# Patient Record
Sex: MALE | Race: WHITE | NOT HISPANIC OR LATINO | Employment: OTHER | URBAN - METROPOLITAN AREA
[De-identification: names, ages, dates, MRNs, and addresses within clinical notes are randomized per-mention and may not be internally consistent; named-entity substitution may affect disease eponyms.]

---

## 2017-04-25 ENCOUNTER — GENERIC CONVERSION - ENCOUNTER (OUTPATIENT)
Dept: OTHER | Facility: OTHER | Age: 67
End: 2017-04-25

## 2017-05-11 ENCOUNTER — GENERIC CONVERSION - ENCOUNTER (OUTPATIENT)
Dept: OTHER | Facility: OTHER | Age: 67
End: 2017-05-11

## 2017-05-22 ENCOUNTER — ALLSCRIPTS OFFICE VISIT (OUTPATIENT)
Dept: OTHER | Facility: OTHER | Age: 67
End: 2017-05-22

## 2017-11-28 ENCOUNTER — ALLSCRIPTS OFFICE VISIT (OUTPATIENT)
Dept: OTHER | Facility: OTHER | Age: 67
End: 2017-11-28

## 2017-11-28 ENCOUNTER — APPOINTMENT (OUTPATIENT)
Dept: LAB | Facility: HOSPITAL | Age: 67
End: 2017-11-28
Payer: MEDICARE

## 2017-11-28 DIAGNOSIS — R97.20 ELEVATED PROSTATE SPECIFIC ANTIGEN (PSA): ICD-10-CM

## 2017-11-28 DIAGNOSIS — R35.0 FREQUENCY OF MICTURITION: ICD-10-CM

## 2017-11-28 DIAGNOSIS — J20.9 ACUTE BRONCHITIS: ICD-10-CM

## 2017-11-28 LAB
BACTERIA UR QL AUTO: ABNORMAL /HPF
BILIRUB UR QL STRIP: NEGATIVE
BILIRUB UR QL STRIP: NEGATIVE
CLARITY UR: CLEAR
CLARITY UR: NORMAL
COLOR UR: YELLOW
COLOR UR: YELLOW
GLUCOSE (HISTORICAL): NEGATIVE
GLUCOSE UR STRIP-MCNC: NEGATIVE MG/DL
HGB UR QL STRIP.AUTO: ABNORMAL
HGB UR QL STRIP.AUTO: POSITIVE
HYALINE CASTS #/AREA URNS LPF: ABNORMAL /LPF
KETONES UR STRIP-MCNC: NEGATIVE MG/DL
KETONES UR STRIP-MCNC: NEGATIVE MG/DL
LEUKOCYTE ESTERASE UR QL STRIP: ABNORMAL
LEUKOCYTE ESTERASE UR QL STRIP: NEGATIVE
NITRITE UR QL STRIP: NEGATIVE
NITRITE UR QL STRIP: NEGATIVE
NON-SQ EPI CELLS URNS QL MICRO: ABNORMAL /HPF
PH UR STRIP.AUTO: 5 [PH]
PH UR STRIP.AUTO: 6 [PH] (ref 4.5–8)
PROT UR STRIP-MCNC: 30 MG/DL
PROT UR STRIP-MCNC: ABNORMAL MG/DL
RBC #/AREA URNS AUTO: ABNORMAL /HPF
SP GR UR STRIP.AUTO: 1.01
SP GR UR STRIP.AUTO: 1.02 (ref 1–1.03)
UROBILINOGEN UR QL STRIP.AUTO: 0.2
UROBILINOGEN UR QL STRIP.AUTO: 1 E.U./DL
WBC #/AREA URNS AUTO: ABNORMAL /HPF

## 2017-11-28 PROCEDURE — 81001 URINALYSIS AUTO W/SCOPE: CPT

## 2017-11-28 PROCEDURE — 87086 URINE CULTURE/COLONY COUNT: CPT

## 2017-11-29 NOTE — PROGRESS NOTES
Assessment    1  Pharyngitis (462) (J02 9)   2  Acute bronchitis (466 0) (J20 9)   3  Frequent urination (788 41) (R35 0)   4  Elevated PSA (790 93) (R97 20)    Plan  Acute bronchitis    · Promethazine-Codeine 6 25-10 MG/5ML Oral Syrup; TAKE 5 ML EVERY 4 TO 6HOURS AS NEEDED FOR COUGH   · * XR CHEST PA & LATERAL; Status:Active; Requested for:28Nov2017;   Elevated PSA    · (1) PSA, DIAGNOSTIC (FOLLOW-UP); Status:Active; Requested for:29Nov2017;   Frequent urination    · (1) CBC/PLT/DIFF; Status:Active; Requested for:29Nov2017;    · (1) COMPREHENSIVE METABOLIC PANEL; Status:Active; Requested for:29Nov2017;    · (1) URINALYSIS WITH MICROSCOPIC; Status:Active; Requested for:28Nov2017;    · (1) URINE CULTURE; Source:Urine, Clean Catch; Status:Active; Requestedfor:28Nov2017;    · Urine Dip Non-Automated- POC; Status:Complete;   Done: [id number] 04:32PM    Discussion/Summary    Strep screen negative here today - urine dip is fine other than some blood, we will  a full urinalysis along with culture regarding urinary frequency and slight burning, he has seen Urology in the past, has not seen them in a few years, after discussion he will redo PSA along with other blood work, do this fasting  We discussed with his symptoms this appears viral, he can use codeine cough medicine, this should help with cough at night along with some soothing for his throat, he does have ulcerations pharyngeal  He can use ibuprofen 600 mg every 6 hours with food as needed watching for stomach upset  Increase fluids, do deep breathing  If he would develop a fever / temperature 100Â° or above, increased cough or worsening he should call, I did give slip to do chest x-ray if he does worsen or if cough continues x1 week  will see Cardiology again in about a month  He should set up a Medicare physical with us at his convenience        Chief Complaint  sore throat about 4 days ago, productive cough, head cold      History of Present Illness  HPI: sore throat w deep productive cough x 4 days - some upper congestion  urinary freq last night- ' peed 11 times' - very minimal discomfort w urinationfeeling ok prior to this- 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 Cardiology back in April - History of neurocardiogenic syncope, none recent  Has had no blood work recently  Relates otherwise has been feeling as at baseline  Regarding medication he does use his aspirin, lovastatin  He has only been using metoprolol tartrate 25 once daily  saw Urology ? 3 yrs ago- past elevated PSA w neg biopsy by hx - no PSA since      Review of Systems   Constitutional: as noted in HPI,-- no fever,-- no recent weight gain-- and-- no recent weight loss  ENT: sore throat, but-- as noted in HPI,-- no earache-- and-- no nosebleeds  Cardiovascular: past hx palps- no worse, but-- the heart rate was not slow,-- no chest pain,-- the heart rate was not fast-- and-- no lower extremity edema  Respiratory: cough, but-- as noted in HPI,-- no shortness of breath,-- no orthopnea-- and-- no wheezing  Gastrointestinal: no change bowel, but-- no abdominal pain,-- no nausea,-- no vomiting-- and-- no blood in stools  Genitourinary: as noted in HPI  Neurological: as noted in HPI,-- no headache,-- no confusion,-- no dizziness-- and-- no fainting  Active Problems  1  Abdominal hernia (553 9) (K46 9)   2  Benign essential hypertension (401 1) (I10)   3  Coccydynia (724 79) (M53 3)   4  Elevated PSA (790 93) (R97 20)   5  Hypercholesterolemia (272 0) (E78 00)   6  Insomnia (780 52) (G47 00)    Past Medical History  1  History of SVT (supraventricular tachycardia) (427 89) (I47 1)  Active Problems And Past Medical History Reviewed: The active problems and past medical history were reviewed and updated today  Family History  Mother    1  Family history of Alzheimer disease   2  Family history of Cancer of esophagus  Father    3  Family history of Diabetes  Sister    4  Family history of Diabetes   5  Family history of Kidney failure    Social History   · Full-time employment   ·    ·    · Never a smoker   · No alcohol use   · No drug use  The social history was reviewed and updated today  Surgical History  1  History of Complete Colonoscopy   2  History of Inguinal Hernia Repair  Surgical History Reviewed: The surgical history was reviewed and updated today  Current Meds   1  Aspirin 81 MG TABS; Take 1 tablet daily Recorded   2  Lovastatin 40 MG Oral Tablet; TAKE 1 TABLET AT BEDTIME Recorded   3  Metoprolol Tartrate 25 MG Oral Tablet; Take 1 tablet twice daily Recorded   4  Simply Sleep TABS; TAKE 1 TABLET AT BEDTIME Recorded    The medication list was reviewed and updated today  Allergies  1  No Known Drug Allergies    Vitals   Recorded: 66YWB2214 03:46PM   Temperature 97 6 F   Heart Rate 74   Systolic 761   Diastolic 74   Height 6 ft    Weight 236 lb    BMI Calculated 32 01   BSA Calculated 2 29       Physical Exam   Constitutional Afebrile, seated no acute distress other than co   Eyes  Conjunctiva and lids: No swelling, erythema, or discharge  -- no pallor, no icterus  Ears, Nose, Mouth, and Throat  Otoscopic examination: Tympanic membrance translucent with normal light reflex  Canals patent without erythema  -- moderate inflammation with redness/ ulcerations uvula, upper right palate, pharyngeal, no exudate  Pulmonary  Auscultation of lungs: Clear to auscultation, equal breath sounds bilaterally, no wheezes, no rales, no rhonci  Cardiovascular regular rate and rhythm, 1/6 systolic ejection murmur left sternal border  -- no ankle edema  Lymphatic  Palpation of lymph nodes in neck: No lymphadenopathy     Psychiatric  Orientation to person, place and time: Normal    Mood and affect: Normal          Results/Data  Urine Dip Non-Automated- POC [id number] 04:32PM Verline Mace     Test Name Result Flag Reference   Color Yellow       Clarity Transparent     Leukocytes negative     Nitrite negative     Blood positive     Bilirubin negative     Urobilinogen 0 2     Protein 30     Ph 5 0     Specific Gravity 1 015     Ketone negative     Glucose negative           Signatures   Electronically signed by : Nawaf Logan DO; Nov 28 2017  5:27PM EST                       (Author)

## 2017-11-30 ENCOUNTER — APPOINTMENT (OUTPATIENT)
Dept: RADIOLOGY | Facility: MEDICAL CENTER | Age: 67
End: 2017-11-30
Payer: MEDICARE

## 2017-11-30 ENCOUNTER — GENERIC CONVERSION - ENCOUNTER (OUTPATIENT)
Dept: OTHER | Facility: OTHER | Age: 67
End: 2017-11-30

## 2017-11-30 ENCOUNTER — APPOINTMENT (OUTPATIENT)
Dept: LAB | Facility: MEDICAL CENTER | Age: 67
End: 2017-11-30
Payer: MEDICARE

## 2017-11-30 ENCOUNTER — TRANSCRIBE ORDERS (OUTPATIENT)
Dept: ADMINISTRATIVE | Facility: HOSPITAL | Age: 67
End: 2017-11-30

## 2017-11-30 DIAGNOSIS — J20.9 ACUTE BRONCHITIS: ICD-10-CM

## 2017-11-30 DIAGNOSIS — R35.0 FREQUENCY OF MICTURITION: ICD-10-CM

## 2017-11-30 DIAGNOSIS — R97.20 ELEVATED PROSTATE SPECIFIC ANTIGEN (PSA): ICD-10-CM

## 2017-11-30 LAB
ALBUMIN SERPL BCP-MCNC: 3.6 G/DL (ref 3.5–5)
ALP SERPL-CCNC: 87 U/L (ref 46–116)
ALT SERPL W P-5'-P-CCNC: 19 U/L (ref 12–78)
ANION GAP SERPL CALCULATED.3IONS-SCNC: 7 MMOL/L (ref 4–13)
AST SERPL W P-5'-P-CCNC: 15 U/L (ref 5–45)
BACTERIA UR CULT: NORMAL
BASOPHILS # BLD AUTO: 0.04 THOUSANDS/ΜL (ref 0–0.1)
BASOPHILS NFR BLD AUTO: 0 % (ref 0–1)
BILIRUB SERPL-MCNC: 0.96 MG/DL (ref 0.2–1)
BUN SERPL-MCNC: 17 MG/DL (ref 5–25)
CALCIUM SERPL-MCNC: 9.3 MG/DL (ref 8.3–10.1)
CHLORIDE SERPL-SCNC: 103 MMOL/L (ref 100–108)
CO2 SERPL-SCNC: 28 MMOL/L (ref 21–32)
CREAT SERPL-MCNC: 1.09 MG/DL (ref 0.6–1.3)
EOSINOPHIL # BLD AUTO: 0.45 THOUSAND/ΜL (ref 0–0.61)
EOSINOPHIL NFR BLD AUTO: 4 % (ref 0–6)
ERYTHROCYTE [DISTWIDTH] IN BLOOD BY AUTOMATED COUNT: 13 % (ref 11.6–15.1)
GFR SERPL CREATININE-BSD FRML MDRD: 70 ML/MIN/1.73SQ M
GLUCOSE P FAST SERPL-MCNC: 99 MG/DL (ref 65–99)
HCT VFR BLD AUTO: 44.7 % (ref 36.5–49.3)
HGB BLD-MCNC: 15.2 G/DL (ref 12–17)
LYMPHOCYTES # BLD AUTO: 1.56 THOUSANDS/ΜL (ref 0.6–4.47)
LYMPHOCYTES NFR BLD AUTO: 13 % (ref 14–44)
MCH RBC QN AUTO: 30.6 PG (ref 26.8–34.3)
MCHC RBC AUTO-ENTMCNC: 34 G/DL (ref 31.4–37.4)
MCV RBC AUTO: 90 FL (ref 82–98)
MONOCYTES # BLD AUTO: 1.4 THOUSAND/ΜL (ref 0.17–1.22)
MONOCYTES NFR BLD AUTO: 12 % (ref 4–12)
NEUTROPHILS # BLD AUTO: 8.19 THOUSANDS/ΜL (ref 1.85–7.62)
NEUTS SEG NFR BLD AUTO: 71 % (ref 43–75)
NRBC BLD AUTO-RTO: 0 /100 WBCS
PLATELET # BLD AUTO: 218 THOUSANDS/UL (ref 149–390)
PMV BLD AUTO: 11.2 FL (ref 8.9–12.7)
POTASSIUM SERPL-SCNC: 4.1 MMOL/L (ref 3.5–5.3)
PROT SERPL-MCNC: 7.6 G/DL (ref 6.4–8.2)
PSA SERPL-MCNC: 5.4 NG/ML (ref 0–4)
RBC # BLD AUTO: 4.96 MILLION/UL (ref 3.88–5.62)
SODIUM SERPL-SCNC: 138 MMOL/L (ref 136–145)
WBC # BLD AUTO: 11.66 THOUSAND/UL (ref 4.31–10.16)

## 2017-11-30 PROCEDURE — 71020 HB CHEST X-RAY 2VW FRONTAL&LATL: CPT

## 2017-11-30 PROCEDURE — 84153 ASSAY OF PSA TOTAL: CPT

## 2017-11-30 PROCEDURE — 36415 COLL VENOUS BLD VENIPUNCTURE: CPT

## 2017-11-30 PROCEDURE — 85025 COMPLETE CBC W/AUTO DIFF WBC: CPT

## 2017-11-30 PROCEDURE — 80053 COMPREHEN METABOLIC PANEL: CPT

## 2017-12-21 ENCOUNTER — GENERIC CONVERSION - ENCOUNTER (OUTPATIENT)
Dept: OTHER | Facility: OTHER | Age: 67
End: 2017-12-21

## 2018-01-09 NOTE — RESULT NOTES
Verified Results  * XR CHEST PA & LATERAL [id number] 09:12AM [facility] Order Number: [id number]   Performing Comments: do chest xray if any worse or if cough past 1 week     Test Name Result Flag Reference   XR CHEST PA & LATERAL (Report)     CHEST      INDICATION: Productive cough     COMPARISON: None     VIEWS: Frontal and lateral projections     IMAGES: 3     FINDINGS:        Cardiomediastinal silhouette appears unremarkable  There is minimal subsegmental atelectasis or scar, left base  Lungs otherwise clear  Pulmonary vascularity normal  No pleural effusion, no pneumothorax  Visualized osseous structures appear within normal limits for the patient's age  IMPRESSION:     No active pulmonary disease         Workstation performed: [id number]     Signed by:   Cesar Larson MD   11/30/17     (1) URINALYSIS WITH MICROSCOPIC [id number] 09:29PM [facility] Order Number: [id number]     Test Name Result Flag Reference   COLOR Yellow     CLARITY Clear     PH UA 6 0  4 5-8 0   LEUKOCYTE ESTERASE UA Trace A Negative   NITRITE UA Negative  Negative   PROTEIN UA 30 (1+) mg/dl A Negative   GLUCOSE UA Negative mg/dl  Negative   KETONES UA Negative mg/dl  Negative   UROBILINOGEN UA 1 0 E U /dl  0 2, 1 0 E U /dl   BILIRUBIN UA Negative  Negative   BLOOD UA Trace A Negative   SPECIFIC GRAVITY UA 1 019  1 003-1 030   WBC UA None Seen /hpf  None Seen, 0-5, 5-55, 5-65   RBC UA 4-10 /hpf A None Seen, 0-5   HYALINE CASTS None Seen /lpf  None Seen   BACTERIA None Seen /hpf  None Seen, Occasional   EPITHELIAL CELLS None Seen /hpf  None Seen, Occasional     (1) URINE CULTURE 28Nov2017 09:29PM [facility] Order Number: [id number]     Test Name Result Flag Reference   CLINICAL REPORT (Report)     Test:        Urine culture  Specimen Type:   Urine  Specimen Date:   11/28/2017 9:29 PM  Result Date:    11/30/2017 8:15 AM  Result Status:   Final result  Resulting Lab:   [facility] [id number] [facility]            Tel: [phone]      CULTURE                                       ------------------                                   No Growth <1000 cfu/mL

## 2018-01-10 NOTE — RESULT NOTES
Verified Results  (1) CBC/PLT/DIFF [id number] 09:26AM Kaley Escobedo Order Number: [id number]     Test Name Result Flag Reference   WBC COUNT 11 66 Thousand/uL H 4 31-10 16   RBC COUNT 4 96 Million/uL  3 88-5 62   HEMOGLOBIN 15 2 g/dL  12 0-17 0   HEMATOCRIT 44 7 %  36 5-49 3   MCV 90 fL  82-98   MCH 30 6 pg  26 8-34 3   MCHC 34 0 g/dL  31 4-37 4   RDW 13 0 %  11 6-15 1   MPV 11 2 fL  8 9-12 7   PLATELET COUNT 339 Thousands/uL  149-390   nRBC AUTOMATED 0 /100 WBCs     NEUTROPHILS RELATIVE PERCENT 71 %  43-75   LYMPHOCYTES RELATIVE PERCENT 13 % L 14-44   MONOCYTES RELATIVE PERCENT 12 %  4-12   EOSINOPHILS RELATIVE PERCENT 4 %  0-6   BASOPHILS RELATIVE PERCENT 0 %  0-1   NEUTROPHILS ABSOLUTE COUNT 8 19 Thousands/? ??L H 1 85-7 62   LYMPHOCYTES ABSOLUTE COUNT 1 56 Thousands/? ??L  0 60-4 47   MONOCYTES ABSOLUTE COUNT 1 40 Thousand/? ??L H 0 17-1 22   EOSINOPHILS ABSOLUTE COUNT 0 45 Thousand/? ??L  0 00-0 61   BASOPHILS ABSOLUTE COUNT 0 04 Thousands/? ??L  0 00-0 10     (1) COMPREHENSIVE METABOLIC PANEL [id number] 76:11HE Kaley Escobedo Order Number: [id number]     Test Name Result Flag Reference   SODIUM 138 mmol/L  136-145   POTASSIUM 4 1 mmol/L  3 5-5 3   CHLORIDE 103 mmol/L  100-108   CARBON DIOXIDE 28 mmol/L  21-32   ANION GAP (CALC) 7 mmol/L  4-13   BLOOD UREA NITROGEN 17 mg/dL  5-25   CREATININE 1 09 mg/dL  0 60-1 30   Standardized to IDMS reference method   CALCIUM 9 3 mg/dL  8 3-10 1   BILI, TOTAL 0 96 mg/dL  0 20-1 00   ALK PHOSPHATAS 87 U/L     ALT (SGPT) 19 U/L  12-78   Specimen collection should occur prior to Sulfasalazine and/or Sulfapyridine administration due to the potential for falsely depressed results  AST(SGOT) 15 U/L  5-45   Specimen collection should occur prior to Sulfasalazine administration due to the potential for falsely depressed results     ALBUMIN 3 6 g/dL  3 5-5 0   TOTAL PROTEIN 7 6 g/dL  6 4-8 2   eGFR 70 ml/min/1 73sq m     National Kidney Disease Education Program recommendations are as follows:  GFR calculation is accurate only with a steady state creatinine  Chronic Kidney disease less than 60 ml/min/1 73 sq  meters  Kidney failure less than 15 ml/min/1 73 sq  meters  GLUCOSE FASTING 99 mg/dL  65-99   Specimen collection should occur prior to Sulfasalazine administration due to the potential for falsely depressed results  Specimen collection should occur prior to Sulfapyridine administration due to the potential for falsely elevated results  (1) PSA, DIAGNOSTIC (FOLLOW-UP) [id number] 09:26AM Neva Giraldo Order Number: [id number]     Test Name Result Flag Reference   PSA 5 4 ng/mL H 0 0-4 0   American Urological Association Guidelines define biochemical recurrence of prostate cancer as a detectable or rising PSA value post-radical prostatectomy that is greater than or equal to 0 2 ng/mL with a second confirmatory level of greater than or equal to 0 2 ng/mL

## 2018-01-13 VITALS
HEART RATE: 38 BPM | WEIGHT: 236.05 LBS | BODY MASS INDEX: 31.97 KG/M2 | HEIGHT: 72 IN | RESPIRATION RATE: 20 BRPM | DIASTOLIC BLOOD PRESSURE: 40 MMHG | SYSTOLIC BLOOD PRESSURE: 140 MMHG

## 2018-01-13 VITALS
TEMPERATURE: 97.6 F | HEART RATE: 74 BPM | HEIGHT: 72 IN | DIASTOLIC BLOOD PRESSURE: 74 MMHG | BODY MASS INDEX: 31.97 KG/M2 | WEIGHT: 236 LBS | SYSTOLIC BLOOD PRESSURE: 136 MMHG

## 2018-01-24 VITALS
OXYGEN SATURATION: 97 % | TEMPERATURE: 97.5 F | BODY MASS INDEX: 31.88 KG/M2 | DIASTOLIC BLOOD PRESSURE: 70 MMHG | HEIGHT: 72 IN | SYSTOLIC BLOOD PRESSURE: 128 MMHG | WEIGHT: 235.38 LBS | HEART RATE: 56 BPM

## 2018-05-22 RX ORDER — PROMETHAZINE HYDROCHLORIDE AND CODEINE PHOSPHATE 6.25; 1 MG/5ML; MG/5ML
5 SYRUP ORAL
COMMUNITY
Start: 2017-11-28 | End: 2018-05-23

## 2018-05-22 RX ORDER — PREDNISONE 10 MG/1
TABLET ORAL
COMMUNITY
Start: 2017-12-21 | End: 2018-05-23

## 2018-05-22 RX ORDER — AMOXICILLIN 875 MG/1
1 TABLET, COATED ORAL EVERY 12 HOURS
COMMUNITY
Start: 2017-11-30 | End: 2018-05-23

## 2018-05-22 RX ORDER — LOVASTATIN 40 MG/1
1 TABLET ORAL
COMMUNITY

## 2018-05-23 ENCOUNTER — OFFICE VISIT (OUTPATIENT)
Dept: FAMILY MEDICINE CLINIC | Facility: CLINIC | Age: 68
End: 2018-05-23
Payer: MEDICARE

## 2018-05-23 VITALS
RESPIRATION RATE: 18 BRPM | HEART RATE: 80 BPM | SYSTOLIC BLOOD PRESSURE: 130 MMHG | BODY MASS INDEX: 31.81 KG/M2 | TEMPERATURE: 98.5 F | HEIGHT: 73 IN | WEIGHT: 240 LBS | DIASTOLIC BLOOD PRESSURE: 78 MMHG

## 2018-05-23 DIAGNOSIS — M65.30 TRIGGER FINGER OF RIGHT HAND, UNSPECIFIED FINGER: Primary | ICD-10-CM

## 2018-05-23 PROCEDURE — 99213 OFFICE O/P EST LOW 20 MIN: CPT | Performed by: FAMILY MEDICINE

## 2018-05-23 NOTE — PATIENT INSTRUCTIONS
Trigger finger of right hand, unspecified finger    Other orders  -     Ambulatory referral to Sports Medicine; Future      After discussion with the patient we have left off at 2 options, we will see if we can get him in with Dr Asif Oconnor on Friday who could try to inject his trigger fingers on his 2nd and 5th fingers of the right hand, if we are unable to get him onto that schedule I have also given him a referral to Sports Medicine, I would advise him to call Sports Medicine in the when their earliest available appointment might be and if his symptoms are worsening he may need to see the orthopedic hand surgeon who is associated with [facility]      In the meantime it would be okay to take an anti-inflammatory such as naproxen or ibuprofen to help with some of that pain especially in the morning when it is worse

## 2018-05-23 NOTE — PROGRESS NOTES
Assessment/Plan:     Diagnoses and all orders for this visit:    Trigger finger of right hand, unspecified finger  -     Ambulatory referral to Sports Medicine; Future      After discussion with the patient we have left off at 2 options, we will see if we can get him in with Dr Mane Rogers on Friday who could try to inject his trigger fingers on his 2nd and 5th fingers of the right hand, if we are unable to get him onto that schedule I have also given him a referral to Sports Medicine, I would advise him to call Sports Medicine in the when their earliest available appointment might be and if his symptoms are worsening he may need to see the orthopedic hand surgeon who is associated with [facility]  In the meantime it would be okay to take an anti-inflammatory such as naproxen or ibuprofen to help with some of that pain especially in the morning when it is worse    Subjective:      Patient ID: Shaunna Sun is a 76 y o  male  Hand Pain    The incident occurred more than 1 week ago  There was no injury mechanism  The pain is present in the right fingers  The quality of the pain is described as burning (triggering)  The pain does not radiate  The pain is moderate  The pain has been intermittent since the incident  Pertinent negatives include no chest pain, muscle weakness, numbness or tingling  Exacerbated by: worse in the AM, worse with fine motor skills  He has tried nothing for the symptoms  this is a 60-year-old man who is here today with to triggering fingers, both on the right hand, the patient is right-hand dominant and he works as a , drummer, pianist, and   He has an upcoming event this weekend will he will be painting and taking photos, and  His 2nd and 5th digits on the right hand are triggering    He had a history of triggering digits on the right hand, his 3rd and 4th digits, and he has had surgery on both of those, he previously saw hand surgeon and Carlito who is now retired  The following portions of the patient's history were reviewed and updated as appropriate: allergies, current medications, past family history, past medical history, past social history, past surgical history and problem list     Review of Systems   Constitutional: Positive for activity change  Cardiovascular: Negative for chest pain  Musculoskeletal: Positive for arthralgias  Skin: Negative for rash  Neurological: Negative for tingling and numbness  All other systems reviewed and are negative  Objective:      /78   Pulse 80   Temp 98 5 °F (36 9 °C)   Resp 18   Ht 6' 1" (1 854 m)   Wt 109 kg (240 lb)   BMI 31 66 kg/m²          Physical Exam   Constitutional: He is oriented to person, place, and time  He appears well-developed and well-nourished  No distress  Eyes: Conjunctivae and EOM are normal    Pulmonary/Chest: Effort normal and breath sounds normal  No respiratory distress  Abdominal: He exhibits no distension  Musculoskeletal: He exhibits tenderness (Proximal  5th digit on the palmar aspect has some mild tenderness with palpation, no significant palpable defects deformity is no overlying redness, no signs of infection or injury,  2nd digit is triggering in office intermittently)  Neurological: He is alert and oriented to person, place, and time  Skin: Skin is warm and dry  No rash noted  He is not diaphoretic  Psychiatric: He has a normal mood and affect  His behavior is normal    Vitals reviewed

## 2019-08-20 ENCOUNTER — OFFICE VISIT (OUTPATIENT)
Dept: FAMILY MEDICINE CLINIC | Facility: CLINIC | Age: 69
End: 2019-08-20
Payer: MEDICARE

## 2019-08-20 VITALS
HEIGHT: 72 IN | HEART RATE: 44 BPM | DIASTOLIC BLOOD PRESSURE: 62 MMHG | OXYGEN SATURATION: 95 % | TEMPERATURE: 96.9 F | WEIGHT: 239.2 LBS | BODY MASS INDEX: 32.4 KG/M2 | SYSTOLIC BLOOD PRESSURE: 150 MMHG

## 2019-08-20 DIAGNOSIS — I10 BENIGN ESSENTIAL HYPERTENSION: ICD-10-CM

## 2019-08-20 DIAGNOSIS — E78.00 HYPERCHOLESTEROLEMIA: ICD-10-CM

## 2019-08-20 DIAGNOSIS — R97.20 ELEVATED PSA: ICD-10-CM

## 2019-08-20 DIAGNOSIS — M25.532 ACUTE PAIN OF LEFT WRIST: Primary | ICD-10-CM

## 2019-08-20 PROBLEM — R31.29 HEMATURIA, MICROSCOPIC: Status: ACTIVE | Noted: 2017-11-29

## 2019-08-20 PROBLEM — K46.9 ABDOMINAL HERNIA: Status: ACTIVE | Noted: 2017-05-22

## 2019-08-20 PROBLEM — I35.1 NONRHEUMATIC AORTIC VALVE INSUFFICIENCY: Status: ACTIVE | Noted: 2019-06-19

## 2019-08-20 PROBLEM — R80.9 PROTEINURIA: Status: ACTIVE | Noted: 2017-11-30

## 2019-08-20 PROCEDURE — 99213 OFFICE O/P EST LOW 20 MIN: CPT | Performed by: FAMILY MEDICINE

## 2019-08-20 RX ORDER — MULTIVITAMIN WITH IRON
1 TABLET ORAL DAILY
COMMUNITY

## 2019-08-20 NOTE — PATIENT INSTRUCTIONS
Radial wrist pain on left, atraumatic, tenosynovitis  I would like him to use diclofenac gel 2 to 3 times daily next 4 to 5 days, if not improving I would like him to x-ray wrist, consider evaluation with hand specialist   Can continue with wrist wrap as is  He did see Cardiology back in June, had BMP  Has bradycardia associated with metoprolol  Asymptomatic  I would like him to do fasting blood work along with urinalysis  Also include PSA, had elevated PSA in the past, 2014 neg biopsy  Should set up a regular annual wellness check at his convenience

## 2019-08-20 NOTE — PROGRESS NOTES
FAMILY PRACTICE OFFICE VISIT  Devyn DUENAS O  [facility]  [address] [facility]  [address] [place], [place], [address]      NAME: Heri Collazo  AGE: 71 y o  SEX: male  : 1950   MRN: [id number]    DATE: 2019  TIME: 4:12 PM    Assessment and Plan     Problem List Items Addressed This Visit        Cardiovascular and Mediastinum    Benign essential hypertension    Relevant Orders    Urinalysis with microscopic       Other    Elevated PSA w neg Bx     Relevant Orders    PSA Total, Diagnostic    Hypercholesterolemia    Relevant Orders    Lipid panel    ALT    AST      Other Visit Diagnoses     Acute pain of left wrist    -  Primary    Relevant Medications    diclofenac sodium (VOLTAREN) 1 %    Other Relevant Orders    XR wrist 3+ vw left          Patient Instructions   Radial wrist pain on left, atraumatic, tenosynovitis  I would like him to use diclofenac gel 2 to 3 times daily next 4 to 5 days, if not improving I would like him to x-ray wrist, consider evaluation with hand specialist   Can continue with wrist wrap as is  He did see Cardiology back in , had BMP  Has bradycardia associated with metoprolol  Asymptomatic  I would like him to do fasting blood work along with urinalysis  Also include PSA, had elevated PSA in the past,  neg biopsy  Should set up a regular annual wellness check at his convenience        Chief Complaint     Chief Complaint   Patient presents with    Wrist Pain       History of Present Illness   Heri Collazo is a righthanded  71y o -year-old male who has radial left wrist pain x 1 mo -  No fall or trauma      Other jts ok  Used Aleve BID x 3 days w some benfit    Frequent use hands-   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      Review of Systems   Review of Systems   Constitutional:        Otherwise has felt okay, no fever chills, no weight loss, no chest pain, shortness of breath, abdominal pain or change in urination  No headaches or lightheadedness  Active Problem List     Patient Active Problem List   Diagnosis    Abdominal hernia    Benign essential hypertension    Proteinuria    Nonrheumatic aortic valve insufficiency    Neurocardiogenic syncope    Insomnia    Hypercholesterolemia    Hematuria, microscopic    Elevated PSA w neg Bx 2014    Coronary atherosclerosis       Past Medical History:  Reviewed    Past Surgical History:  Reviewed    Family History:  Reviewed    Social History:  Reviewed    Objective     Vitals:    08/20/19 1345   BP: 150/62   BP Location: Left arm   Patient Position: Sitting   Cuff Size: Large   Pulse: (!) 44   Temp: (!) 96 9 °F (36 1 °C)   TempSrc: Tympanic   SpO2: 95%   Weight: 109 kg (239 lb 3 2 oz)   Height: 6' (1 829 m)     Body mass index is 32 44 kg/m²  BP Readings from Last 3 Encounters:   08/20/19 150/62   05/23/18 130/78   12/21/17 128/70       Wt Readings from Last 3 Encounters:   08/20/19 109 kg (239 lb 3 2 oz)   05/23/18 109 kg (240 lb)   12/21/17 107 kg (235 lb 6 oz)       Physical Exam   Constitutional: He appears well-developed and well-nourished  No distress  Musculoskeletal:   Tender radial left wrist especially with range of motion thumb, pulse intact, no weakness         ALLERGIES:  No Known Allergies    Current Medications     Current Outpatient Medications   Medication Sig Dispense Refill    aspirin 81 MG tablet Take 1 tablet by mouth daily      diphenhydrAMINE (SIMPLY SLEEP) 25 MG tablet Take 1 tablet by mouth      lovastatin (MEVACOR) 40 MG tablet Take 1 tablet by mouth      Magnesium 250 MG TABS Take by mouth daily      metoprolol tartrate (LOPRESSOR) 25 mg tablet Take 1 tablet by mouth 2 (two) times a day      Saw Palmetto, Serenoa repens, (SAW PALMETTO PO) Take by mouth daily 250 mg      diclofenac sodium (VOLTAREN) 1 % Use left wrist 2-3 x a day as need 100 g 1     No current facility-administered medications for this visit               Orders Placed This Encounter   Procedures    XR wrist 3+ vw left    Lipid panel    ALT    AST    PSA Total, Diagnostic    Urinalysis with microscopic         Laura Schmitt DO

## 2019-08-28 ENCOUNTER — APPOINTMENT (OUTPATIENT)
Dept: RADIOLOGY | Facility: MEDICAL CENTER | Age: 69
End: 2019-08-28
Payer: MEDICARE

## 2019-08-28 DIAGNOSIS — M25.532 ACUTE PAIN OF LEFT WRIST: ICD-10-CM

## 2019-08-28 PROCEDURE — 73110 X-RAY EXAM OF WRIST: CPT

## 2019-09-06 ENCOUNTER — APPOINTMENT (OUTPATIENT)
Dept: LAB | Facility: CLINIC | Age: 69
End: 2019-09-06
Payer: MEDICARE

## 2019-09-06 DIAGNOSIS — R97.20 ELEVATED PSA: ICD-10-CM

## 2019-09-06 DIAGNOSIS — E78.00 HYPERCHOLESTEROLEMIA: ICD-10-CM

## 2019-09-06 LAB
ALT SERPL W P-5'-P-CCNC: 27 U/L (ref 12–78)
AST SERPL W P-5'-P-CCNC: 19 U/L (ref 5–45)
BACTERIA UR QL AUTO: ABNORMAL /HPF
BILIRUB UR QL STRIP: NEGATIVE
CHOLEST SERPL-MCNC: 135 MG/DL (ref 50–200)
CLARITY UR: CLEAR
COLOR UR: YELLOW
GLUCOSE UR STRIP-MCNC: NEGATIVE MG/DL
HDLC SERPL-MCNC: 33 MG/DL (ref 40–60)
HGB UR QL STRIP.AUTO: NEGATIVE
HYALINE CASTS #/AREA URNS LPF: ABNORMAL /LPF
KETONES UR STRIP-MCNC: NEGATIVE MG/DL
LDLC SERPL CALC-MCNC: 66 MG/DL (ref 0–100)
LEUKOCYTE ESTERASE UR QL STRIP: NEGATIVE
NITRITE UR QL STRIP: NEGATIVE
NON-SQ EPI CELLS URNS QL MICRO: ABNORMAL /HPF
NONHDLC SERPL-MCNC: 102 MG/DL
PH UR STRIP.AUTO: 6.5 [PH]
PROT UR STRIP-MCNC: ABNORMAL MG/DL
PSA SERPL-MCNC: 7.5 NG/ML (ref 0–4)
RBC #/AREA URNS AUTO: ABNORMAL /HPF
SP GR UR STRIP.AUTO: 1.02 (ref 1–1.03)
TRIGL SERPL-MCNC: 179 MG/DL
UROBILINOGEN UR QL STRIP.AUTO: 0.2 E.U./DL
WBC #/AREA URNS AUTO: ABNORMAL /HPF

## 2019-09-06 PROCEDURE — 84460 ALANINE AMINO (ALT) (SGPT): CPT

## 2019-09-06 PROCEDURE — 81001 URINALYSIS AUTO W/SCOPE: CPT | Performed by: FAMILY MEDICINE

## 2019-09-06 PROCEDURE — 84153 ASSAY OF PSA TOTAL: CPT

## 2019-09-06 PROCEDURE — 36415 COLL VENOUS BLD VENIPUNCTURE: CPT

## 2019-09-06 PROCEDURE — 80061 LIPID PANEL: CPT | Performed by: FAMILY MEDICINE

## 2019-09-06 PROCEDURE — 84450 TRANSFERASE (AST) (SGOT): CPT

## 2020-05-22 ENCOUNTER — OFFICE VISIT (OUTPATIENT)
Dept: FAMILY MEDICINE CLINIC | Facility: CLINIC | Age: 70
End: 2020-05-22
Payer: MEDICARE

## 2020-05-22 VITALS
HEART RATE: 70 BPM | HEIGHT: 72 IN | SYSTOLIC BLOOD PRESSURE: 148 MMHG | DIASTOLIC BLOOD PRESSURE: 76 MMHG | TEMPERATURE: 97.7 F | BODY MASS INDEX: 31.69 KG/M2 | WEIGHT: 234 LBS | OXYGEN SATURATION: 99 %

## 2020-05-22 DIAGNOSIS — Z11.59 ENCOUNTER FOR HEPATITIS C SCREENING TEST FOR LOW RISK PATIENT: ICD-10-CM

## 2020-05-22 DIAGNOSIS — Z00.00 MEDICARE ANNUAL WELLNESS VISIT, INITIAL: Primary | ICD-10-CM

## 2020-05-22 DIAGNOSIS — Z12.11 COLON CANCER SCREENING: ICD-10-CM

## 2020-05-22 DIAGNOSIS — R50.9 FEVER, UNSPECIFIED FEVER CAUSE: ICD-10-CM

## 2020-05-22 DIAGNOSIS — Z23 NEED FOR PNEUMOCOCCAL VACCINATION: ICD-10-CM

## 2020-05-22 DIAGNOSIS — Z86.79 HISTORY OF HYPERTENSION: ICD-10-CM

## 2020-05-22 DIAGNOSIS — R73.9 BORDERLINE HYPERGLYCEMIA: ICD-10-CM

## 2020-05-22 PROBLEM — R33.9 URINARY RETENTION: Status: ACTIVE | Noted: 2020-05-22

## 2020-05-22 PROBLEM — M19.019 LOCALIZED, PRIMARY OSTEOARTHRITIS OF SHOULDER REGION: Status: ACTIVE | Noted: 2020-05-22

## 2020-05-22 PROBLEM — I48.0 PAROXYSMAL ATRIAL FIBRILLATION (HCC): Status: ACTIVE | Noted: 2020-02-06

## 2020-05-22 PROBLEM — I34.0 NONRHEUMATIC MITRAL VALVE REGURGITATION: Status: ACTIVE | Noted: 2020-02-06

## 2020-05-22 PROCEDURE — 1160F RVW MEDS BY RX/DR IN RCRD: CPT | Performed by: FAMILY MEDICINE

## 2020-05-22 PROCEDURE — 1123F ACP DISCUSS/DSCN MKR DOCD: CPT | Performed by: FAMILY MEDICINE

## 2020-05-22 PROCEDURE — 3077F SYST BP >= 140 MM HG: CPT | Performed by: FAMILY MEDICINE

## 2020-05-22 PROCEDURE — 1125F AMNT PAIN NOTED PAIN PRSNT: CPT | Performed by: FAMILY MEDICINE

## 2020-05-22 PROCEDURE — 3078F DIAST BP <80 MM HG: CPT | Performed by: FAMILY MEDICINE

## 2020-05-22 PROCEDURE — 1036F TOBACCO NON-USER: CPT | Performed by: FAMILY MEDICINE

## 2020-05-22 PROCEDURE — G0438 PPPS, INITIAL VISIT: HCPCS | Performed by: FAMILY MEDICINE

## 2020-05-22 PROCEDURE — G0009 ADMIN PNEUMOCOCCAL VACCINE: HCPCS | Performed by: FAMILY MEDICINE

## 2020-05-22 PROCEDURE — 3008F BODY MASS INDEX DOCD: CPT | Performed by: FAMILY MEDICINE

## 2020-05-22 PROCEDURE — 90670 PCV13 VACCINE IM: CPT | Performed by: FAMILY MEDICINE

## 2020-05-22 PROCEDURE — 4040F PNEUMOC VAC/ADMIN/RCVD: CPT | Performed by: FAMILY MEDICINE

## 2020-05-22 PROCEDURE — 1170F FXNL STATUS ASSESSED: CPT | Performed by: FAMILY MEDICINE

## 2020-05-22 RX ORDER — TAMSULOSIN HYDROCHLORIDE 0.4 MG/1
CAPSULE ORAL
COMMUNITY
Start: 2020-05-05

## 2020-06-05 LAB
EXTERNAL SARS COV-2 ANTIBODY IGG: NEGATIVE
HBA1C MFR BLD HPLC: 5.6 %
HCV AB SER-ACNC: NEGATIVE

## 2021-01-25 ENCOUNTER — APPOINTMENT (OUTPATIENT)
Dept: RADIOLOGY | Facility: MEDICAL CENTER | Age: 71
End: 2021-01-25
Payer: MEDICARE

## 2021-01-25 ENCOUNTER — OFFICE VISIT (OUTPATIENT)
Dept: URGENT CARE | Facility: MEDICAL CENTER | Age: 71
End: 2021-01-25
Payer: MEDICARE

## 2021-01-25 VITALS
OXYGEN SATURATION: 96 % | HEART RATE: 70 BPM | WEIGHT: 235 LBS | BODY MASS INDEX: 31.14 KG/M2 | TEMPERATURE: 98.5 F | SYSTOLIC BLOOD PRESSURE: 148 MMHG | RESPIRATION RATE: 20 BRPM | DIASTOLIC BLOOD PRESSURE: 72 MMHG | HEIGHT: 73 IN

## 2021-01-25 DIAGNOSIS — S29.9XXA RIB INJURY: ICD-10-CM

## 2021-01-25 DIAGNOSIS — S22.31XA CLOSED FRACTURE OF ONE RIB OF RIGHT SIDE, INITIAL ENCOUNTER: Primary | ICD-10-CM

## 2021-01-25 PROCEDURE — 99213 OFFICE O/P EST LOW 20 MIN: CPT | Performed by: PHYSICIAN ASSISTANT

## 2021-01-25 PROCEDURE — 71101 X-RAY EXAM UNILAT RIBS/CHEST: CPT

## 2021-01-25 PROCEDURE — G0463 HOSPITAL OUTPT CLINIC VISIT: HCPCS | Performed by: PHYSICIAN ASSISTANT

## 2021-01-25 RX ORDER — TAMSULOSIN HYDROCHLORIDE 0.4 MG/1
0.4 CAPSULE ORAL
COMMUNITY
Start: 2020-04-07 | End: 2021-01-28

## 2021-01-25 RX ORDER — METHOCARBAMOL 500 MG/1
500 TABLET, FILM COATED ORAL 3 TIMES DAILY
Qty: 20 TABLET | Refills: 0 | OUTPATIENT
Start: 2021-01-25 | End: 2021-01-28

## 2021-01-25 NOTE — PROGRESS NOTES
[facility]        NAME: Juliano Pereira is a 79 y o  male  : 1950    MRN: [id number]  DATE: 2021  TIME: 1:17 PM    /72   Pulse 70   Temp 98 5 °F (36 9 °C)   Resp 20   Ht 6' 1" (1 854 m)   Wt 107 kg (235 lb)   SpO2 96%   BMI 31 00 kg/m²     Assessment and Plan   Closed fracture of one rib of right side, initial encounter [S22 31XA]  1  Closed fracture of one rib of right side, initial encounter  methocarbamol (ROBAXIN) 500 mg tablet    CANCELED: XR ribs 2 vw right         Patient Instructions       Follow up with PCP in 3-5 days  Proceed to  ER if symptoms worsen  Chief Complaint     Chief Complaint   Patient presents with    Chest Pain     Pt reports falling into his coffee table last night and c/o stabbing right-sided rib pain  Pain is made worse by deep breathing, coughing, and sneezing  Pt took Aleve for symptoms  History of Present Illness       Pt with right lower rib pain since tripping over coffee table and hitting right ribs on table  Event was yesterday       Review of Systems   Review of Systems   Constitutional: Negative  HENT: Negative  Eyes: Negative  Respiratory: Negative  Cardiovascular: Negative  Right lower rib pain    Gastrointestinal: Negative  Endocrine: Negative  Genitourinary: Negative  Musculoskeletal: Negative  Skin: Negative  Allergic/Immunologic: Negative  Neurological: Negative  Hematological: Negative  Psychiatric/Behavioral: Negative  All other systems reviewed and are negative          Current Medications       Current Outpatient Medications:     tamsulosin (FLOMAX) 0 4 mg, Take 0 4 mg by mouth, Disp: , Rfl:     aspirin 81 MG tablet, Take 1 tablet by mouth daily, Disp: , Rfl:     diphenhydrAMINE (SIMPLY SLEEP) 25 MG tablet, Take 1 tablet by mouth, Disp: , Rfl:     lovastatin (MEVACOR) 40 MG tablet, Take 1 tablet by mouth, Disp: , Rfl:     Magnesium 250 MG TABS, Take by mouth daily, Disp: , Rfl:     methocarbamol (ROBAXIN) 500 mg tablet, Take 1 tablet (500 mg total) by mouth 3 (three) times a day, Disp: 20 tablet, Rfl: 0    Saw Palmetto, Serenoa repens, (BL SAW PALMETTO PO), every 24 hours, Disp: , Rfl:     Saw Palmetto, Serenoa repens, (SAW PALMETTO PO), Take by mouth daily 250 mg, Disp: , Rfl:     tamsulosin (FLOMAX) 0 4 mg, TAKE 1 CAPSULE BY MOUTH EVERY DAY AT NIGHT, Disp: , Rfl:     Current Allergies     Allergies as of 01/25/2021    (No Known Allergies)            The following portions of the patient's history were reviewed and updated as appropriate: allergies, current medications, past family history, past medical history, past social history, past surgical history and problem list      Past Medical History:   Diagnosis Date    SVT (supraventricular tachycardia) ([facility] [address] )     Syncope, cardiogenic        Past Surgical History:   Procedure Laterality Date    COLONOSCOPY      INGUINAL HERNIA REPAIR         Family History   Problem Relation Age of Onset    Alzheimer's disease Mother [de-identified]    Esophageal cancer Mother     Diabetes Father     Diabetes Sister     Kidney failure Sister          Medications have been verified  Objective   /72   Pulse 70   Temp 98 5 °F (36 9 °C)   Resp 20   Ht 6' 1" (1 854 m)   Wt 107 kg (235 lb)   SpO2 96%   BMI 31 00 kg/m²        Physical Exam     Physical Exam  Vitals signs and nursing note reviewed  Constitutional:       Appearance: Normal appearance  He is normal weight  HENT:      Head: Normocephalic and atraumatic  Right Ear: Tympanic membrane, ear canal and external ear normal       Left Ear: Tympanic membrane, ear canal and external ear normal       Nose: Nose normal       Mouth/Throat:      Mouth: Mucous membranes are moist       Pharynx: Oropharynx is clear  Eyes:      Extraocular Movements: Extraocular movements intact        Conjunctiva/sclera: Conjunctivae normal       Pupils: Pupils are equal, round, and reactive to light  Neck:      Musculoskeletal: Normal range of motion  Cardiovascular:      Rate and Rhythm: Normal rate and regular rhythm  Pulses: Normal pulses  Heart sounds: Normal heart sounds  Pulmonary:      Effort: Pulmonary effort is normal       Breath sounds: Normal breath sounds  Comments: Right lower rib pain  Mid axillary line to mid clavicular line  No ecchymosis   Abdominal:      General: Abdomen is flat  Bowel sounds are normal       Palpations: Abdomen is soft  Comments: No ruq pain    Musculoskeletal: Normal range of motion  Skin:     General: Skin is warm  Capillary Refill: Capillary refill takes less than 2 seconds  Neurological:      General: No focal deficit present  Mental Status: He is alert and oriented to person, place, and time     Psychiatric:         Mood and Affect: Mood normal          Behavior: Behavior normal

## 2021-01-28 ENCOUNTER — APPOINTMENT (EMERGENCY)
Dept: CT IMAGING | Facility: HOSPITAL | Age: 71
End: 2021-01-28
Payer: MEDICARE

## 2021-01-28 ENCOUNTER — HOSPITAL ENCOUNTER (EMERGENCY)
Facility: HOSPITAL | Age: 71
Discharge: HOME | End: 2021-01-28
Attending: EMERGENCY MEDICINE
Payer: MEDICARE

## 2021-01-28 VITALS
RESPIRATION RATE: 18 BRPM | HEART RATE: 64 BPM | DIASTOLIC BLOOD PRESSURE: 75 MMHG | OXYGEN SATURATION: 92 % | SYSTOLIC BLOOD PRESSURE: 146 MMHG | TEMPERATURE: 97.8 F

## 2021-01-28 DIAGNOSIS — S20.211A RIB CONTUSION, RIGHT, INITIAL ENCOUNTER: Primary | ICD-10-CM

## 2021-01-28 DIAGNOSIS — R91.8 LUNG NODULES: ICD-10-CM

## 2021-01-28 DIAGNOSIS — I71.2 THORACIC AORTIC ANEURYSM WITHOUT RUPTURE (HCC): ICD-10-CM

## 2021-01-28 PROCEDURE — G1004 CDSM NDSC: HCPCS

## 2021-01-28 PROCEDURE — 71250 CT THORAX DX C-: CPT

## 2021-01-28 PROCEDURE — 99283 EMERGENCY DEPT VISIT LOW MDM: CPT

## 2021-01-28 PROCEDURE — 99285 EMERGENCY DEPT VISIT HI MDM: CPT | Performed by: EMERGENCY MEDICINE

## 2021-01-28 PROCEDURE — 96372 THER/PROPH/DIAG INJ SC/IM: CPT

## 2021-01-28 RX ORDER — LIDOCAINE 50 MG/G
1 PATCH TOPICAL DAILY
Qty: 5 PATCH | Refills: 0 | Status: SHIPPED | OUTPATIENT
Start: 2021-01-28 | End: 2021-04-28 | Stop reason: ALTCHOICE

## 2021-01-28 RX ORDER — ONDANSETRON 4 MG/1
4 TABLET, ORALLY DISINTEGRATING ORAL ONCE
Status: COMPLETED | OUTPATIENT
Start: 2021-01-28 | End: 2021-01-28

## 2021-01-28 RX ORDER — HYDROCODONE BITARTRATE AND ACETAMINOPHEN 5; 325 MG/1; MG/1
1 TABLET ORAL EVERY 4 HOURS PRN
Qty: 15 TABLET | Refills: 0 | Status: SHIPPED | OUTPATIENT
Start: 2021-01-28 | End: 2021-02-07

## 2021-01-28 RX ORDER — HYDROMORPHONE HCL/PF 1 MG/ML
1 SYRINGE (ML) INJECTION ONCE
Status: COMPLETED | OUTPATIENT
Start: 2021-01-28 | End: 2021-01-28

## 2021-01-28 RX ORDER — KETOROLAC TROMETHAMINE 30 MG/ML
30 INJECTION, SOLUTION INTRAMUSCULAR; INTRAVENOUS ONCE
Status: COMPLETED | OUTPATIENT
Start: 2021-01-28 | End: 2021-01-28

## 2021-01-28 RX ADMIN — KETOROLAC TROMETHAMINE 30 MG: 30 INJECTION, SOLUTION INTRAMUSCULAR at 06:51

## 2021-01-28 RX ADMIN — ONDANSETRON 4 MG: 4 TABLET, ORALLY DISINTEGRATING ORAL at 06:50

## 2021-01-28 RX ADMIN — HYDROMORPHONE HYDROCHLORIDE 1 MG: 1 INJECTION, SOLUTION INTRAMUSCULAR; INTRAVENOUS; SUBCUTANEOUS at 06:53

## 2021-01-28 NOTE — ED PROVIDER NOTES
History  Chief Complaint   Patient presents with    Rib Pain     pt fell sunday night and was seen at urgent care on monday  imaging done and dx with broken rib on right side  d/c home with robaxin   taking robaxin and aleve with minimal relief   pain got worse this morning and called EMS  hurts to take a deep breath  denies other injuries in fall  77-year-old male with a history syncope, SVT presents to the emergency department with worsening and ongoing right lower rib pain  Patient states he can not breathe secondary to the pain  No cough or fever  He tripped and fell into a coffee table striking his right side 5 days ago  He was seen at urgent care and had x-rays done of the ribs and was diagnosed with rib fracture  He was started on NSAIDs and muscle relaxants  he states that these are not helping  History provided by:  Patient   used: No    Chest Pain  Pain location:  R lateral chest  Pain quality: shooting    Pain radiates to:  Does not radiate  Pain radiates to the back: no    Pain severity:  Severe  Onset quality:  Gradual  Duration:  5 days  Timing:  Constant  Progression:  Worsening  Chronicity:  New  Context: breathing, movement and at rest    Relieved by:  Nothing  Worsened by:  Certain positions, deep breathing and movement  Ineffective treatments: Muscle relaxants/NSAIDs    Associated symptoms: no abdominal pain, no altered mental status, no anorexia, no anxiety, no back pain, no claudication, no cough, no diaphoresis, no dizziness, no dysphagia, no fatigue, no fever, no headache, no heartburn, no lower extremity edema, no nausea, no near-syncope, no numbness, no orthopnea, no palpitations, no PND, no shortness of breath, no syncope, not vomiting and no weakness    Risk factors: no coronary artery disease, no diabetes mellitus, no high cholesterol, no hypertension, no immobilization, no prior DVT/PE, no smoking and no surgery        Prior to Admission Medications Prescriptions Last Dose Informant Patient Reported? Taking? Magnesium 250 MG TABS   Yes Yes   Sig: Take by mouth daily   Saw Palmetto, Serenoa repens, (SAW PALMETTO PO)   Yes Yes   Sig: Take by mouth daily 250 mg   aspirin 81 MG tablet   Yes Yes   Sig: Take 1 tablet by mouth daily   diphenhydrAMINE (SIMPLY SLEEP) 25 MG tablet   Yes No   Sig: Take 1 tablet by mouth   lovastatin (MEVACOR) 40 MG tablet   Yes Yes   Sig: Take 1 tablet by mouth   methocarbamol (ROBAXIN) 500 mg tablet   No Yes   Sig: Take 1 tablet (500 mg total) by mouth 3 (three) times a day   tamsulosin (FLOMAX) 0 4 mg   Yes Yes   Sig: TAKE 1 CAPSULE BY MOUTH EVERY DAY AT NIGHT      Facility-Administered Medications: None       Past Medical History:   Diagnosis Date    SVT (supraventricular tachycardia) (HCC)     Syncope, cardiogenic        Past Surgical History:   Procedure Laterality Date    COLONOSCOPY      INGUINAL HERNIA REPAIR         Family History   Problem Relation Age of Onset    Alzheimer's disease Mother [de-identified]    Esophageal cancer Mother     Diabetes Father     Diabetes Sister     Kidney failure Sister      I have reviewed and agree with the history as documented  E-Cigarette/Vaping     E-Cigarette/Vaping Substances     Social History     Tobacco Use    Smoking status: Never Smoker    Smokeless tobacco: Never Used   Substance Use Topics    Alcohol use: No    Drug use: No       Review of Systems   Constitutional: Negative  Negative for diaphoresis, fatigue and fever  HENT: Negative  Negative for trouble swallowing  Eyes: Negative  Respiratory: Negative  Negative for cough and shortness of breath  Cardiovascular: Positive for chest pain  Negative for palpitations, orthopnea, claudication, leg swelling, syncope, PND and near-syncope  Gastrointestinal: Negative  Negative for abdominal pain, anorexia, heartburn, nausea and vomiting  Genitourinary: Negative      Musculoskeletal: Negative for back pain and neck pain    Skin: Negative  Allergic/Immunologic: Negative  Neurological: Negative  Negative for dizziness, weakness, numbness and headaches  Hematological: Negative  Psychiatric/Behavioral: Negative  All other systems reviewed and are negative  Physical Exam  Physical Exam  Vitals signs and nursing note reviewed  Constitutional:       General: He is awake  He is not in acute distress  Appearance: Normal appearance  He is well-developed and overweight  He is not ill-appearing, toxic-appearing or diaphoretic  Comments: Patient holding right lower ribs, moaning   HENT:      Head: Normocephalic and atraumatic  Right Ear: External ear normal       Left Ear: External ear normal    Eyes:      General: No scleral icterus  Conjunctiva/sclera: Conjunctivae normal    Neck:      Thyroid: No thyromegaly  Vascular: No JVD  Cardiovascular:      Rate and Rhythm: Normal rate and regular rhythm  Heart sounds: Normal heart sounds  Pulmonary:      Effort: Pulmonary effort is normal  No tachypnea, accessory muscle usage, respiratory distress or retractions  Breath sounds: Normal breath sounds  No stridor  No wheezing, rhonchi or rales  Chest:      Chest wall: Tenderness present  No deformity, swelling, crepitus or edema  Abdominal:      General: Bowel sounds are normal  There is no distension  Palpations: Abdomen is soft  There is no mass  Tenderness: There is no abdominal tenderness  Hernia: No hernia is present  Musculoskeletal: Normal range of motion  General: No tenderness or deformity  Skin:     General: Skin is warm and dry  Coloration: Skin is not jaundiced or pale  Findings: No bruising, erythema, lesion or rash  Neurological:      General: No focal deficit present  Mental Status: He is alert and oriented to person, place, and time  Motor: No weakness  Deep Tendon Reflexes: Reflexes are normal and symmetric  Psychiatric:         Mood and Affect: Mood normal          Behavior: Behavior is cooperative  Vital Signs  ED Triage Vitals   Temperature Pulse Respirations Blood Pressure SpO2   01/28/21 0633 01/28/21 0633 01/28/21 0633 01/28/21 0633 01/28/21 0633   97 8 °F (36 6 °C) 80 18 (!) 196/101 97 %      Temp Source Heart Rate Source Patient Position - Orthostatic VS BP Location FiO2 (%)   01/28/21 0633 01/28/21 0815 01/28/21 0815 01/28/21 0815 --   Oral Monitor Lying Right arm       Pain Score       01/28/21 0633       Worst Possible Pain           Vitals:    01/28/21 0633 01/28/21 0815   BP: (!) 196/101 146/75   Pulse: 80 64   Patient Position - Orthostatic VS:  Lying         Visual Acuity      ED Medications  Medications   ketorolac (TORADOL) injection 30 mg (30 mg Intramuscular Given 1/28/21 0651)   HYDROmorphone (DILAUDID) injection 1 mg (1 mg Intramuscular Given 1/28/21 0653)   ondansetron (ZOFRAN-ODT) dispersible tablet 4 mg (4 mg Oral Given 1/28/21 3199)       Diagnostic Studies  Results Reviewed     None                 CT chest without contrast   Final Result by Urvashi March DO (01/28 9823)   1  No evidence of displaced rib fractures  2   4 5 cm ascending aortic aneurysm  3   Noncalcified bilateral pulmonary nodules, largest measuring 6 mm anterolateral aspect right upper lobe  Based on current Fleischner Society 2017 Guidelines on incidental pulmonary nodule, followup non-contrast CT is recommended at 6-12 months from    the initial examination and, if stable at that time, an additional followup is recommended for 18-24 months from the initial examination  The study was marked in [facility] for immediate notification  Workstation performed: [id number]                    Procedures  Procedures         ED Course  ED Course as of Jan 29 2357   Thu Jan 28, 2021   9253 Explained results of CT to patient and patient's wife    He understands that the nodules seen on CT today need follow-up within 6 to 12 months                                SBIRT 20yo+      Most Recent Value   SBIRT (25 yo +)   In order to provide better care to our patients, we are screening all of our patients for alcohol and drug use  Would it be okay to ask you these screening questions? Yes Filed at: 01/28/2021 5667   Initial Alcohol Screen: US AUDIT-C    1  How often do you have a drink containing alcohol?  0 Filed at: 01/28/2021 0634   2  How many drinks containing alcohol do you have on a typical day you are drinking? 0 Filed at: 01/28/2021 0634   3b  FEMALE Any Age, or MALE 65+: How often do you have 4 or more drinks on one occassion? 0 Filed at: 01/28/2021 0634   Audit-C Score  0 Filed at: 01/28/2021 9243   COLEMAN: How many times in the past year have you    Used an illegal drug or used a prescription medication for non-medical reasons? Never Filed at: 01/28/2021 4048                    MDM  Number of Diagnoses or Management Options  Diagnosis management comments: 80-year-old male presents with ongoing right lower rib pain after a trip and fall 5 days ago striking his right ribs on the edge of a coffee table  He was seen at urgent care and had x-rays and was diagnosed with a rib fracture  On review of the records no rib fracture was seen on the x-ray  Patient was given prescriptions for NSAID and muscle relaxant which he has been taking, but states is not helping  He states that her sit take a deep breath or any kind of movement  He has had no coughing or fevers  On exam he is alert and seems to be in some distress holding his right lower ribs and crying  There does seem to be a definite positional component to his pain  He does have tenderness along the right lower border of the ribs but no abdominal tenderness  His lungs are clear  Will control pain with intermuscular injections of Toradol, Dilaudid    Will also CT chest to rule out occult rib fracture or any injury to the lung       Amount and/or Complexity of Data Reviewed  Tests in the radiology section of CPT®: ordered and reviewed        Disposition  Final diagnoses:   Rib contusion, right, initial encounter   Lung nodules   Thoracic aortic aneurysm without rupture ([address] )     Time reflects when diagnosis was documented in both MDM as applicable and the Disposition within this note     Time User Action Codes Description Comment    1/28/2021  8:00 AM Delorise Loll A Add [S20 211A] Rib contusion, right, initial encounter     1/28/2021  8:01 AM Delorise Loll A Add [R91 8] Lung nodules     1/28/2021  8:02 AM Delorise Loll A Add [I71 2] Thoracic aortic aneurysm without rupture [facility])       ED Disposition     ED Disposition Condition Date/Time Comment    Discharge Good Thu Jan 28, 2021  8:00 AM Rocky Danger discharge to home/self care  Follow-up Information     Follow up With Specialties Details Why Contact Nataliya Solorzano DO Family Medicine Schedule an appointment as soon as possible for a visit in 2 days  [facility] [address]  [phone]            Discharge Medication List as of 1/28/2021  8:05 AM      START taking these medications    Details   HYDROcodone-acetaminophen (NORCO) 5-325 mg per tablet Take 1 tablet by mouth every 4 (four) hours as needed for pain for up to 10 daysMax Daily Amount: 6 tablets, Starting u 1/28/2021, Until Sun 2/7/2021, Normal      lidocaine (LIDODERM) 5 % Apply 1 patch topically daily Remove & Discard patch within 12 hours or as directed by MD, Starting Thu 1/28/2021, Normal         CONTINUE these medications which have NOT CHANGED    Details   aspirin 81 MG tablet Take 1 tablet by mouth daily, Historical Med      lovastatin (MEVACOR) 40 MG tablet Take 1 tablet by mouth, Historical Med      Magnesium 250 MG TABS Take by mouth daily, Historical Med      Saw Palmetto, Serenoa repens, (SAW PALMETTO PO) Take by mouth daily 250 mg, Historical Med      tamsulosin (FLOMAX) 0 4 mg TAKE 1 CAPSULE BY MOUTH EVERY DAY AT NIGHT, Historical Med      diphenhydrAMINE (SIMPLY SLEEP) 25 MG tablet Take 1 tablet by mouth, Historical Med         STOP taking these medications       methocarbamol (ROBAXIN) 500 mg tablet Comments:   Reason for Stopping:             No discharge procedures on file      PDMP Review       Value Time User    PDMP Reviewed  Yes 1/28/2021  8:02 AM Mike Choi DO          ED Provider  Electronically Signed by           Mike Choi DO  01/29/21 7506

## 2021-02-05 ENCOUNTER — OFFICE VISIT (OUTPATIENT)
Dept: FAMILY MEDICINE CLINIC | Facility: CLINIC | Age: 71
End: 2021-02-05
Payer: MEDICARE

## 2021-02-05 VITALS
HEART RATE: 83 BPM | HEIGHT: 73 IN | WEIGHT: 232 LBS | TEMPERATURE: 97.8 F | BODY MASS INDEX: 30.75 KG/M2 | OXYGEN SATURATION: 98 % | DIASTOLIC BLOOD PRESSURE: 76 MMHG | SYSTOLIC BLOOD PRESSURE: 158 MMHG

## 2021-02-05 DIAGNOSIS — I10 BENIGN ESSENTIAL HYPERTENSION: ICD-10-CM

## 2021-02-05 DIAGNOSIS — I35.1 NONRHEUMATIC AORTIC VALVE INSUFFICIENCY: ICD-10-CM

## 2021-02-05 DIAGNOSIS — R91.8 PULMONARY NODULES: Primary | ICD-10-CM

## 2021-02-05 DIAGNOSIS — I34.0 NONRHEUMATIC MITRAL VALVE REGURGITATION: ICD-10-CM

## 2021-02-05 DIAGNOSIS — I71.2 ASCENDING AORTIC ANEURYSM (HCC): ICD-10-CM

## 2021-02-05 PROBLEM — I71.21 ASCENDING AORTIC ANEURYSM: Status: ACTIVE | Noted: 2021-02-05

## 2021-02-05 PROCEDURE — 99214 OFFICE O/P EST MOD 30 MIN: CPT | Performed by: FAMILY MEDICINE

## 2021-02-05 RX ORDER — AMLODIPINE BESYLATE 2.5 MG/1
2.5 TABLET ORAL DAILY
Qty: 90 TABLET | Refills: 3 | Status: SHIPPED | OUTPATIENT
Start: 2021-02-05 | End: 2021-11-23

## 2021-02-05 NOTE — PATIENT INSTRUCTIONS
Reviewed emergency room visit from late January, had fallen on to coffee table, bruised ribs, CT scan showed no rib fracture but did show multiple pulmonary nodules, max 6 mm  Had some bilateral scarring along with 4 5 cm ascending aortic aneurysm  Nonsmoker, smoked for 10 years back in the 76s  Regarding CT I would recommend re-doing CT in 6 months for completeness, most likely benign but do require follow-up with current guidelines  Regarding aneurysm control blood pressure, he does see Cardiology  That will also be reassessed on follow-up CT  Last echocardiogram was back in August   Has both aortic and mitral regurg  Systolic blood pressure here today 158 ( 158/76) -   in the past beta-blocker even low-dose had dropped heart rate, he will use amlodipine, start 2 5 mg daily, call us with blood pressure readings in 1 to 2 months  I had seen him back in May of 2020 with an annual wellness check, we should see him again in 5 to 6 months with annual wellness check  Call us sooner if needed    He also does continue to see Urology regarding elevated PSA, back in September PSA 10 9, did have MRI prostate  Back in October CBC, CMP were fine

## 2021-02-05 NOTE — PROGRESS NOTES
FAMILY PRACTICE OFFICE VISIT  Tory DUENAS O  [facility]  [address]  [address]  [place], [place],       NAME: Mani Granda  AGE: 70 y o  SEX: male  : 1950   MRN: [id number]    DATE: 2021  TIME: 5:51 PM    Assessment and Plan     Problem List Items Addressed This Visit        Cardiovascular and Mediastinum    Benign essential hypertension    Relevant Medications    amLODIPine (NORVASC) 2 5 mg tablet    Nonrheumatic aortic valve insufficiency    Relevant Medications    amLODIPine (NORVASC) 2 5 mg tablet    Nonrheumatic mitral valve regurgitation    Relevant Medications    amLODIPine (NORVASC) 2 5 mg tablet    Ascending aortic aneurysm (HCC)    Relevant Orders    CT chest wo contrast       Other    Pulmonary nodules - Primary    Relevant Orders    CT chest wo contrast          Patient Instructions   Reviewed emergency room visit from late January, had fallen on to coffee table, bruised ribs, CT scan showed no rib fracture but did show multiple pulmonary nodules, max 6 mm  Had some bilateral scarring along with 4 5 cm ascending aortic aneurysm  Nonsmoker, smoked for 10 years back in the s  Regarding CT I would recommend re-doing CT in 6 months for completeness, most likely benign but do require follow-up with current guidelines  Regarding aneurysm control blood pressure, he does see Cardiology  That will also be reassessed on follow-up CT  Last echocardiogram was back in August   Has both aortic and mitral regurg  Systolic blood pressure here today 158 ( 158/76) -   in the past beta-blocker even low-dose had dropped heart rate, he will use amlodipine, start 2 5 mg daily, call us with blood pressure readings in 1 to 2 months  I had seen him back in May of 2020 with an annual wellness check, we should see him again in 5 to 6 months with annual wellness check    Call us sooner if needed    He also does continue to see Urology regarding elevated PSA, back in September PSA 10 9, did have MRI prostate  Back in October CBC, CMP were fine  Chief Complaint     Chief Complaint   Patient presents with    ER follow up       History of Present Illness   Alpa Land is a 70y o -year-old male who   Is in for a follow-up after emergency room evaluation, he had a fall at home onto a coffee table, thought he broke right anterior lateral ribs, was seen at urgent care and  emergency room  Had CT scanning, no rib fracture but did find pulmonary nodules along with aortic aneurysm  He does continue to see Cardiology, they touch base with his cardiologist who felt the aneurysm was related to his valvular heart disease  He is not on any blood pressure medication, in the past had use metoprolol low-dose, developed bradycardia and medication was stopped  He has no cough or shortness of breath, was a smoker for about 10 years back in the 70s  Other than recent fall he has been feeling okay      Review of Systems   Review of Systems   Constitutional: Negative for appetite change, fatigue, fever and unexpected weight change  HENT: Negative for sore throat and trouble swallowing  Respiratory: Negative for cough and shortness of breath  Cardiovascular: Negative for palpitations and leg swelling  Gastrointestinal: Negative for abdominal pain, blood in stool, nausea and vomiting  No acid reflux     No change in bowel   Genitourinary: Negative for dysuria and hematuria  Neurological: Negative for dizziness, syncope, light-headedness and headaches  Psychiatric/Behavioral: Negative for behavioral problems and confusion         Active Problem List     Patient Active Problem List   Diagnosis    Abdominal hernia    Benign essential hypertension    Proteinuria    Nonrheumatic aortic valve insufficiency    Neurocardiogenic syncope    Insomnia    Hypercholesterolemia    Hematuria, microscopic    Elevated PSA w neg Bx 2014  Coronary atherosclerosis    Nonrheumatic mitral valve regurgitation    Paroxysmal atrial fibrillation (HCC) ( resolved- had yrs ago )    Localized, primary osteoarthritis of shoulder region    Urinary retention    Pulmonary nodules    Ascending aortic aneurysm (HCC)       Past Medical History:  Reviewed    Past Surgical History:  Reviewed    Family History:  Reviewed    Social History:  Reviewed    Objective     Vitals:    02/05/21 1513   BP: 158/76   BP Location: Left arm   Patient Position: Sitting   Cuff Size: Standard   Pulse: 83   Temp: 97 8 °F (36 6 °C)   SpO2: 98%   Weight: 105 kg (232 lb)   Height: 6' 1" (1 854 m)     Body mass index is 30 61 kg/m²  BP Readings from Last 3 Encounters:   02/05/21 158/76   01/28/21 146/75   01/25/21 148/72       Wt Readings from Last 3 Encounters:   02/05/21 105 kg (232 lb)   01/25/21 107 kg (235 lb)   05/22/20 106 kg (234 lb)       Physical Exam  Constitutional:       Comments: Pleasant 70year-old seated on table, tenderness right anterior lateral chest wall, no bruising  No palpable abnormality  Overall looks well   Eyes:      General: No scleral icterus  Cardiovascular:      Rate and Rhythm: Normal rate and regular rhythm  Heart sounds: Murmur present  Pulmonary:      Effort: Pulmonary effort is normal  No respiratory distress  Breath sounds: Normal breath sounds  No wheezing, rhonchi or rales  Abdominal:      Palpations: Abdomen is soft  Tenderness: There is no abdominal tenderness  There is no guarding  Musculoskeletal:      Right lower leg: No edema  Left lower leg: No edema  Skin:     Coloration: Skin is not jaundiced  Neurological:      Mental Status: He is oriented to person, place, and time     Psychiatric:         Mood and Affect: Mood normal          Behavior: Behavior normal          ALLERGIES:  No Known Allergies    Current Medications     Current Outpatient Medications   Medication Sig Dispense Refill    aspirin 81 MG tablet Take 1 tablet by mouth daily      diphenhydrAMINE (SIMPLY SLEEP) 25 MG tablet Take 1 tablet by mouth      lovastatin (MEVACOR) 40 MG tablet Take 1 tablet by mouth      Magnesium 250 MG TABS Take by mouth daily      Saw Palmetto, Serenoa repens, (SAW PALMETTO PO) Take by mouth daily 250 mg      tamsulosin (FLOMAX) 0 4 mg TAKE 1 CAPSULE BY MOUTH EVERY DAY AT NIGHT      amLODIPine (NORVASC) 2 5 mg tablet Take 1 tablet (2 5 mg total) by mouth daily 90 tablet 3    HYDROcodone-acetaminophen (NORCO) 5-325 mg per tablet Take 1 tablet by mouth every 4 (four) hours as needed for pain for up to 10 daysMax Daily Amount: 6 tablets (Patient not taking: Reported on 2/5/2021) 15 tablet 0    lidocaine (LIDODERM) 5 % Apply 1 patch topically daily Remove & Discard patch within 12 hours or as directed by MD (Patient not taking: Reported on 2/5/2021) 5 patch 0     No current facility-administered medications for this visit               Orders Placed This Encounter   Procedures    CT chest wo contrast         [place] Fore, DO

## 2021-03-10 DIAGNOSIS — Z23 ENCOUNTER FOR IMMUNIZATION: ICD-10-CM

## 2021-04-28 ENCOUNTER — OFFICE VISIT (OUTPATIENT)
Dept: FAMILY MEDICINE CLINIC | Facility: CLINIC | Age: 71
End: 2021-04-28
Payer: MEDICARE

## 2021-04-28 VITALS
DIASTOLIC BLOOD PRESSURE: 90 MMHG | WEIGHT: 233 LBS | HEART RATE: 69 BPM | BODY MASS INDEX: 30.88 KG/M2 | RESPIRATION RATE: 12 BRPM | OXYGEN SATURATION: 96 % | TEMPERATURE: 98 F | SYSTOLIC BLOOD PRESSURE: 140 MMHG | HEIGHT: 73 IN

## 2021-04-28 DIAGNOSIS — B02.9 HERPES ZOSTER WITHOUT COMPLICATION: Primary | ICD-10-CM

## 2021-04-28 PROCEDURE — 99212 OFFICE O/P EST SF 10 MIN: CPT | Performed by: INTERNAL MEDICINE

## 2021-04-28 RX ORDER — VALACYCLOVIR HYDROCHLORIDE 1 G/1
1000 TABLET, FILM COATED ORAL 3 TIMES DAILY
Qty: 21 TABLET | Refills: 0 | Status: SHIPPED | OUTPATIENT
Start: 2021-04-28 | End: 2021-09-22 | Stop reason: ALTCHOICE

## 2021-04-28 NOTE — PROGRESS NOTES
Assessment/Plan:    No problem-specific Assessment & Plan notes found for this encounter  Diagnoses and all orders for this visit:    Herpes zoster without complication  -     valACYclovir (VALTREX) 1,000 mg tablet; Take 1 tablet (1,000 mg total) by mouth 3 (three) times a day for 7 days          Thirty is a concern for developing shingles on the last side of his face  Will start treatment with valacyclovir 1 g every 8 hours  Patient was instructed to let me know in 2 days of how he feels  He was instructed to call me immediately if he will feel that he will start to have pain in his eye, discharge or any redness or changes of his vision  Subjective:      Patient ID: Willow Jeong is a 70 y o  male  Patient reported that yesterday he started to have redness on the left side of his face by the left eye  He does not report any trauma  He reports tingling sensation in the area  He said that he had this type of sensation when he had shingles in the past   He denies any chills or fevers  He cannot exclude that he had vesicle in this area  He denies any pain in his left eye, no changes in the vision, no redness of the conjunctiva, no discharge  The following portions of the patient's history were reviewed and updated as appropriate: allergies, current medications, past family history, past medical history, past social history, past surgical history, and problem list     Review of Systems   Constitutional: Negative for chills and fever  Eyes: Negative for photophobia, pain, discharge, itching and visual disturbance  Skin: Positive for color change (Left side of the face by the left eye )  Psychiatric/Behavioral: Negative for confusion           Objective:      /90 (BP Location: Left arm, Patient Position: Sitting, Cuff Size: Standard)   Pulse 69   Temp 98 °F (36 7 °C)   Resp 12   Ht 6' 1" (1 854 m)   Wt 106 kg (233 lb)   SpO2 96%   BMI 30 74 kg/m²          Physical Exam  Constitutional:       General: He is not in acute distress  Appearance: He is not ill-appearing or toxic-appearing  Eyes:      General:         Right eye: No discharge  Left eye: No discharge  Extraocular Movements: Extraocular movements intact  Conjunctiva/sclera: Conjunctivae normal       Pupils: Pupils are equal, round, and reactive to light  Skin:     Findings: Erythema (3 cm area of the erythema at the left side I of the face by the left eye  No vesicles appreciated ) present  Neurological:      Mental Status: He is alert                 Current Outpatient Medications:     amLODIPine (NORVASC) 2 5 mg tablet, Take 1 tablet (2 5 mg total) by mouth daily, Disp: 90 tablet, Rfl: 3    aspirin 81 MG tablet, Take 1 tablet by mouth daily, Disp: , Rfl:     diphenhydrAMINE (SIMPLY SLEEP) 25 MG tablet, Take 1 tablet by mouth, Disp: , Rfl:     lovastatin (MEVACOR) 40 MG tablet, Take 1 tablet by mouth, Disp: , Rfl:     Magnesium 250 MG TABS, Take by mouth daily, Disp: , Rfl:     Saw Palmetto, Serenoa repens, (SAW PALMETTO PO), Take by mouth daily 250 mg, Disp: , Rfl:     tamsulosin (FLOMAX) 0 4 mg, TAKE 1 CAPSULE BY MOUTH EVERY DAY AT NIGHT, Disp: , Rfl:     valACYclovir (VALTREX) 1,000 mg tablet, Take 1 tablet (1,000 mg total) by mouth 3 (three) times a day for 7 days, Disp: 21 tablet, Rfl: 0

## 2021-06-15 PROBLEM — Z95.2 STATUS POST AORTIC VALVE REPLACEMENT: Status: ACTIVE | Noted: 2021-05-31

## 2021-06-15 PROBLEM — Z98.890 S/P MITRAL VALVE REPAIR: Status: ACTIVE | Noted: 2021-06-15

## 2021-06-24 ENCOUNTER — TELEPHONE (OUTPATIENT)
Dept: FAMILY MEDICINE CLINIC | Facility: CLINIC | Age: 71
End: 2021-06-24

## 2021-06-24 NOTE — TELEPHONE ENCOUNTER
I last saw him back in February, he did have hospital stay at [facility] May 25th through June 7th    He should have follow-up with 1 of us when he is able to make it in, nursing visit requests does sound appropriate

## 2021-06-24 NOTE — TELEPHONE ENCOUNTER
Visit nurse [facility] called stating that would like the approval to extend nursing visits for 2 weeks  To start next week     [phone]

## 2021-08-20 PROCEDURE — 88305 TISSUE EXAM BY PATHOLOGIST: CPT | Performed by: PATHOLOGY

## 2021-09-07 ENCOUNTER — OFFICE VISIT (OUTPATIENT)
Dept: FAMILY MEDICINE CLINIC | Facility: CLINIC | Age: 71
End: 2021-09-07
Payer: MEDICARE

## 2021-09-07 VITALS
HEIGHT: 73 IN | BODY MASS INDEX: 29.29 KG/M2 | HEART RATE: 80 BPM | WEIGHT: 221 LBS | SYSTOLIC BLOOD PRESSURE: 100 MMHG | OXYGEN SATURATION: 95 % | DIASTOLIC BLOOD PRESSURE: 60 MMHG

## 2021-09-07 DIAGNOSIS — R91.8 PULMONARY NODULES: ICD-10-CM

## 2021-09-07 DIAGNOSIS — I48.0 PAROXYSMAL ATRIAL FIBRILLATION (HCC): ICD-10-CM

## 2021-09-07 DIAGNOSIS — I71.2 THORACIC AORTIC ANEURYSM WITHOUT RUPTURE (HCC): ICD-10-CM

## 2021-09-07 DIAGNOSIS — Z98.890 S/P MITRAL VALVE REPAIR: ICD-10-CM

## 2021-09-07 DIAGNOSIS — R97.20 ELEVATED PSA: ICD-10-CM

## 2021-09-07 DIAGNOSIS — R07.89 LEFT-SIDED CHEST WALL PAIN: Primary | ICD-10-CM

## 2021-09-07 DIAGNOSIS — I25.10 ATHEROSCLEROSIS OF NATIVE CORONARY ARTERY OF NATIVE HEART WITHOUT ANGINA PECTORIS: ICD-10-CM

## 2021-09-07 DIAGNOSIS — R07.9 CHEST PAIN, UNSPECIFIED TYPE: Primary | ICD-10-CM

## 2021-09-07 DIAGNOSIS — Z95.2 STATUS POST AORTIC VALVE REPLACEMENT: ICD-10-CM

## 2021-09-07 PROBLEM — I35.1 NONRHEUMATIC AORTIC VALVE INSUFFICIENCY: Status: RESOLVED | Noted: 2019-06-19 | Resolved: 2021-09-07

## 2021-09-07 PROBLEM — I34.0 NONRHEUMATIC MITRAL VALVE REGURGITATION: Status: RESOLVED | Noted: 2020-02-06 | Resolved: 2021-09-07

## 2021-09-07 PROBLEM — I71.20 THORACIC AORTIC ANEURYSM WITHOUT RUPTURE: Status: ACTIVE | Noted: 2021-02-05

## 2021-09-07 PROCEDURE — 99214 OFFICE O/P EST MOD 30 MIN: CPT | Performed by: FAMILY MEDICINE

## 2021-09-07 PROCEDURE — 93000 ELECTROCARDIOGRAM COMPLETE: CPT

## 2021-09-07 NOTE — PROGRESS NOTES
FAMILY PRACTICE OFFICE VISIT  [facility]  Answers for HPI/ROS submitted by the patient on 2021  Onset: yesterday  Onset quality: undetermined  Frequency: constantly  Progression since onset: unchanged  Pain location: LUQ  Pain - numeric: 4/10  Pain quality: aching  Radiates to: LUQ  anorexia: No  belching: No  flatus: No  hematochezia: No  melena: No  weight loss: No  Aggravated by: deep breathing  Relieved by: nothing    [address], [address]      NAME: Bam Pope  AGE: 70 y o  SEX: male  : 1950   MRN: [id number]    DATE: 2021  TIME: 4:24 PM    Assessment and Plan     Problem List Items Addressed This Visit        Cardiovascular and Mediastinum    Coronary atherosclerosis    Paroxysmal atrial fibrillation (HCC)     Thoracic aortic aneurysm without rupture ([facility] [address] )       Other    Elevated PSA w neg Bx     Pulmonary nodules    Relevant Orders    CT chest wo contrast    Status post aortic valve replacement    S/P mitral valve repair      Other Visit Diagnoses     Left-sided chest wall pain, reproducible with palpation, deep inspiration    -  Primary    Relevant Orders    XR ribs 2 vw left          Patient Instructions   Reviewed extensive history with [facility], had undergone aortic valve replacement with mitral valve repair plus Maze procedure in late May, had postop AFib, underwent cardioversion again on   He did use amiodarone for 1 month, relates he stopped that 1 week ago  Currently anticoagulated with Eliquis, did have nose bleeds, saw ENT with cautery, no recent bleeding otherwise  Has reproducible left anterior lower chest wall pain on rib, he did have CT scanning early this year showing right upper lobe nodule, CT scanning has also been redone at [facility], [address] in  showed nodule to be stable with no other worrisome areas    He can redo CT in December for completeness  EKG run here today shows normal sinus rhythm with no acute finding  Discussed musculoskeletal pain, should improve with time, call if any worsening  He is scheduled to see us September 23rd, keep appointment, is due for an annual wellness check  Re-evaluate chest wall discomfort then  If rib pain does worsen or persist he can do x-ray ribs, relates past/remote history possible rib fracture, had fall back in January with no recent trauma    He last saw Cardiology in late July, he is due at the end of this month to see them for a 2 month check  Blood pressure remains low here today, 100/60  He did dropped 10 lb since surgery, he is on amlodipine 2 5 mg daily, off other meds which would affect blood pressure  CBC, BMP looked okay in June  He also plans to see Urology in follow-up, history elevated PSA, last PSA earlier this year 9 9 similar to prior      Chief Complaint     No chief complaint on file  History of Present Illness   Ky Garsia is a 70y o -year-old male who awoke yesterday with pain anterior lower chest wall at rib, no trauma, not associated with see knees, does note increase with deep breathing, palpation  Is able to localize area, he jessy a ring around area on skin surface  He did have a fall earlier in year, had CT scanning which showed no fracture, did have pulmonary nodules  Back in late May he had undergone aortic valve replacement along with mitral valve repair at [facility], had developed AFib, had undergone cardioversion  He saw Cardiology again in late July, doing well other than low blood pressure, meds were adjusted  He is anticoagulated with Eliquis  He relates he stopped amiodarone 1 week ago, he monitors his rate, rhythm with smart watch, relates has been regular    He did have nose bleeds, saw ENT recently with cautery/treatment, no further bleeding  He has no increased shortness of breath, no current chest pain        Review of Systems Review of Systems   Constitutional: Negative for chills and fever  Respiratory: Negative for cough, shortness of breath and wheezing  Cardiovascular: Negative for chest pain and leg swelling  No recent palpitations   Gastrointestinal: Negative for abdominal pain, constipation, diarrhea, nausea and vomiting  Genitourinary: Negative for dysuria, frequency and hematuria  He sees Urology, history elevated PSA   Musculoskeletal: Negative for arthralgias and myalgias  Skin: Negative for rash  Neurological: Negative for headaches  Past lightheadedness with low blood pressure   Hematological: Bruises/bleeds easily (No bleeding)  Active Problem List     Patient Active Problem List   Diagnosis    Abdominal hernia    Benign essential hypertension    Proteinuria    Neurocardiogenic syncope    Insomnia    Hypercholesterolemia    Hematuria, microscopic    Elevated PSA w neg Bx 2014    Coronary atherosclerosis    Paroxysmal atrial fibrillation (HCC)     Localized, primary osteoarthritis of shoulder region    Urinary retention    Pulmonary nodules    Thoracic aortic aneurysm without rupture ([facility] [address] )    Status post aortic valve replacement    S/P mitral valve repair       Past Medical History:  Reviewed    Past Surgical History:  Reviewed    Family History:  Reviewed    Social History:  Reviewed    Objective     Vitals:    09/07/21 1521   BP: 100/60   BP Location: Left arm   Patient Position: Sitting   Cuff Size: Large   Pulse: 80   SpO2: 95%   Weight: 100 kg (221 lb)   Height: 6' 1" (1 854 m)     Body mass index is 29 16 kg/m²  BP Readings from Last 3 Encounters:   09/07/21 100/60   04/28/21 140/90   02/05/21 158/76       Wt Readings from Last 3 Encounters:   09/07/21 100 kg (221 lb)   08/20/21 99 8 kg (220 lb)   08/19/21 99 8 kg (220 lb)       Physical Exam  Constitutional:       General: He is not in acute distress  Appearance: He is well-developed   He is not ill-appearing  Comments: 80-year-old male looks well, seated on table, does have tenderness left anterior lower rib  No palpable abnormality  Eyes:      General: No scleral icterus  Cardiovascular:      Rate and Rhythm: Normal rate and regular rhythm  Heart sounds: Normal heart sounds  Pulmonary:      Effort: Pulmonary effort is normal  No respiratory distress  Breath sounds: Normal breath sounds  No wheezing, rhonchi or rales  Comments: Does note increased discomfort left anterior lower chest wall with deep breathing, palpation  Abdominal:      Palpations: Abdomen is soft  Tenderness: There is no abdominal tenderness  There is no guarding or rebound  Comments: Abdominal exam benign   Musculoskeletal:      Right lower leg: No edema  Left lower leg: No edema  Skin:     Coloration: Skin is not jaundiced  Neurological:      Mental Status: He is oriented to person, place, and time  Psychiatric:         Mood and Affect: Mood normal          Behavior: Behavior normal          ALLERGIES:  No Known Allergies    Current Medications     Current Outpatient Medications   Medication Sig Dispense Refill    amLODIPine (NORVASC) 2 5 mg tablet Take 1 tablet (2 5 mg total) by mouth daily 90 tablet 3    apixaban (Eliquis) 5 mg Take 5 mg by mouth 2 (two) times a day      Cholecalciferol 50 MCG (2000 UT) CAPS Take by mouth      diphenhydrAMINE (SIMPLY SLEEP) 25 MG tablet Take 1 tablet by mouth      lovastatin (MEVACOR) 40 MG tablet Take 1 tablet by mouth      Magnesium 250 MG TABS Take by mouth daily      Saw Palmetto, Serenoa repens, (SAW PALMETTO PO) Take by mouth daily 250 mg      tamsulosin (FLOMAX) 0 4 mg TAKE 1 CAPSULE BY MOUTH EVERY DAY AT NIGHT      valACYclovir (VALTREX) 1,000 mg tablet Take 1 tablet (1,000 mg total) by mouth 3 (three) times a day for 7 days 21 tablet 0     No current facility-administered medications for this visit              Orders Placed This Encounter Procedures    CT chest wo contrast    XR ribs 2 vw left         Terrea Goldmann, DO

## 2021-09-07 NOTE — PATIENT INSTRUCTIONS
Reviewed extensive history with [facility], had undergone aortic valve replacement with mitral valve repair plus Maze procedure in late May, had postop AFib, underwent cardioversion again on August 9th  He did use amiodarone for 1 month, relates he stopped that 1 week ago  Currently anticoagulated with Eliquis, did have nose bleeds, saw ENT with cautery, no recent bleeding otherwise  Has reproducible left anterior lower chest wall pain on rib, he did have CT scanning early this year showing right upper lobe nodule, CT scanning has also been redone at [facility], [address] in June showed nodule to be stable with no other worrisome areas  He can redo CT in December for completeness  EKG run here today shows normal sinus rhythm with no acute finding  Discussed musculoskeletal pain, should improve with time, call if any worsening  He is scheduled to see us September 23rd, keep appointment, is due for an annual wellness check  Re-evaluate chest wall discomfort then  If rib pain does worsen or persist he can do x-ray ribs, relates past/remote history possible rib fracture, had fall back in January with no recent trauma    He last saw Cardiology in late July, he is due at the end of this month to see them for a 2 month check  Blood pressure remains low here today, 100/60  He did dropped 10 lb since surgery, he is on amlodipine 2 5 mg daily, off other meds which would affect blood pressure  CBC, BMP looked okay in June      He also plans to see Urology in follow-up, history elevated PSA, last PSA earlier this year 9 9 similar to prior

## 2021-09-23 ENCOUNTER — OFFICE VISIT (OUTPATIENT)
Dept: FAMILY MEDICINE CLINIC | Facility: CLINIC | Age: 71
End: 2021-09-23
Payer: MEDICARE

## 2021-09-23 VITALS
HEART RATE: 72 BPM | HEIGHT: 73 IN | WEIGHT: 223 LBS | OXYGEN SATURATION: 97 % | DIASTOLIC BLOOD PRESSURE: 78 MMHG | TEMPERATURE: 97.8 F | BODY MASS INDEX: 29.55 KG/M2 | SYSTOLIC BLOOD PRESSURE: 124 MMHG

## 2021-09-23 DIAGNOSIS — Z23 NEED FOR 23-POLYVALENT PNEUMOCOCCAL POLYSACCHARIDE VACCINE: ICD-10-CM

## 2021-09-23 DIAGNOSIS — Z00.00 MEDICARE ANNUAL WELLNESS VISIT, SUBSEQUENT: Primary | ICD-10-CM

## 2021-09-23 PROCEDURE — 90732 PPSV23 VACC 2 YRS+ SUBQ/IM: CPT

## 2021-09-23 PROCEDURE — G0439 PPPS, SUBSEQ VISIT: HCPCS | Performed by: FAMILY MEDICINE

## 2021-09-23 PROCEDURE — G0009 ADMIN PNEUMOCOCCAL VACCINE: HCPCS

## 2021-09-23 PROCEDURE — 1123F ACP DISCUSS/DSCN MKR DOCD: CPT | Performed by: FAMILY MEDICINE

## 2021-09-23 NOTE — PROGRESS NOTES
BMI Counseling: Body mass index is 29 42 kg/m²  The BMI is above normal  Nutrition recommendations include encouraging healthy choices of fruits and vegetables and moderation in carbohydrate intake  Exercise recommendations include exercising 3-5 times per week  Rationale for BMI follow-up plan is due to patient being overweight or obese  Depression Screening and Follow-up Plan:   Patient was screened for depression during today's encounter  They screened negative with a PHQ-2 score of 0  Jose Roberto HE  [facility]  [address]  [address]  [place] [address], [address]     MEDICARE SUBSEQUENT VISIT NOTE ( AWV only )      NAME: Jordana Dillard  AGE: 70 y o  SEX: male  : 1950   MRN: [id number]    DATE: 2021  TIME: 3:02 PM    Assessment and Plan     Problem List Items Addressed This Visit     None      Visit Diagnoses     Medicare annual wellness visit, subsequent    -  Primary    BMI 29 0-29 9,adult        Need for 23-polyvalent pneumococcal polysaccharide vaccine        Relevant Orders    PNEUMOCOCCAL POLYSACCHARIDE VACCINE 23-VALENT =>3YO SQ IM (Completed)          Medicare Wellness Counseling/ Discussion    Patient Instructions     Reviewed health history along w [facility] - he did have a visit with me , in today for an annual wellness check  He is currently doing well    As in prior notes he had undergone aortic valve replacement with mitral valve repair plus Maze procedure in late May, had postop AFib, underwent cardioversion again on     He did use amiodarone for 1 month,  he stopped that end of August  Currently anticoagulated with Eliquis, did have nose bleeds, saw ENT with cautery, has had some nose bleeds recently, plans to discuss stopping Eliquis with Cardiology      Had reproducible left anterior lower chest wall pain on rib, has resolved --  Xray showed no new finding,  he did have CT scanning earlier this year showing right upper lobe nodule, CT scanning has also been redone at [facility], Lulu Ramirez in June showed nodule to be stable with no other worrisome areas  He can redo CT in December for completeness      EKG run 9/7/21 showed normal sinus rhythm with no acute finding  If rib pain does worsen or persist he can do x-ray ribs, relates past/remote history possible rib fracture, had fall back in January with no recent trauma     He last saw Cardiology in late July, he will be seeing them in f/up  Blood pressure usually runs on lower side, watch for lightheadedness  He did dropped 10 lb since surgery, he is on amlodipine 2 5 mg daily, off other meds which would affect blood pressure  CBC, BMP looked okay in June      He also plans to see Urology in follow-up, history elevated PSA, last PSA earlier this year 9 9 similar to prior    He is up to date with Lipid screening  Back in March cholesterol 132 with HDL 28, LDL 69, triglyceride 173  He is up to date with Diabetes screening  A1c back in May 5 8    Immunization History   Administered Date(s) Administered    INFLUENZA 02/04/2018    Influenza Quadrivalent Preservative Free 3 years and older IM 10/14/2014    Pneumococcal Conjugate 13-Valent 05/22/2020    SARS-CoV-2 / COVID-19 mRNA IM (Pfizer-BioNTech) 03/02/2021, 03/24/2021       Discussed Vaccines, he should do a Flu shot yearly in the fall, is due for Pneumococcal 23 vaccine, also COVID vaccine highly recommended  Tdap/tetanus shot will be done at a future date  (done every 10 yrs for superficial cuts, every 5 yrs for deep wounds)   Can also look into coverage for shingles shot, Shingrix  Can do that at pharmacy        Was never a smoker     Regarding Colon Cancer screening, screening is up to date, he did have a colonoscopy in August of 2020 with Dr Duarte Bryant     Discussed  Prostate Cancer screening, he will continue to see Urology as is    We discussed end of life planning, does have a  "LIVING WILL" Glaucoma screening is   due     Can see Dermatology    Discussed importance of routine exercise, healthy diet  We will see him again in 6 months, sooner as needed  Chief Complaint     Chief Complaint   Patient presents with    Medicare Wellness Visit       History of Present Illness     Brendan Lombardi is a 70y o -year-old male who is in today for an annual wellness check, I had seen him recently with left reproducible chest pain, that resolved  Overall he is feeling well other than he has noted occasional nose bleed yet, is on Eliquis which he hopes to stop soon, had seen ENT in the past with cauterization  Nose bleeds not as bad    Has had no palpitations, increased shortness of breath, overall feeling well    Review of Systems     Review of Systems   Constitutional: Negative for appetite change, fatigue (Improved), fever and unexpected weight change (Previously dropped weight with procedure, is working on keeping his weight down)  HENT: Negative for sore throat and trouble swallowing  Respiratory: Negative for cough, chest tightness and shortness of breath (None recent)  Cardiovascular: Negative for chest pain, palpitations (History AFib, no recent palpitations) and leg swelling  Gastrointestinal: Negative for abdominal pain, blood in stool, nausea and vomiting  No acid reflux     No change in bowel   Genitourinary: Negative for dysuria and hematuria  Neurological: Negative for dizziness, syncope, light-headedness and headaches  Hematological: Bruises/bleeds easily (Has nose bleeds, no other bleeding)  Psychiatric/Behavioral: Negative for behavioral problems and confusion         Active Problem List     Patient Active Problem List   Diagnosis    Abdominal hernia    Benign essential hypertension    Proteinuria    Neurocardiogenic syncope    Insomnia    Hypercholesterolemia    Hematuria, microscopic    Elevated PSA w neg Bx 2014    Coronary atherosclerosis  Paroxysmal atrial fibrillation (HCC)     Localized, primary osteoarthritis of shoulder region    Urinary retention    Pulmonary nodules    Thoracic aortic aneurysm without rupture (HCC)    Status post aortic valve replacement    S/P mitral valve repair       Past Medical History:  Past Medical History:   Diagnosis Date    SVT (supraventricular tachycardia) (HCC)     Syncope, cardiogenic        Past Surgical History:  Past Surgical History:   Procedure Laterality Date    CARDIAC SURGERY      COLONOSCOPY      INGUINAL HERNIA REPAIR         Family History:  Family History   Problem Relation Age of Onset    Alzheimer's disease Mother [de-identified]    Esophageal cancer Mother     Diabetes Father     Diabetes Sister     Kidney failure Sister        Social History:  Social History     Tobacco Use    Smoking status: Never Smoker    Smokeless tobacco: Never Used   Substance Use Topics    Alcohol use: No       Objective     Vitals:    09/23/21 1420   BP: 124/78   BP Location: Left arm   Patient Position: Sitting   Cuff Size: Large   Pulse: 72   Temp: 97 8 °F (36 6 °C)   SpO2: 97%   Weight: 101 kg (223 lb)   Height: 6' 1" (1 854 m)     Body mass index is 29 42 kg/m²  BP Readings from Last 3 Encounters:   09/23/21 124/78   09/07/21 100/60   04/28/21 140/90       Wt Readings from Last 3 Encounters:   09/23/21 101 kg (223 lb)   09/07/21 100 kg (221 lb)   08/20/21 99 8 kg (220 lb)       Physical Exam  Constitutional:       General: He is not in acute distress  Appearance: Normal appearance  He is well-developed  He is not ill-appearing  Eyes:      General: No scleral icterus  Cardiovascular:      Rate and Rhythm: Normal rate and regular rhythm  Heart sounds: Murmur heard  Pulmonary:      Effort: Pulmonary effort is normal  No respiratory distress  Breath sounds: Normal breath sounds  No wheezing, rhonchi or rales  Abdominal:      Palpations: Abdomen is soft  Tenderness:  There is no abdominal tenderness  Musculoskeletal:      Right lower leg: No edema  Left lower leg: No edema  Lymphadenopathy:      Cervical: No cervical adenopathy  Skin:     Coloration: Skin is not jaundiced  Neurological:      Mental Status: He is alert and oriented to person, place, and time  Psychiatric:         Mood and Affect: Mood normal          Behavior: Behavior normal          ALLERGIES:  No Known Allergies    Current Medications     Current Outpatient Medications   Medication Sig Dispense Refill    amLODIPine (NORVASC) 2 5 mg tablet Take 1 tablet (2 5 mg total) by mouth daily 90 tablet 3    apixaban (Eliquis) 5 mg Take 5 mg by mouth 2 (two) times a day      Cholecalciferol 50 MCG (2000 UT) CAPS Take by mouth      diphenhydrAMINE (SIMPLY SLEEP) 25 MG tablet Take 1 tablet by mouth      lovastatin (MEVACOR) 40 MG tablet Take 1 tablet by mouth      Magnesium 250 MG TABS Take by mouth daily      Saw Palmetto, Serenoa repens, (SAW PALMETTO PO) Take by mouth daily 250 mg      tamsulosin (FLOMAX) 0 4 mg TAKE 1 CAPSULE BY MOUTH EVERY DAY AT NIGHT       No current facility-administered medications for this visit           Health Maintenance     See other note today re clinical AWV info             Most recent labs available from [address] [place] [address]   ( others may be available in Care Everywhere / Media sections)  Lab Results   Component Value Date    WBC 11 66 (H) 11/30/2017    HGB 15 2 11/30/2017    HCT 44 7 11/30/2017     11/30/2017    CHOL 143 10/17/2014    TRIG 179 (H) 09/06/2019    HDL 33 (L) 09/06/2019    ALT 27 09/06/2019    AST 19 09/06/2019     10/17/2014    K 4 1 11/30/2017     11/30/2017    CREATININE 1 09 11/30/2017    BUN 17 11/30/2017    CO2 28 11/30/2017    PSA 7 5 (H) 09/06/2019    GLUF 99 11/30/2017    HGBA1C 5 8 (H) 05/06/2021       Orders Placed This Encounter   Procedures    PNEUMOCOCCAL POLYSACCHARIDE VACCINE 23-VALENT =>1YO SQ IM             Eddie Acosta DO

## 2021-09-23 NOTE — PATIENT INSTRUCTIONS
Reviewed health history along w meds - he did have a visit with me September 7, in today for an annual wellness check  He is currently doing well    As in prior notes he had undergone aortic valve replacement with mitral valve repair plus Maze procedure in late May, had postop AFib, underwent cardioversion again on August 9th  He did use amiodarone for 1 month,  he stopped that end of August  Currently anticoagulated with Eliquis, did have nose bleeds, saw ENT with cautery, has had some nose bleeds recently, plans to discuss stopping Eliquis with Cardiology      Had reproducible left anterior lower chest wall pain on rib, has resolved --  Xray showed no new finding,  he did have CT scanning earlier this year showing right upper lobe nodule, CT scanning has also been redone at [facility], [address] in June showed nodule to be stable with no other worrisome areas  He can redo CT in December for completeness      EKG run 9/7/21 showed normal sinus rhythm with no acute finding  If rib pain does worsen or persist he can do x-ray ribs, relates past/remote history possible rib fracture, had fall back in January with no recent trauma     He last saw Cardiology in late July, he will be seeing them in f/up  Blood pressure usually runs on lower side, watch for lightheadedness  He did dropped 10 lb since surgery, he is on amlodipine 2 5 mg daily, off other meds which would affect blood pressure  CBC, BMP looked okay in June      He also plans to see Urology in follow-up, history elevated PSA, last PSA earlier this year 9 9 similar to prior    He is up to date with Lipid screening  Back in March cholesterol 132 with HDL 28, LDL 69, triglyceride 173  He is up to date with Diabetes screening    A1c back in May 5 8    Immunization History   Administered Date(s) Administered    INFLUENZA 02/04/2018    Influenza Quadrivalent Preservative Free 3 years and older IM 10/14/2014    Pneumococcal Conjugate 13-Valent 05/22/2020    SARS-CoV-2 / COVID-19 mRNA IM (Pfizer-BioNTech) 03/02/2021, 03/24/2021       Discussed Vaccines, he should do a Flu shot yearly in the fall, is due for Pneumococcal 23 vaccine, also COVID vaccine highly recommended  Tdap/tetanus shot will be done at a future date  (done every 10 yrs for superficial cuts, every 5 yrs for deep wounds)   Can also look into coverage for shingles shot, Shingrix  Can do that at pharmacy  Was never a smoker     Regarding Colon Cancer screening, screening is up to date, he did have a colonoscopy in August of 2020 with Dr Kenney Patel     Discussed  Prostate Cancer screening, he will continue to see Urology as is    We discussed end of life planning, does have a  "LIVING WILL"     Glaucoma screening is   due     Can see Dermatology    Discussed importance of routine exercise, healthy diet  We will see him again in 6 months, sooner as needed

## 2021-09-23 NOTE — PROGRESS NOTES
Assessment and Plan:     Problem List Items Addressed This Visit     None      Visit Diagnoses     Medicare annual wellness visit, subsequent    -  Primary    BMI 29 0-29 9,adult        Need for 23-polyvalent pneumococcal polysaccharide vaccine        Relevant Orders    PNEUMOCOCCAL POLYSACCHARIDE VACCINE 23-VALENT =>1YO SQ IM (Completed)           Preventive health issues were discussed with patient, and age appropriate screening tests were ordered as noted in patient's After Visit Summary  Personalized health advice and appropriate referrals for health education or preventive services given if needed, as noted in patient's After Visit Summary       History of Present Illness:     Patient presents for Medicare Annual Wellness visit    Patient Care Team:  Rayshawn Sheppard DO as PCP - General     Problem List:     Patient Active Problem List   Diagnosis    Abdominal hernia    Benign essential hypertension    Proteinuria    Neurocardiogenic syncope    Insomnia    Hypercholesterolemia    Hematuria, microscopic    Elevated PSA w neg Bx 2014    Coronary atherosclerosis    Paroxysmal atrial fibrillation (HCC)     Localized, primary osteoarthritis of shoulder region    Urinary retention    Pulmonary nodules    Thoracic aortic aneurysm without rupture ([facility] [address] )    Status post aortic valve replacement    S/P mitral valve repair      Past Medical and Surgical History:     Past Medical History:   Diagnosis Date    SVT (supraventricular tachycardia) (HCC)     Syncope, cardiogenic      Past Surgical History:   Procedure Laterality Date    CARDIAC SURGERY      COLONOSCOPY      INGUINAL HERNIA REPAIR        Family History:     Family History   Problem Relation Age of Onset    Alzheimer's disease Mother [de-identified]    Esophageal cancer Mother     Diabetes Father     Diabetes Sister     Kidney failure Sister       Social History:     Social History     Socioeconomic History    Marital status: /Civil Union Spouse name: None    Number of children: None    Years of education: None    Highest education level: None   Occupational History    Occupation:      Comment: full-time   Tobacco Use    Smoking status: Never Smoker    Smokeless tobacco: Never Used   Vaping Use    Vaping Use: Never used   Substance and Sexual Activity    Alcohol use: No    Drug use: No    Sexual activity: None   Other Topics Concern    None   Social History Narrative    None     Social Determinants of Health     Financial Resource Strain:     Difficulty of Paying Living Expenses:    Food Insecurity:     Worried About Running Out of Food in the Last Year:     Ran Out of Food in the Last Year:    Transportation Needs:     Lack of Transportation (Medical):      Lack of Transportation (Non-Medical):    Physical Activity:     Days of Exercise per Week:     Minutes of Exercise per Session:    Stress:     Feeling of Stress :    Social Connections:     Frequency of Communication with Friends and Family:     Frequency of Social Gatherings with Friends and Family:     Attends Yazidi Services:     Active Member of Clubs or Organizations:     Attends Club or Organization Meetings:     Marital Status:    Intimate Partner Violence:     Fear of Current or Ex-Partner:     Emotionally Abused:     Physically Abused:     Sexually Abused:       Medications and Allergies:     Current Outpatient Medications   Medication Sig Dispense Refill    amLODIPine (NORVASC) 2 5 mg tablet Take 1 tablet (2 5 mg total) by mouth daily 90 tablet 3    apixaban (Eliquis) 5 mg Take 5 mg by mouth 2 (two) times a day      Cholecalciferol 50 MCG (2000 UT) CAPS Take by mouth      diphenhydrAMINE (SIMPLY SLEEP) 25 MG tablet Take 1 tablet by mouth      lovastatin (MEVACOR) 40 MG tablet Take 1 tablet by mouth      Magnesium 250 MG TABS Take by mouth daily      Saw Palmetto, Serenoa repens, (SAW PALMETTO PO) Take by mouth daily 250 mg      tamsulosin (FLOMAX) 0 4 mg TAKE 1 CAPSULE BY MOUTH EVERY DAY AT NIGHT       No current facility-administered medications for this visit  No Known Allergies   Immunizations:     Immunization History   Administered Date(s) Administered    INFLUENZA 02/04/2018    Influenza Quadrivalent Preservative Free 3 years and older IM 10/14/2014    Pneumococcal Conjugate 13-Valent 05/22/2020    Pneumococcal Polysaccharide PPV23 09/23/2021    SARS-CoV-2 / COVID-19 mRNA IM (Pfizer-BioNTech) 03/02/2021, 03/24/2021      Health Maintenance:         Topic Date Due    Colorectal Cancer Screening  08/06/2022    Hepatitis C Screening  Completed         Topic Date Due    DTaP,Tdap,and Td Vaccines (1 - Tdap) Never done    Influenza Vaccine (1) 09/01/2021      Medicare Health Risk Assessment:     /78 (BP Location: Left arm, Patient Position: Sitting, Cuff Size: Large)   Pulse 72   Temp 97 8 °F (36 6 °C)   Ht 6' 1" (1 854 m)   Wt 101 kg (223 lb)   SpO2 97%   BMI 29 42 kg/m²      Laura Avalos is here for his Subsequent Wellness visit  Health Risk Assessment:   Patient rates overall health as good  Patient feels that their physical health rating is much better  Patient is satisfied with their life  Eyesight was rated as same  Hearing was rated as same  Patient feels that their emotional and mental health rating is much better  Patients states they are never, rarely angry  Patient states they are never, rarely unusually tired/fatigued  Pain experienced in the last 7 days has been none  Patient states that he has experienced no weight loss or gain in last 6 months  Depression Screening:   PHQ-2 Score: 0      Fall Risk Screening:    In the past year, patient has experienced: history of falling in past year    Number of falls: 2 or more  Injured during fall?: Yes    Feels unsteady when standing or walking?: No    Worried about falling?: No      Home Safety:  Patient does not have trouble with stairs inside or outside of their home  Patient has working smoke alarms and has working carbon monoxide detector  Home safety hazards include: none  Nutrition:   Current diet is Regular  Medications:   Patient is currently taking over-the-counter supplements  OTC medications include: see medication list  Patient is not able to manage medications  Activities of Daily Living (ADLs)/Instrumental Activities of Daily Living (IADLs):   Walk and transfer into and out of bed and chair?: Yes  Dress and groom yourself?: Yes    Bathe or shower yourself?: Yes    Feed yourself? Yes  Do your laundry/housekeeping?: Yes  Manage your money, pay your bills and track your expenses?: No  Make your own meals?: Yes    Do your own shopping?: Yes    Previous Hospitalizations:   Any hospitalizations or ED visits within the last 12 months?: Yes      Advance Care Planning:   Living will: Yes    Durable POA for healthcare: Yes    Advanced directive: Yes      PREVENTIVE SCREENINGS      Cardiovascular Screening:    General: Screening Not Indicated and History Lipid Disorder      Diabetes Screening:     General: Screening Current      Colorectal Cancer Screening:     General: Screening Current      Abdominal Aortic Aneurysm (AAA) Screening:    Risk factors include: age between 73-67 yo        Lung Cancer Screening:     General: Screening Not Indicated      Hepatitis C Screening:    General: Screening Current    Screening, Brief Intervention, and Referral to Treatment (SBIRT)    Screening  Typical number of drinks in a day: 0  Typical number of drinks in a week: 0  Interpretation: Low risk drinking behavior      Single Item Drug Screening:  How often have you used an illegal drug (including marijuana) or a prescription medication for non-medical reasons in the past year? never    Single Item Drug Screen Score: 0  Interpretation: Negative screen for possible drug use disorder      Eddie Acosta,

## 2021-11-23 ENCOUNTER — ANESTHESIA EVENT (OUTPATIENT)
Dept: PERIOP | Facility: HOSPITAL | Age: 71
End: 2021-11-23
Payer: MEDICARE

## 2021-11-29 ENCOUNTER — APPOINTMENT (OUTPATIENT)
Dept: LAB | Facility: HOSPITAL | Age: 71
End: 2021-11-29
Attending: SPECIALIST
Payer: MEDICARE

## 2021-11-29 DIAGNOSIS — J34.89 OVERDEVELOPMENT OF NASAL BONES: ICD-10-CM

## 2021-11-29 DIAGNOSIS — Z79.01 LONG TERM (CURRENT) USE OF ANTICOAGULANTS: ICD-10-CM

## 2021-11-29 DIAGNOSIS — R04.0 EPISTAXIS: ICD-10-CM

## 2021-11-29 LAB
ANION GAP SERPL CALCULATED.3IONS-SCNC: 10 MMOL/L (ref 4–13)
BASOPHILS # BLD AUTO: 0.08 THOUSANDS/ΜL (ref 0–0.1)
BASOPHILS NFR BLD AUTO: 1 % (ref 0–1)
BUN SERPL-MCNC: 19 MG/DL (ref 5–25)
CALCIUM SERPL-MCNC: 9.1 MG/DL (ref 8.3–10.1)
CHLORIDE SERPL-SCNC: 105 MMOL/L (ref 100–108)
CO2 SERPL-SCNC: 25 MMOL/L (ref 21–32)
CREAT SERPL-MCNC: 1.14 MG/DL (ref 0.6–1.3)
EOSINOPHIL # BLD AUTO: 0.32 THOUSAND/ΜL (ref 0–0.61)
EOSINOPHIL NFR BLD AUTO: 5 % (ref 0–6)
ERYTHROCYTE [DISTWIDTH] IN BLOOD BY AUTOMATED COUNT: 14.7 % (ref 11.6–15.1)
GFR SERPL CREATININE-BSD FRML MDRD: 64 ML/MIN/1.73SQ M
GLUCOSE SERPL-MCNC: 92 MG/DL (ref 65–140)
HCT VFR BLD AUTO: 44.2 % (ref 36.5–49.3)
HGB BLD-MCNC: 13.9 G/DL (ref 12–17)
IMM GRANULOCYTES # BLD AUTO: 0.01 THOUSAND/UL (ref 0–0.2)
IMM GRANULOCYTES NFR BLD AUTO: 0 % (ref 0–2)
LYMPHOCYTES # BLD AUTO: 1.26 THOUSANDS/ΜL (ref 0.6–4.47)
LYMPHOCYTES NFR BLD AUTO: 18 % (ref 14–44)
MCH RBC QN AUTO: 28 PG (ref 26.8–34.3)
MCHC RBC AUTO-ENTMCNC: 31.4 G/DL (ref 31.4–37.4)
MCV RBC AUTO: 89 FL (ref 82–98)
MONOCYTES # BLD AUTO: 0.5 THOUSAND/ΜL (ref 0.17–1.22)
MONOCYTES NFR BLD AUTO: 7 % (ref 4–12)
NEUTROPHILS # BLD AUTO: 4.8 THOUSANDS/ΜL (ref 1.85–7.62)
NEUTS SEG NFR BLD AUTO: 69 % (ref 43–75)
NRBC BLD AUTO-RTO: 0 /100 WBCS
PLATELET # BLD AUTO: 197 THOUSANDS/UL (ref 149–390)
PMV BLD AUTO: 11.3 FL (ref 8.9–12.7)
POTASSIUM SERPL-SCNC: 4.1 MMOL/L (ref 3.5–5.3)
RBC # BLD AUTO: 4.97 MILLION/UL (ref 3.88–5.62)
SODIUM SERPL-SCNC: 140 MMOL/L (ref 136–145)
WBC # BLD AUTO: 6.97 THOUSAND/UL (ref 4.31–10.16)

## 2021-11-29 PROCEDURE — 80048 BASIC METABOLIC PNL TOTAL CA: CPT

## 2021-11-29 PROCEDURE — 85025 COMPLETE CBC W/AUTO DIFF WBC: CPT

## 2021-11-29 PROCEDURE — 36415 COLL VENOUS BLD VENIPUNCTURE: CPT

## 2021-11-30 ENCOUNTER — HOSPITAL ENCOUNTER (OUTPATIENT)
Facility: HOSPITAL | Age: 71
Setting detail: OUTPATIENT SURGERY
Discharge: HOME | End: 2021-11-30
Attending: OTOLARYNGOLOGY | Admitting: OTOLARYNGOLOGY
Payer: MEDICARE

## 2021-11-30 ENCOUNTER — ANESTHESIA (OUTPATIENT)
Dept: PERIOP | Facility: HOSPITAL | Age: 71
End: 2021-11-30
Payer: MEDICARE

## 2021-11-30 VITALS
BODY MASS INDEX: 30.49 KG/M2 | OXYGEN SATURATION: 94 % | WEIGHT: 225.09 LBS | RESPIRATION RATE: 16 BRPM | DIASTOLIC BLOOD PRESSURE: 91 MMHG | SYSTOLIC BLOOD PRESSURE: 151 MMHG | TEMPERATURE: 97.7 F | HEIGHT: 72 IN | HEART RATE: 71 BPM

## 2021-11-30 DIAGNOSIS — J34.89 NASAL LESION: Primary | ICD-10-CM

## 2021-11-30 DIAGNOSIS — J34.89 PYOGENIC GRANULOMA OF NARES: ICD-10-CM

## 2021-11-30 PROCEDURE — 30999 UNLISTED PROCEDURE NOSE: CPT | Performed by: OTOLARYNGOLOGY

## 2021-11-30 PROCEDURE — 88304 TISSUE EXAM BY PATHOLOGIST: CPT | Performed by: PATHOLOGY

## 2021-11-30 PROCEDURE — 30117 REMOVAL OF INTRANASAL LESION: CPT | Performed by: OTOLARYNGOLOGY

## 2021-11-30 RX ORDER — CEPHALEXIN 500 MG/1
500 CAPSULE ORAL EVERY 8 HOURS SCHEDULED
Qty: 21 CAPSULE | Refills: 0 | Status: SHIPPED | OUTPATIENT
Start: 2021-11-30 | End: 2021-12-07

## 2021-11-30 RX ORDER — HYDROMORPHONE HCL/PF 1 MG/ML
0.5 SYRINGE (ML) INJECTION
Status: DISCONTINUED | OUTPATIENT
Start: 2021-11-30 | End: 2021-11-30 | Stop reason: HOSPADM

## 2021-11-30 RX ORDER — FENTANYL CITRATE/PF 50 MCG/ML
25 SYRINGE (ML) INJECTION
Status: DISCONTINUED | OUTPATIENT
Start: 2021-11-30 | End: 2021-11-30 | Stop reason: HOSPADM

## 2021-11-30 RX ORDER — ONDANSETRON 2 MG/ML
4 INJECTION INTRAMUSCULAR; INTRAVENOUS ONCE AS NEEDED
Status: DISCONTINUED | OUTPATIENT
Start: 2021-11-30 | End: 2021-11-30 | Stop reason: HOSPADM

## 2021-11-30 RX ORDER — CEFAZOLIN SODIUM 2 G/50ML
SOLUTION INTRAVENOUS AS NEEDED
Status: DISCONTINUED | OUTPATIENT
Start: 2021-11-30 | End: 2021-11-30

## 2021-11-30 RX ORDER — ROCURONIUM BROMIDE 10 MG/ML
INJECTION, SOLUTION INTRAVENOUS AS NEEDED
Status: DISCONTINUED | OUTPATIENT
Start: 2021-11-30 | End: 2021-11-30

## 2021-11-30 RX ORDER — EPHEDRINE SULFATE 50 MG/ML
INJECTION INTRAVENOUS AS NEEDED
Status: DISCONTINUED | OUTPATIENT
Start: 2021-11-30 | End: 2021-11-30

## 2021-11-30 RX ORDER — MEPERIDINE HYDROCHLORIDE 25 MG/ML
12.5 INJECTION INTRAMUSCULAR; INTRAVENOUS; SUBCUTANEOUS
Status: DISCONTINUED | OUTPATIENT
Start: 2021-11-30 | End: 2021-11-30 | Stop reason: HOSPADM

## 2021-11-30 RX ORDER — CEFAZOLIN SODIUM 2 G/50ML
2000 SOLUTION INTRAVENOUS ONCE
Status: DISCONTINUED | OUTPATIENT
Start: 2021-11-30 | End: 2021-11-30 | Stop reason: HOSPADM

## 2021-11-30 RX ORDER — LIDOCAINE HYDROCHLORIDE 20 MG/ML
INJECTION, SOLUTION EPIDURAL; INFILTRATION; INTRACAUDAL; PERINEURAL AS NEEDED
Status: DISCONTINUED | OUTPATIENT
Start: 2021-11-30 | End: 2021-11-30

## 2021-11-30 RX ORDER — MIDAZOLAM HYDROCHLORIDE 2 MG/2ML
INJECTION, SOLUTION INTRAMUSCULAR; INTRAVENOUS AS NEEDED
Status: DISCONTINUED | OUTPATIENT
Start: 2021-11-30 | End: 2021-11-30

## 2021-11-30 RX ORDER — PROPOFOL 10 MG/ML
INJECTION, EMULSION INTRAVENOUS AS NEEDED
Status: DISCONTINUED | OUTPATIENT
Start: 2021-11-30 | End: 2021-11-30

## 2021-11-30 RX ORDER — SODIUM CHLORIDE, SODIUM LACTATE, POTASSIUM CHLORIDE, CALCIUM CHLORIDE 600; 310; 30; 20 MG/100ML; MG/100ML; MG/100ML; MG/100ML
125 INJECTION, SOLUTION INTRAVENOUS CONTINUOUS
Status: DISCONTINUED | OUTPATIENT
Start: 2021-11-30 | End: 2021-11-30 | Stop reason: HOSPADM

## 2021-11-30 RX ORDER — LIDOCAINE HYDROCHLORIDE AND EPINEPHRINE 10; 10 MG/ML; UG/ML
INJECTION, SOLUTION INFILTRATION; PERINEURAL AS NEEDED
Status: DISCONTINUED | OUTPATIENT
Start: 2021-11-30 | End: 2021-11-30 | Stop reason: HOSPADM

## 2021-11-30 RX ORDER — OXYMETAZOLINE HYDROCHLORIDE 0.05 G/100ML
SPRAY NASAL AS NEEDED
Status: DISCONTINUED | OUTPATIENT
Start: 2021-11-30 | End: 2021-11-30 | Stop reason: HOSPADM

## 2021-11-30 RX ORDER — FENTANYL CITRATE 50 UG/ML
INJECTION, SOLUTION INTRAMUSCULAR; INTRAVENOUS AS NEEDED
Status: DISCONTINUED | OUTPATIENT
Start: 2021-11-30 | End: 2021-11-30

## 2021-11-30 RX ADMIN — EPHEDRINE SULFATE 10 MG: 50 INJECTION, SOLUTION INTRAVENOUS at 14:34

## 2021-11-30 RX ADMIN — FENTANYL CITRATE 100 MCG: 50 INJECTION INTRAMUSCULAR; INTRAVENOUS at 14:19

## 2021-11-30 RX ADMIN — ROCURONIUM BROMIDE 40 MG: 50 INJECTION, SOLUTION INTRAVENOUS at 14:19

## 2021-11-30 RX ADMIN — EPHEDRINE SULFATE 10 MG: 50 INJECTION, SOLUTION INTRAVENOUS at 14:30

## 2021-11-30 RX ADMIN — MIDAZOLAM 1 MG: 1 INJECTION INTRAMUSCULAR; INTRAVENOUS at 14:08

## 2021-11-30 RX ADMIN — LIDOCAINE HYDROCHLORIDE 60 MG: 20 INJECTION, SOLUTION EPIDURAL; INFILTRATION; INTRACAUDAL; PERINEURAL at 14:19

## 2021-11-30 RX ADMIN — SODIUM CHLORIDE, SODIUM LACTATE, POTASSIUM CHLORIDE, AND CALCIUM CHLORIDE: .6; .31; .03; .02 INJECTION, SOLUTION INTRAVENOUS at 15:14

## 2021-11-30 RX ADMIN — ROCURONIUM BROMIDE 10 MG: 50 INJECTION, SOLUTION INTRAVENOUS at 14:55

## 2021-11-30 RX ADMIN — PROPOFOL 40 MG: 10 INJECTION, EMULSION INTRAVENOUS at 14:55

## 2021-11-30 RX ADMIN — SODIUM CHLORIDE, SODIUM LACTATE, POTASSIUM CHLORIDE, AND CALCIUM CHLORIDE 125 ML/HR: .6; .31; .03; .02 INJECTION, SOLUTION INTRAVENOUS at 13:25

## 2021-11-30 RX ADMIN — PROPOFOL 130 MG: 10 INJECTION, EMULSION INTRAVENOUS at 14:19

## 2021-11-30 RX ADMIN — PHENYLEPHRINE HYDROCHLORIDE 50 MCG/MIN: 10 INJECTION INTRAVENOUS at 14:39

## 2021-11-30 RX ADMIN — CEFAZOLIN SODIUM 2000 MG: 2 SOLUTION INTRAVENOUS at 14:08

## 2021-12-24 ENCOUNTER — HOSPITAL ENCOUNTER (EMERGENCY)
Facility: HOSPITAL | Age: 71
Discharge: HOME | End: 2021-12-24
Attending: EMERGENCY MEDICINE | Admitting: EMERGENCY MEDICINE
Payer: MEDICARE

## 2021-12-24 VITALS
DIASTOLIC BLOOD PRESSURE: 108 MMHG | OXYGEN SATURATION: 99 % | SYSTOLIC BLOOD PRESSURE: 156 MMHG | RESPIRATION RATE: 18 BRPM | HEART RATE: 71 BPM

## 2021-12-24 DIAGNOSIS — R04.0 EPISTAXIS: Primary | ICD-10-CM

## 2021-12-24 LAB
ANION GAP SERPL CALCULATED.3IONS-SCNC: 11 MMOL/L (ref 4–13)
APTT PPP: 34 SECONDS (ref 23–37)
BASOPHILS # BLD AUTO: 0.08 THOUSANDS/ΜL (ref 0–0.1)
BASOPHILS NFR BLD AUTO: 1 % (ref 0–1)
BUN SERPL-MCNC: 18 MG/DL (ref 5–25)
CALCIUM SERPL-MCNC: 9 MG/DL (ref 8.3–10.1)
CHLORIDE SERPL-SCNC: 105 MMOL/L (ref 100–108)
CO2 SERPL-SCNC: 24 MMOL/L (ref 21–32)
CREAT SERPL-MCNC: 1.05 MG/DL (ref 0.6–1.3)
EOSINOPHIL # BLD AUTO: 0.22 THOUSAND/ΜL (ref 0–0.61)
EOSINOPHIL NFR BLD AUTO: 3 % (ref 0–6)
ERYTHROCYTE [DISTWIDTH] IN BLOOD BY AUTOMATED COUNT: 13.9 % (ref 11.6–15.1)
GFR SERPL CREATININE-BSD FRML MDRD: 71 ML/MIN/1.73SQ M
GLUCOSE SERPL-MCNC: 100 MG/DL (ref 65–140)
HCT VFR BLD AUTO: 41.5 % (ref 36.5–49.3)
HGB BLD-MCNC: 13.8 G/DL (ref 12–17)
IMM GRANULOCYTES # BLD AUTO: 0.12 THOUSAND/UL (ref 0–0.2)
IMM GRANULOCYTES NFR BLD AUTO: 2 % (ref 0–2)
INR PPP: 1.07 (ref 0.84–1.19)
LYMPHOCYTES # BLD AUTO: 1.59 THOUSANDS/ΜL (ref 0.6–4.47)
LYMPHOCYTES NFR BLD AUTO: 20 % (ref 14–44)
MCH RBC QN AUTO: 29.2 PG (ref 26.8–34.3)
MCHC RBC AUTO-ENTMCNC: 33.3 G/DL (ref 31.4–37.4)
MCV RBC AUTO: 88 FL (ref 82–98)
MONOCYTES # BLD AUTO: 0.73 THOUSAND/ΜL (ref 0.17–1.22)
MONOCYTES NFR BLD AUTO: 9 % (ref 4–12)
NEUTROPHILS # BLD AUTO: 5.27 THOUSANDS/ΜL (ref 1.85–7.62)
NEUTS SEG NFR BLD AUTO: 65 % (ref 43–75)
NRBC BLD AUTO-RTO: 0 /100 WBCS
PLATELET # BLD AUTO: 325 THOUSANDS/UL (ref 149–390)
PMV BLD AUTO: 9.9 FL (ref 8.9–12.7)
POTASSIUM SERPL-SCNC: 3.9 MMOL/L (ref 3.5–5.3)
PROTHROMBIN TIME: 13.7 SECONDS (ref 11.6–14.5)
RBC # BLD AUTO: 4.72 MILLION/UL (ref 3.88–5.62)
SODIUM SERPL-SCNC: 140 MMOL/L (ref 136–145)
WBC # BLD AUTO: 8.01 THOUSAND/UL (ref 4.31–10.16)

## 2021-12-24 PROCEDURE — 85025 COMPLETE CBC W/AUTO DIFF WBC: CPT | Performed by: PHYSICIAN ASSISTANT

## 2021-12-24 PROCEDURE — 99283 EMERGENCY DEPT VISIT LOW MDM: CPT

## 2021-12-24 PROCEDURE — 99024 POSTOP FOLLOW-UP VISIT: CPT | Performed by: STUDENT IN AN ORGANIZED HEALTH CARE EDUCATION/TRAINING PROGRAM

## 2021-12-24 PROCEDURE — 31238 NSL/SINS NDSC SRG NSL HEMRRG: CPT | Performed by: STUDENT IN AN ORGANIZED HEALTH CARE EDUCATION/TRAINING PROGRAM

## 2021-12-24 PROCEDURE — 85610 PROTHROMBIN TIME: CPT | Performed by: PHYSICIAN ASSISTANT

## 2021-12-24 PROCEDURE — 80048 BASIC METABOLIC PNL TOTAL CA: CPT | Performed by: PHYSICIAN ASSISTANT

## 2021-12-24 PROCEDURE — 36415 COLL VENOUS BLD VENIPUNCTURE: CPT | Performed by: PHYSICIAN ASSISTANT

## 2021-12-24 PROCEDURE — 85730 THROMBOPLASTIN TIME PARTIAL: CPT | Performed by: PHYSICIAN ASSISTANT

## 2021-12-24 PROCEDURE — 99284 EMERGENCY DEPT VISIT MOD MDM: CPT | Performed by: PHYSICIAN ASSISTANT

## 2021-12-24 RX ORDER — TRANEXAMIC ACID 100 MG/ML
1000 INJECTION, SOLUTION INTRAVENOUS ONCE
Status: COMPLETED | OUTPATIENT
Start: 2021-12-24 | End: 2021-12-24

## 2021-12-24 RX ORDER — AMOXICILLIN AND CLAVULANATE POTASSIUM 875; 125 MG/1; MG/1
1 TABLET, FILM COATED ORAL EVERY 12 HOURS
Qty: 10 TABLET | Refills: 0 | Status: SHIPPED | OUTPATIENT
Start: 2021-12-24 | End: 2021-12-29

## 2021-12-24 RX ORDER — BACITRACIN, NEOMYCIN, POLYMYXIN B 400; 3.5; 5 [USP'U]/G; MG/G; [USP'U]/G
1 OINTMENT TOPICAL ONCE
Status: COMPLETED | OUTPATIENT
Start: 2021-12-24 | End: 2021-12-24

## 2021-12-24 RX ORDER — LIDOCAINE HYDROCHLORIDE AND EPINEPHRINE 10; 10 MG/ML; UG/ML
20 INJECTION, SOLUTION INFILTRATION; PERINEURAL ONCE
Status: COMPLETED | OUTPATIENT
Start: 2021-12-24 | End: 2021-12-24

## 2021-12-24 RX ORDER — OXYMETAZOLINE HYDROCHLORIDE 0.05 G/100ML
2 SPRAY NASAL ONCE
Status: COMPLETED | OUTPATIENT
Start: 2021-12-24 | End: 2021-12-24

## 2021-12-24 RX ADMIN — LIDOCAINE HYDROCHLORIDE,EPINEPHRINE BITARTRATE 20 ML: 10; .01 INJECTION, SOLUTION INFILTRATION; PERINEURAL at 15:00

## 2021-12-24 RX ADMIN — POLYMYXIN B SULFATE, BACITRACIN ZINC, NEOMYCIN SULFATE 1 LARGE APPLICATION: 5000; 3.5; 4 OINTMENT TOPICAL at 15:00

## 2021-12-24 RX ADMIN — SILVER NITRATE APPLICATORS 5 APPLICATOR: 25; 75 STICK TOPICAL at 15:00

## 2021-12-24 RX ADMIN — TRANEXAMIC ACID 1000 MG: 1 INJECTION, SOLUTION INTRAVENOUS at 13:19

## 2021-12-24 RX ADMIN — OXYMETAZOLINE HYDROCHLORIDE 2 SPRAY: 0.05 SPRAY NASAL at 13:19

## 2022-01-09 ENCOUNTER — ANESTHESIA (OUTPATIENT)
Dept: PERIOP | Facility: HOSPITAL | Age: 72
End: 2022-01-09
Payer: MEDICARE

## 2022-01-09 ENCOUNTER — HOSPITAL ENCOUNTER (OUTPATIENT)
Facility: HOSPITAL | Age: 72
Setting detail: OBSERVATION
Discharge: HOME | End: 2022-01-09
Attending: EMERGENCY MEDICINE | Admitting: INTERNAL MEDICINE
Payer: MEDICARE

## 2022-01-09 ENCOUNTER — ANESTHESIA EVENT (OUTPATIENT)
Dept: PERIOP | Facility: HOSPITAL | Age: 72
End: 2022-01-09
Payer: MEDICARE

## 2022-01-09 VITALS
RESPIRATION RATE: 18 BRPM | OXYGEN SATURATION: 95 % | BODY MASS INDEX: 22.16 KG/M2 | WEIGHT: 163.36 LBS | HEART RATE: 84 BPM | TEMPERATURE: 98.5 F | DIASTOLIC BLOOD PRESSURE: 80 MMHG | SYSTOLIC BLOOD PRESSURE: 140 MMHG

## 2022-01-09 DIAGNOSIS — R04.0 EPISTAXIS, RECURRENT: Primary | ICD-10-CM

## 2022-01-09 LAB
ABO GROUP BLD: NORMAL
ABO GROUP BLD: NORMAL
APTT PPP: 38 SECONDS (ref 23–37)
BASOPHILS # BLD AUTO: 0.08 THOUSANDS/ΜL (ref 0–0.1)
BASOPHILS NFR BLD AUTO: 1 % (ref 0–1)
BLD GP AB SCN SERPL QL: NEGATIVE
EOSINOPHIL # BLD AUTO: 0.33 THOUSAND/ΜL (ref 0–0.61)
EOSINOPHIL NFR BLD AUTO: 4 % (ref 0–6)
ERYTHROCYTE [DISTWIDTH] IN BLOOD BY AUTOMATED COUNT: 14.7 % (ref 11.6–15.1)
HCT VFR BLD AUTO: 34.3 % (ref 36.5–49.3)
HCT VFR BLD AUTO: 37 % (ref 36.5–49.3)
HGB BLD-MCNC: 11.1 G/DL (ref 12–17)
HGB BLD-MCNC: 12.1 G/DL (ref 12–17)
IMM GRANULOCYTES # BLD AUTO: 0.02 THOUSAND/UL (ref 0–0.2)
IMM GRANULOCYTES NFR BLD AUTO: 0 % (ref 0–2)
INR PPP: 1.13 (ref 0.84–1.19)
LYMPHOCYTES # BLD AUTO: 1.87 THOUSANDS/ΜL (ref 0.6–4.47)
LYMPHOCYTES NFR BLD AUTO: 22 % (ref 14–44)
MCH RBC QN AUTO: 29.5 PG (ref 26.8–34.3)
MCHC RBC AUTO-ENTMCNC: 32.7 G/DL (ref 31.4–37.4)
MCV RBC AUTO: 90 FL (ref 82–98)
MONOCYTES # BLD AUTO: 0.64 THOUSAND/ΜL (ref 0.17–1.22)
MONOCYTES NFR BLD AUTO: 8 % (ref 4–12)
NEUTROPHILS # BLD AUTO: 5.62 THOUSANDS/ΜL (ref 1.85–7.62)
NEUTS SEG NFR BLD AUTO: 65 % (ref 43–75)
NRBC BLD AUTO-RTO: 0 /100 WBCS
PLATELET # BLD AUTO: 208 THOUSANDS/UL (ref 149–390)
PMV BLD AUTO: 10.9 FL (ref 8.9–12.7)
PROTHROMBIN TIME: 14.3 SECONDS (ref 11.6–14.5)
RBC # BLD AUTO: 4.1 MILLION/UL (ref 3.88–5.62)
RH BLD: POSITIVE
RH BLD: POSITIVE
SPECIMEN EXPIRATION DATE: NORMAL
WBC # BLD AUTO: 8.56 THOUSAND/UL (ref 4.31–10.16)

## 2022-01-09 PROCEDURE — 96375 TX/PRO/DX INJ NEW DRUG ADDON: CPT

## 2022-01-09 PROCEDURE — 86901 BLOOD TYPING SEROLOGIC RH(D): CPT | Performed by: EMERGENCY MEDICINE

## 2022-01-09 PROCEDURE — 85018 HEMOGLOBIN: CPT | Performed by: EMERGENCY MEDICINE

## 2022-01-09 PROCEDURE — 36415 COLL VENOUS BLD VENIPUNCTURE: CPT | Performed by: EMERGENCY MEDICINE

## 2022-01-09 PROCEDURE — RECHECK: Performed by: INTERNAL MEDICINE

## 2022-01-09 PROCEDURE — 96374 THER/PROPH/DIAG INJ IV PUSH: CPT

## 2022-01-09 PROCEDURE — 31241 NSL/SNS NDSC LIG SPHNPTN ART: CPT | Performed by: OTOLARYNGOLOGY

## 2022-01-09 PROCEDURE — 85610 PROTHROMBIN TIME: CPT | Performed by: EMERGENCY MEDICINE

## 2022-01-09 PROCEDURE — 86900 BLOOD TYPING SEROLOGIC ABO: CPT | Performed by: EMERGENCY MEDICINE

## 2022-01-09 PROCEDURE — 86850 RBC ANTIBODY SCREEN: CPT | Performed by: EMERGENCY MEDICINE

## 2022-01-09 PROCEDURE — 99220 PR INITIAL OBSERVATION CARE/DAY 70 MINUTES: CPT | Performed by: INTERNAL MEDICINE

## 2022-01-09 PROCEDURE — 99284 EMERGENCY DEPT VISIT MOD MDM: CPT

## 2022-01-09 PROCEDURE — 99284 EMERGENCY DEPT VISIT MOD MDM: CPT | Performed by: EMERGENCY MEDICINE

## 2022-01-09 PROCEDURE — 85014 HEMATOCRIT: CPT | Performed by: EMERGENCY MEDICINE

## 2022-01-09 PROCEDURE — G0008 ADMIN INFLUENZA VIRUS VAC: HCPCS | Performed by: INTERNAL MEDICINE

## 2022-01-09 PROCEDURE — 96376 TX/PRO/DX INJ SAME DRUG ADON: CPT

## 2022-01-09 PROCEDURE — 85730 THROMBOPLASTIN TIME PARTIAL: CPT | Performed by: EMERGENCY MEDICINE

## 2022-01-09 PROCEDURE — 85025 COMPLETE CBC W/AUTO DIFF WBC: CPT | Performed by: EMERGENCY MEDICINE

## 2022-01-09 PROCEDURE — 90662 IIV NO PRSV INCREASED AG IM: CPT | Performed by: INTERNAL MEDICINE

## 2022-01-09 RX ORDER — OXYMETAZOLINE HYDROCHLORIDE 0.05 G/100ML
2 SPRAY NASAL EVERY 12 HOURS SCHEDULED
Status: DISCONTINUED | OUTPATIENT
Start: 2022-01-09 | End: 2022-01-09 | Stop reason: HOSPADM

## 2022-01-09 RX ORDER — LABETALOL 20 MG/4 ML (5 MG/ML) INTRAVENOUS SYRINGE
10 EVERY 6 HOURS PRN
Status: DISCONTINUED | OUTPATIENT
Start: 2022-01-09 | End: 2022-01-09 | Stop reason: HOSPADM

## 2022-01-09 RX ORDER — ONDANSETRON 2 MG/ML
INJECTION INTRAMUSCULAR; INTRAVENOUS AS NEEDED
Status: DISCONTINUED | OUTPATIENT
Start: 2022-01-09 | End: 2022-01-09

## 2022-01-09 RX ORDER — MORPHINE SULFATE 4 MG/ML
4 INJECTION, SOLUTION INTRAMUSCULAR; INTRAVENOUS ONCE
Status: COMPLETED | OUTPATIENT
Start: 2022-01-09 | End: 2022-01-09

## 2022-01-09 RX ORDER — ONDANSETRON 2 MG/ML
4 INJECTION INTRAMUSCULAR; INTRAVENOUS ONCE
Status: COMPLETED | OUTPATIENT
Start: 2022-01-09 | End: 2022-01-09

## 2022-01-09 RX ORDER — SODIUM CHLORIDE, SODIUM GLUCONATE, SODIUM ACETATE, POTASSIUM CHLORIDE, MAGNESIUM CHLORIDE, SODIUM PHOSPHATE, DIBASIC, AND POTASSIUM PHOSPHATE .53; .5; .37; .037; .03; .012; .00082 G/100ML; G/100ML; G/100ML; G/100ML; G/100ML; G/100ML; G/100ML
75 INJECTION, SOLUTION INTRAVENOUS CONTINUOUS
Status: DISCONTINUED | OUTPATIENT
Start: 2022-01-09 | End: 2022-01-09 | Stop reason: HOSPADM

## 2022-01-09 RX ORDER — PROPOFOL 10 MG/ML
INJECTION, EMULSION INTRAVENOUS AS NEEDED
Status: DISCONTINUED | OUTPATIENT
Start: 2022-01-09 | End: 2022-01-09

## 2022-01-09 RX ORDER — NEOSTIGMINE METHYLSULFATE 1 MG/ML
INJECTION INTRAVENOUS AS NEEDED
Status: DISCONTINUED | OUTPATIENT
Start: 2022-01-09 | End: 2022-01-09

## 2022-01-09 RX ORDER — HYDROMORPHONE HCL/PF 1 MG/ML
0.5 SYRINGE (ML) INJECTION EVERY 4 HOURS PRN
Status: DISCONTINUED | OUTPATIENT
Start: 2022-01-09 | End: 2022-01-09 | Stop reason: HOSPADM

## 2022-01-09 RX ORDER — ECHINACEA PURPUREA EXTRACT 125 MG
1 TABLET ORAL 4 TIMES DAILY
Qty: 15 ML | Refills: 0 | Status: SHIPPED | COMMUNITY
Start: 2022-01-09 | End: 2022-07-05

## 2022-01-09 RX ORDER — SODIUM CHLORIDE 9 MG/ML
125 INJECTION, SOLUTION INTRAVENOUS CONTINUOUS
Status: CANCELLED | OUTPATIENT
Start: 2022-01-09

## 2022-01-09 RX ORDER — LIDOCAINE HYDROCHLORIDE AND EPINEPHRINE 10; 10 MG/ML; UG/ML
5 INJECTION, SOLUTION INFILTRATION; PERINEURAL ONCE
Status: COMPLETED | OUTPATIENT
Start: 2022-01-09 | End: 2022-01-09

## 2022-01-09 RX ORDER — SUCCINYLCHOLINE/SOD CL,ISO/PF 100 MG/5ML
SYRINGE (ML) INTRAVENOUS AS NEEDED
Status: DISCONTINUED | OUTPATIENT
Start: 2022-01-09 | End: 2022-01-09

## 2022-01-09 RX ORDER — OXYCODONE HYDROCHLORIDE 10 MG/1
10 TABLET ORAL EVERY 4 HOURS PRN
Status: DISCONTINUED | OUTPATIENT
Start: 2022-01-09 | End: 2022-01-09 | Stop reason: HOSPADM

## 2022-01-09 RX ORDER — FENTANYL CITRATE/PF 50 MCG/ML
25 SYRINGE (ML) INJECTION
Status: DISCONTINUED | OUTPATIENT
Start: 2022-01-09 | End: 2022-01-09 | Stop reason: HOSPADM

## 2022-01-09 RX ORDER — MAGNESIUM HYDROXIDE/ALUMINUM HYDROXICE/SIMETHICONE 120; 1200; 1200 MG/30ML; MG/30ML; MG/30ML
30 SUSPENSION ORAL EVERY 6 HOURS PRN
Status: DISCONTINUED | OUTPATIENT
Start: 2022-01-09 | End: 2022-01-09 | Stop reason: HOSPADM

## 2022-01-09 RX ORDER — OXYCODONE HYDROCHLORIDE 5 MG/1
5 TABLET ORAL EVERY 4 HOURS PRN
Status: DISCONTINUED | OUTPATIENT
Start: 2022-01-09 | End: 2022-01-09 | Stop reason: HOSPADM

## 2022-01-09 RX ORDER — ONDANSETRON 2 MG/ML
4 INJECTION INTRAMUSCULAR; INTRAVENOUS ONCE AS NEEDED
Status: DISCONTINUED | OUTPATIENT
Start: 2022-01-09 | End: 2022-01-09 | Stop reason: HOSPADM

## 2022-01-09 RX ORDER — ROCURONIUM BROMIDE 10 MG/ML
INJECTION, SOLUTION INTRAVENOUS AS NEEDED
Status: DISCONTINUED | OUTPATIENT
Start: 2022-01-09 | End: 2022-01-09

## 2022-01-09 RX ORDER — PROPOFOL 10 MG/ML
INJECTION, EMULSION INTRAVENOUS CONTINUOUS PRN
Status: DISCONTINUED | OUTPATIENT
Start: 2022-01-09 | End: 2022-01-09

## 2022-01-09 RX ORDER — OXYMETAZOLINE HYDROCHLORIDE 0.05 G/100ML
SPRAY NASAL AS NEEDED
Status: DISCONTINUED | OUTPATIENT
Start: 2022-01-09 | End: 2022-01-09 | Stop reason: HOSPADM

## 2022-01-09 RX ORDER — OXYCODONE HYDROCHLORIDE AND ACETAMINOPHEN 5; 325 MG/1; MG/1
1 TABLET ORAL EVERY 8 HOURS PRN
Qty: 8 TABLET | Refills: 0 | Status: CANCELLED | OUTPATIENT
Start: 2022-01-09 | End: 2022-01-19

## 2022-01-09 RX ORDER — TAMSULOSIN HYDROCHLORIDE 0.4 MG/1
0.4 CAPSULE ORAL
Status: DISCONTINUED | OUTPATIENT
Start: 2022-01-09 | End: 2022-01-09 | Stop reason: HOSPADM

## 2022-01-09 RX ORDER — PRAVASTATIN SODIUM 40 MG
40 TABLET ORAL
Status: DISCONTINUED | OUTPATIENT
Start: 2022-01-09 | End: 2022-01-09 | Stop reason: HOSPADM

## 2022-01-09 RX ORDER — SODIUM CHLORIDE 9 MG/ML
INJECTION, SOLUTION INTRAVENOUS CONTINUOUS PRN
Status: DISCONTINUED | OUTPATIENT
Start: 2022-01-09 | End: 2022-01-09

## 2022-01-09 RX ORDER — OXYMETAZOLINE HYDROCHLORIDE 0.05 G/100ML
2 SPRAY NASAL ONCE
Status: COMPLETED | OUTPATIENT
Start: 2022-01-09 | End: 2022-01-09

## 2022-01-09 RX ORDER — FENTANYL CITRATE 50 UG/ML
INJECTION, SOLUTION INTRAMUSCULAR; INTRAVENOUS AS NEEDED
Status: DISCONTINUED | OUTPATIENT
Start: 2022-01-09 | End: 2022-01-09

## 2022-01-09 RX ORDER — ONDANSETRON 2 MG/ML
4 INJECTION INTRAMUSCULAR; INTRAVENOUS EVERY 6 HOURS PRN
Status: DISCONTINUED | OUTPATIENT
Start: 2022-01-09 | End: 2022-01-09 | Stop reason: HOSPADM

## 2022-01-09 RX ORDER — ECHINACEA PURPUREA EXTRACT 125 MG
1 TABLET ORAL
Status: DISCONTINUED | OUTPATIENT
Start: 2022-01-09 | End: 2022-01-09 | Stop reason: HOSPADM

## 2022-01-09 RX ORDER — DEXAMETHASONE SODIUM PHOSPHATE 10 MG/ML
INJECTION, SOLUTION INTRAMUSCULAR; INTRAVENOUS AS NEEDED
Status: DISCONTINUED | OUTPATIENT
Start: 2022-01-09 | End: 2022-01-09

## 2022-01-09 RX ORDER — ACETAMINOPHEN 325 MG/1
650 TABLET ORAL EVERY 6 HOURS PRN
Status: DISCONTINUED | OUTPATIENT
Start: 2022-01-09 | End: 2022-01-09 | Stop reason: HOSPADM

## 2022-01-09 RX ORDER — OXYMETAZOLINE HYDROCHLORIDE 0.05 G/100ML
2 SPRAY NASAL EVERY 12 HOURS SCHEDULED
Qty: 30 ML | Refills: 0 | Status: SHIPPED | COMMUNITY
Start: 2022-01-09 | End: 2022-02-17

## 2022-01-09 RX ORDER — GLYCOPYRROLATE 0.2 MG/ML
INJECTION INTRAMUSCULAR; INTRAVENOUS AS NEEDED
Status: DISCONTINUED | OUTPATIENT
Start: 2022-01-09 | End: 2022-01-09

## 2022-01-09 RX ORDER — LIDOCAINE HYDROCHLORIDE 10 MG/ML
INJECTION, SOLUTION EPIDURAL; INFILTRATION; INTRACAUDAL; PERINEURAL AS NEEDED
Status: DISCONTINUED | OUTPATIENT
Start: 2022-01-09 | End: 2022-01-09

## 2022-01-09 RX ORDER — TRANEXAMIC ACID 100 MG/ML
500 INJECTION, SOLUTION INTRAVENOUS ONCE
Status: COMPLETED | OUTPATIENT
Start: 2022-01-09 | End: 2022-01-09

## 2022-01-09 RX ADMIN — SODIUM CHLORIDE, SODIUM GLUCONATE, SODIUM ACETATE, POTASSIUM CHLORIDE, MAGNESIUM CHLORIDE, SODIUM PHOSPHATE, DIBASIC, AND POTASSIUM PHOSPHATE 75 ML/HR: .53; .5; .37; .037; .03; .012; .00082 INJECTION, SOLUTION INTRAVENOUS at 15:35

## 2022-01-09 RX ADMIN — SODIUM CHLORIDE: 0.9 INJECTION, SOLUTION INTRAVENOUS at 12:20

## 2022-01-09 RX ADMIN — OXYMETAZOLINE HYDROCHLORIDE 2 SPRAY: 0.05 SPRAY NASAL at 15:36

## 2022-01-09 RX ADMIN — ONDANSETRON 4 MG: 2 INJECTION INTRAMUSCULAR; INTRAVENOUS at 12:46

## 2022-01-09 RX ADMIN — FENTANYL CITRATE 50 MCG: 50 INJECTION INTRAMUSCULAR; INTRAVENOUS at 12:34

## 2022-01-09 RX ADMIN — FENTANYL CITRATE 50 MCG: 50 INJECTION INTRAMUSCULAR; INTRAVENOUS at 12:46

## 2022-01-09 RX ADMIN — DEXAMETHASONE SODIUM PHOSPHATE 8 MG: 10 INJECTION INTRAMUSCULAR; INTRAVENOUS at 12:46

## 2022-01-09 RX ADMIN — TRANEXAMIC ACID 500 MG: 1 INJECTION, SOLUTION INTRAVENOUS at 04:25

## 2022-01-09 RX ADMIN — ROCURONIUM BROMIDE 5 MG: 50 INJECTION, SOLUTION INTRAVENOUS at 12:34

## 2022-01-09 RX ADMIN — OXYMETAZOLINE HYDROCHLORIDE 2 SPRAY: 0.05 SPRAY NASAL at 06:45

## 2022-01-09 RX ADMIN — NEOSTIGMINE METHYLSULFATE 3 MG: 1 INJECTION INTRAVENOUS at 13:16

## 2022-01-09 RX ADMIN — LIDOCAINE HYDROCHLORIDE 60 MG: 10 INJECTION, SOLUTION EPIDURAL; INFILTRATION; INTRACAUDAL; PERINEURAL at 12:34

## 2022-01-09 RX ADMIN — GLYCOPYRROLATE 0.4 MG: 0.2 INJECTION, SOLUTION INTRAMUSCULAR; INTRAVENOUS at 13:16

## 2022-01-09 RX ADMIN — INFLUENZA A VIRUS A/VICTORIA/2570/2019 IVR-215 (H1N1) ANTIGEN (FORMALDEHYDE INACTIVATED), INFLUENZA A VIRUS A/TASMANIA/503/2020 IVR-221 (H3N2) ANTIGEN (FORMALDEHYDE INACTIVATED), INFLUENZA B VIRUS B/PHUKET/3073/2013 ANTIGEN (FORMALDEHYDE INACTIVATED), AND INFLUENZA B VIRUS B/WASHINGTON/02/2019 ANTIGEN (FORMALDEHYDE INACTIVATED) 0.7 ML: 60; 60; 60; 60 INJECTION, SUSPENSION INTRAMUSCULAR at 15:48

## 2022-01-09 RX ADMIN — ONDANSETRON 4 MG: 2 INJECTION INTRAMUSCULAR; INTRAVENOUS at 04:25

## 2022-01-09 RX ADMIN — Medication 100 MG: at 12:34

## 2022-01-09 RX ADMIN — PROPOFOL 100 MCG/KG/MIN: 10 INJECTION, EMULSION INTRAVENOUS at 12:34

## 2022-01-09 RX ADMIN — ROCURONIUM BROMIDE 45 MG: 50 INJECTION, SOLUTION INTRAVENOUS at 12:46

## 2022-01-09 RX ADMIN — PRAVASTATIN SODIUM 40 MG: 40 TABLET ORAL at 15:39

## 2022-01-09 RX ADMIN — LIDOCAINE HYDROCHLORIDE,EPINEPHRINE BITARTRATE 5 ML: 10; .01 INJECTION, SOLUTION INFILTRATION; PERINEURAL at 04:25

## 2022-01-09 RX ADMIN — MORPHINE SULFATE 4 MG: 4 INJECTION INTRAVENOUS at 08:35

## 2022-01-09 RX ADMIN — TAMSULOSIN HYDROCHLORIDE 0.4 MG: 0.4 CAPSULE ORAL at 15:39

## 2022-01-09 RX ADMIN — PROPOFOL 200 MG: 10 INJECTION, EMULSION INTRAVENOUS at 12:34

## 2022-01-09 RX ADMIN — MORPHINE SULFATE 2 MG: 2 INJECTION, SOLUTION INTRAMUSCULAR; INTRAVENOUS at 09:19

## 2022-01-09 NOTE — H&P
[facility] 1950, 70 y o  male MRN: [id number]  Unit/Bed#: ED 09 Encounter: [id number]  Primary Care Provider: Gerilyn Hammans, DO   Date and time admitted to hospital: 1/9/2022  4:02 AM    * Epistaxis  Assessment & Plan  Status post left nasal septal excision of pyogenic granuloma with middle turbinate free flap reconstruction 11/30/2021  Patient continued to have persistent epistaxis despite medical treatment  ENT evaluated in emergency department status post endoscopy which showed left nasal bleeding from left middle turbinate surgical site which was cauterized  Packing placed  Primary ENT Dr Yin Lowe to take to OR 1/9/2022  Maintain NPO  Appreciate recommendations by ENT, Afrin nasal spray, saline to nose throughout the day, antibiotic prophylaxis with Unasyn    Status post aortic valve replacement  Assessment & Plan  Follows with [facility]    Thoracic aortic aneurysm without rupture [facility])  Assessment & Plan  Continue outpatient monitoring    Urinary retention  Assessment & Plan  Continue Flomax    Paroxysmal atrial fibrillation [facility])   Assessment & Plan  History of paroxysmal atrial fibrillation  Not maintained on anticoagulation with history of epistaxis    Benign essential hypertension  Assessment & Plan  Elevated in the emergency department likely related to pain  Not on any home medications  Continue to monitor  Labetalol p r n  SBP greater than 170      VTE Prophylaxis:  On hold given epistaxis  Code Status:  Level 1 full code    Anticipated Length of Stay:  Patient will be admitted on an Observation basis with an anticipated length of stay of  less than 2 midnights  Justification for Hospital Stay:  Need for inpatient procedure, monitoring of epistaxis    Total Time for Visit, including Counseling / Coordination of Care: 60 minutes    Greater than 50% of this total time spent on direct patient counseling and coordination of care     Chief Complaint:   Epistaxis    History of Present Illness:    Jordi Sandoval is a 70 y o  male With past medical history of paroxysmal atrial fibrillation, urinary retention, status post aortic valve/mitral valve replacement, recent excision left nasal palatine and granuloma presented to the hospital with epistaxis  Patient had recent ENT procedure 11/30/2021, despite medical therapy patient continued to have epistaxis  Patient was seen in emergency department and underwent cauterization of previous surgical site  Plan for OR today  Patient reports taking his medications as prescribed  He denies any other symptoms besides sinus pain related to nasal packing  Review of Systems:    Review of Systems   Constitutional: Negative for chills and fever  HENT: Positive for nosebleeds  Negative for sore throat and trouble swallowing  Eyes: Negative for photophobia and visual disturbance  Respiratory: Negative for shortness of breath and wheezing  Cardiovascular: Negative for chest pain and palpitations  Gastrointestinal: Negative for constipation, diarrhea, nausea and vomiting  Genitourinary: Negative for difficulty urinating and dysuria  Musculoskeletal: Negative for arthralgias and myalgias  Skin: Negative for rash and wound  Neurological: Negative for dizziness, light-headedness and headaches         Past Medical and Surgical History:     Past Medical History:   Diagnosis Date    Enlarged prostate     History of aortic valve disease     History of mitral valve disease     History of transfusion     May 2021 - no adverse reaction    SVT (supraventricular tachycardia) (HCC)     Syncope, cardiogenic        Past Surgical History:   Procedure Laterality Date    CARDIAC SURGERY      MV repair, AV replaced and aortic aneurysm repaired    COLONOSCOPY      FACIAL/NECK BIOPSY Left 11/30/2021    Procedure: EXCISION PYOGENIC GRANULOMA; FREE MUCOSAL GRAFT FROM NOSE;  Surgeon: Joyce Rios Malena Modi MD;  Location: [facility];  Service: ENT    INGUINAL HERNIA REPAIR      TONSILLECTOMY         Meds/Allergies:    Prior to Admission medications    Medication Sig Start Date End Date Taking? Authorizing Provider   Cholecalciferol 50 MCG (2000 UT) CAPS Take by mouth    Historical Provider, MD   diphenhydrAMINE (SIMPLY SLEEP) 25 MG tablet Take 1 tablet by mouth daily at bedtime as needed      Historical Provider, MD   fluticasone (FLONASE) 50 mcg/act nasal spray 1 spray into each nostril 2 (two) times a day 12/29/21   Jacki Shields MD   lovastatin (MEVACOR) 40 MG tablet Take 1 tablet by mouth    Historical Provider, MD   Magnesium 250 MG TABS Take 1 tablet by mouth daily      Historical Provider, MD   Saw [place], Serenoa repens, (SAW PALMETTO PO) Take by mouth daily 250 mg    Historical Provider, MD   tamsulosin (FLOMAX) 0 4 mg TAKE 1 CAPSULE BY MOUTH EVERY DAY AT NIGHT 5/5/20   Historical Provider, MD   methocarbamol (ROBAXIN) 500 mg tablet Take 1 tablet (500 mg total) by mouth 3 (three) times a day 1/25/21 1/28/21  Mariela Contreras PA-C       Allergies: No Known Allergies    Social History:     Marital Status: /Civil Union   Substance Use History:   Social History     Substance and Sexual Activity   Alcohol Use No     Social History     Tobacco Use   Smoking Status Never Smoker   Smokeless Tobacco Never Used     Social History     Substance and Sexual Activity   Drug Use No       Family History:    non-contributory    Physical Exam:     Vitals:   Blood Pressure: (!) 182/115 (01/09/22 1100)  Pulse: 80 (01/09/22 1100)  Temperature: 98 6 °F (37 °C) (01/09/22 0404)  Temp Source: Oral (01/09/22 0404)  Respirations: 16 (01/09/22 1100)  Weight - Scale: 74 1 kg (163 lb 5 8 oz) (01/09/22 0404)  SpO2: 97 % (01/09/22 1100)    Physical Exam  Vitals reviewed  Constitutional:       General: He is not in acute distress  Appearance: He is well-developed   He is not ill-appearing, toxic-appearing or diaphoretic  HENT:      Head: Normocephalic and atraumatic  Nose:      Comments: Nasal packing in place     Mouth/Throat:      Mouth: Mucous membranes are moist       Pharynx: No oropharyngeal exudate  Eyes:      General: No scleral icterus  Extraocular Movements: Extraocular movements intact  Conjunctiva/sclera: Conjunctivae normal    Cardiovascular:      Rate and Rhythm: Normal rate and regular rhythm  Heart sounds: Murmur heard  Pulmonary:      Effort: Pulmonary effort is normal  No respiratory distress  Breath sounds: Normal breath sounds  No wheezing or rales  Abdominal:      General: There is no distension  Palpations: Abdomen is soft  Tenderness: There is no abdominal tenderness  There is no guarding or rebound  Musculoskeletal:         General: No swelling, tenderness or deformity  Cervical back: Normal range of motion  Skin:     General: Skin is warm and dry  Neurological:      General: No focal deficit present  Mental Status: He is alert  Mental status is at baseline  Psychiatric:         Mood and Affect: Mood normal          Behavior: Behavior normal          Thought Content: Thought content normal          Judgment: Judgment normal           Additional Data:     Lab Results: I have reviewed pertinent results     Results from last 7 days   Lab Units 01/09/22  0617 01/09/22  0414 01/09/22  0414   WBC Thousand/uL  --   --  8 56   HEMOGLOBIN g/dL 11 1*   < > 12 1   HEMATOCRIT % 34 3*   < > 37 0   PLATELETS Thousands/uL  --   --  208   NEUTROS PCT %  --   --  65   LYMPHS PCT %  --   --  22   MONOS PCT %  --   --  8   EOS PCT %  --   --  4    < > = values in this interval not displayed           Results from last 7 days   Lab Units 01/09/22  0414   INR  1 13                   Imaging: I have reviewed pertinent imaging     No orders to display       EKG, Pathology, and Other Studies Reviewed on Admission:   · EKG: Reviewed All[employer] / [facility] Records Reviewed    ** Please Note: This note has been constructed using a voice recognition system   **

## 2022-01-09 NOTE — ED NOTES
Pt given CHG bath, bed linens changed, new gown and socks provided to pt        Ramo Jacome RN  01/09/22 5215

## 2022-01-09 NOTE — PLAN OF CARE
Problem: Potential for Falls  Goal: Patient will remain free of falls  Description: INTERVENTIONS:  - Educate patient/family on patient safety including physical limitations  - Instruct patient to call for assistance with activity   - Consult OT/PT to assist with strengthening/mobility   - Keep Call bell within reach  - Keep bed low and locked with side rails adjusted as appropriate  - Keep care items and personal belongings within reach  - Initiate and maintain comfort rounds  - Make Fall Risk Sign visible to staff  - Offer Toileting every  Hours, in advance of need  - Initiate/Maintain alarm  - Obtain necessary fall risk management equipment:   - Apply yellow socks and bracelet for high fall risk patients  - Consider moving patient to room near nurses station  Outcome: Progressing     Problem: PAIN - ADULT  Goal: Verbalizes/displays adequate comfort level or baseline comfort level  Description: Interventions:  - Encourage patient to monitor pain and request assistance  - Assess pain using appropriate pain scale  - Administer analgesics based on type and severity of pain and evaluate response  - Implement non-pharmacological measures as appropriate and evaluate response  - Consider cultural and social influences on pain and pain management  - Notify physician/advanced practitioner if interventions unsuccessful or patient reports new pain  Outcome: Progressing     Problem: INFECTION - ADULT  Goal: Absence or prevention of progression during hospitalization  Description: INTERVENTIONS:  - Assess and monitor for signs and symptoms of infection  - Monitor lab/diagnostic results  - Monitor all insertion sites, i e  indwelling lines, tubes, and drains  - Monitor endotracheal if appropriate and nasal secretions for changes in amount and color  - [place] appropriate cooling/warming therapies per order  - Administer medications as ordered  - Instruct and encourage patient and family to use good hand hygiene technique  - Identify and instruct in appropriate isolation precautions for identified infection/condition  Outcome: Progressing  Goal: Absence of fever/infection during neutropenic period  Description: INTERVENTIONS:  - Monitor WBC    Outcome: Progressing     Problem: SAFETY ADULT  Goal: Patient will remain free of falls  Description: INTERVENTIONS:  - Educate patient/family on patient safety including physical limitations  - Instruct patient to call for assistance with activity   - Consult OT/PT to assist with strengthening/mobility   - Keep Call bell within reach  - Keep bed low and locked with side rails adjusted as appropriate  - Keep care items and personal belongings within reach  - Initiate and maintain comfort rounds  - Make Fall Risk Sign visible to staff  - Offer Toileting every  Hours, in advance of need  - Initiate/Maintain alarm  - Obtain necessary fall risk management equipment:   - Apply yellow socks and bracelet for high fall risk patients  - Consider moving patient to room near nurses station  Outcome: Progressing  Goal: Maintain or return to baseline ADL function  Description: INTERVENTIONS:  -  Assess patient's ability to carry out ADLs; assess patient's baseline for ADL function and identify physical deficits which impact ability to perform ADLs (bathing, care of mouth/teeth, toileting, grooming, dressing, etc )  - Assess/evaluate cause of self-care deficits   - Assess range of motion  - Assess patient's mobility; develop plan if impaired  - Assess patient's need for assistive devices and provide as appropriate  - Encourage maximum independence but intervene and supervise when necessary  - Involve family in performance of ADLs  - Assess for home care needs following discharge   - Consider OT consult to assist with ADL evaluation and planning for discharge  - Provide patient education as appropriate  Outcome: Progressing  Goal: Maintains/Returns to pre admission functional level  Description: INTERVENTIONS:  - Perform BMAT or MOVE assessment daily    - Set and communicate daily mobility goal to care team and patient/family/caregiver  - Collaborate with rehabilitation services on mobility goals if consulted  - Perform Range of Motion  times a day  - Reposition patient every  hours  - Dangle patient times a day  - Stand patient  times a day  - Ambulate patient  times a day  - Out of bed to chair  times a day   - Out of bed for meals  times a day  - Out of bed for toileting  - Record patient progress and toleration of activity level   Outcome: Progressing     Problem: DISCHARGE PLANNING  Goal: Discharge to home or other facility with appropriate resources  Description: INTERVENTIONS:  - Identify barriers to discharge w/patient and caregiver  - Arrange for needed discharge resources and transportation as appropriate  - Identify discharge learning needs (meds, wound care, etc )  - Arrange for interpretive services to assist at discharge as needed  - Refer to Case Management Department for coordinating discharge planning if the patient needs post-hospital services based on physician/advanced practitioner order or complex needs related to functional status, cognitive ability, or social support system  Outcome: Progressing     Problem: Knowledge Deficit  Goal: Patient/family/caregiver demonstrates understanding of disease process, treatment plan, medications, and discharge instructions  Description: Complete learning assessment and assess knowledge base    Interventions:  - Provide teaching at level of understanding  - Provide teaching via preferred learning methods  Outcome: Progressing

## 2022-01-09 NOTE — ANESTHESIA PREPROCEDURE EVALUATION
Procedure:  Sphenopalatine artery ligation (Left Nose)    Relevant Problems   ANESTHESIA  urinary retention post discharge after last surgical procedure      CARDIO   (+) Benign essential hypertension   (+) Coronary atherosclerosis   (+) Hypercholesterolemia   (+) Paroxysmal atrial fibrillation (HCC)    (+) S/P mitral valve repair   (+) Status post aortic valve replacement   (+) Thoracic aortic aneurysm without rupture (HCC)      ENDO (within normal limits)      MUSCULOSKELETAL   (+) Localized, primary osteoarthritis of shoulder region      PULMONARY (within normal limits)      Other   (+) Epistaxis   (+) Urinary retention        Physical Exam    Airway    Mallampati score: I  TM Distance: >3 FB  Neck ROM: full     Dental   No notable dental hx     Cardiovascular  Cardiovascular exam normal    Pulmonary  Pulmonary exam normal     Other Findings        Anesthesia Plan  ASA Score- 3 Emergent    Anesthesia Type- general with ASA Monitors  Additional Monitors:   Airway Plan: ETT  Plan Factors-    Chart reviewed  Imaging results reviewed  Existing labs reviewed  Patient summary reviewed  Induction- intravenous  Postoperative Plan-     Informed Consent- Anesthetic plan and risks discussed with patient

## 2022-01-09 NOTE — QUICK NOTE
Patient seen and examined with Manisha RODRIGUEZ  Case discussed with on-call attending physician Dr Bren Gustafson covering Dr Ney De La Fuente  Patient presently stable with no active bleeding with 7 5 cm rhino rocket in place on the left  Will coordinate with patient's primary ENT DR Ney De La Fuente, regarding pack removal and further treatment  Should he developed acute bleeding, to OR for control and pack removal   If stable overnight will coordinate with his primary ENT regarding possible discharge tomorrow with follow-up with him

## 2022-01-09 NOTE — ED PROVIDER NOTES
History  Chief Complaint   Patient presents with   [address]      Patient is a 70year old male who presents via EMS for evaluation of a nosebleed  Per patient, he has had bleeding from the left nostril for the past 3 hours  Patient has tried using afrin multiple times tonight and was applying pressure with no improvement in bleeding  Per chart review, patient had a granuloma removed in November and had mucosal grafting performed at that time  Patient has had multiple ED and office visits for epistaxis since then  Patient has had packing and cauterizations previously  Per ENT note on , "Should he continue to have issues will likely have to go to the OR for directed investigation"  Patient currently states that he feels lightheaded  Says that there is blood running down the back of his nose and into his throat  Currently denies any nausea or vomiting  History provided by:  Patient and medical records   used: No    Nose Bleed  Location:  L nare  Duration:  3 hours  Chronicity:  Recurrent  Ineffective treatments:  Applying pressure and vasoconstrictors  Associated symptoms: blood in oropharynx    Associated symptoms: no fever        Prior to Admission Medications   Prescriptions Last Dose Informant Patient Reported? Taking?    Cholecalciferol 50 MCG ( UT) CAPS   Yes No   Sig: Take by mouth   Magnesium 250 MG TABS  Self Yes No   Sig: Take 1 tablet by mouth daily     Saw Palmetto, Serenoa repens, (SAW PALMETTO PO)  Self Yes No   Sig: Take by mouth daily 250 mg   diphenhydrAMINE (SIMPLY SLEEP) 25 MG tablet  Self Yes No   Sig: Take 1 tablet by mouth daily at bedtime as needed     fluticasone (FLONASE) 50 mcg/act nasal spray   No No   Si spray into each nostril 2 (two) times a day   lovastatin (MEVACOR) 40 MG tablet  Self Yes No   Sig: Take 1 tablet by mouth   tamsulosin (FLOMAX) 0 4 mg  Self Yes No   Sig: TAKE 1 CAPSULE BY MOUTH EVERY DAY AT NIGHT      Facility-Administered Medications: None       Past Medical History:   Diagnosis Date    Enlarged prostate     History of aortic valve disease     History of mitral valve disease     History of transfusion     May 2021 - no adverse reaction    SVT (supraventricular tachycardia) (HCC)     Syncope, cardiogenic        Past Surgical History:   Procedure Laterality Date    CARDIAC SURGERY      MV repair, AV replaced and aortic aneurysm repaired    COLONOSCOPY      FACIAL/NECK BIOPSY Left 11/30/2021    Procedure: EXCISION PYOGENIC GRANULOMA; FREE MUCOSAL GRAFT FROM NOSE;  Surgeon: Jian Infante MD;  Location: [facility];  Service: ENT    INGUINAL HERNIA REPAIR      TONSILLECTOMY         Family History   Problem Relation Age of Onset    Alzheimer's disease Mother [de-identified]    Esophageal cancer Mother     Diabetes Father     Diabetes Sister     Kidney failure Sister      I have reviewed and agree with the history as documented  E-Cigarette/Vaping    E-Cigarette Use Never User      E-Cigarette/Vaping Substances    Nicotine No     THC No     CBD No     Flavoring No     Other No     Unknown No      Social History     Tobacco Use    Smoking status: Never Smoker    Smokeless tobacco: Never Used   Vaping Use    Vaping Use: Never used   Substance Use Topics    Alcohol use: No    Drug use: No        Review of Systems   Constitutional: Negative for chills and fever  HENT: Positive for nosebleeds  Respiratory: Negative for shortness of breath  Cardiovascular: Negative for chest pain  Gastrointestinal: Negative for abdominal pain, nausea and vomiting  Genitourinary: Negative  Musculoskeletal: Negative  Skin: Negative  Neurological: Positive for light-headedness  All other systems reviewed and are negative        Physical Exam  ED Triage Vitals [01/09/22 0404]   Temperature Pulse Respirations Blood Pressure SpO2   98 6 °F (37 °C) 68 16 165/93 100 %      Temp Source Heart Rate Source Patient Position - Orthostatic VS BP Location FiO2 (%)   Oral Monitor Sitting Right arm --      Pain Score       No Pain             Orthostatic Vital Signs  Vitals:    01/09/22 0404   BP: 165/93   Pulse: 68   Patient Position - Orthostatic VS: Sitting       Physical Exam  Vitals and nursing note reviewed  Constitutional:       General: He is awake  Appearance: He is ill-appearing  HENT:      Head: Normocephalic and atraumatic  Nose:      Left Nostril: Epistaxis present  Mouth/Throat:      Lips: Pink  Mouth: Mucous membranes are moist       Comments: Patient actively spitting out blood  Blood noted in the posterior oropharynx  Eyes:      General: Vision grossly intact  Gaze aligned appropriately  Cardiovascular:      Rate and Rhythm: Normal rate and regular rhythm  Heart sounds: Normal heart sounds  Pulmonary:      Effort: Pulmonary effort is normal  No respiratory distress  Musculoskeletal:      Cervical back: Full passive range of motion without pain and neck supple  Skin:     General: Skin is warm and dry  Neurological:      General: No focal deficit present  Mental Status: He is alert and oriented to person, place, and time           ED Medications  Medications   tranexamic Acid 1,000 mg in sodium chloride 0 9 % 100 mL IVPB Loading Dose (has no administration in time range)     Followed by   tranexamic Acid 1,000 mg in sodium chloride 0 9 % 500 mL IVPB (has no administration in time range)   tranexamic acid 100mg/mL (for epistaxis) 500 mg (500 mg Nasal Given by Other 1/9/22 0425)   lidocaine-epinephrine (XYLOCAINE/EPINEPHRINE) 1 %-1:100,000 injection 5 mL (5 mL Other Given by Other 1/9/22 0425)   ondansetron (ZOFRAN) injection 4 mg (4 mg Intravenous Given 1/9/22 0425)       Diagnostic Studies  Results Reviewed     Procedure Component Value Units Date/Time    Protime-INR [[id number]]  (Normal) Collected: 01/09/22 0414    Lab Status: Final result Specimen: Blood from Arm, Left Updated: 01/09/22 0441     Protime 14 3 seconds      INR 1 13    APTT [[id number]]  (Abnormal) Collected: 01/09/22 0414    Lab Status: Final result Specimen: Blood from Arm, Left Updated: 01/09/22 0441     PTT 38 seconds     CBC and differential [[id number]] Collected: 01/09/22 0414    Lab Status: Final result Specimen: Blood from Arm, Left Updated: 01/09/22 0429     WBC 8 56 Thousand/uL      RBC 4 10 Million/uL      Hemoglobin 12 1 g/dL      Hematocrit 37 0 %      MCV 90 fL      MCH 29 5 pg      MCHC 32 7 g/dL      RDW 14 7 %      MPV 10 9 fL      Platelets 679 Thousands/uL      nRBC 0 /100 WBCs      Neutrophils Relative 65 %      Immat GRANS % 0 %      Lymphocytes Relative 22 %      Monocytes Relative 8 %      Eosinophils Relative 4 %      Basophils Relative 1 %      Neutrophils Absolute 5 62 Thousands/µL      Immature Grans Absolute 0 02 Thousand/uL      Lymphocytes Absolute 1 87 Thousands/µL      Monocytes Absolute 0 64 Thousand/µL      Eosinophils Absolute 0 33 Thousand/µL      Basophils Absolute 0 08 Thousands/µL                  No orders to display         Procedures  Procedures      ED Course  ED Course as of 01/09/22 2057   Lindsay Josue Jan 09, 2022   0442 Attempted rhinorocket placement but met resistance  Will try systemic TXA  Offered atomized TXA and lido with epi but patient believes bleeding has stopped at this time  On call ENT made aware  2141 Systemic TXA order cancelled as patient has prosthetic aortic valve  Atomized TXA and lido with epi  On call ENT physician states he will come evaluate     0626 Hemoglobin(!): 11 1  Was 12 2 hours prior                             SBIRT 22yo+      Most Recent Value   SBIRT (22 yo +)    In order to provide better care to our patients, we are screening all of our patients for alcohol and drug use  Would it be okay to ask you these screening questions? Yes Filed at: 01/09/2022 0409   Initial Alcohol Screen: US AUDIT-C     1   How often do you have a drink containing alcohol? 0 Filed at: 01/09/2022 0409   2  How many drinks containing alcohol do you have on a typical day you are drinking? 0 Filed at: 01/09/2022 0409   3a  Male UNDER 65: How often do you have five or more drinks on one occasion? 0 Filed at: 01/09/2022 0409   3b  FEMALE Any Age, or MALE 65+: How often do you have 4 or more drinks on one occassion? 0 Filed at: 01/09/2022 0409   Audit-C Score 0 Filed at: 01/09/2022 8630   COLEMAN: How many times in the past year have you    Used an illegal drug or used a prescription medication for non-medical reasons? Never Filed at: 01/09/2022 0409                MDM  Number of Diagnoses or Management Options  Epistaxis, recurrent: new and requires workup  Diagnosis management comments: 70year old male with recent history of nasal granuloma removal with recurrent episodes of epistaxis presents via EMS with 3 hours of epistaxis  Patient tried using Afrin and direct pressure relief of symptoms  Patient currently endorses feeling lightheaded  Arrival, copious epistaxis left nostril with blood into the posterior oropharynx  Patient actively spitting out blood clots  Patient's vitals stable  Initial CBC, coags type and screen sent  Patient's initial CBC shows a 1 g drop in hemoglobin from previous laboratory studies  ENT was consulted  Patient initially given exterior nose clamp but states that this only makes the bleeding drain into the back of his mouth instead, patient did not want to wear this any more  Attempted to place rhino rocket in the left nostril, unable to advance packing secondary to patient's distorted nasal anatomy after previous surgical procedures  Offered the patient to pack the right nostril with external clamping, patient declined this procedure at this time  Atomized lidocaine with epinephrine and TXA into the left nostril without improvement    Considered giving systemic TXA, however, patient has a history of a prosthetic aortic valve, therefore, systemic TXA is relatively contraindicated this time  Repeat CBC after 2 hours revealed another 1 g drop in hemoglobin  Patient's vitals remained stable  Patient was monitored for any hemodynamic instability  Patient was signed out to the morning team pending ENT evaluation  Patient ultimately was packed by ENT PA and was admitted for further management and observation  Patient admitted in stable condition  Amount and/or Complexity of Data Reviewed  Clinical lab tests: ordered and reviewed  Discuss the patient with other providers: yes    Patient Progress  Patient progress: stable      Disposition  Final diagnoses:   Epistaxis, recurrent     Time reflects when diagnosis was documented in both MDM as applicable and the Disposition within this note     Time User Action Codes Description Comment    1/9/2022  5:25 AM Lenor Civil Add [R04 0] Epistaxis, recurrent       ED Disposition     ED Disposition Condition Date/Time Comment    Admit Stable Sun Jan 9, 2022  9:44 AM Case was discussed with LUBNA and the patient's admission status was agreed to be Admission Status: observation status to the service of Dr Amari Sheppard  Follow-up Information    None         Patient's Medications   Discharge Prescriptions    No medications on file     No discharge procedures on file  PDMP Review       Value Time User    PDMP Reviewed  Yes 1/28/2021  8:02 AM Alis Boston DO           ED Provider  Attending physically available and evaluated Remus Stairs  I managed the patient along with the ED Attending      Electronically Signed by         Shamika Posey DO  01/09/22 0007

## 2022-01-09 NOTE — ED CARE HANDOFF
Emergency Department Sign Out Note        Sign out and transfer of care from Dr Phan Sutton  See Separate Emergency Department note  The patient, Sylvester Aschoff, was evaluated by the previous provider for posterior epistaxis  Workup Completed:  Labs  Hemoglobin down trending, 13 two weeks ago -> 12 on arrival today -> 11 repeat two hours after initial     Previous providers attempted to pack left nare, however unsuccessful due to pt's anatomy  TXA and lidocaine atomized into the left nare  Bleeding continues  ED Course / Workup Pending (followup):  Pending ENT consult                                  ED Course as of 01/09/22 1028   Sun Jan 09, 2022   0834 Pt cauterized and packed by ENT  Will continue to observe in ED    0919 Called to bedside by RN, as patient was complaining of increased pain within the left nare  On re-assessment, pt appears very uncomfortable  Reports pain within the L nare, he believes at the site of cautery  He gained no relief with initial dose of morphine, will give more pain meds now  ENT PA updated, will be down to re-evaluate  At this time, will will require admission for pain control and hemoglobin trending  Pt and wife in agreement  Pt also noted to be significantly hypertensive, suspect due to pain  2023 Pt re-evaluated by ENT, air removed from balloon within packing  Pt reports improvement in his pain  Will proceed with admission for monitoring  ENT requesting SLIM as primary       Procedures  MDM        Disposition  Final diagnoses:   Epistaxis, recurrent     Time reflects when diagnosis was documented in both MDM as applicable and the Disposition within this note     Time User Action Codes Description Comment    1/9/2022  5:25 AM Luis Fernando Contreras Add [R04 0] Epistaxis, recurrent       ED Disposition     ED Disposition Condition Date/Time Comment    Admit Stable [place] Jan 9, 2022  9:44 AM Case was discussed with LUBNA and the patient's admission status was agreed to be Admission Status: observation status to the service of Dr Beto Mccatrney  Follow-up Information    None       Patient's Medications   Discharge Prescriptions    No medications on file     No discharge procedures on file         ED Provider  Electronically Signed by     Richie Jimenez DO  01/09/22 102

## 2022-01-09 NOTE — DISCHARGE INSTRUCTIONS
Dear Jazmin Telles,     It was our pleasure to care for you here at [facility]  It is our hope that we were always able to exceed the expected standards for your care during your stay  You were hospitalized due to epistaxis  You were cared for on the 5th floor under the service of Calvin Dove DO with the [facility] who covers for your primary care physician (PCP), Valdemar Moyer DO, while you were hospitalized  If you have any questions or concerns related to this hospitalization, you may contact us at [phone]  For follow up as well as medication refills, we recommend that you follow up with your primary care physician  A registered nurse will reach out to you by phone within a few days after your discharge to answer any additional questions that you may have after going home  However, at this time we provide for you here, the most important instructions / recommendations at discharge:     · Notable Medication Adjustments -   · Afrin 2 sprays twice a day to nasal packing  · Saline nasal spray 4 times a day to nasal packing  · Important follow up information -   · Follow-up with ENT 1/13  · Other Instructions -   · Please return to the hospital for recurrence of epistaxis  · Please review this entire after visit summary as additional general instructions including medication list, appointments, activity, diet, any pertinent wound care, and other additional recommendations from your care team that may be provided for you        Sincerely,     Calvin Dove DO

## 2022-01-09 NOTE — ED NOTES
Bleeding stopped at this time, however patient reporting severe pain to left nostril where rhino rocket is in place       Robb Norwood RN  01/09/22 3051

## 2022-01-09 NOTE — PROGRESS NOTES
Brief ENT Note    S/p left spa ligation and cauterization  Packed with Merocel  Patient should be observed for 6 hrs post-op if no bleeding OK for discharge  DC with Afrin twice daily, nasal saline 4 times daily to packing  We will schedule follow up  No antibiotics needed      Tiger text with questions      Dilshad Awad MD MPH  Otolaryngology--Head and Neck Surgery  [employer] [employer] [employer]  1/9/2022 1:19 PM

## 2022-01-09 NOTE — ED NOTES
Lidocaine and TXA given to Dr Dilma Mccartney for application        Behzad Yancye RN  01/09/22 4318

## 2022-01-09 NOTE — CONSULTS
Inpatient consult to ENT  Consult performed by: Vineet York PA-C  Consult ordered by: Chica Power DO      Consultation - ENT    SLQ:[id number]        Assessment/Plan     Assessment:  Left Epistaxis s/p left nasal septal excision of a pyogenic granuloma with a middle turbinate free flap reconstruction on November 30th 2021 by Dr Leisa Elise of [facility]  ENT  The patient was seen by Dr Leisa Elise in office follow-up on January 6, 2021 where the patient continued to have a moderate volume of bleeding from the left side and mild scabbing requiring use of silver nitrate and Posicep packing  He developed recurrent bleeding last evening requiring packing placement performed by the ER  There was difficulty placing the packing due to feeling resistance as per the ER attending  He had persistent bleeding despite use of Afrin, lidocaine, and TXA treatment  Plan:  Nasal endoscopy showed left nasal bleeding from the left middle turbinate surgical site which I cauterized with AgNO3; however, he required packing with a left sided 7 5cm Rapid Rhinorocket pack which ceased the bleeding  I placed some Surgicel around the pack as well  He had no further nasal bleeding with no further bleeding from the nose or into the oropharynx  The patient did have significant pain from the pressure of the balloon  I decreased the balloon pressure which improved his pain and the bleeding remains ceased  The patient was also evaluated by Dr Flor Robledo today who is reaching out to the on call physician from [facility]  to alert them of their patient and the plan is to removed the pack in the OR  Antibiotic prophylaxis is required with the nasal packing at this point I discussed with the medical attending  We discussed the case with Dr Elana Alarcon; the Hospitalist attending  -Afrin nasal spray twice daily    -saline to nose throughout the day  - Plan to monitor overnight  To OR if bleeds through pack   Primary ENT Dr Leisa Elise - will coordinate with him regarding removal of pack in OR vs if stable overnight discharge with pack and follow up with Dr Cristopher Oliver as outpatient  Patient presently stable  Case discussed with attending Dr Roni Davison  History of Present Illness   Physician Requesting Consult:   Reason for Consult / Principal Problem: Severe left nasal bleeding  HPI: 70y o  year old male who presents with left nasal bleeding despite medical treatment by the ER  Consults       Patient ID: Cally Kumari is a 70 y o  male  The following portions of the patient's history were reviewed and updated as appropriate: allergies, current medications, past family history, past medical history, past social history, past surgical history and problem list       Past Medical History:   Diagnosis Date    Enlarged prostate     History of aortic valve disease     History of mitral valve disease     History of transfusion     May 2021 - no adverse reaction    SVT (supraventricular tachycardia) (HCC)     Syncope, cardiogenic        Past Surgical History:   Procedure Laterality Date    CARDIAC SURGERY      MV repair, AV replaced and aortic aneurysm repaired    COLONOSCOPY      FACIAL/NECK BIOPSY Left 11/30/2021    Procedure: EXCISION PYOGENIC GRANULOMA; FREE MUCOSAL GRAFT FROM NOSE;  Surgeon: Tito Woodard MD;  Location: [facility];  Service: ENT    INGUINAL HERNIA REPAIR      TONSILLECTOMY         Social History     Tobacco Use    Smoking status: Never Smoker    Smokeless tobacco: Never Used   Vaping Use    Vaping Use: Never used   Substance Use Topics    Alcohol use: No    Drug use: No       No current facility-administered medications on file prior to encounter       Current Outpatient Medications on File Prior to Encounter   Medication Sig Dispense Refill    Cholecalciferol 50 MCG (2000 UT) CAPS Take by mouth      diphenhydrAMINE (SIMPLY SLEEP) 25 MG tablet Take 1 tablet by mouth daily at bedtime as needed  fluticasone (FLONASE) 50 mcg/act nasal spray 1 spray into each nostril 2 (two) times a day 16 g 11    lovastatin (MEVACOR) 40 MG tablet Take 1 tablet by mouth      Magnesium 250 MG TABS Take 1 tablet by mouth daily        Saw Palmetto, Serenoa repens, (SAW PALMETTO PO) Take by mouth daily 250 mg      tamsulosin (FLOMAX) 0 4 mg TAKE 1 CAPSULE BY MOUTH EVERY DAY AT NIGHT      [DISCONTINUED] methocarbamol (ROBAXIN) 500 mg tablet Take 1 tablet (500 mg total) by mouth 3 (three) times a day 20 tablet 0       No Known Allergies        Review of Systems   Constitutional: Negative  HENT: Positive for nosebleeds  Eyes: Negative  Respiratory: Negative  Negative for chest tightness, shortness of breath and wheezing  Cardiovascular: Negative  Negative for chest pain  BP (!) 190/124   Pulse 84   Temp 98 6 °F (37 °C) (Oral)   Resp 18   Wt 74 1 kg (163 lb 5 8 oz)   SpO2 100%   BMI 22 16 kg/m²       PHYSICAL  EXAMINATION    CONSTITUTION:    Appears appropriate for age  No evidence of any acute distress  Communicates normally  Voice quality is clear  Alert and oriented  HEAD/FACE:    Atraumatic, normocephalic on inspection  No scars present  Salivary glands are normal in texture and size without any asymmetry  Facial nerve function is symmetric and normal     EYES:    Extraocular muscles intact in both eyes, normal gaze bilaterally and no evidence of nystagmus  Pupils equal, round, and accommodate to light bilaterally  EARS:    External ears normal     External canals are clear and dry  Tympanic membranes intact with normal mobility, no effusion, no retraction, no perforation  Post auricular area is normal    NOSE:    External nose without deformity  Internal mucosa with acute bleeding from the left nasal cavity; specifically in the region of the left middle turbinate   Septum midline      Inferior nasal turbinates normal in color and size bilaterally    ORAL CAVITY:    Lips normal and healthy in appearance  Dentition in good repair  Gums healthy, pink and moist     Tongue appears pink and moist with no lesions  Floor of mouth pink, moist, and smooth  Submandibular ducts patent with clear saliva  Parotid ducts patent with clear saliva  Oral mucosa pink and moist     Hard palate normal in appearance without any lesions  OROPHARYNX:    Soft palate pink and moist without any lesions  Bifid uvula midline without any lesions  Tonsils grade 1 bilaterally  Posterior pharynx pink and moist without any lesions  No PND or bloody drainage s/p pack placement    NECK:    Supple and symmetric  No masses noted  Trachea midline  No thyromegaly or nodules noted  LYMPH:    No palpable adenopathy in left or right neck    SKIN:    No rashes  No lesions noted  Nasal Endoscopy (non-operated)     Because only the anterior portion of the turbinates and anterior nasal septum could be visualized, it was elected to proceed with nasal endoscopy for visualization of the posterior nasal chamber, middle meatal area, sphenoid recess and nasopharynx  Verbal consent was obtained from the patient / parent  The nasal cavity was anesthetized and decongested with a solution of Phenylephrine:Lidocaine (1:1) placed on cotton pledgets  After waiting an appropriate amount of time for the medication to take effect the pledgets were removed and the procedure was completed with the flexible endoscope  Findings are as follows:    Floor of Nose:  smooth mucosa without any mass or obstruction    Septum: Midline without any perforation    Inferior Turbinates:  Normal in size, pink, moist, no abnormalities    Middle Turbinates:  Normal in size, pink, moist, no abnormalities on the right; the left side with acute bleeding      Superior Turbinates:  Normal in size, pink, moist, no abnormalities    Middle Meatus with Ethmoid Recess:  Normal mucosa without any pus, polyps  Superior Meatus: Normal mucosa without any pus, polyps  Sphenoid Os: Normal mucosa without mucopus or polyps    Nasopharynx:  No masses  Smooth pink mucosa    Eustachian Tube Orifice:  Pink mucosa, patent, no obstruction

## 2022-01-09 NOTE — ED TRIAGE NOTES
Pt woke up around 0030 with a nosebleed  Pt has hx of cauterization x2 and most recently had one a few days ago  Pt has been trying to get bleeding to stop for almost 3 hours without relief  Per EMS pt filled up two trash cans of blood  Pt stopped blood thinners approximately a month ago  Denies pain or other complaints

## 2022-01-09 NOTE — ED NOTES
Patient reports severe pain to left nostril where rhino rocket is in place  ENT states he was going to be coming back to check on patient, Dr Antonia Vazquez made aware at this time        Deb Limon, EARNEST  01/09/22 0238

## 2022-01-09 NOTE — ASSESSMENT & PLAN NOTE
Elevated in the emergency department likely related to pain  Not on any home medications  Continue to monitor  Labetalol p r n   SBP greater than 170

## 2022-01-09 NOTE — H&P
Surgery Pre-op note/Updated History and Physical    Date of service: 1/9/202212:19 PM    Patient seen in the OR, continued moderate volume epistaxis likely has abnormal healing along middle turbinate, this is not something that can be addressed with packing  Will plan take him back urgently to the OR today for SPA ligation  Vitals:    10/13/21 0845   BP: 131/91   Pulse:    Resp:    Temp:    SpO2:      ENT: Rhinorocket inplace  Chest: RRR/CTABL  Abd: Soft  Ext: Perfused    The procedure was discussed with the patient, including risks, benefits, and alternatives and all questions were answered  Consent signed and in the chart      Shelton Lewis MD MPH  Otolaryngology--Head and Neck Surgery  [employer]  1/9/2022 12:19 PM

## 2022-01-09 NOTE — PERIOPERATIVE NURSING NOTE
/89, HR 76  Denies pain  No need to void  A & O X 3  Dr Atkinson Memory notified of VS and assessment, Ok to D/C to floor  No new order at this time

## 2022-01-09 NOTE — DISCHARGE SUMMARY
[facility]  Discharge- Claude Yoon 1950, 70 y o  male MRN: [id number]  Unit/Bed#: E5 -01 Encounter: [id number]  Primary Care Provider: Jamila Soto DO   Date and time admitted to hospital: 1/9/2022  4:02 AM    * Epistaxis  Assessment & Plan  Status post left nasal septal excision of pyogenic granuloma with middle turbinate free flap reconstruction 11/30/2021  Patient continued to have persistent epistaxis despite medical treatment  ENT evaluated in emergency department status post endoscopy which showed left nasal bleeding from left middle turbinate surgical site which was cauterized  Packing placed  Primary ENT Dr Marylou Pisano to take to OR 1/9/2022  Status post left SP a ligation and cauterization  Packed with Merocel  Patient was monitored following procedure without recurrence of bleeding  Recommend Afrin twice daily, nasal saline 4 times daily to packing  Outpatient follow-up with ENT        Discharging Physician / Practitioner: Meeta Weir DO  PCP: Jamila Soto DO  Admission Date:   Admission Orders (From admission, onward)     Ordered        01/09/22 0944  Place in Observation  Once                      Discharge Date: 01/09/22    Medical Problems             Resolved Problems  Date Reviewed: 1/9/2022    None                Consultations During Hospital Stay:ENT    Procedures Performed: Spa ligation and cauterization     Significant Findings / Test Results:    No results found  Incidental Findings: None     Test Results Pending at Discharge (will require follow up): none     Outpatient Tests Requested:none    Complications:  None    Reason for Admission: Epistaxis     Hospital Course:     Claude Yoon is a 70 y o  male With past medical history of paroxysmal atrial fibrillation, urinary retention, status post aortic valve/mitral valve replacement, recent excision left nasal palatine and granuloma presented to the hospital with epistaxis    Patient had recent ENT procedure 11/30/2021, despite medical therapy patient continued to have epistaxis  Patient was seen in emergency department and underwent cauterization of previous surgical site  Patient underwent spa ligation and cauterization  Patient was monitored following procedure  Outpatient follow-up with ENT 1/13/2021  Please see above list of diagnoses and related plan for additional information  Condition at Discharge: stable     Discharge Day Visit / Exam:     * Please refer to separate progress note for these details *    Discharge instructions/Information to patient and family:   See after visit summary for information provided to patient and family  Provisions for Follow-Up Care:  See after visit summary for information related to follow-up care and any pertinent home health orders  Disposition:     Home    Planned Readmission: None     Discharge Statement:  I spent 60 minutes discharging the patient  This time was spent on the day of discharge  I had direct contact with the patient on the day of discharge  Greater than 50% of the total time was spent examining patient, answering all patient questions, arranging and discussing plan of care with patient as well as directly providing post-discharge instructions  Additional time then spent on discharge activities  Discharge Medications:  See after visit summary for reconciled discharge medications provided to patient and family

## 2022-01-09 NOTE — ASSESSMENT & PLAN NOTE
History of paroxysmal atrial fibrillation  Not maintained on anticoagulation with history of epistaxis

## 2022-01-09 NOTE — ANESTHESIA POSTPROCEDURE EVALUATION
Post-Op Assessment Note    CV Status:  Stable    Pain management: adequate     Mental Status:  Alert and awake   Hydration Status:  Euvolemic   PONV Controlled:  Controlled   Airway Patency:  Patent      Post Op Vitals Reviewed: Yes      Staff: Anesthesiologist         No complications documented      BP      Temp      Pulse     Resp     SpO2

## 2022-01-09 NOTE — ED NOTES
Dr Ksenia Malone (SL[facility]) at bedside  Ice chips provided to patient at this time       Judd Archibald RN  01/09/22 1372

## 2022-01-09 NOTE — ED ATTENDING ATTESTATION
1/9/2022  IAna DO, saw and evaluated the patient  I have discussed the patient with the resident/non-physician practitioner and agree with the resident's/non-physician practitioner's findings, Plan of Care, and MDM as documented in the resident's/non-physician practitioner's note, except where noted  All available labs and Radiology studies were reviewed  I was present for key portions of any procedure(s) performed by the resident/non-physician practitioner and I was immediately available to provide assistance  At this point I agree with the current assessment done in the Emergency Department  I have conducted an independent evaluation of this patient a history and physical is as follows:    Patient  is a 70 y o  male that presents for recurrent left-sided epistaxis  Patient is status post excision of a left pyogenic granuloma with free MT flap recon  He has had multiple episodes of epistaxis that have required admission, operative evaluation and cautery  Patient has been packed in the past as well  Patient just recently saw Otolaryngology on January 6th  They recommend further operative management if he has another recurrent bleed  Patient started to bleed a few hours ago and was unable to control it at home  On exam the patient is sitting upright with active bleeding from the left nostril  The nose is clamped and patient does have bleeding into the posterior pharynx  Patient is not in any respiratory distress  No stridor and lungs are clear  Patient has a mixture of dark blood and clots that he is spitting out  Unable to directly visualize the bleeding source because of active bleeding and distorted anatomy  ED Course     4:40 AM  Tried to pack the nose with a posterior rhino rocket  This was unsuccessful  We met immediate resistance  Patient asked us to stop    I offered to place a posterior, Epistat packing in the right nostril to try to tamponade bleeding but patient refused this  He is requesting ear nose and throat to come in  We explained that this is a temporizing measure that could be lifesaving but patient wants to continue with nasal clamping at this time  We will try to nebulize lidocaine with epi and TXA with a atomizer  Unable to give IV TXA due to his history of aortic valve replacement  Case is currently being discuss with the ear nose and throat  Labs are pending  5:42 AM  Patient has a 1 g drop of hemoglobin  Platelets are normal but slightly lower than what they were on December 24th  Patient is starting to rebleed again  Resident spoke with ear nose and throat again  They are on their way to the hospital to evaluate the patient      6:32 AM  Labs Reviewed   APTT - Abnormal       Result Value Ref Range Status    PTT 38 (*) 23 - 37 seconds Final    Comment: Therapeutic Heparin Range =  60-90 seconds   HEMOGLOBIN AND HEMATOCRIT, BLOOD - Abnormal    Hemoglobin 11 1 (*) 12 0 - 17 0 g/dL Final    Hematocrit 34 3 (*) 36 5 - 49 3 % Final   PROTIME-INR - Normal    Protime 14 3  11 6 - 14 5 seconds Final    INR 1 13  0 84 - 1 19 Final   CBC AND DIFFERENTIAL    WBC 8 56  4 31 - 10 16 Thousand/uL Final    RBC 4 10  3 88 - 5 62 Million/uL Final    Hemoglobin 12 1  12 0 - 17 0 g/dL Final    Hematocrit 37 0  36 5 - 49 3 % Final    MCV 90  82 - 98 fL Final    MCH 29 5  26 8 - 34 3 pg Final    MCHC 32 7  31 4 - 37 4 g/dL Final    RDW 14 7  11 6 - 15 1 % Final    MPV 10 9  8 9 - 12 7 fL Final    Platelets 093  761 - 390 Thousands/uL Final    nRBC 0  /100 WBCs Final    Neutrophils Relative 65  43 - 75 % Final    Immat GRANS % 0  0 - 2 % Final    Lymphocytes Relative 22  14 - 44 % Final    Monocytes Relative 8  4 - 12 % Final    Eosinophils Relative 4  0 - 6 % Final    Basophils Relative 1  0 - 1 % Final    Neutrophils Absolute 5 62  1 85 - 7 62 Thousands/µL Final    Immature Grans Absolute 0 02  0 00 - 0 20 Thousand/uL Final    Lymphocytes Absolute 1 87  0 60 - 4 47 Thousands/µL Final    Monocytes Absolute 0 64  0 17 - 1 22 Thousand/µL Final    Eosinophils Absolute 0 33  0 00 - 0 61 Thousand/µL Final    Basophils Absolute 0 08  0 00 - 0 10 Thousands/µL Final   TYPE AND SCREEN    ABO Grouping B   Final    Rh Factor Positive   Final    Antibody Screen Negative   Final    Specimen Expiration Date [id number]   Final   82 Vanessa Hazel RECHECK    ABO Grouping B   Final    Rh Factor Positive   Final       Patient's hemoglobin dropped another g to 11 1  Patient is still bleeding but not as active as before  Airway still intact  Vital signs otherwise still stable  ENT evaluation still pending at this time  Patient with minimal relief after atomized medications  Will s/o the pt to the oncoming physician to f/u on ENT eval for dispo  Critical Care Time  CriticalCare Time  Performed by: Ana Locke DO  Authorized by: Ana Locke DO     Critical care provider statement:     Critical care time (minutes):  45    Critical care time was exclusive of:  Separately billable procedures and treating other patients and teaching time    Critical care was necessary to treat or prevent imminent or life-threatening deterioration of the following conditions: Epistaxis      Critical care was time spent personally by me on the following activities:  Blood draw for specimens, obtaining history from patient or surrogate, development of treatment plan with patient or surrogate, discussions with consultants, evaluation of patient's response to treatment, examination of patient, interpretation of cardiac output measurements, ordering and performing treatments and interventions, ordering and review of laboratory studies, ordering and review of radiographic studies, review of old charts and re-evaluation of patient's condition    I assumed direction of critical care for this patient from another provider in my specialty: no

## 2022-01-09 NOTE — OP NOTE
PERATIVE REPORT  PATIENT NAME: Francisco Panda    :  1950  MRN: [id number]  Pt Location: [facility] OR ROOM 03    SURGERY DATE: 2022    Surgeon(s) and Role:     * Janet Ospina MD - Primary     * Shannon Kahn MD - Assisting    Preop Diagnosis:  Epistaxis, recurrent [R04 0]    Post-Op Diagnosis Codes:     * Epistaxis, recurrent [R04 0]    Procedure(s) (LRB):  Sphenopalatine artery ligation (Left)    Specimen(s):  * No specimens in log *    Estimated Blood Loss:   Minimal    Drains:  * No LDAs found *    Anesthesia Type:   General    Operative Indications:  Epistaxis, recurrent [R04 0]    Operative Findings:  Diffuse oozing and injury from previously placed packing  Oozing along inferior MT stump, this was resected, maxillary antrotomy made, dissection back to SPA, with was then cauterized  Oozing along along anterior, superior skull base/frontal outflow/septum in region of AEA, mild cautery done here  Merocel and surgiflo placed    Complications:   None    Procedure and Technique:  The patient was met in holding at bedside and the risks, benefits, indications, and alternatives were discussed with the paitent, and informed consent was obtained  The patient was then brought back to the operating room where general endotracheal anesthesia was induced  The patient was then positioned in the supine position and prepped and draped in the usual fashion  Using a zero degree endoscope the patients nose was investigated with findings as above  The uncinate was removed on the left, the maxillary sinus was then entered and widened  A plane was then devloped posteriorly towards the SP foramen  This was then cauterized  Superiorly and anteriorly there was some oozing thought to be from traumatic packing placement, this was partially cauterized  Surgiflo and a merocele was then placed  The patient was then extubated and awaken from anesthesia without difficulty and transferred to the PACU   Patient tolerated the procedure well without any complications  This will serve as the full operative note       I was present for the entire procedure    Patient Disposition:  PACU       SIGNATURE: Jesus Avery MD  DATE: January 9, 2022  TIME: 1:20 PM

## 2022-01-09 NOTE — ASSESSMENT & PLAN NOTE
Status post left nasal septal excision of pyogenic granuloma with middle turbinate free flap reconstruction 11/30/2021  Patient continued to have persistent epistaxis despite medical treatment  ENT evaluated in emergency department status post endoscopy which showed left nasal bleeding from left middle turbinate surgical site which was cauterized    Packing placed  Primary ENT Dr Joey Paredes to take to OR 1/9/2022  Maintain NPO  Appreciate recommendations by ENT, Afrin nasal spray, saline to nose throughout the day, antibiotic prophylaxis with Unasyn

## 2022-01-09 NOTE — ASSESSMENT & PLAN NOTE
Status post left nasal septal excision of pyogenic granuloma with middle turbinate free flap reconstruction 11/30/2021  Patient continued to have persistent epistaxis despite medical treatment  ENT evaluated in emergency department status post endoscopy which showed left nasal bleeding from left middle turbinate surgical site which was cauterized  Packing placed  Primary ENT Dr Melchor Gutierrez to take to OR 1/9/2022  Status post left SP a ligation and cauterization    Packed with Merocel  Patient was monitored following procedure without recurrence of bleeding  Recommend Afrin twice daily, nasal saline 4 times daily to packing  Outpatient follow-up with ENT

## 2022-01-10 ENCOUNTER — TRANSITIONAL CARE MANAGEMENT (OUTPATIENT)
Dept: FAMILY MEDICINE CLINIC | Facility: CLINIC | Age: 72
End: 2022-01-10

## 2022-01-14 ENCOUNTER — OFFICE VISIT (OUTPATIENT)
Dept: FAMILY MEDICINE CLINIC | Facility: CLINIC | Age: 72
End: 2022-01-14
Payer: MEDICARE

## 2022-01-14 VITALS
WEIGHT: 219 LBS | DIASTOLIC BLOOD PRESSURE: 78 MMHG | BODY MASS INDEX: 29.66 KG/M2 | HEIGHT: 72 IN | OXYGEN SATURATION: 96 % | TEMPERATURE: 98 F | SYSTOLIC BLOOD PRESSURE: 100 MMHG | HEART RATE: 89 BPM

## 2022-01-14 DIAGNOSIS — I48.0 PAROXYSMAL ATRIAL FIBRILLATION (HCC): ICD-10-CM

## 2022-01-14 DIAGNOSIS — R04.0 EPISTAXIS: Primary | ICD-10-CM

## 2022-01-14 DIAGNOSIS — I71.2 THORACIC AORTIC ANEURYSM WITHOUT RUPTURE (HCC): ICD-10-CM

## 2022-01-14 DIAGNOSIS — Z95.2 STATUS POST AORTIC VALVE REPLACEMENT: ICD-10-CM

## 2022-01-14 DIAGNOSIS — I25.10 ATHEROSCLEROSIS OF NATIVE CORONARY ARTERY OF NATIVE HEART WITHOUT ANGINA PECTORIS: ICD-10-CM

## 2022-01-14 DIAGNOSIS — Z98.890 S/P MITRAL VALVE REPAIR: ICD-10-CM

## 2022-01-14 DIAGNOSIS — R91.8 PULMONARY NODULES: ICD-10-CM

## 2022-01-14 DIAGNOSIS — J34.89 PYOGENIC GRANULOMA OF NARES: ICD-10-CM

## 2022-01-14 PROCEDURE — 99495 TRANSJ CARE MGMT MOD F2F 14D: CPT | Performed by: FAMILY MEDICINE

## 2022-01-14 NOTE — PATIENT INSTRUCTIONS
I had seen him in September with a full annual wellness visit, as in those notes he had undergone valvular heart surgery back in May, had undergone cardioversion back in August   Had been anticoagulated with Eliquis  He did see his Cardiologist November 10th, had discussed pros and cons regarding anticoagulation, risk of bleeding vs decreased risk stroke, they had decided to stop anticoagulation with Eliquis  He relates no irregular heartbeat with using his smart watch, he can start enteric-coated aspirin 81 mg daily when okay with ENT, could start with 3 times weekly for the 1st month  He did have another Echocardiogram with [facility] January 5th, "Normal aortic valve prosthesis function  Normal mitral valve function  Compared to the pre-operative echo there has been a reduction in LV size and an improvement in systolic function "    Tends to run low blood pressure, he is off amlodipine, blood pressure today 100/78  He will be seeing Cardiology again in follow-up  Continue to control cardiovascular risk as can, his cholesterol back in March of 2021 was excellent 132 with HDL 28, LDL 69  Continue lovastatin 40 mg daily    We reviewed his visits with ENT/hospital stay with epistaxis  Back in late November he did undergo excision left nasal pyogenic granuloma, did have some ongoing bleeding, was hospitalized January 9th with ligation performed to take care of bleeding, he did see ENT yesterday, has absorbable packing, nose bleeds have resolved  He will be seeing them again in 1 month, sooner if needed  Hemoglobin January 9th 11 1  Last BMP was okay 1 month ago  In November he did have postop urinary retention, does have history elevated PSA, he will continue to see Urology  Is on tamsulosin    In September PSA 5 38    As in our September note he does have a slip to redo CT of chest, he did have a follow-up CT of chest in June of 2021 at [facility], right upper lobe lung nodule was stable at 7 mm, does have 4 3 cm ascending thoracic aneurysm  Radiology had recommended redo CT 6 to 12 months, await redo  We should see him after September 23rd with an annual wellness visit, call us sooner if needed

## 2022-01-14 NOTE — PROGRESS NOTES
FAMILY PRACTICE OFFICE VISIT  Sherley DUENAS O  [facility]  [address]  [address] [place], [place], [address]      NAME: Cally Kumari  AGE: 70 y o  SEX: male  : 1950   MRN: [id number]    DATE: 2022  TIME: 11:36 AM    Assessment and Plan     Problem List Items Addressed This Visit     Coronary atherosclerosis    Paroxysmal atrial fibrillation [facility])     Pulmonary nodules    Thoracic aortic aneurysm without rupture ([facility] [address] )    Status post aortic valve replacement    S/P mitral valve repair    Epistaxis - Primary      Other Visit Diagnoses     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              Patient Instructions   I had seen him in September with a full annual wellness visit, as in those notes he had undergone valvular heart surgery back in May, had undergone cardioversion back in August   Had been anticoagulated with Eliquis  He did see his Cardiologist , had discussed pros and cons regarding anticoagulation, risk of bleeding vs decreased risk stroke, they had decided to stop anticoagulation with Eliquis  He relates no irregular heartbeat with using his smart watch, he can start enteric-coated aspirin 81 mg daily when okay with ENT, could start with 3 times weekly for the 1st month  He did have another Echocardiogram with [facility] , "Normal aortic valve prosthesis function  Normal mitral valve function  Compared to the pre-operative echo there has been a reduction in LV size and an improvement in systolic function "    Tends to run low blood pressure, he is off amlodipine, blood pressure today 100/78  He will be seeing Cardiology again in follow-up  Continue to control cardiovascular risk as can, his cholesterol back in 2021 was excellent 132 with HDL 28, LDL 69  Continue lovastatin 40 mg daily    We reviewed his visits with ENT/hospital stay with epistaxis    Back in late November he did undergo excision left nasal pyogenic granuloma, did have some ongoing bleeding, was hospitalized January 9th with ligation performed to take care of bleeding, he did see ENT yesterday, has absorbable packing, nose bleeds have resolved  He will be seeing them again in 1 month, sooner if needed  Hemoglobin January 9th 11 1  Last BMP was okay 1 month ago  In November he did have postop urinary retention, does have history elevated PSA, he will continue to see Urology  Is on tamsulosin  In September PSA 5 38    As in our September note he does have a slip to redo CT of chest, he did have a follow-up CT of chest in June of 2021 at [facility], right upper lobe lung nodule was stable at 7 mm, does have 4 3 cm ascending thoracic aneurysm  Radiology had recommended redo CT 6 to 12 months, await redo  We should see him after September 23rd with an annual wellness visit, call us sooner if needed  Chief Complaint     Chief Complaint   Patient presents with    Transition of Care Management     TCM Call (since 12/14/2021)     Date and time call was made  1/10/2022  1:39 PM    Hospital care reviewed  Records reviewed        Patient was hospitialized at  [facility]        Date of Admission  01/09/22    Date of discharge  01/09/22    Diagnosis  Epistaxis     Disposition  Home    Were the patients medications reviewed and updated  Yes    Current Symptoms  None      TCM Call (since 12/14/2021)     Post hospital issues  None    Scheduled for follow up? Yes    Did you obtain your prescribed medications  Yes    Do you need help managing your prescriptions or medications  No    Is transportation to your appointment needed  No    I have advised the patient to call PCP with any new or worsening symptoms  RIYA Hansen            History of Present Illness   Lazaro Melgar is a 70y o -year-old male who had an annual wellness with me back in September    He did undergo excision pyogenic granuloma left nare in November, had bleeding, underwent another procedure recently at hospital   He did see ENT yesterday, had packing removed, absorbable packing was replaced, no current nose bleed  He did see Cardiology few months ago, with nose bleeds they had discussed pros and cons regarding anticoagulation, he has been off Eliquis  He does monitor his heart rate/rhythm with smart watch, relates has been regular  He notes no palpitations, no new chest pain, no increased shortness of breath  He did have postop urinary retention again, does see Urology  Review of Systems   Review of Systems   Constitutional: Positive for fatigue  Negative for appetite change, fever and unexpected weight change  HENT: Positive for nosebleeds (See HPI)  Negative for sore throat and trouble swallowing  Respiratory: Negative for cough, chest tightness, shortness of breath and wheezing  Cardiovascular: Negative for chest pain, palpitations and leg swelling  Gastrointestinal: Negative for abdominal pain, blood in stool, nausea and vomiting  No acid reflux     No change in bowel   Genitourinary: Positive for difficulty urinating  Negative for dysuria and hematuria  Neurological: Negative for dizziness, syncope, light-headedness and headaches  Hematological: Does not bruise/bleed easily (Has no other bleeding)  Psychiatric/Behavioral: Negative for behavioral problems and confusion         Active Problem List     Patient Active Problem List   Diagnosis    Abdominal hernia    Benign essential hypertension    Proteinuria    Neurocardiogenic syncope    Insomnia    Hypercholesterolemia    Hematuria, microscopic    Elevated PSA w neg Bx 2014    Coronary atherosclerosis    Paroxysmal atrial fibrillation (HCC)     Localized, primary osteoarthritis of shoulder region    Urinary retention    Pulmonary nodules    Thoracic aortic aneurysm without rupture (HCC)    Status post aortic valve replacement    S/P mitral valve repair    Epistaxis       Past Medical History:  Reviewed    Past Surgical History:  Reviewed    Family History:  Reviewed    Social History:  Reviewed    Objective     Vitals:    01/14/22 1101   BP: 100/78   BP Location: Left arm   Patient Position: Sitting   Cuff Size: Large   Pulse: 89   Temp: 98 °F (36 7 °C)   SpO2: 96%   Weight: 99 3 kg (219 lb)   Height: 6' (1 829 m)     Body mass index is 29 7 kg/m²  BP Readings from Last 3 Encounters:   01/14/22 100/78   01/09/22 140/80   12/24/21 (!) 156/108       Wt Readings from Last 3 Encounters:   01/14/22 99 3 kg (219 lb)   01/09/22 74 1 kg (163 lb 5 8 oz)   01/06/22 102 kg (225 lb)       Physical Exam  Constitutional:       General: He is not in acute distress  Appearance: Normal appearance  He is well-developed  He is not ill-appearing  Eyes:      General: No scleral icterus  Cardiovascular:      Rate and Rhythm: Normal rate and regular rhythm  Heart sounds: Murmur heard  Pulmonary:      Effort: Pulmonary effort is normal  No respiratory distress  Breath sounds: Normal breath sounds  No rhonchi or rales  Musculoskeletal:      Right lower leg: No edema  Left lower leg: No edema  Lymphadenopathy:      Cervical: No cervical adenopathy  Skin:     Comments: Bruise right forearm at IV site, no other unusual bruising   Neurological:      Mental Status: He is alert and oriented to person, place, and time  Mental status is at baseline     Psychiatric:         Mood and Affect: Mood normal          Behavior: Behavior normal          ALLERGIES:  No Known Allergies    Current Medications     Current Outpatient Medications   Medication Sig Dispense Refill    Cholecalciferol 50 MCG (2000 UT) CAPS Take by mouth      diphenhydrAMINE (SIMPLY SLEEP) 25 MG tablet Take 1 tablet by mouth daily at bedtime as needed        lovastatin (MEVACOR) 40 MG tablet Take 1 tablet by mouth      Magnesium 250 MG TABS Take 1 tablet by mouth daily  Saw Palmetto, Serenoa repens, (SAW PALMETTO PO) Take by mouth daily 250 mg      tamsulosin (FLOMAX) 0 4 mg TAKE 1 CAPSULE BY MOUTH EVERY DAY AT NIGHT      fluticasone (FLONASE) 50 mcg/act nasal spray 1 spray into each nostril 2 (two) times a day (Patient not taking: Reported on 1/14/2022 ) 16 g 11    oxymetazoline (AFRIN) 0 05 % nasal spray 2 sprays by Each Nare route every 12 (twelve) hours (Patient not taking: Reported on 1/14/2022 ) 30 mL 0    sodium chloride (OCEAN) 0 65 % nasal spray 1 spray into each nostril 4 (four) times a day (Patient not taking: Reported on 1/14/2022 ) 15 mL 0     No current facility-administered medications for this visit  No orders of the defined types were placed in this encounter          Edmond Amador DO

## 2022-02-14 ENCOUNTER — HOSPITAL ENCOUNTER (OUTPATIENT)
Dept: CT IMAGING | Facility: HOSPITAL | Age: 72
Discharge: HOME | End: 2022-02-14
Payer: MEDICARE

## 2022-02-14 DIAGNOSIS — R91.8 PULMONARY NODULES: ICD-10-CM

## 2022-02-14 PROCEDURE — 71250 CT THORAX DX C-: CPT

## 2022-02-14 PROCEDURE — G1004 CDSM NDSC: HCPCS

## 2022-03-07 ENCOUNTER — HOSPITAL ENCOUNTER (OUTPATIENT)
Dept: CT IMAGING | Facility: HOSPITAL | Age: 72
Discharge: HOME | End: 2022-03-07
Attending: OTOLARYNGOLOGY
Payer: MEDICARE

## 2022-03-07 DIAGNOSIS — J30.89 NON-SEASONAL ALLERGIC RHINITIS, UNSPECIFIED TRIGGER: ICD-10-CM

## 2022-03-07 PROCEDURE — 70486 CT MAXILLOFACIAL W/O DYE: CPT

## 2022-07-05 ENCOUNTER — OFFICE VISIT (OUTPATIENT)
Dept: FAMILY MEDICINE CLINIC | Facility: CLINIC | Age: 72
End: 2022-07-05
Payer: MEDICARE

## 2022-07-05 VITALS
WEIGHT: 219 LBS | BODY MASS INDEX: 29.66 KG/M2 | HEIGHT: 72 IN | SYSTOLIC BLOOD PRESSURE: 108 MMHG | TEMPERATURE: 97.4 F | OXYGEN SATURATION: 96 % | DIASTOLIC BLOOD PRESSURE: 70 MMHG | RESPIRATION RATE: 18 BRPM | HEART RATE: 82 BPM

## 2022-07-05 DIAGNOSIS — I49.3 VENTRICULAR ECTOPY: ICD-10-CM

## 2022-07-05 DIAGNOSIS — M54.42 ACUTE BILATERAL LOW BACK PAIN WITH LEFT-SIDED SCIATICA: ICD-10-CM

## 2022-07-05 DIAGNOSIS — M43.9 SPINAL CURVATURE: ICD-10-CM

## 2022-07-05 DIAGNOSIS — R57.0 CARDIOGENIC SHOCK (HCC): ICD-10-CM

## 2022-07-05 DIAGNOSIS — D69.6 THROMBOCYTOPENIA (HCC): ICD-10-CM

## 2022-07-05 DIAGNOSIS — M54.42 ACUTE BILATERAL LOW BACK PAIN WITH LEFT-SIDED SCIATICA: Primary | ICD-10-CM

## 2022-07-05 PROCEDURE — 99214 OFFICE O/P EST MOD 30 MIN: CPT | Performed by: FAMILY MEDICINE

## 2022-07-05 PROCEDURE — 93000 ELECTROCARDIOGRAM COMPLETE: CPT | Performed by: FAMILY MEDICINE

## 2022-07-05 RX ORDER — METAXALONE 800 MG/1
TABLET ORAL
Qty: 20 TABLET | Refills: 0 | Status: SHIPPED | OUTPATIENT
Start: 2022-07-05

## 2022-07-05 NOTE — PROGRESS NOTES
FAMILY PRACTICE OFFICE VISIT    NAME: Francisco Panda    AGE: 67 y o  SEX: male  : 1950   MRN: [id number]    DATE: 2022  TIME: 2:29 PM    Assessment and Plan   1  Acute bilateral low back pain with left-sided sciatica  Suspect muscle spasm  Pt had tried robaxin at home without great relief but only took 5-6 doses  Will try another muscle relaxant - skelaxin (do not take with robaxin)  Avoid driving/working while taking due to sedation  Avoid heavy lifting  Ice/heat prn  If not improving, call      2  Spinal curvature  Send for scoliosis survey  If curvature present  Could be affecting low back/gait and causing secondary pain    3  Ventricular ectopy  Pt was asymptomatic    Check ecg today -  NSR at 63 bpm  Normal axis  Possible LVH by voltage criteria  No st seg elevation/depression    Pt states he will be getting an echo with cardio    Send for holter based on ausculatation during exam - cannot r/o that pt was not in afib during initial part of encounter    Recommend  f/u with cardio  H/o a fib in past  Also h/o AVR  If symptomatic at any point - call 911  Patient Instructions   Get scoliosis survey     rx for skelaxin    Followup with cardio  Consider holter  Avoid caffeine and decongestants            Chief Complaint     Chief Complaint   Patient presents with    Back Pain     X3 weeks c/o lower back pain ok when sitting getting up he has pain 6/10 longer he sits the worse it gets  Taking Tylenol & Motrin with little relief         History of Present Illness   Francisco Panda is a 67y o -year-old male who presents today for evaluation of low back pain X the past 3 weeks  H/o back pain years ago - short lasting  Pt unsure what prompted most recent flare  No known injury /trauma or fall  Pain is worse when going from sitting to standing  No h/o cancer    Review of Systems   Review of Systems   Constitutional: Negative for chills, diaphoresis, fever and unexpected weight change  Respiratory: Negative for cough and shortness of breath  Cardiovascular: Negative for chest pain and palpitations  Pt denies feeling of a fib at present  Asymptomatic  Gastrointestinal:        No changes in bowel or bladder  Denies saddle anesthesia     Musculoskeletal: Positive for back pain  Low back pain   Taking motrin and tylenol tid (1 of each)  Was taking some muscle relaxants that were left over (took 5- 6 over 3 weeks)  No hip pain    No pain with walking  Just when going from sitting to standing   Neurological: Negative for weakness  May get some numbness /tingling into left foot at times           Active Problem List     Patient Active Problem List   Diagnosis    Abdominal hernia    Benign essential hypertension    Proteinuria    Neurocardiogenic syncope    Insomnia    Hypercholesterolemia    Hematuria, microscopic    Elevated PSA w neg Bx 2014    Coronary atherosclerosis    Paroxysmal atrial fibrillation (HCC)     Localized, primary osteoarthritis of shoulder region    Urinary retention    Pulmonary nodules    Thoracic aortic aneurysm without rupture ([facility] [address] )    Status post aortic valve replacement    S/P mitral valve repair    Epistaxis         Past Medical History:  Past Medical History:   Diagnosis Date    Enlarged prostate     History of aortic valve disease     History of mitral valve disease     History of transfusion     May 2021 - no adverse reaction    SVT (supraventricular tachycardia) (HCC)     Syncope, cardiogenic        Past Surgical History:  Past Surgical History:   Procedure Laterality Date    CARDIAC SURGERY      MV repair, AV replaced and aortic aneurysm repaired    CAUTERIZE INNER NOSE Left 1/9/2022    Procedure: Sphenopalatine artery ligation;  Surgeon: Matias Sullivan MD;  Location: [facility];  Service: ENT    COLONOSCOPY      FACIAL/NECK BIOPSY Left 11/30/2021    Procedure: EXCISION PYOGENIC GRANULOMA; FREE MUCOSAL GRAFT FROM NOSE;  Surgeon: Vinita Estrada MD;  Location: [facility];  Service: ENT    INGUINAL HERNIA REPAIR      TONSILLECTOMY         Family History:  Family History   Problem Relation Age of Onset    Alzheimer's disease Mother [de-identified]    Esophageal cancer Mother     Diabetes Father     Diabetes Sister     Kidney failure Sister        Social History:  Social History     Socioeconomic History    Marital status: /Civil Union     Spouse name: Not on file    Number of children: Not on file    Years of education: Not on file    Highest education level: Not on file   Occupational History    Occupation:      Comment: full-time   Tobacco Use    Smoking status: Never Smoker    Smokeless tobacco: Never Used   Vaping Use    Vaping Use: Never used   Substance and Sexual Activity    Alcohol use: No    Drug use: No    Sexual activity: Not on file   Other Topics Concern    Not on file   Social History Narrative    Not on file     Social Determinants of Health     Financial Resource Strain: Not on file   Food Insecurity: Not on file   Transportation Needs: Not on file   Physical Activity: Not on file   Stress: Not on file   Social Connections: Not on file   Intimate Partner Violence: Not on file   Housing Stability: Not on file       Objective     Vitals:    07/05/22 1404   BP: 108/70   Pulse: 82   Resp: 18   Temp: (!) 97 4 °F (36 3 °C)   SpO2: 96%     Wt Readings from Last 3 Encounters:   07/05/22 99 3 kg (219 lb)   03/17/22 96 2 kg (212 lb)   02/17/22 96 2 kg (212 lb)       Physical Exam  Vitals and nursing note reviewed  Constitutional:       General: He is not in acute distress  Appearance: Normal appearance  He is not ill-appearing or toxic-appearing  Cardiovascular:      Rate and Rhythm: Normal rate  Pulses: Normal pulses  Heart sounds: Normal heart sounds  No murmur heard       Comments: Irregularly irregular vs frequent ectopy  Pulmonary:      Effort: Pulmonary effort is normal  No respiratory distress  Breath sounds: Normal breath sounds  No wheezing, rhonchi or rales  Musculoskeletal:      Comments: Pt appears to have possible spinal curvature with forward bending vs incresed muscle spasm  With lower thoracic and upper lumbar convex to the left  No discrete SI joint tenderness  Pt with some tenderness along the paraspinal upper lumbar musculature - left more than right     Skin:     Comments: No overlying skin changes in area of back pain     Neurological:      Mental Status: He is alert  Comments: Motor b/l lower ext's (+)4-5 /5 and equal  Sensory intact b/l lower ext's  (-) straight leg raise b/l lower ext's   Psychiatric:         Mood and Affect: Mood normal          Behavior: Behavior normal          Thought Content:  Thought content normal          Judgment: Judgment normal          Pertinent Laboratory/Diagnostic Studies:  Lab Results   Component Value Date    GLUCOSE 102 10/17/2014    BUN 18 12/24/2021    CREATININE 1 05 12/24/2021    CALCIUM 9 0 12/24/2021     10/17/2014    K 3 9 12/24/2021    CO2 24 12/24/2021     12/24/2021     Lab Results   Component Value Date    ALT 27 09/06/2019    AST 19 09/06/2019    ALKPHOS 87 11/30/2017    BILITOT 0 59 10/17/2014       Lab Results   Component Value Date    WBC 8 56 01/09/2022    HGB 11 1 (L) 01/09/2022    HCT 34 3 (L) 01/09/2022    MCV 90 01/09/2022     01/09/2022       No results found for: TSH    Lab Results   Component Value Date    CHOL 143 10/17/2014     Lab Results   Component Value Date    TRIG 179 (H) 09/06/2019     Lab Results   Component Value Date    HDL 33 (L) 09/06/2019     Lab Results   Component Value Date    LDLCALC 66 09/06/2019     Lab Results   Component Value Date    HGBA1C 5 8 (H) 05/06/2021       Results for orders placed or performed during the hospital encounter of 01/09/22   CBC and differential   Result Value Ref Range    WBC 8 56 4 31 - 10 16 Thousand/uL    RBC 4 10 3 88 - 5 62 Million/uL    Hemoglobin 12 1 12 0 - 17 0 g/dL    Hematocrit 37 0 36 5 - 49 3 %    MCV 90 82 - 98 fL    MCH 29 5 26 8 - 34 3 pg    MCHC 32 7 31 4 - 37 4 g/dL    RDW 14 7 11 6 - 15 1 %    MPV 10 9 8 9 - 12 7 fL    Platelets 478 169 - 876 Thousands/uL    nRBC 0 /100 WBCs    Neutrophils Relative 65 43 - 75 %    Immat GRANS % 0 0 - 2 %    Lymphocytes Relative 22 14 - 44 %    Monocytes Relative 8 4 - 12 %    Eosinophils Relative 4 0 - 6 %    Basophils Relative 1 0 - 1 %    Neutrophils Absolute 5 62 1 85 - 7 62 Thousands/µL    Immature Grans Absolute 0 02 0 00 - 0 20 Thousand/uL    Lymphocytes Absolute 1 87 0 60 - 4 47 Thousands/µL    Monocytes Absolute 0 64 0 17 - 1 22 Thousand/µL    Eosinophils Absolute 0 33 0 00 - 0 61 Thousand/µL    Basophils Absolute 0 08 0 00 - 0 10 Thousands/µL   Protime-INR   Result Value Ref Range    Protime 14 3 11 6 - 14 5 seconds    INR 1 13 0 84 - 1 19   APTT   Result Value Ref Range    PTT 38 (H) 23 - 37 seconds   Hemoglobin and hematocrit, blood   Result Value Ref Range    Hemoglobin 11 1 (L) 12 0 - 17 0 g/dL    Hematocrit 34 3 (L) 36 5 - 49 3 %   Type and screen   Result Value Ref Range    ABO Grouping B     Rh Factor Positive     Antibody Screen Negative     Specimen Expiration Date [id number]    ABORh Recheck - Contact Blood Bank Prior to Collection   Result Value Ref Range    ABO Grouping B     Rh Factor Positive        No orders of the defined types were placed in this encounter        ALLERGIES:  No Known Allergies    Current Medications     Current Outpatient Medications   Medication Sig Dispense Refill    Cholecalciferol 50 MCG (2000 UT) CAPS Take by mouth      diphenhydrAMINE (SOMINEX) 25 MG tablet Take 1 tablet by mouth daily at bedtime as needed        lovastatin (MEVACOR) 40 MG tablet Take 1 tablet by mouth      Magnesium 250 MG TABS Take 1 tablet by mouth daily        Saw Palmetto, Serenoa repens, (SAW PALMETTO PO) Take by mouth daily 250 mg      tamsulosin (FLOMAX) 0 4 mg TAKE 1 CAPSULE BY MOUTH EVERY DAY AT NIGHT      Fluticasone Propionate (Xhance) 93 MCG/ACT EXHU 1 spray into each nostril 2 (two) times a day 16 mL 11    methylPREDNISolone 4 MG tablet therapy pack Use as directed on package 21 each 0    sodium chloride (OCEAN) 0 65 % nasal spray 1 spray into each nostril 4 (four) times a day 15 mL 0     No current facility-administered medications for this visit           Health Maintenance     Health Maintenance   Topic Date Due    DTaP,Tdap,and Td Vaccines (1 - Tdap) Never done    Falls: Plan of Care  Never done    COVID-19 Vaccine (4 - Booster for Pfizer series) 04/13/2022    Colorectal Cancer Screening  08/06/2022    Influenza Vaccine (1) 09/01/2022    Medicare Annual Wellness Visit (AWV)  09/23/2022    BMI: Followup Plan  09/23/2022    Fall Risk  01/14/2023    Depression Screening  01/14/2023    BMI: Adult  07/05/2023    Hepatitis C Screening  Completed    Pneumococcal Vaccine: 65+ Years  Completed    HIB Vaccine  Aged Out    Hepatitis B Vaccine  Aged Out    IPV Vaccine  Aged Out    Hepatitis A Vaccine  Aged Out    Meningococcal ACWY Vaccine  Aged Out    HPV Vaccine  Aged [employer] History   Administered Date(s) Administered    COVID-19 PFIZER VACCINE 0 3 ML IM 03/02/2021, 03/24/2021, 12/13/2021    INFLUENZA 02/04/2018    Influenza Quadrivalent Preservative Free 3 years and older IM 10/14/2014    Influenza, high dose seasonal 0 7 mL 01/09/2022    Pneumococcal Conjugate 13-Valent 05/22/2020    Pneumococcal Polysaccharide PPV23 09/23/2021          Coral Jara DO

## 2022-07-05 NOTE — PATIENT INSTRUCTIONS
Get scoliosis survey     rx for skelaxin    Followup with cardio  Consider holter  Avoid caffeine and decongestants

## 2022-07-06 RX ORDER — METAXALONE 800 MG/1
TABLET ORAL
Qty: 20 TABLET | Refills: 0 | OUTPATIENT
Start: 2022-07-06

## 2022-07-06 NOTE — TELEPHONE ENCOUNTER
Skelaxin not covered by insurance  I personally called him at [phone] on 7/6/2022 - to discuss alternatives  He already picked up the skelaxin rx last night and paid cash  I asked him to call me back with any worsening or failure to improve

## 2023-02-20 PROBLEM — N40.0 BPH (BENIGN PROSTATIC HYPERPLASIA): Status: ACTIVE | Noted: 2023-02-20

## 2023-02-21 ENCOUNTER — APPOINTMENT (OUTPATIENT)
Dept: LAB | Facility: MEDICAL CENTER | Age: 73
End: 2023-02-21

## 2023-02-21 ENCOUNTER — OFFICE VISIT (OUTPATIENT)
Dept: FAMILY MEDICINE CLINIC | Facility: CLINIC | Age: 73
End: 2023-02-21

## 2023-02-21 ENCOUNTER — HOSPITAL ENCOUNTER (OUTPATIENT)
Dept: CT IMAGING | Facility: HOSPITAL | Age: 73
Discharge: HOME | End: 2023-02-21

## 2023-02-21 VITALS
DIASTOLIC BLOOD PRESSURE: 80 MMHG | HEIGHT: 72 IN | WEIGHT: 217 LBS | BODY MASS INDEX: 29.39 KG/M2 | HEART RATE: 60 BPM | SYSTOLIC BLOOD PRESSURE: 132 MMHG | OXYGEN SATURATION: 98 % | TEMPERATURE: 97.6 F

## 2023-02-21 DIAGNOSIS — R63.4 WEIGHT LOSS, UNINTENTIONAL: ICD-10-CM

## 2023-02-21 DIAGNOSIS — I48.0 PAROXYSMAL ATRIAL FIBRILLATION (HCC): ICD-10-CM

## 2023-02-21 DIAGNOSIS — K40.90 INGUINAL HERNIA WITHOUT OBSTRUCTION OR GANGRENE, RECURRENCE NOT SPECIFIED, UNSPECIFIED LATERALITY: ICD-10-CM

## 2023-02-21 DIAGNOSIS — R10.32 ACUTE BILATERAL LOWER ABDOMINAL PAIN: ICD-10-CM

## 2023-02-21 DIAGNOSIS — I25.10 ATHEROSCLEROSIS OF NATIVE CORONARY ARTERY OF NATIVE HEART WITHOUT ANGINA PECTORIS: ICD-10-CM

## 2023-02-21 DIAGNOSIS — R10.32 ACUTE BILATERAL LOWER ABDOMINAL PAIN: Primary | ICD-10-CM

## 2023-02-21 DIAGNOSIS — I71.20 THORACIC AORTIC ANEURYSM WITHOUT RUPTURE, UNSPECIFIED PART: ICD-10-CM

## 2023-02-21 DIAGNOSIS — Z95.2 STATUS POST AORTIC VALVE REPLACEMENT: ICD-10-CM

## 2023-02-21 DIAGNOSIS — R10.31 ACUTE BILATERAL LOWER ABDOMINAL PAIN: ICD-10-CM

## 2023-02-21 DIAGNOSIS — R97.20 ELEVATED PSA: ICD-10-CM

## 2023-02-21 DIAGNOSIS — Z98.890 S/P MITRAL VALVE REPAIR: ICD-10-CM

## 2023-02-21 DIAGNOSIS — R10.31 ACUTE BILATERAL LOWER ABDOMINAL PAIN: Primary | ICD-10-CM

## 2023-02-21 PROBLEM — R04.0 EPISTAXIS: Status: RESOLVED | Noted: 2022-01-09 | Resolved: 2023-02-21

## 2023-02-21 PROBLEM — R57.0 CARDIOGENIC SHOCK (HCC): Status: RESOLVED | Noted: 2022-07-05 | Resolved: 2023-02-21

## 2023-02-21 PROBLEM — D69.6 THROMBOCYTOPENIA (HCC): Status: RESOLVED | Noted: 2022-07-05 | Resolved: 2023-02-21

## 2023-02-21 LAB
ALBUMIN SERPL BCP-MCNC: 4.3 G/DL (ref 3.5–5)
ALP SERPL-CCNC: 105 U/L (ref 46–116)
ALT SERPL W P-5'-P-CCNC: 26 U/L (ref 12–78)
ANION GAP SERPL CALCULATED.3IONS-SCNC: 3 MMOL/L (ref 4–13)
AST SERPL W P-5'-P-CCNC: 29 U/L (ref 5–45)
BACTERIA UR QL AUTO: ABNORMAL /HPF
BILIRUB SERPL-MCNC: 0.7 MG/DL (ref 0.2–1)
BILIRUB UR QL STRIP: NEGATIVE
BUN SERPL-MCNC: 17 MG/DL (ref 5–25)
CALCIUM SERPL-MCNC: 9.7 MG/DL (ref 8.3–10.1)
CHLORIDE SERPL-SCNC: 106 MMOL/L (ref 96–108)
CLARITY UR: CLEAR
CO2 SERPL-SCNC: 27 MMOL/L (ref 21–32)
COLOR UR: YELLOW
CREAT SERPL-MCNC: 1.12 MG/DL (ref 0.6–1.3)
ERYTHROCYTE [DISTWIDTH] IN BLOOD BY AUTOMATED COUNT: 13.5 % (ref 11.6–15.1)
GFR SERPL CREATININE-BSD FRML MDRD: 64 ML/MIN/1.73SQ M
GLUCOSE SERPL-MCNC: 92 MG/DL (ref 65–140)
GLUCOSE UR STRIP-MCNC: NEGATIVE MG/DL
HCT VFR BLD AUTO: 47 % (ref 36.5–49.3)
HGB BLD-MCNC: 15.3 G/DL (ref 12–17)
HGB UR QL STRIP.AUTO: ABNORMAL
KETONES UR STRIP-MCNC: NEGATIVE MG/DL
LEUKOCYTE ESTERASE UR QL STRIP: NEGATIVE
MCH RBC QN AUTO: 29.3 PG (ref 26.8–34.3)
MCHC RBC AUTO-ENTMCNC: 32.6 G/DL (ref 31.4–37.4)
MCV RBC AUTO: 90 FL (ref 82–98)
MUCOUS THREADS UR QL AUTO: ABNORMAL
NITRITE UR QL STRIP: NEGATIVE
NON-SQ EPI CELLS URNS QL MICRO: ABNORMAL /HPF
PH UR STRIP.AUTO: 6 [PH]
PLATELET # BLD AUTO: 198 THOUSANDS/UL (ref 149–390)
PMV BLD AUTO: 11.9 FL (ref 8.9–12.7)
POTASSIUM SERPL-SCNC: 3.9 MMOL/L (ref 3.5–5.3)
PROT SERPL-MCNC: 7.8 G/DL (ref 6.4–8.4)
PROT UR STRIP-MCNC: ABNORMAL MG/DL
RBC # BLD AUTO: 5.23 MILLION/UL (ref 3.88–5.62)
RBC #/AREA URNS AUTO: ABNORMAL /HPF
SODIUM SERPL-SCNC: 136 MMOL/L (ref 135–147)
SP GR UR STRIP.AUTO: 1.02 (ref 1–1.03)
UROBILINOGEN UR STRIP-ACNC: <2 MG/DL
WBC # BLD AUTO: 6.34 THOUSAND/UL (ref 4.31–10.16)
WBC #/AREA URNS AUTO: ABNORMAL /HPF

## 2023-02-21 RX ADMIN — IOHEXOL 100 ML: 350 INJECTION, SOLUTION INTRAVENOUS at 17:57

## 2023-02-21 NOTE — PATIENT INSTRUCTIONS
He does have over 10 days bilateral lower abdominal/pelvic pain, rates it as a 4-5 on 0-to-10 scale, has had decreased appetite with 6 pound weight loss by history recent days  I would like him to go for blood work now, also do urinalysis  He should also do stat CT of abdomen/pelvis, does have known aneurysm which was stable at 4 5 cm 1 year ago, does have known hernias, could have acute diverticulitis  I held off on adding medication until we receive study results, consider surgical consult, in either case he should have follow-up with [facility], he did receive a letter in December to set up follow-up regarding need for colonoscopy, should be seen in office first     He does plan to follow-up with his cardiologist at [facility], Dr Zac Jc, see previous history of valve replacement/surgery  Also plans to follow-up with [facility] urology, elevated PSA  We did discuss being seen at the emergency room, he is okay with doing outpatient studies, if he notes any worsening he should be seen at the emergency room

## 2023-02-21 NOTE — PROGRESS NOTES
FAMILY PRACTICE OFFICE VISIT  Se[facility] Spatz A [facility] D [facility]  [facility]  [address]  [address], [place], [address]      NAME: Jazmin Telles  AGE: 68 y o  SEX: male  : 1950   MRN: [id number]    DATE: 2023  TIME: 12:30 PM    Assessment and Plan     Problem List Items Addressed This Visit     S/P mitral valve repair    Status post aortic valve replacement    Elevated PSA w neg Bx     Abdominal hernia    Relevant Orders    CT abdomen pelvis w wo contrast    Thoracic aortic aneurysm without rupture    Relevant Orders    CT abdomen pelvis w wo contrast    Paroxysmal atrial fibrillation ([address] )     Coronary atherosclerosis   Other Visit Diagnoses     Acute bilateral lower abdominal pain    -  Primary    Relevant Orders    CBC    Comprehensive metabolic panel    Urinalysis with microscopic    CT abdomen pelvis w wo contrast    Weight loss, unintentional        Relevant Orders    CT abdomen pelvis w wo contrast          Patient Instructions   He does have over 10 days bilateral lower abdominal/pelvic pain, rates it as a 4-5 on 0-to-10 scale, has had decreased appetite with 6 pound weight loss by history recent days  I would like him to go for blood work now, also do urinalysis  He should also do stat CT of abdomen/pelvis, does have known aneurysm which was stable at 4 5 cm 1 year ago, does have known hernias, could have acute diverticulitis  I held off on adding medication until we receive study results, consider surgical consult, in either case he should have follow-up with [facility], he did receive a letter in December to set up follow-up regarding need for colonoscopy, should be seen in office first     He does plan to follow-up with his cardiologist at [facility], Dr Molly Morales, see previous history of valve replacement/surgery  Also plans to follow-up with [facility] urology, elevated PSA      We did discuss being seen at the emergency room, he is okay with doing outpatient studies, if he notes any worsening he should be seen at the emergency room  Chief Complaint     Chief Complaint   Patient presents with   • Abdominal Pain     Left lower site for 10 days    • Anorexia     For 1 week        History of Present Illness   Freddy Meza is a 68y o -year-old male who is in today accompanied by his wife, he has noted at least 10 days of lower abdominal pain bilaterally, felt his abdomen was hard, has had decreased appetite, has lost 6 pounds recently  He is concerned this is related to hernias, does have remote hernia surgery, does have known abdominal/inguinal hernia  Does have history thoracic aneurysm, CT scanning showed this was stable at 4 5 cm 1 year ago  Has had no increased back pain  Has had no nausea or vomiting, today has slightly looser stools but overall bowels have been about the same as at baseline, moves his bowels every morning few times, no blood, no melena  Last colonoscopy was in August 2020 with Dr Clarice Holguin, he did receive a letter in December from  to set up another 2-year colonoscopy, has not done that yet  Last saw his cardiologist in May, plans to redo echocardiogram and see them in follow-up, no increased shortness of breath, no increased chest pain  Also does see urology at [facility], saw them in December, has history elevated PSA  Review of Systems   Review of Systems   Constitutional: Positive for appetite change, fatigue and unexpected weight change  Negative for fever  HENT: Negative for sore throat and trouble swallowing  Respiratory: Negative for cough, chest tightness, shortness of breath and wheezing  Cardiovascular: Negative for chest pain, palpitations and leg swelling  Gastrointestinal: Positive for abdominal distention and abdominal pain  Negative for blood in stool, nausea and vomiting  See HPI   Genitourinary: Negative for dysuria and hematuria     Neurological: Positive for light-headedness (Occasional long-term)  Negative for dizziness, syncope and headaches  Psychiatric/Behavioral: Negative for behavioral problems and confusion  Active Problem List     Patient Active Problem List   Diagnosis   • Abdominal hernia   • Benign essential hypertension   • Proteinuria   • Neurocardiogenic syncope   • Insomnia   • Hypercholesterolemia   • Hematuria, microscopic   • Elevated PSA w neg Bx 2014   • Coronary atherosclerosis   • Paroxysmal atrial fibrillation (HCC)    • Localized, primary osteoarthritis of shoulder region   • Urinary retention   • Pulmonary nodules   • Thoracic aortic aneurysm without rupture   • Status post aortic valve replacement   • S/P mitral valve repair   • Epistaxis   • Thrombocytopenia (HCC)   • BPH (benign prostatic hyperplasia)       Past Medical History:  Reviewed    Past Surgical History:  Reviewed    Family History:  Reviewed    Social History:  Reviewed    Objective     Vitals:    02/21/23 1148   BP: 132/80   BP Location: Left arm   Patient Position: Sitting   Cuff Size: Large   Pulse: 60   Temp: 97 6 °F (36 4 °C)   SpO2: 98%   Weight: 98 4 kg (217 lb)   Height: 6' (1 829 m)     Body mass index is 29 43 kg/m²  BP Readings from Last 3 Encounters:   02/21/23 132/80   07/05/22 108/70   01/14/22 100/78       Wt Readings from Last 3 Encounters:   02/21/23 98 4 kg (217 lb)   07/05/22 99 3 kg (219 lb)   03/17/22 96 2 kg (212 lb)       Physical Exam  Constitutional:       Comments: 28-year-old male, complaining of lower abdominal pain bilaterally  Afebrile  Oxygenating okay  HENT:      Mouth/Throat:      Pharynx: Oropharynx is clear  Eyes:      General: No scleral icterus  Cardiovascular:      Rate and Rhythm: Normal rate and regular rhythm  Heart sounds: Murmur heard  Pulmonary:      Effort: Pulmonary effort is normal  No respiratory distress  Breath sounds: Normal breath sounds  No wheezing, rhonchi or rales     Abdominal: Comments: Abdomen is soft, he does have tenderness bilateral lower abdominal/pelvic without guard or rebound  Bowel sounds are hyperactive especially on right  Does have some prominence right mid abdominal     Does have inguinal hernia indirect, direct right greater than left, minimally tender  No scrotal mass  No inguinal swollen glands  Musculoskeletal:      Right lower leg: No edema  Left lower leg: No edema  Skin:     Coloration: Skin is not jaundiced  Neurological:      Mental Status: Mental status is at baseline  Psychiatric:         Behavior: Behavior normal          ALLERGIES:  No Known Allergies    Current Medications     Current Outpatient Medications   Medication Sig Dispense Refill   • Cholecalciferol 50 MCG (2000 UT) CAPS Take by mouth     • diphenhydrAMINE (SOMINEX) 25 MG tablet Take 1 tablet by mouth daily at bedtime as needed       • lovastatin (MEVACOR) 40 MG tablet Take 1 tablet by mouth     • Magnesium 250 MG TABS Take 1 tablet by mouth daily       • Saw Palmetto, Serenoa repens, (SAW PALMETTO PO) Take by mouth daily 250 mg     • tamsulosin (FLOMAX) 0 4 mg TAKE 1 CAPSULE BY MOUTH EVERY DAY AT NIGHT     • metaxalone (SKELAXIN) 800 mg tablet Take 1/2 to 1 tab po q 8 hours prn muscle spasm; may cause drowsiness; do not drive/work while taking (Patient not taking: Reported on 2/21/2023) 20 tablet 0     No current facility-administered medications for this visit              Orders Placed This Encounter   Procedures   • CT abdomen pelvis w wo contrast   • CBC   • Comprehensive metabolic panel   • Urinalysis with microscopic         Celina Velez DO

## 2023-04-25 ENCOUNTER — OFFICE VISIT (OUTPATIENT)
Dept: FAMILY MEDICINE CLINIC | Facility: CLINIC | Age: 73
End: 2023-04-25

## 2023-04-25 ENCOUNTER — TRANSITIONAL CARE MANAGEMENT (OUTPATIENT)
Dept: FAMILY MEDICINE CLINIC | Facility: CLINIC | Age: 73
End: 2023-04-25

## 2023-04-25 VITALS
HEART RATE: 51 BPM | BODY MASS INDEX: 29.12 KG/M2 | DIASTOLIC BLOOD PRESSURE: 80 MMHG | HEIGHT: 72 IN | OXYGEN SATURATION: 96 % | WEIGHT: 215 LBS | SYSTOLIC BLOOD PRESSURE: 122 MMHG

## 2023-04-25 DIAGNOSIS — I10 BENIGN ESSENTIAL HYPERTENSION: ICD-10-CM

## 2023-04-25 DIAGNOSIS — Z95.2 STATUS POST AORTIC VALVE REPLACEMENT: ICD-10-CM

## 2023-04-25 DIAGNOSIS — I25.10 ATHEROSCLEROSIS OF NATIVE CORONARY ARTERY OF NATIVE HEART WITHOUT ANGINA PECTORIS: ICD-10-CM

## 2023-04-25 DIAGNOSIS — R07.9 CHEST PAIN, UNSPECIFIED TYPE: Primary | ICD-10-CM

## 2023-04-25 DIAGNOSIS — I71.20 THORACIC AORTIC ANEURYSM WITHOUT RUPTURE, UNSPECIFIED PART (HCC): ICD-10-CM

## 2023-04-25 DIAGNOSIS — Z98.890 S/P MITRAL VALVE REPAIR: ICD-10-CM

## 2023-04-25 DIAGNOSIS — I49.3 FREQUENT PVCS: ICD-10-CM

## 2023-04-25 PROBLEM — R00.2 PALPITATIONS: Status: ACTIVE | Noted: 2023-04-16

## 2023-04-25 NOTE — PROGRESS NOTES
FAMILY PRACTICE OFFICE VISIT  [facility]  [address]  [address], [place], [address]      NAME: Barbra Noyola  AGE: 68 y o  SEX: male  : 1950   MRN: [id number]    DATE: 2023  TIME: 3:40 PM    Assessment and Plan     Problem List Items Addressed This Visit     S/P mitral valve repair    Status post aortic valve replacement    Thoracic aortic aneurysm without rupture (HCC)    Frequent PVCs    Coronary atherosclerosis    Benign essential hypertension   Other Visit Diagnoses     Chest pain, resolved    -  Primary          Patient Instructions   Reviewed observation stay at [facility] , he is now back on metoprolol tartrate 12 5 mg daily, does have bradycardia, not orthostatic here today  He does have Zio patch on, first week was without metoprolol, second is with metoprolol  He will be following up with cardiology in May  Inpatient echocardiogram was unchanged from prior, history of valve procedures  Cholesterol 139 with HDL 29, LDL 76  CBC, CMP looked okay, troponins were only up a few points from upper normal   Chest x-ray was clear, urinalysis clear other than protein, TSH was fine at 1 52  He did have recent lower abdominal pain, nothing like when I saw him in February, did have CT scan of abdomen/pelvis then  Just observe, he will be undergoing colonoscopy May 31 with Dr Kiel Gramajo  We will need to watch for electrolyte imbalance/increased palpitations with prep  Last CT of chest was in 2022, does have few stable pulmonary nodules, a sending thoracic aneurysm was stable at that time at 4 3 cm, plan redo later this year        Chief Complaint     Chief Complaint   Patient presents with   • Transition of Care Management     PCV       History of Present Illness   Barbra Noyola is a 68y o -year-old male who is in for a reevaluation after being seen with observation  through 17 at [facility], noted chest discomfort, found to have frequent PVCs, no acute cardiac issue was identified  Is in today accompanied by his wife, feels well today, is wearing Zio patch  Did start metoprolol tartrate 12 5 mg again, does have history of bradycardia, no orthostatic symptoms recently  No current chest pain, did have some shortness of breath the other night  Review of Systems   Review of Systems   Constitutional: Positive for fatigue  Negative for appetite change, fever and unexpected weight change  HENT: Negative for sore throat and trouble swallowing  Respiratory: Positive for shortness of breath (other night in bed)  Negative for cough and chest tightness  Cardiovascular: Positive for palpitations  Negative for chest pain (none since home) and leg swelling  Gastrointestinal: Negative for blood in stool, nausea and vomiting  Prev pain resolved-   Last night lower discomfort x 30 mins w loose stool -  No current pain   Genitourinary: Negative for dysuria and hematuria  Neurological: Positive for dizziness (1 episode driving x few secs)  Negative for syncope and headaches  Psychiatric/Behavioral: Negative for behavioral problems and confusion         Active Problem List     Patient Active Problem List   Diagnosis   • Abdominal hernia   • Benign essential hypertension   • Proteinuria   • Neurocardiogenic syncope   • Insomnia   • Hypercholesterolemia   • Hematuria, microscopic   • Elevated PSA w neg Bx 2014   • Coronary atherosclerosis   • Paroxysmal atrial fibrillation (HCC)    • Localized, primary osteoarthritis of shoulder region   • Urinary retention   • Pulmonary nodules   • Thoracic aortic aneurysm without rupture (HCC)   • Status post aortic valve replacement   • S/P mitral valve repair   • BPH (benign prostatic hyperplasia)   • Inguinal Hernia Right   • Frequent PVCs       Past Medical History:  Reviewed    Past Surgical History:  Reviewed    Family History:  Reviewed    Social History:  Reviewed    Objective     Vitals:    04/25/23 1418   BP: 122/80   BP Location: Right arm   Patient Position: Sitting   Cuff Size: Standard   Pulse: (!) 51   SpO2: 96%   Weight: 97 5 kg (215 lb)   Height: 6' (1 829 m)     Body mass index is 29 16 kg/m²  BP Readings from Last 3 Encounters:   04/25/23 122/80   04/18/23 122/79   02/21/23 132/80       Wt Readings from Last 3 Encounters:   04/25/23 97 5 kg (215 lb)   04/18/23 99 3 kg (219 lb)   02/21/23 98 4 kg (217 lb)       Physical Exam  Constitutional:       General: He is not in acute distress  Appearance: Normal appearance  He is well-developed  He is not ill-appearing  Comments: Blood pressure sitting, standing not orthostatic   Eyes:      General: No scleral icterus  Cardiovascular:      Rate and Rhythm: Regular rhythm  Bradycardia present  Heart sounds: Normal heart sounds  Pulmonary:      Effort: Pulmonary effort is normal  No respiratory distress  Breath sounds: Normal breath sounds  No wheezing, rhonchi or rales  Abdominal:      Tenderness: There is no abdominal tenderness  Musculoskeletal:      Right lower leg: No edema  Left lower leg: No edema  Skin:     Coloration: Skin is not jaundiced  Neurological:      Mental Status: He is alert  Mental status is at baseline     Psychiatric:         Behavior: Behavior normal          ALLERGIES:  No Known Allergies    Current Medications     Current Outpatient Medications   Medication Sig Dispense Refill   • Cholecalciferol 50 MCG (2000 UT) CAPS Take by mouth     • diphenhydrAMINE (SOMINEX) 25 MG tablet Take 1 tablet by mouth daily at bedtime as needed       • lovastatin (MEVACOR) 40 MG tablet Take 1 tablet by mouth     • Magnesium 250 MG TABS Take 1 tablet by mouth daily       • metoprolol tartrate (LOPRESSOR) 25 mg tablet      • Saw Palmetto, Serenoa repens, (SAW PALMETTO PO) Take by mouth daily 250 mg     • sodium picosulfate, magnesium oxide, citric acid (Clenpiq) 10-3 5-12 MG-GM -GM/160ML SOLN Take 1 kit by mouth see administration instructions 175 mL 0   • tamsulosin (FLOMAX) 0 4 mg TAKE 1 CAPSULE BY MOUTH EVERY DAY AT NIGHT       No current facility-administered medications for this visit  No orders of the defined types were placed in this encounter          Manuel Berg DO

## 2023-04-25 NOTE — PATIENT INSTRUCTIONS
Reviewed observation stay at [facility] April 16 through 17, he is now back on metoprolol tartrate 12 5 mg daily, does have bradycardia, not orthostatic here today  He does have Zio patch on, first week was without metoprolol, second is with metoprolol  He will be following up with cardiology in May  Inpatient echocardiogram was unchanged from prior, history of valve procedures  Cholesterol 139 with HDL 29, LDL 76  CBC, CMP looked okay, troponins were only up a few points from upper normal   Chest x-ray was clear, urinalysis clear other than protein, TSH was fine at 1 52  He did have recent lower abdominal pain, nothing like when I saw him in February, did have CT scan of abdomen/pelvis then  Just observe, he will be undergoing colonoscopy May 31 with Dr Radha Araujo  We will need to watch for electrolyte imbalance/increased palpitations with prep  Last CT of chest was in April 2022, does have few stable pulmonary nodules, a sending thoracic aneurysm was stable at that time at 4 3 cm, plan redo later this year

## 2023-06-27 ENCOUNTER — OFFICE VISIT (OUTPATIENT)
Dept: FAMILY MEDICINE CLINIC | Facility: CLINIC | Age: 73
End: 2023-06-27
Payer: MEDICARE

## 2023-06-27 VITALS
OXYGEN SATURATION: 96 % | WEIGHT: 221 LBS | SYSTOLIC BLOOD PRESSURE: 132 MMHG | BODY MASS INDEX: 29.93 KG/M2 | HEART RATE: 74 BPM | TEMPERATURE: 98 F | HEIGHT: 72 IN | DIASTOLIC BLOOD PRESSURE: 80 MMHG

## 2023-06-27 DIAGNOSIS — J20.9 ACUTE BRONCHITIS, UNSPECIFIED ORGANISM: Primary | ICD-10-CM

## 2023-06-27 PROCEDURE — 99214 OFFICE O/P EST MOD 30 MIN: CPT | Performed by: FAMILY MEDICINE

## 2023-06-27 RX ORDER — ALBUTEROL SULFATE 90 UG/1
AEROSOL, METERED RESPIRATORY (INHALATION)
Qty: 6.7 G | Refills: 0 | Status: SHIPPED | OUTPATIENT
Start: 2023-06-27

## 2023-06-27 RX ORDER — AZITHROMYCIN 250 MG/1
TABLET, FILM COATED ORAL
Qty: 6 TABLET | Refills: 0 | Status: SHIPPED | OUTPATIENT
Start: 2023-06-27 | End: 2023-07-02

## 2023-06-27 NOTE — PROGRESS NOTES
Answers for HPI/ROS submitted by the patient on 2023  Chronicity: new  Onset: in the past 7 days  Progression since onset: gradually worsening  Frequency: hourly  Cough characteristics: non-productive  ear congestion: No  heartburn: No  hemoptysis: No  nasal congestion: Yes  sweats: No  weight loss: No  Aggravated by: nothing    FAMILY PRACTICE OFFICE VISIT    NAME: Chuck Saldivar    AGE: 68 y o  SEX: male  : 1950   MRN: [id number]    DATE: 2023  TIME: 10:12 AM    Assessment and Plan   1  Acute bronchitis, unspecified organism  Antibiotic  Eat a yogurt while taking antibiotic  Recommendation to increase fluids - particularly water, rest, saline nasal spray and humidified air  Avoid decongestants due to pvc's  Ventolin inhaler - 2 puffs by mouth every 4-6 hours as needed for coughing/wheezing or shortness of breath  Robitussin DM as needed for coughing/post-nasal drip  If not improving - will consider a round of prednisone and possible chest xray        Chief Complaint     Chief Complaint   Patient presents with   • Sore Throat     Started with a sore throat last Tuesday   • Nasal Congestion     Yellow mucus    • Cough       History of Present Illness   Chuck Saldivar is a 68y o -year-old male who presents today for sick visit  Started with sore throat X 7 days ago - resolved  And then 3 days later a cough started  Is interfering with sleep  Has to sleep elevated   Taking nyquil otc  Recently traveled by plane  Review of Systems   Review of Systems   Constitutional: Negative for chills and fever  HENT: Positive for postnasal drip  Negative for ear pain, rhinorrhea and sore throat  Nasal congestion improving  Sore throat resolved  Respiratory: Positive for cough  Negative for shortness of breath and wheezing  H/o tob use - quit over 50 years ago  No VALERIE  Pt not sure if he is hearing a wheeze upon exhalation  Cardiovascular: Negative for chest pain  Musculoskeletal: Negative for myalgias  Skin: Negative for rash  Neurological: Negative for headaches  Active Problem List     Patient Active Problem List   Diagnosis   • Abdominal hernia   • Benign essential hypertension   • Proteinuria   • Neurocardiogenic syncope   • Insomnia   • Hypercholesterolemia   • Hematuria, microscopic   • Elevated PSA w neg Bx 2014   • Coronary atherosclerosis   • Paroxysmal atrial fibrillation (HCC)    • Localized, primary osteoarthritis of shoulder region   • Urinary retention   • Pulmonary nodules   • Thoracic aortic aneurysm without rupture (HCC)   • Status post aortic valve replacement   • S/P mitral valve repair   • BPH (benign prostatic hyperplasia)   • Inguinal Hernia Right   • Frequent PVCs         Past Medical History:  Past Medical History:   Diagnosis Date   • Benign essential hypertension 03/23/2011   • BPH (benign prostatic hyperplasia) 02/20/2023    Last Assessment & Plan:  Formatting of this note might be different from the original  Doing well  Cont tamsulosin and saw palmetto  We did discuss adding finasteride but will have repeat psa prior  Also discussed indications for holep  As of now, he's doing great  Last episode of retention was clearly related to anesthesia  Fu 4-6w with psa   • Cardiogenic shock ([facility] [address] ) 07/05/2022   • Chronic kidney disease, stage III (moderate) ([facility] [address] )    • Coronary atherosclerosis 03/23/2011    Last Assessment & Plan:  There are no symptoms of an anginal syndrome  I provided education regarding the nature of coronary artery disease and our recommendations to reduce his long-term risk of ACS  I recommended he continue utilizing aspirin and statins to minimize risk    A lipid profile was requested with the option to use high intensity statins as an alternative to Mevacor in response to per   • Enlarged prostate    • Epistaxis 01/09/2022   • History of aortic valve disease    • History of mitral valve disease    • History of transfusion     May 2021 - no adverse reaction   • Paroxysmal atrial fibrillation ([facility] [address] )  02/06/2020   • Pulmonary nodules 02/05/2021   • PVC's (premature ventricular contractions)    • S/P mitral valve repair 06/15/2021    May 2021 at [facility]   • Status post aortic valve replacement 05/31/2021    At [facility] May 2021   • SVT (supraventricular tachycardia) [facility])    • Syncope, cardiogenic    • Thoracic aortic aneurysm without rupture ([facility] [address] ) 02/05/2021    4 5cm on CT 1/21  Formatting of this note might be different from the original  4 5cm on CT chest 1/28/21  Last Assessment & Plan:  Formatting of this note might be different from the original  6/6/21 chest CT showed stable 4 3 cm ascending thoracic aortic aneurysm  • Thrombocytopenia ([facility] ) 07/05/2022       Past Surgical History:  Past Surgical History:   Procedure Laterality Date   • CARDIAC SURGERY      MV repair, AV replaced and aortic aneurysm repaired   • CAUTERIZE INNER NOSE Left 01/09/2022    Procedure: Sphenopalatine artery ligation;  Surgeon: Melani Rios MD;  Location: [facility];  Service: ENT   • COLONOSCOPY  2007    Dr Tori Soria  Tubular adenoma descending colon polyp  • COLONOSCOPY  2010    Dr Tori Soria  Diverticulosis  • COLONOSCOPY  08/2020    Dr Tori Soria  12 mm descending colon inflammatory polyp, 3 mm benign lymphoid aggregate, diverticulosis     • FACIAL/NECK BIOPSY Left 11/30/2021    Procedure: EXCISION PYOGENIC GRANULOMA; FREE MUCOSAL GRAFT FROM NOSE;  Surgeon: Melani Rios MD;  Location: [facility];  Service: ENT   • INGUINAL HERNIA REPAIR     • TONSILLECTOMY         Family History:  Family History   Problem Relation Age of Onset   • Alzheimer's disease Mother [de-identified]   • Esophageal cancer Mother    • Diabetes Father    • Diabetes Sister    • Kidney failure Sister        Social History:  Social History     Socioeconomic History   • Marital status: /Civil Union     Spouse name: Not on file   • Number of children: Not on file   • Years of education: Not on file   • Highest education level: Not on file   Occupational History   • Occupation: /      Comment: full-time   Tobacco Use   • Smoking status: Never   • Smokeless tobacco: Never   Vaping Use   • Vaping Use: Never used   Substance and Sexual Activity   • Alcohol use: No   • Drug use: No   • Sexual activity: Not on file   Other Topics Concern   • Not on file   Social History Narrative   • Not on file     Social Determinants of Health     Financial Resource Strain: Not on file   Food Insecurity: Not on file   Transportation Needs: Not on file   Physical Activity: Not on file   Stress: Not on file   Social Connections: Not on file   Intimate Partner Violence: Not on file   Housing Stability: Not on file       Objective     Vitals:    06/27/23 1006   BP: 132/80   Pulse: 74   Temp: 98 °F (36 7 °C)   SpO2: 96%     Wt Readings from Last 3 Encounters:   06/27/23 100 kg (221 lb)   04/25/23 97 5 kg (215 lb)   04/18/23 99 3 kg (219 lb)       Physical Exam  Vitals and nursing note reviewed  Constitutional:       General: He is not in acute distress  Appearance: He is not toxic-appearing  Comments: Appears tired but nontoxic     HENT:      Right Ear: Tympanic membrane normal       Left Ear: Tympanic membrane normal       Nose: Congestion present  Mouth/Throat:      Mouth: Mucous membranes are moist       Pharynx: Oropharynx is clear  Uvula midline  No oropharyngeal exudate  Tonsils: No tonsillar exudate or tonsillar abscesses  Cardiovascular:      Rate and Rhythm: Normal rate and regular rhythm  Heart sounds: No murmur heard  Pulmonary:      Effort: Pulmonary effort is normal  No respiratory distress  Breath sounds: Wheezing and rhonchi present  No rales  Comments: Scattered and diffuse rhonchi with end expiratory wheezing    No use of accessory respiratory muscles  Is able to converse without coughing or "SOB  Lymphadenopathy:      Cervical: No cervical adenopathy  Neurological:      Mental Status: He is alert     Psychiatric:         Mood and Affect: Mood normal          Behavior: Behavior normal          Pertinent Laboratory/Diagnostic Studies:  Lab Results   Component Value Date    GLUCOSE 102 10/17/2014    BUN 17 02/21/2023    CREATININE 1 12 02/21/2023    CALCIUM 9 7 02/21/2023     10/17/2014    K 3 9 02/21/2023    CO2 27 02/21/2023     02/21/2023     Lab Results   Component Value Date    ALT 26 02/21/2023    AST 29 02/21/2023    ALKPHOS 105 02/21/2023    BILITOT 0 59 10/17/2014       Lab Results   Component Value Date    WBC 6 34 02/21/2023    HGB 15 3 02/21/2023    HCT 47 0 02/21/2023    MCV 90 02/21/2023     02/21/2023       No results found for: \"TSH\"    Lab Results   Component Value Date    CHOL 143 10/17/2014     Lab Results   Component Value Date    TRIG 179 (H) 09/06/2019     Lab Results   Component Value Date    HDL 33 (L) 09/06/2019     Lab Results   Component Value Date    LDLCALC 66 09/06/2019     Lab Results   Component Value Date    HGBA1C 5 8 (H) 05/06/2021       Results for orders placed or performed in visit on 02/21/23   CBC   Result Value Ref Range    WBC 6 34 4 31 - 10 16 Thousand/uL    RBC 5 23 3 88 - 5 62 Million/uL    Hemoglobin 15 3 12 0 - 17 0 g/dL    Hematocrit 47 0 36 5 - 49 3 %    MCV 90 82 - 98 fL    MCH 29 3 26 8 - 34 3 pg    MCHC 32 6 31 4 - 37 4 g/dL    RDW 13 5 11 6 - 15 1 %    Platelets 059 664 - 687 Thousands/uL    MPV 11 9 8 9 - 12 7 fL   Comprehensive metabolic panel   Result Value Ref Range    Sodium 136 135 - 147 mmol/L    Potassium 3 9 3 5 - 5 3 mmol/L    Chloride 106 96 - 108 mmol/L    CO2 27 21 - 32 mmol/L    ANION GAP 3 (L) 4 - 13 mmol/L    BUN 17 5 - 25 mg/dL    Creatinine 1 12 0 60 - 1 30 mg/dL    Glucose 92 65 - 140 mg/dL    Calcium 9 7 8 3 - 10 1 mg/dL    AST 29 5 - 45 U/L    ALT 26 12 - 78 U/L    Alkaline Phosphatase 105 46 - 116 U/L    Total " Protein 7 8 6 4 - 8 4 g/dL    Albumin 4 3 3 5 - 5 0 g/dL    Total Bilirubin 0 70 0 20 - 1 00 mg/dL    eGFR 64 ml/min/1 73sq m   Urinalysis with microscopic   Result Value Ref Range    Color, UA Yellow     Clarity, UA Clear     Specific [place], UA 1 018 1 003 - 1 030    pH, UA 6 0 4 5, 5 0, 5 5, 6 0, 6 5, 7 0, 7 5, 8 0    Leukocytes, UA Negative Negative    Nitrite, UA Negative Negative    Protein, UA 30 (1+) (A) Negative mg/dl    Glucose, UA Negative Negative mg/dl    Ketones, UA Negative Negative mg/dl    Urobilinogen, UA <2 0 <2 0 mg/dl mg/dl    Bilirubin, UA Negative Negative    Occult Blood, UA Small (A) Negative    RBC, UA 2-4 (A) None Seen, 1-2 /hpf    WBC, UA 2-4 (A) None Seen, 1-2 /hpf    Epithelial Cells Occasional None Seen, Occasional /hpf    Bacteria, UA None Seen None Seen, Occasional /hpf    MUCUS THREADS Occasional (A) None Seen       No orders of the defined types were placed in this encounter  ALLERGIES:  No Known Allergies    Current Medications     Current Outpatient Medications   Medication Sig Dispense Refill   • Cholecalciferol 50 MCG (2000 UT) CAPS Take by mouth     • diphenhydrAMINE (SOMINEX) 25 MG tablet Take 1 tablet by mouth daily at bedtime as needed       • lovastatin (MEVACOR) 40 MG tablet Take 1 tablet by mouth     • Magnesium 250 MG TABS Take 1 tablet by mouth daily       • metoprolol tartrate (LOPRESSOR) 25 mg tablet      • Saw Palmetto, Serenoa repens, (SAW PALMETTO PO) Take by mouth daily 250 mg     • tamsulosin (FLOMAX) 0 4 mg TAKE 1 CAPSULE BY MOUTH EVERY DAY AT NIGHT     • sodium picosulfate, magnesium oxide, citric acid (Clenpiq) 10-3 5-12 MG-GM -GM/160ML SOLN Take 1 kit by mouth see administration instructions 175 mL 0     No current facility-administered medications for this visit           Health Maintenance     Health Maintenance   Topic Date Due   • Medicare Annual Wellness Visit (AWV)  09/23/2022   • BMI: Followup Plan  09/23/2022   • Fall Risk  01/14/2023   • COVID-19 Vaccine (6 - Pfizer series) 01/23/2023   • Influenza Vaccine (Season Ended) 09/01/2023   • Depression Screening  02/21/2024   • BMI: Adult  04/25/2024   • Colorectal Cancer Screening  05/31/2025   • Hepatitis C Screening  Completed   • Pneumococcal Vaccine: 65+ Years  Completed   • HIB Vaccine  Aged Out   • IPV Vaccine  Aged Out   • Hepatitis A Vaccine  Aged Out   • Meningococcal ACWY Vaccine  Aged Out   • HPV Vaccine  Aged Out     Immunization History   Administered Date(s) Administered   • COVID-19 PFIZER VACCINE 0 3 ML IM 03/02/2021, 03/24/2021, 12/13/2021, 06/18/2022, 11/28/2022   • INFLUENZA 02/04/2018   • Influenza Quadrivalent Preservative Free 3 years and older IM 10/14/2014   • Influenza, high dose seasonal 0 7 mL 01/09/2022   • Pneumococcal Conjugate 13-Valent 05/22/2020   • Pneumococcal Polysaccharide PPV23 09/23/2021          Valentine Gupta, DO

## 2023-06-27 NOTE — PATIENT INSTRUCTIONS
You have been dx with acute bronchitis  -   Antibiotic    Eat a yogurt while taking antibiotic  Recommendation to increase fluids - particularly water, rest, saline nasal spray and humidified air  Avoid decongestants due to pvc's  Ventolin inhaler - 2 puffs by mouth every 4-6 hours as needed for coughing/wheezing or shortness of breath  Robitussin DM as needed for coughing/post-nasal drip  If not improving - will consider a round of prednisone and possible chest xray

## 2023-07-01 ENCOUNTER — APPOINTMENT (OUTPATIENT)
Dept: RADIOLOGY | Facility: MEDICAL CENTER | Age: 73
End: 2023-07-01
Payer: MEDICARE

## 2023-07-01 ENCOUNTER — OFFICE VISIT (OUTPATIENT)
Dept: URGENT CARE | Facility: MEDICAL CENTER | Age: 73
End: 2023-07-01
Payer: MEDICARE

## 2023-07-01 VITALS
RESPIRATION RATE: 18 BRPM | TEMPERATURE: 97.8 F | BODY MASS INDEX: 29.93 KG/M2 | WEIGHT: 221 LBS | SYSTOLIC BLOOD PRESSURE: 135 MMHG | DIASTOLIC BLOOD PRESSURE: 90 MMHG | OXYGEN SATURATION: 97 % | HEIGHT: 72 IN | HEART RATE: 65 BPM

## 2023-07-01 DIAGNOSIS — S29.9XXA RIB INJURY: ICD-10-CM

## 2023-07-01 DIAGNOSIS — S22.31XA CLOSED FRACTURE OF ONE RIB OF RIGHT SIDE, INITIAL ENCOUNTER: Primary | ICD-10-CM

## 2023-07-01 PROCEDURE — G0463 HOSPITAL OUTPT CLINIC VISIT: HCPCS | Performed by: PHYSICIAN ASSISTANT

## 2023-07-01 PROCEDURE — 99213 OFFICE O/P EST LOW 20 MIN: CPT | Performed by: PHYSICIAN ASSISTANT

## 2023-07-01 PROCEDURE — 71101 X-RAY EXAM UNILAT RIBS/CHEST: CPT

## 2023-07-01 RX ORDER — METHOCARBAMOL 500 MG/1
500 TABLET, FILM COATED ORAL 2 TIMES DAILY
Qty: 10 TABLET | Refills: 0 | Status: SHIPPED | OUTPATIENT
Start: 2023-07-01

## 2023-07-01 RX ORDER — BENZONATATE 100 MG/1
100 CAPSULE ORAL 3 TIMES DAILY PRN
Qty: 20 CAPSULE | Refills: 0 | Status: SHIPPED | OUTPATIENT
Start: 2023-07-01

## 2023-07-01 RX ORDER — LIDOCAINE 50 MG/G
1 PATCH TOPICAL DAILY
Qty: 10 PATCH | Refills: 0 | Status: SHIPPED | OUTPATIENT
Start: 2023-07-01

## 2023-07-01 NOTE — PROGRESS NOTES
[facility]        NAME: Dorina Wilkerson is a 68 y o  male  : 1950    MRN: [id number]  DATE: 2023  TIME: 3:09 PM    /90   Pulse 65   Temp 97 8 °F (36 6 °C)   Resp 18   Ht 6' (1 829 m)   Wt 100 kg (221 lb)   SpO2 97%   BMI 29 97 kg/m²     Assessment and Plan   Closed fracture of one rib of right side, initial encounter [S22 31XA]  1  Closed fracture of one rib of right side, initial encounter  XR ribs right w pa chest min 3 views    lidocaine (Lidoderm) 5 %    benzonatate (TESSALON PERLES) 100 mg capsule    methocarbamol (ROBAXIN) 500 mg tablet            Patient Instructions       Follow up with PCP in 3-5 days  Proceed to  ER if symptoms worsen  Chief Complaint     Chief Complaint   Patient presents with   • Rib Injury     Pt fell on Wednesday injuring his ribs on the right side  Pt states that they pain increased today          History of Present Illness       Pt with fall 3 days ago,  Pt with right rib pain       Review of Systems   Review of Systems   Constitutional: Negative  HENT: Negative  Eyes: Negative  Respiratory: Negative  Cardiovascular: Negative  Gastrointestinal: Negative  Endocrine: Negative  Genitourinary: Negative  Musculoskeletal: Negative  Skin: Negative  Allergic/Immunologic: Negative  Neurological: Negative  Hematological: Negative  Psychiatric/Behavioral: Negative  All other systems reviewed and are negative          Current Medications       Current Outpatient Medications:   •  benzonatate (TESSALON PERLES) 100 mg capsule, Take 1 capsule (100 mg total) by mouth 3 (three) times a day as needed for cough, Disp: 20 capsule, Rfl: 0  •  lidocaine (Lidoderm) 5 %, Apply 1 patch topically over 12 hours daily Remove & Discard patch within 12 hours or as directed by MD, Disp: 10 patch, Rfl: 0  •  methocarbamol (ROBAXIN) 500 mg tablet, Take 1 tablet (500 mg total) by mouth 2 (two) times a day, Disp: 10 tablet, Rfl: 0  • albuterol (Ventolin HFA) 90 mcg/act inhaler, 2 puffs po q 4-6 hours prn coughing/wheezing or shortness of breath, Disp: 6 7 g, Rfl: 0  •  azithromycin (ZITHROMAX) 250 mg tablet, Take 1 tab po bid day 1, then take 1 tab po daily for days 2-5; HOLD lovastatin X 10 days, Disp: 6 tablet, Rfl: 0  •  Cholecalciferol 50 MCG (2000 UT) CAPS, Take by mouth, Disp: , Rfl:   •  diphenhydrAMINE (SOMINEX) 25 MG tablet, Take 1 tablet by mouth daily at bedtime as needed  , Disp: , Rfl:   •  lovastatin (MEVACOR) 40 MG tablet, Take 1 tablet by mouth, Disp: , Rfl:   •  Magnesium 250 MG TABS, Take 1 tablet by mouth daily  , Disp: , Rfl:   •  metoprolol tartrate (LOPRESSOR) 25 mg tablet, , Disp: , Rfl:   •  Saw Palmetto, Serenoa repens, (SAW PALMETTO PO), Take by mouth daily 250 mg, Disp: , Rfl:   •  sodium picosulfate, magnesium oxide, citric acid (Clenpiq) 10-3 5-12 MG-GM -GM/160ML SOLN, Take 1 kit by mouth see administration instructions, Disp: 175 mL, Rfl: 0  •  tamsulosin (FLOMAX) 0 4 mg, TAKE 1 CAPSULE BY MOUTH EVERY DAY AT NIGHT, Disp: , Rfl:     Current Allergies     Allergies as of 07/01/2023   • (No Known Allergies)            The following portions of the patient's history were reviewed and updated as appropriate: allergies, current medications, past family history, past medical history, past social history, past surgical history and problem list      Past Medical History:   Diagnosis Date   • Benign essential hypertension 03/23/2011   • BPH (benign prostatic hyperplasia) 02/20/2023    Last Assessment & Plan:  Formatting of this note might be different from the original  Doing well  Cont tamsulosin and saw palmetto  We did discuss adding finasteride but will have repeat psa prior  Also discussed indications for holep  As of now, he's doing great  Last episode of retention was clearly related to anesthesia    Fu 4-6w with psa   • Cardiogenic shock ([address] ) 07/05/2022   • Chronic kidney disease, stage III (moderate) (HCC)    • Coronary atherosclerosis 03/23/2011    Last Assessment & Plan:  There are no symptoms of an anginal syndrome  I provided education regarding the nature of coronary artery disease and our recommendations to reduce his long-term risk of ACS  I recommended he continue utilizing aspirin and statins to minimize risk  A lipid profile was requested with the option to use high intensity statins as an alternative to Mevacor in response to per   • Enlarged prostate    • Epistaxis 01/09/2022   • History of aortic valve disease    • History of mitral valve disease    • History of transfusion     May 2021 - no adverse reaction   • Paroxysmal atrial fibrillation ([address] )  02/06/2020   • Pulmonary nodules 02/05/2021   • PVC's (premature ventricular contractions)    • S/P mitral valve repair 06/15/2021    May 2021 at [facility]   • Status post aortic valve replacement 05/31/2021    At [facility] May 2021   • SVT (supraventricular tachycardia) ([address] )    • Syncope, cardiogenic    • Thoracic aortic aneurysm without rupture ([address] ) 02/05/2021    4 5cm on CT 1/21  Formatting of this note might be different from the original  4 5cm on CT chest 1/28/21  Last Assessment & Plan:  Formatting of this note might be different from the original  6/6/21 chest CT showed stable 4 3 cm ascending thoracic aortic aneurysm  • Thrombocytopenia ([address] ) 07/05/2022       Past Surgical History:   Procedure Laterality Date   • CARDIAC SURGERY      MV repair, AV replaced and aortic aneurysm repaired   • CAUTERIZE INNER NOSE Left 01/09/2022    Procedure: Sphenopalatine artery ligation;  Surgeon: Sukumar Sifuentes MD;  [facility];  Service: ENT   • COLONOSCOPY  2007    Dr Sobeida Perez  Tubular adenoma descending colon polyp  • COLONOSCOPY  2010    Dr Sobeida Perez  Diverticulosis  • COLONOSCOPY  08/2020    Dr Sobeida Perez  12 mm descending colon inflammatory polyp, 3 mm benign lymphoid aggregate, diverticulosis     • FACIAL/NECK BIOPSY Left 11/30/2021 Procedure: EXCISION PYOGENIC GRANULOMA; FREE MUCOSAL GRAFT FROM NOSE;  Surgeon: Sharda London MD;  [facility];  Service: ENT   • INGUINAL HERNIA REPAIR     • TONSILLECTOMY         Family History   Problem Relation Age of Onset   • Alzheimer's disease Mother [de-identified]   • Esophageal cancer Mother    • Diabetes Father    • Diabetes Sister    • Kidney failure Sister          Medications have been verified  Objective   /90   Pulse 65   Temp 97 8 °F (36 6 °C)   Resp 18   Ht 6' (1 829 m)   Wt 100 kg (221 lb)   SpO2 97%   BMI 29 97 kg/m²        Physical Exam     Physical Exam  Vitals and nursing note reviewed  Constitutional:       Appearance: Normal appearance  He is normal weight  HENT:      Head: Normocephalic and atraumatic  Right Ear: Tympanic membrane, ear canal and external ear normal       Left Ear: Tympanic membrane, ear canal and external ear normal       Nose: Nose normal       Mouth/Throat:      Mouth: Mucous membranes are moist       Pharynx: Oropharynx is clear  Eyes:      Extraocular Movements: Extraocular movements intact  Conjunctiva/sclera: Conjunctivae normal       Pupils: Pupils are equal, round, and reactive to light  Cardiovascular:      Rate and Rhythm: Normal rate and regular rhythm  Pulses: Normal pulses  Heart sounds: Normal heart sounds  Comments: Right lower rib pain mid clavicular line to mid axillary line  No ecchymosis   Pulmonary:      Effort: Pulmonary effort is normal       Breath sounds: Normal breath sounds  Abdominal:      General: Abdomen is flat  Bowel sounds are normal       Palpations: Abdomen is soft  Comments: No ruq pain    Musculoskeletal:         General: Normal range of motion  Cervical back: Normal range of motion and neck supple  Skin:     Capillary Refill: Capillary refill takes less than 2 seconds  Neurological:      General: No focal deficit present  Mental Status: He is alert

## 2023-10-30 NOTE — PATIENT INSTRUCTIONS
Reviewed health history along with medications. He is doing well here today. Reviewed follow-up visit with cardiology October 19, had been seen at the emergency room October 7. Emergency room EKG showed no acute finding, chest x-ray was unremarkable, CBC, CMP appeared stable. Last CT of chest was in February 2022, nodules were stable at that time, aortic aneurysm was stable at 4.5 cm, I would like him to redo CT of chest.  Note he did have CT of abdomen/pelvis back in February. Continue to control cardiovascular risk as can, back in April cholesterol was excellent at 139, HDL 29, LDL 76. Also at that time echocardiogram showed ejection fraction 40 to 45%, history of valve replacements, imaged areas of aorta looked okay on echocardiogram-he will see cardiology again in follow-up January 19. Immunization History   Administered Date(s) Administered    COVID-19 PFIZER VACCINE 0.3 ML IM 03/02/2021, 03/24/2021, 12/13/2021, 06/18/2022, 11/28/2022    INFLUENZA 02/04/2018    Influenza Quadrivalent Preservative Free 3 years and older IM 10/14/2014    Influenza, high dose seasonal 0.7 mL 01/09/2022    Pneumococcal Conjugate 13-Valent 05/22/2020    Pneumococcal Polysaccharide PPV23 09/23/2021       Discussed Vaccines,    Pneumococcal vax is up to date  He does do yearly Flu shot. Given today  Tdap/tetanus shot is due, can be done at pharmacy (done every 10 yrs for superficial cuts, every 5 yrs for deep wounds)  Can also do 'shingles' shot, Shingrix at pharmacy. Covid vaccine booster will be done next few weeks      Was never a smoker     Regarding Colon Cancer screening,   Screening is up to date -had colonoscopy with Dr. Riley Rizo in May 2023, redo 5 years was advised. He will continue to see urology regarding history elevated PSA, PSA in June was 5.6, sees them again January 9. He does have a  "LIVING WILL"     Glaucoma screening is due - can set up    Continue to try to watch a healthy diet.   Walk for exercise as tolerated. Has had no further nosebleeds, does note drainage frequently at night from left nostril, can use nasal saline, observe, hold off on Flonase with history of nosebleeds. If does worsen consider reevaluation with ENT. We will see him again in 12 months, sooner as needed.

## 2023-10-31 ENCOUNTER — OFFICE VISIT (OUTPATIENT)
Dept: FAMILY MEDICINE CLINIC | Facility: CLINIC | Age: 73
End: 2023-10-31
Payer: MEDICARE

## 2023-10-31 VITALS
HEART RATE: 54 BPM | OXYGEN SATURATION: 94 % | WEIGHT: 222 LBS | DIASTOLIC BLOOD PRESSURE: 80 MMHG | SYSTOLIC BLOOD PRESSURE: 134 MMHG | HEIGHT: 72 IN | BODY MASS INDEX: 30.07 KG/M2

## 2023-10-31 DIAGNOSIS — J34.89 NASAL DISCHARGE: ICD-10-CM

## 2023-10-31 DIAGNOSIS — Z00.00 MEDICARE ANNUAL WELLNESS VISIT, SUBSEQUENT: Primary | ICD-10-CM

## 2023-10-31 DIAGNOSIS — R91.8 PULMONARY NODULES: ICD-10-CM

## 2023-10-31 DIAGNOSIS — I49.3 FREQUENT PVCS: ICD-10-CM

## 2023-10-31 DIAGNOSIS — I71.20 THORACIC AORTIC ANEURYSM WITHOUT RUPTURE, UNSPECIFIED PART (HCC): ICD-10-CM

## 2023-10-31 DIAGNOSIS — I48.0 PAROXYSMAL ATRIAL FIBRILLATION (HCC): ICD-10-CM

## 2023-10-31 DIAGNOSIS — Z95.2 STATUS POST AORTIC VALVE REPLACEMENT: ICD-10-CM

## 2023-10-31 DIAGNOSIS — E78.00 HYPERCHOLESTEROLEMIA: ICD-10-CM

## 2023-10-31 DIAGNOSIS — I10 BENIGN ESSENTIAL HYPERTENSION: ICD-10-CM

## 2023-10-31 DIAGNOSIS — Z23 INFLUENZA VACCINE NEEDED: ICD-10-CM

## 2023-10-31 DIAGNOSIS — Z98.890 S/P MITRAL VALVE REPAIR: ICD-10-CM

## 2023-10-31 DIAGNOSIS — I25.10 ATHEROSCLEROSIS OF NATIVE CORONARY ARTERY OF NATIVE HEART WITHOUT ANGINA PECTORIS: ICD-10-CM

## 2023-10-31 PROCEDURE — 99213 OFFICE O/P EST LOW 20 MIN: CPT | Performed by: FAMILY MEDICINE

## 2023-10-31 PROCEDURE — 90662 IIV NO PRSV INCREASED AG IM: CPT

## 2023-10-31 PROCEDURE — G0008 ADMIN INFLUENZA VIRUS VAC: HCPCS

## 2023-10-31 PROCEDURE — G0439 PPPS, SUBSEQ VISIT: HCPCS | Performed by: FAMILY MEDICINE

## 2023-10-31 NOTE — PROGRESS NOTES
Assessment and Plan:     Problem List Items Addressed This Visit       Benign essential hypertension    Hypercholesterolemia    Coronary atherosclerosis    Paroxysmal atrial fibrillation (HCC)     Pulmonary nodules    Relevant Orders    CT chest wo contrast    Thoracic aortic aneurysm without rupture ([address])    Relevant Orders    CT chest wo contrast    Status post aortic valve replacement    S/P mitral valve repair    Frequent PVCs     Other Visit Diagnoses       Medicare annual wellness visit, subsequent    -  Primary    Influenza vaccine needed        Relevant Orders    influenza vaccine, high-dose, PF 0.7 mL (FLUZONE HIGH-DOSE) (Completed)    BMI 29.0-29.9,adult        Nasal discharge left                Depression Screening and Follow-up Plan: Patient was screened for depression during today's encounter. They screened negative with a PHQ-2 score of 0. Preventive health issues were discussed with patient, and age appropriate screening tests were ordered as noted in patient's After Visit Summary. Personalized health advice and appropriate referrals for health education or preventive services given if needed, as noted in patient's After Visit Summary.     See other note today regarding provider information       History of Present Illness:     Patient presents for a Medicare Wellness Visit      Patient Care Team:  Bryan Hoyt DO as PCP - General       Problem List:     Patient Active Problem List   Diagnosis    Abdominal hernia    Benign essential hypertension    Proteinuria    Neurocardiogenic syncope    Insomnia    Hypercholesterolemia    Hematuria, microscopic    Elevated PSA w neg Bx 2014    Coronary atherosclerosis    Paroxysmal atrial fibrillation (HCC)     Localized, primary osteoarthritis of shoulder region    Urinary retention    Pulmonary nodules    Thoracic aortic aneurysm without rupture ([address])    Status post aortic valve replacement    S/P mitral valve repair    BPH (benign prostatic hyperplasia) Inguinal Hernia Right    Frequent PVCs      Past Medical and Surgical History:     Past Medical History:   Diagnosis Date    Benign essential hypertension 03/23/2011    BPH (benign prostatic hyperplasia) 02/20/2023    Last Assessment & Plan:  Formatting of this note might be different from the original. Doing well. Cont tamsulosin and saw palmetto. We did discuss adding finasteride but will have repeat psa prior. Also discussed indications for holep. As of now, he's doing great. Last episode of retention was clearly related to anesthesia. Fu 4-6w with psa    Cardiogenic shock ([address]) 07/05/2022    Chronic kidney disease, stage III (moderate) ([address])     Coronary atherosclerosis 03/23/2011    Last Assessment & Plan:  There are no symptoms of an anginal syndrome. I provided education regarding the nature of coronary artery disease and our recommendations to reduce his long-term risk of ACS. I recommended he continue utilizing aspirin and statins to minimize risk.   A lipid profile was requested with the option to use high intensity statins as an alternative to Mevacor in response to per    Enlarged prostate     Epistaxis 01/09/2022    History of aortic valve disease     History of mitral valve disease     History of transfusion     May 2021 - no adverse reaction    Paroxysmal atrial fibrillation ([address])  02/06/2020    Pulmonary nodules 02/05/2021    PVC's (premature ventricular contractions)     S/P mitral valve repair 06/15/2021    May 2021 at [facility]    Status post aortic valve replacement 05/31/2021    At [facility] May 2021    SVT (supraventricular tachycardia)     Syncope, cardiogenic     Thoracic aortic aneurysm without rupture ([address]) 02/05/2021    4.5cm on CT 1/21  Formatting of this note might be different from the original. 4.5cm on CT chest 1/28/21  Last Assessment & Plan:  Formatting of this note might be different from the original. 6/6/21 chest CT showed stable 4.3 cm ascending thoracic aortic aneurysm. Thrombocytopenia ([address]) 07/05/2022     Past Surgical History:   Procedure Laterality Date    CARDIAC SURGERY      MV repair, AV replaced and aortic aneurysm repaired    CAUTERIZE INNER NOSE Left 01/09/2022    Procedure: Sphenopalatine artery ligation;  Surgeon: Roni Harris MD;  Location: [facility];  Service: ENT    COLONOSCOPY  2007    Dr. Thu Garcia. Tubular adenoma descending colon polyp. COLONOSCOPY  2010    Dr. Thu Garcia. Diverticulosis. COLONOSCOPY  08/2020    Dr. Thu Garcia. 12 mm descending colon inflammatory polyp, 3 mm benign lymphoid aggregate, diverticulosis.     FACIAL/NECK BIOPSY Left 11/30/2021    Procedure: EXCISION PYOGENIC GRANULOMA; FREE MUCOSAL GRAFT FROM NOSE;  Surgeon: Roni Harris MD;  Location: [facility];  Service: ENT    INGUINAL HERNIA REPAIR      TONSILLECTOMY        Family History:     Family History   Problem Relation Age of Onset    Alzheimer's disease Mother 80    Esophageal cancer Mother     Diabetes Father     Diabetes Sister     Kidney failure Sister       Social History:     Social History     Socioeconomic History    Marital status: /Civil Union     Spouse name: None    Number of children: None    Years of education: None    Highest education level: None   Occupational History    Occupation: /      Comment: full-time   Tobacco Use    Smoking status: Never    Smokeless tobacco: Never   Vaping Use    Vaping Use: Never used   Substance and Sexual Activity    Alcohol use: No    Drug use: No    Sexual activity: None   Other Topics Concern    None   Social History Narrative    None     Social Determinants of Health     Financial Resource Strain: Low Risk  (10/24/2023)    Overall Financial Resource Strain (CARDIA)     Difficulty of Paying Living Expenses: Not hard at all   Food Insecurity: Not on file   Transportation Needs: No Transportation Needs (10/24/2023)    PRAPARE - Transportation     Lack of Transportation (Medical): No     Lack of Transportation (Non-Medical): No   Physical Activity: Not on file   Stress: Not on file   Social Connections: Not on file   Intimate Partner Violence: Not on file   Housing Stability: Not on file      Medications and Allergies:     Current Outpatient Medications   Medication Sig Dispense Refill    Cholecalciferol 50 MCG (2000 UT) CAPS Take by mouth      lovastatin (MEVACOR) 40 MG tablet Take 1 tablet by mouth      Magnesium 250 MG TABS Take 1 tablet by mouth daily        Saw Palmetto, Serenoa repens, (SAW PALMETTO PO) Take by mouth daily 250 mg      tamsulosin (FLOMAX) 0.4 mg TAKE 1 CAPSULE BY MOUTH EVERY DAY AT NIGHT       No current facility-administered medications for this visit. No Known Allergies   Immunizations:     Immunization History   Administered Date(s) Administered    COVID-19 PFIZER VACCINE 0.3 ML IM 03/02/2021, 03/24/2021, 12/13/2021, 06/18/2022, 11/28/2022    INFLUENZA 02/04/2018    Influenza Quadrivalent Preservative Free 3 years and older IM 10/14/2014    Influenza, high dose seasonal 0.7 mL 01/09/2022, 10/31/2023    Pneumococcal Conjugate 13-Valent 05/22/2020    Pneumococcal Polysaccharide PPV23 09/23/2021      Health Maintenance:         Topic Date Due    Colorectal Cancer Screening  05/31/2025    Hepatitis C Screening  Completed     There are no preventive care reminders to display for this patient. Medicare Screening Tests and Risk Assessments:     Sylvia Shearer is here for his Subsequent Wellness visit. Health Risk Assessment:   Patient rates overall health as good. Patient feels that their physical health rating is same. Patient is very satisfied with their life. Eyesight was rated as slightly worse. Hearing was rated as same. Patient feels that their emotional and mental health rating is same. Patients states they are never, rarely angry. Patient states they are sometimes unusually tired/fatigued. Pain experienced in the last 7 days has been some.  Patient's pain rating has been 2/10. Patient states that he has experienced no weight loss or gain in last 6 months. Depression Screening:   PHQ-2 Score: 0      Fall Risk Screening: In the past year, patient has experienced: history of falling in past year      Home Safety:  Patient does not have trouble with stairs inside or outside of their home. Patient has working smoke alarms and has no working carbon monoxide detector. Home safety hazards include: none. Nutrition:   Current diet is Regular and Limited junk food. Medications:   Patient is currently taking over-the-counter supplements. OTC medications include: see medication list. Patient is able to manage medications. Activities of Daily Living (ADLs)/Instrumental Activities of Daily Living (IADLs):   Walk and transfer into and out of bed and chair?: Yes  Dress and groom yourself?: Yes    Bathe or shower yourself?: Yes    Feed yourself? Yes  Do your laundry/housekeeping?: Yes  Manage your money, pay your bills and track your expenses?: Yes  Make your own meals?: Yes    Do your own shopping?: Yes    Previous Hospitalizations:   Any hospitalizations or ED visits within the last 12 months?: Yes    How many hospitalizations have you had in the last year?: 1-2    Advance Care Planning:   Living will: Yes    Durable POA for healthcare:  Yes    Advanced directive: Yes      PREVENTIVE SCREENINGS      Cardiovascular Screening:    General: Screening Not Indicated and History Lipid Disorder      Diabetes Screening:     General: Screening Current      Colorectal Cancer Screening:     General: Screening Current      Abdominal Aortic Aneurysm (AAA) Screening:    Risk factors include: age between 70-77 yo        Lung Cancer Screening:     General: Screening Not Indicated      Hepatitis C Screening:    General: Screening Current    Screening, Brief Intervention, and Referral to Treatment (SBIRT)    Screening  Typical number of drinks in a day: 0  Typical number of drinks in a week: 0  Interpretation: Low risk drinking behavior.     AUDIT-C Screenin) How often did you have a drink containing alcohol in the past year? never  2) How many drinks did you have on a typical day when you were drinking in the past year? 0  3) How often did you have 6 or more drinks on one occasion in the past year? never    AUDIT-C Score: 0  Interpretation: Score 0-3 (male): Negative screen for alcohol misuse    Single Item Drug Screening:  How often have you used an illegal drug (including marijuana) or a prescription medication for non-medical reasons in the past year? never    Single Item Drug Screen Score: 0  Interpretation: Negative screen for possible drug use disorder        Gabino Morocho, DO

## 2023-10-31 NOTE — PROGRESS NOTES
Depression Screening and Follow-up Plan: Patient was screened for depression during today's encounter. They screened negative with a PHQ-2 score of 0. [facility]  [address]  [address]  [address] [place] [address], [id number] MEDICARE SUBSEQUENT VISIT NOTE ( part 1 )      NAME: Ricky Kuhn  AGE: 68 y.o. SEX: male  : 1950   MRN: [id number]    DATE: 10/31/2023  TIME: 2:22 PM    Assessment and Plan     Problem List Items Addressed This Visit       Benign essential hypertension    Hypercholesterolemia    Coronary atherosclerosis    Paroxysmal atrial fibrillation ([address])     Pulmonary nodules    Relevant Orders    CT chest wo contrast    Thoracic aortic aneurysm without rupture ([address])    Relevant Orders    CT chest wo contrast    Status post aortic valve replacement    S/P mitral valve repair    Frequent PVCs     Other Visit Diagnoses       Medicare annual wellness visit, subsequent    -  Primary    Influenza vaccine needed        Relevant Orders    influenza vaccine, high-dose, PF 0.7 mL (FLUZONE HIGH-DOSE) (Completed)    BMI 29.0-29.9,adult        Nasal discharge left                Medicare Wellness Counseling/ Discussion    Patient Instructions   Reviewed health history along with medications. He is doing well here today. Reviewed follow-up visit with cardiology , had been seen at the emergency room . Emergency room EKG showed no acute finding, chest x-ray was unremarkable, CBC, CMP appeared stable. Last CT of chest was in 2022, nodules were stable at that time, aortic aneurysm was stable at 4.5 cm, I would like him to redo CT of chest.  Note he did have CT of abdomen/pelvis back in February. Continue to control cardiovascular risk as can, back in April cholesterol was excellent at 139, HDL 29, LDL 76.   Also at that time echocardiogram showed ejection fraction 40 to 45%, history of valve replacements, imaged areas of aorta looked okay on echocardiogram-he will see cardiology again in follow-up January 19. Immunization History   Administered Date(s) Administered    COVID-19 PFIZER VACCINE 0.3 ML IM 03/02/2021, 03/24/2021, 12/13/2021, 06/18/2022, 11/28/2022    INFLUENZA 02/04/2018    Influenza Quadrivalent Preservative Free 3 years and older IM 10/14/2014    Influenza, high dose seasonal 0.7 mL 01/09/2022    Pneumococcal Conjugate 13-Valent 05/22/2020    Pneumococcal Polysaccharide PPV23 09/23/2021       Discussed Vaccines,    Pneumococcal vax is up to date  He does do yearly Flu shot. Given today  Tdap/tetanus shot is due, can be done at pharmacy (done every 10 yrs for superficial cuts, every 5 yrs for deep wounds)  Can also do 'shingles' shot, Shingrix at pharmacy. Covid vaccine booster will be done next few weeks      Was never a smoker     Regarding Colon Cancer screening,   Screening is up to date -had colonoscopy with Dr. Inocente Jimenez in May 2023, redo 5 years was advised. He will continue to see urology regarding history elevated PSA, PSA in June was 5.6, sees them again January 9. He does have a  "LIVING WILL"     Glaucoma screening is due - can set up    Continue to try to watch a healthy diet. Walk for exercise as tolerated. Has had no further nosebleeds, does note drainage frequently at night from left nostril, can use nasal saline, observe, hold off on Flonase with history of nosebleeds. If does worsen consider reevaluation with ENT. We will see him again in 12 months, sooner as needed. Chief Complaint     Chief Complaint   Patient presents with    Medicare Wellness Visit    Follow-up     Runny nose/ cough       History of Present Illness     Trish Delgadillo is a 68y.o.-year-old male who is in today for an annual wellness check/regular follow-up.   Today he is feeling well, he was seen at the emergency room earlier this month with right-sided chest discomfort, did see cardiology in follow-up. He is off metoprolol due to low heart rate, is using other medication as directed. Continues to remain active with photography, painting. Review of Systems     Review of Systems   Constitutional:  Negative for appetite change, fatigue, fever and unexpected weight change. HENT:  Positive for postnasal drip and rhinorrhea (left sided lying on side at night, 'milky not green'). Negative for nosebleeds (none recently-  see prev hx), sore throat and trouble swallowing. Respiratory:  Negative for cough, shortness of breath and wheezing. Cardiovascular:  Positive for palpitations (hx PVCs - no issues recently). Negative for chest pain and leg swelling. See ER visit  none since   Gastrointestinal:  Negative for abdominal pain (no further lower pains), blood in stool, nausea and vomiting. No acid reflux     No change in bowel   Genitourinary:  Positive for frequency. Negative for dysuria and hematuria. Sees Urol   Hx retention   Neurological:  Positive for dizziness (was walking 3 weeks ago and became dizzy x 30 secs -  none since). Negative for syncope, light-headedness and headaches. Psychiatric/Behavioral:  Negative for behavioral problems and confusion.         Active Problem List     Patient Active Problem List   Diagnosis    Abdominal hernia    Benign essential hypertension    Proteinuria    Neurocardiogenic syncope    Insomnia    Hypercholesterolemia    Hematuria, microscopic    Elevated PSA w neg Bx 2014    Coronary atherosclerosis    Paroxysmal atrial fibrillation (HCC)     Localized, primary osteoarthritis of shoulder region    Urinary retention    Pulmonary nodules    Thoracic aortic aneurysm without rupture (HCC)    Status post aortic valve replacement    S/P mitral valve repair    BPH (benign prostatic hyperplasia)    Inguinal Hernia Right    Frequent PVCs       Past Medical History:  Past Medical History:   Diagnosis Date    Benign essential hypertension 03/23/2011    BPH (benign prostatic hyperplasia) 02/20/2023    Last Assessment & Plan:  Formatting of this note might be different from the original. Doing well. Cont tamsulosin and saw palmetto. We did discuss adding finasteride but will have repeat psa prior. Also discussed indications for holep. As of now, he's doing great. Last episode of retention was clearly related to anesthesia. Fu 4-6w with psa    Cardiogenic shock ([address]) 07/05/2022    Chronic kidney disease, stage III (moderate) ([address])     Coronary atherosclerosis 03/23/2011    Last Assessment & Plan:  There are no symptoms of an anginal syndrome. I provided education regarding the nature of coronary artery disease and our recommendations to reduce his long-term risk of ACS. I recommended he continue utilizing aspirin and statins to minimize risk. A lipid profile was requested with the option to use high intensity statins as an alternative to Mevacor in response to per    Enlarged prostate     Epistaxis 01/09/2022    History of aortic valve disease     History of mitral valve disease     History of transfusion     May 2021 - no adverse reaction    Paroxysmal atrial fibrillation ([address])  02/06/2020    Pulmonary nodules 02/05/2021    PVC's (premature ventricular contractions)     S/P mitral valve repair 06/15/2021    May 2021 at [facility]    Status post aortic valve replacement 05/31/2021    At [facility] May 2021    SVT (supraventricular tachycardia)     Syncope, cardiogenic     Thoracic aortic aneurysm without rupture ([address]) 02/05/2021    4.5cm on CT 1/21  Formatting of this note might be different from the original. 4.5cm on CT chest 1/28/21  Last Assessment & Plan:  Formatting of this note might be different from the original. 6/6/21 chest CT showed stable 4.3 cm ascending thoracic aortic aneurysm.     Thrombocytopenia ([address]) 07/05/2022       Past Surgical History:  Past Surgical History:   Procedure Laterality Date    CARDIAC SURGERY      MV repair, AV replaced and aortic aneurysm repaired    CAUTERIZE INNER NOSE Left 01/09/2022    Procedure: Sphenopalatine artery ligation;  Surgeon: Ozzy Begum MD;  [facility];  Service: ENT    COLONOSCOPY  2007    Dr. Reji Acosta. Tubular adenoma descending colon polyp. COLONOSCOPY  2010    Dr. Reji Acosta. Diverticulosis. COLONOSCOPY  08/2020    Dr. Reji Acosta. 12 mm descending colon inflammatory polyp, 3 mm benign lymphoid aggregate, diverticulosis. FACIAL/NECK BIOPSY Left 11/30/2021    Procedure: EXCISION PYOGENIC GRANULOMA; FREE MUCOSAL GRAFT FROM NOSE;  Surgeon: Ozzy Begum MD;  [facility];  Service: ENT    INGUINAL HERNIA REPAIR      TONSILLECTOMY         Family History:  Family History   Problem Relation Age of Onset    Alzheimer's disease Mother 80    Esophageal cancer Mother     Diabetes Father     Diabetes Sister     Kidney failure Sister        Social History:  Social History     Tobacco Use    Smoking status: Never    Smokeless tobacco: Never   Substance Use Topics    Alcohol use: No       Objective     Vitals:    10/31/23 1252   BP: 134/80   BP Location: Left arm   Patient Position: Sitting   Cuff Size: Large   Pulse: (!) 54   SpO2: 94%   Weight: 101 kg (222 lb)   Height: 6' (1.829 m)     Body mass index is 30.11 kg/m². BP Readings from Last 3 Encounters:   10/31/23 134/80   07/01/23 135/90   06/27/23 132/80       Wt Readings from Last 3 Encounters:   10/31/23 101 kg (222 lb)   07/01/23 100 kg (221 lb)   06/27/23 100 kg (221 lb)       Physical Exam  Constitutional:       General: He is not in acute distress. Appearance: Normal appearance. He is well-developed. He is not ill-appearing. HENT:      Right Ear: Tympanic membrane normal.      Left Ear: Tympanic membrane normal.      Mouth/Throat:      Pharynx: Oropharynx is clear. Eyes:      General: No scleral icterus. Neck:      Vascular: No carotid bruit.    Cardiovascular:      Rate and Rhythm: Normal rate and regular rhythm. Heart sounds: Murmur heard. Pulmonary:      Effort: Pulmonary effort is normal. No respiratory distress. Breath sounds: Normal breath sounds. No wheezing, rhonchi or rales. Abdominal:      Palpations: Abdomen is soft. Tenderness: There is no abdominal tenderness. Musculoskeletal:      Right lower leg: No edema. Left lower leg: No edema. Lymphadenopathy:      Cervical: No cervical adenopathy. Skin:     Coloration: Skin is not jaundiced. Neurological:      Mental Status: He is alert and oriented to person, place, and time. Mental status is at baseline. Psychiatric:         Mood and Affect: Mood normal.         Behavior: Behavior normal.         ALLERGIES:  No Known Allergies    Current Medications     Current Outpatient Medications   Medication Sig Dispense Refill    Cholecalciferol 50 MCG (2000 UT) CAPS Take by mouth      lovastatin (MEVACOR) 40 MG tablet Take 1 tablet by mouth      Magnesium 250 MG TABS Take 1 tablet by mouth daily        Saw Palmetto, Serenoa repens, (SAW PALMETTO PO) Take by mouth daily 250 mg      tamsulosin (FLOMAX) 0.4 mg TAKE 1 CAPSULE BY MOUTH EVERY DAY AT NIGHT       No current facility-administered medications for this visit.          Health Maintenance     See other note today re clinical AWV info             Most recent labs available from [address]   ( others may be available in Care Everywhere / Media sections)  Lab Results   Component Value Date    WBC 6.34 02/21/2023    HGB 15.3 02/21/2023    HCT 47.0 02/21/2023     02/21/2023    CHOL 143 10/17/2014    TRIG 179 (H) 09/06/2019    HDL 33 (L) 09/06/2019    ALT 26 02/21/2023    AST 29 02/21/2023     10/17/2014    K 3.9 02/21/2023     02/21/2023    CREATININE 1.12 02/21/2023    BUN 17 02/21/2023    CO2 27 02/21/2023    PSA 7.5 (H) 09/06/2019    INR 1.13 01/09/2022    GLUF 99 11/30/2017    HGBA1C 5.8 (H) 05/06/2021       Orders Placed This Encounter Procedures    CT chest wo contrast    influenza vaccine, high-dose, PF 0.7 mL (FLUZONE HIGH-DOSE)             Zachary Sanchez DO

## 2023-11-12 ENCOUNTER — HOSPITAL ENCOUNTER (OUTPATIENT)
Dept: CT IMAGING | Facility: HOSPITAL | Age: 73
Discharge: HOME | End: 2023-11-12
Payer: MEDICARE

## 2023-11-12 PROCEDURE — G1004 CDSM NDSC: HCPCS

## 2023-11-12 PROCEDURE — 71250 CT THORAX DX C-: CPT

## 2024-01-05 ENCOUNTER — OFFICE VISIT (OUTPATIENT)
Dept: FAMILY MEDICINE CLINIC | Facility: CLINIC | Age: 74
End: 2024-01-05
Payer: MEDICARE

## 2024-01-05 ENCOUNTER — HOSPITAL ENCOUNTER (OUTPATIENT)
Dept: RADIOLOGY | Facility: HOSPITAL | Age: 74
Discharge: HOME | End: 2024-01-05
Payer: MEDICARE

## 2024-01-05 VITALS
TEMPERATURE: 97.8 F | BODY MASS INDEX: 29.51 KG/M2 | HEART RATE: 54 BPM | DIASTOLIC BLOOD PRESSURE: 82 MMHG | WEIGHT: 217.6 LBS | OXYGEN SATURATION: 98 % | SYSTOLIC BLOOD PRESSURE: 126 MMHG

## 2024-01-05 DIAGNOSIS — I71.20 THORACIC AORTIC ANEURYSM WITHOUT RUPTURE, UNSPECIFIED PART (HCC): ICD-10-CM

## 2024-01-05 DIAGNOSIS — R05.2 SUBACUTE COUGH: Primary | ICD-10-CM

## 2024-01-05 DIAGNOSIS — R05.2 SUBACUTE COUGH: ICD-10-CM

## 2024-01-05 DIAGNOSIS — I48.0 PAROXYSMAL ATRIAL FIBRILLATION (HCC): ICD-10-CM

## 2024-01-05 PROCEDURE — 93000 ELECTROCARDIOGRAM COMPLETE: CPT | Performed by: FAMILY MEDICINE

## 2024-01-05 PROCEDURE — 99214 OFFICE O/P EST MOD 30 MIN: CPT | Performed by: FAMILY MEDICINE

## 2024-01-05 PROCEDURE — 71046 X-RAY EXAM CHEST 2 VIEWS: CPT

## 2024-01-05 RX ORDER — DOXYCYCLINE HYCLATE 100 MG/1
100 CAPSULE ORAL EVERY 12 HOURS SCHEDULED
Qty: 14 CAPSULE | Refills: 0 | Status: SHIPPED | OUTPATIENT
Start: 2024-01-05 | End: 2024-01-12

## 2024-01-05 RX ORDER — BENZONATATE 100 MG/1
100 CAPSULE ORAL 3 TIMES DAILY PRN
Qty: 20 CAPSULE | Refills: 0 | Status: SHIPPED | OUTPATIENT
Start: 2024-01-05

## 2024-01-05 NOTE — PATIENT INSTRUCTIONS
Contact dr adams on Monday re: abnormal ecg in office  Showing frequent pvc's and pac's  Tracing scanned in chart  Please ask him to look at    If at any point - symptoms of a fib/increased shortness of breath, dizziness,. Chest pains,.. - call 911    Antibiotic - avoid taking within 2 hours of magnesium or other vitamins/supplements  Eat a yogurt while takingReturn to ent - dr magdaleno    Get chest xray    Trial of mucinex over the counter  Tessalon perles - as needed for cough    Contact dr magdaleno for further workup on sinuses

## 2024-01-05 NOTE — PROGRESS NOTES
FAMILY PRACTICE OFFICE VISIT    NAME: Oni Nagy    AGE: 73 y.o.   SEX: male  : 1950   MRN: [id number]    DATE: 2024  TIME: 4:03 PM    Assessment and Plan   1. Subacute cough  Unsure if post-infectious  As pt has had 3 respiratory viruses since 2023  But given duration and frequency of infections - will check CXR today    Send back to ent to workup post-nasal drip as possible underlying cause as symptoms seems to have started with epistaxiis problems/treatment.    Antibiotic - avoid taking within 2 hours of magnesium or other vitamins/supplements  Eat a yogurt while taking    - XR chest pa & lateral; Future    2. Paroxysmal atrial fibrillation (HCC)   Ecg today:  NSR at  bpm - varying based on pac's and pvc's    Normal axis  No st seg elevation  There is 1 mm st seg depression V4-V6 - slighlty present on prior  LVH by voltage criteria  Pac's and pvc's increased from prior ecg  Pt unable to tolerate anti-coag  Due to h/o severe epistaxis - prompting multiple procedures    Pt is asymptomatic from cardiac standpoint - no chest pains, SOB, dizziness, syncope,...  If any - pt to call 911  See below re: f/u with cardio   Rest and keep well hydrated over the weekend      - POCT ECG    3. Thoracic aortic aneurysm without rupture, unspecified part (HCC)  Following with cardio        CT sinuses - 3/2022:  IMPRESSION:     Mild polypoid mucosal thickening identified diffusely.  No air-fluid levels are identified.     Rightward nasal septal deviation.     Evidence of prior left-sided endoscopic sinus surgery.    Would suggest returning to ENT for possible repeat CT, nlg and formal allergy testing    May need pft's    Check cxr today.        Chief Complaint     Chief Complaint   Patient presents with    Cough     Not going away has had over a month, had covid 2 weeks ago. Cough is worse at night while lying down. Milky white yellow Phlegm also thick, no fevers         History of Present Illness    Oni Nagy is a 73 y.o.-year-old male who presents today for acute concern    Started initially in early 11/2023 - when around a lot of people  Cough and cold symptoms and phlegm  Resolved    Then mid nov - pt developed another cold with cough and phelgm    2 weeks ago - pt had covid   And cough again with phlegm  Worse at night.  Can't lie on his back due to coughing  Answers submitted by the patient for this visit:  Cough Questionnaire (Submitted on 1/3/2024)  Chief Complaint: Cough  Chronicity: chronic  Onset: more than 1 month ago  Progression since onset: waxing and waning  Frequency: hourly  Cough characteristics: productive of sputum  ear congestion: Yes  Aggravated by: lying down      Review of Systems   Review of Systems   Constitutional:  Negative for fever.   HENT:  Positive for postnasal drip.         H/o 4 cauterizations in nose and then another surgery  -in past for epistaxis.  Since then - pt with chronic post-nasal drip.    Uses nedi pot.     Respiratory:  Positive for cough. Negative for shortness of breath and wheezing.         Cough - productive with phlegm    Tries taking nyquil     Pt does not think that his previous uri's in 11/2023 completely resolved - as coughing continued slightly  And then got worse again with each cold.       Cardiovascular:  Negative for chest pain and palpitations.   Gastrointestinal:  Negative for abdominal pain.        Denies gerd     Allergic/Immunologic: Negative for environmental allergies.        No known allergies     Neurological:  Negative for dizziness, syncope, light-headedness and headaches.        No signs of CVA     Psychiatric/Behavioral:  Negative for dysphoric mood, self-injury, sleep disturbance and suicidal ideas. The patient is not nervous/anxious.        Active Problem List     Patient Active Problem List   Diagnosis    Abdominal hernia    Benign essential hypertension    Proteinuria    Neurocardiogenic syncope    Insomnia     Hypercholesterolemia    Hematuria, microscopic    Elevated PSA w neg Bx 2014    Coronary atherosclerosis    Paroxysmal atrial fibrillation (HCC)     Localized, primary osteoarthritis of shoulder region    Urinary retention    Pulmonary nodules    Thoracic aortic aneurysm without rupture (HCC)    Status post aortic valve replacement    S/P mitral valve repair    BPH (benign prostatic hyperplasia)    Inguinal Hernia Right    Frequent PVCs         Past Medical History:  Past Medical History:   Diagnosis Date    Benign essential hypertension 03/23/2011    BPH (benign prostatic hyperplasia) 02/20/2023    Last Assessment & Plan:  Formatting of this note might be different from the original. Doing well.  Cont tamsulosin and saw palmetto.  We did discuss adding finasteride but will have repeat psa prior.  Also discussed indications for holep.  As of now, he's doing great. Last episode of retention was clearly related to anesthesia.  Fu 4-6w with psa    Cardiogenic shock (HCC) 07/05/2022    Chronic kidney disease, stage III (moderate) ([facility])     Coronary atherosclerosis 03/23/2011    Last Assessment & Plan:  There are no symptoms of an anginal syndrome. I provided education regarding the nature of coronary artery disease and our recommendations to reduce his long-term risk of ACS.  I recommended he continue utilizing aspirin and statins to minimize risk.  A lipid profile was requested with the option to use high intensity statins as an alternative to Mevacor in response to per    Enlarged prostate     Epistaxis 01/09/2022    History of aortic valve disease     History of mitral valve disease     History of transfusion     May 2021 - no adverse reaction    Paroxysmal atrial fibrillation (HCC)  02/06/2020    Pulmonary nodules 02/05/2021    PVC's (premature ventricular contractions)     S/P mitral valve repair 06/15/2021    May 2021 at [facility]    Status post aortic valve replacement 05/31/2021    At [facility] May 2021     SVT (supraventricular tachycardia)     Syncope, cardiogenic     Thoracic aortic aneurysm without rupture (HCC) 02/05/2021    4.5cm on CT 1/21  Formatting of this note might be different from the original. 4.5cm on CT chest 1/28/21  Last Assessment & Plan:  Formatting of this note might be different from the original. 6/6/21 chest CT showed stable 4.3 cm ascending thoracic aortic aneurysm.    Thrombocytopenia (HCC) 07/05/2022       Past Surgical History:  Past Surgical History:   Procedure Laterality Date    CARDIAC SURGERY      MV repair, AV replaced and aortic aneurysm repaired    CAUTERIZE INNER NOSE Left 01/09/2022    Procedure: Sphenopalatine artery ligation;  Surgeon: Alfonso Rodriguez MD;  Location: [facility];  Service: ENT    COLONOSCOPY  2007    Dr. Schreiber.  Tubular adenoma descending colon polyp.    COLONOSCOPY  2010    Dr. Schreiber.  Diverticulosis.    COLONOSCOPY  08/2020    Dr. Schreiber.  12 mm descending colon inflammatory polyp, 3 mm benign lymphoid aggregate, diverticulosis.    FACIAL/NECK BIOPSY Left 11/30/2021    Procedure: EXCISION PYOGENIC GRANULOMA; FREE MUCOSAL GRAFT FROM NOSE;  Surgeon: Alfonso Rodriguez MD;  Location: [facility];  Service: ENT    INGUINAL HERNIA REPAIR      TONSILLECTOMY         Family History:  Family History   Problem Relation Age of Onset    Alzheimer's disease Mother 80    Esophageal cancer Mother     Diabetes Father     Diabetes Sister     Kidney failure Sister        Social History:  Social History     Socioeconomic History    Marital status: /Civil Union     Spouse name: Not on file    Number of children: Not on file    Years of education: Not on file    Highest education level: Not on file   Occupational History    Occupation: /      Comment: full-time   Tobacco Use    Smoking status: Never    Smokeless tobacco: Never   Vaping Use    Vaping status: Never Used   Substance and Sexual Activity    Alcohol use: No    Drug use:  No    Sexual activity: Not on file   Other Topics Concern    Not on file   Social History Narrative    Not on file     Social Determinants of Health     Financial Resource Strain: Low Risk  (10/24/2023)    Overall Financial Resource Strain (CARDIA)     Difficulty of Paying Living Expenses: Not hard at all   Food Insecurity: No Food Insecurity (4/16/2023)    Received from [facility]    Hunger Vital Sign     Worried About Running Out of Food in the Last Year: Never true     Ran Out of Food in the Last Year: Never true   Transportation Needs: No Transportation Needs (10/24/2023)    PRAPARE - Transportation     Lack of Transportation (Medical): No     Lack of Transportation (Non-Medical): No   Physical Activity: Not on file   Stress: Not on file   Social Connections: Not on file   Intimate Partner Violence: Not At Risk (4/16/2023)    Received from [facility]    Humiliation, Afraid, Rape, and Kick questionnaire     Fear of Current or Ex-Partner: No     Emotionally Abused: No     Physically Abused: No     Sexually Abused: No   Housing Stability: Low Risk  (4/16/2023)    Received from [facility]    Housing Stability Vital Sign     Unable to Pay for Housing in the Last Year: No     Number of Places Lived in the Last Year: 1     Unstable Housing in the Last Year: No       Objective     Vitals:    01/05/24 1557   BP: 126/82   Pulse: (!) 54   Temp: 97.8 °F (36.6 °C)   SpO2: 98%     Wt Readings from Last 3 Encounters:   01/05/24 98.7 kg (217 lb 9.6 oz)   10/31/23 101 kg (222 lb)   07/01/23 100 kg (221 lb)       Physical Exam  Vitals and nursing note reviewed.   Constitutional:       Comments: Appears tired but nontoxic  Comfortable appearing       HENT:      Right Ear: Tympanic membrane normal.      Left Ear: Tympanic membrane normal.      Nose: Nose normal. No congestion.      Mouth/Throat:      Mouth: Mucous membranes are moist.      Pharynx: No oropharyngeal exudate or  "posterior oropharyngeal erythema.      Comments: Mild post-nasal drip noted  Mucous membranes moist and pink        Eyes:      General: No scleral icterus.  Cardiovascular:      Rate and Rhythm: Normal rate.      Comments: Regular with frequent ectopy  Systolic murmur    Pulmonary:      Effort: Pulmonary effort is normal. No respiratory distress.      Breath sounds: Rhonchi present. No wheezing or rales.      Comments: May be slight rhonchi diffuse vs upper airway reverberation  No use of accessory respiratory muscles    Not coughing during visit  No SOB observed    Abdominal:      General: There is no distension.      Palpations: Abdomen is soft. There is no mass.      Tenderness: There is no abdominal tenderness. There is no guarding or rebound.   Musculoskeletal:      Cervical back: Neck supple. No tenderness.      Right lower leg: No edema.      Left lower leg: No edema.   Lymphadenopathy:      Cervical: No cervical adenopathy.   Skin:     General: Skin is warm.      Coloration: Skin is not jaundiced.   Neurological:      General: No focal deficit present.      Mental Status: He is alert and oriented to person, place, and time.   Psychiatric:         Mood and Affect: Mood normal.         Behavior: Behavior normal.         Thought Content: Thought content normal.         Judgment: Judgment normal.         Pertinent Laboratory/Diagnostic Studies:  Lab Results   Component Value Date    GLUCOSE 102 10/17/2014    BUN 17 02/21/2023    CREATININE 1.12 02/21/2023    CALCIUM 9.7 02/21/2023     10/17/2014    K 3.9 02/21/2023    CO2 27 02/21/2023     02/21/2023     Lab Results   Component Value Date    ALT 26 02/21/2023    AST 29 02/21/2023    ALKPHOS 105 02/21/2023    BILITOT 0.59 10/17/2014       Lab Results   Component Value Date    WBC 6.34 02/21/2023    HGB 15.3 02/21/2023    HCT 47.0 02/21/2023    MCV 90 02/21/2023     02/21/2023       No results found for: \"TSH\"    Lab Results   Component Value " Date    CHOL 143 10/17/2014     Lab Results   Component Value Date    TRIG 179 (H) 09/06/2019     Lab Results   Component Value Date    HDL 33 (L) 09/06/2019     Lab Results   Component Value Date    LDLCALC 66 09/06/2019     Lab Results   Component Value Date    HGBA1C 5.8 (H) 05/06/2021       Results for orders placed or performed in visit on 02/21/23   CBC   Result Value Ref Range    WBC 6.34 4.31 - 10.16 Thousand/uL    RBC 5.23 3.88 - 5.62 Million/uL    Hemoglobin 15.3 12.0 - 17.0 g/dL    Hematocrit 47.0 36.5 - 49.3 %    MCV 90 82 - 98 fL    MCH 29.3 26.8 - 34.3 pg    MCHC 32.6 31.4 - 37.4 g/dL    RDW 13.5 11.6 - 15.1 %    Platelets 198 149 - 390 Thousands/uL    MPV 11.9 8.9 - 12.7 fL   Comprehensive metabolic panel   Result Value Ref Range    Sodium 136 135 - 147 mmol/L    Potassium 3.9 3.5 - 5.3 mmol/L    Chloride 106 96 - 108 mmol/L    CO2 27 21 - 32 mmol/L    ANION GAP 3 (L) 4 - 13 mmol/L    BUN 17 5 - 25 mg/dL    Creatinine 1.12 0.60 - 1.30 mg/dL    Glucose 92 65 - 140 mg/dL    Calcium 9.7 8.3 - 10.1 mg/dL    AST 29 5 - 45 U/L    ALT 26 12 - 78 U/L    Alkaline Phosphatase 105 46 - 116 U/L    Total Protein 7.8 6.4 - 8.4 g/dL    Albumin 4.3 3.5 - 5.0 g/dL    Total Bilirubin 0.70 0.20 - 1.00 mg/dL    eGFR 64 ml/min/1.73sq m   Urinalysis with microscopic   Result Value Ref Range    Color, UA Yellow     Clarity, UA Clear     Specific Gravity, UA 1.018 1.003 - 1.030    pH, UA 6.0 4.5, 5.0, 5.5, 6.0, 6.5, 7.0, 7.5, 8.0    Leukocytes, UA Negative Negative    Nitrite, UA Negative Negative    Protein, UA 30 (1+) (A) Negative mg/dl    Glucose, UA Negative Negative mg/dl    Ketones, UA Negative Negative mg/dl    Urobilinogen, UA <2.0 <2.0 mg/dl mg/dl    Bilirubin, UA Negative Negative    Occult Blood, UA Small (A) Negative    RBC, UA 2-4 (A) None Seen, 1-2 /hpf    WBC, UA 2-4 (A) None Seen, 1-2 /hpf    Epithelial Cells Occasional None Seen, Occasional /hpf    Bacteria, UA None Seen None Seen, Occasional /hpf    MUCUS  THREADS Occasional (A) None Seen       No orders of the defined types were placed in this encounter.      ALLERGIES:  No Known Allergies    Current Medications     Current Outpatient Medications   Medication Sig Dispense Refill    Cholecalciferol 50 MCG (2000 UT) CAPS Take by mouth      lovastatin (MEVACOR) 40 MG tablet Take 1 tablet by mouth      Magnesium 250 MG TABS Take 1 tablet by mouth daily        Saw Palmetto, Serenoa repens, (SAW PALMETTO PO) Take by mouth daily 250 mg      tamsulosin (FLOMAX) 0.4 mg TAKE 1 CAPSULE BY MOUTH EVERY DAY AT NIGHT       No current facility-administered medications for this visit.         Health Maintenance     Health Maintenance   Topic Date Due    COVID-19 Vaccine (6 - 2023-24 season) 01/31/2024 (Originally 9/1/2023)    Fall Risk  10/31/2024    Depression Screening  10/31/2024    Medicare Annual Wellness Visit (AWV)  10/31/2024    Colorectal Cancer Screening  05/31/2025    Hepatitis C Screening  Completed    Pneumococcal Vaccine: 65+ Years  Completed    Influenza Vaccine  Completed    HIB Vaccine  Aged Out    IPV Vaccine  Aged Out    Hepatitis A Vaccine  Aged Out    Meningococcal ACWY Vaccine  Aged Out    HPV Vaccine  Aged Out     Immunization History   Administered Date(s) Administered    COVID-19 PFIZER VACCINE 0.3 ML IM 03/02/2021, 03/24/2021, 12/13/2021, 06/18/2022, 11/28/2022    INFLUENZA 02/04/2018    Influenza Quadrivalent Preservative Free 3 years and older IM 10/14/2014    Influenza, high dose seasonal 0.7 mL 01/09/2022, 10/31/2023    Pneumococcal Conjugate 13-Valent 05/22/2020    Pneumococcal Polysaccharide PPV23 09/23/2021          Janine Greene DO

## 2024-01-09 ENCOUNTER — PATIENT MESSAGE (OUTPATIENT)
Dept: FAMILY MEDICINE CLINIC | Facility: CLINIC | Age: 74
End: 2024-01-09

## 2024-01-09 ENCOUNTER — TELEPHONE (OUTPATIENT)
Age: 74
End: 2024-01-09

## 2024-01-09 NOTE — TELEPHONE ENCOUNTER
----- Message from Oni Nagy sent at 1/9/2024 10:39 AM EST -----  Regarding: X-ray results   Contact: [phone]  I had a chest X-ray on Friday January 5 at [facility] and have not heard anything about the results. Nothing is in my portal. Just curious if it showed anything  Thanks!   At [facility], we're committed to providing you with excellent care and service.  You may receive a survey in the mail or by email about your experience.  We greatly appreciate you completing and returning the survey, as your feedback is very important to us!  We look forward to hearing from you.  Thank you for choosing [facility] [place]!

## 2024-01-09 NOTE — TELEPHONE ENCOUNTER
P/c to patient. Patient informed Xray has not been finalized.  We will call patient once Dr. Mejia has final report and reviewed his X-Ray of his Chest-PA done 1/5/2024.      Please F/U with patient once results are final  Thank you.

## 2024-01-09 NOTE — PATIENT COMMUNICATION
P/c to patient. Patient informed Xray has not been finalized.  We will call patient once Dr. Mejia has final report and reviewed his X-Ray of his Chest-PA done 1/5/2024.        Please F/U with patient once results are final  Thank you.                     Electronically signed by Mone Hines MA at 1/9/2024  4:10 PM

## 2024-01-11 ENCOUNTER — TELEPHONE (OUTPATIENT)
Dept: FAMILY MEDICINE CLINIC | Facility: CLINIC | Age: 74
End: 2024-01-11

## 2024-01-11 DIAGNOSIS — I71.20 THORACIC AORTIC ANEURYSM WITHOUT RUPTURE, UNSPECIFIED PART (HCC): Primary | ICD-10-CM

## 2024-01-11 NOTE — TELEPHONE ENCOUNTER
Contacted [facility] radiology to obtain Pt Xray results. They will complete and release them shortly.

## 2024-01-11 NOTE — TELEPHONE ENCOUNTER
Staff, please call him, I can see him at 1140 tomorrow January 12 to reevaluate regarding cough, back pain.  His chest x-ray was clear.  I did receive the notes from Dr. Greene

## 2024-01-11 NOTE — TELEPHONE ENCOUNTER
Armando llamas  Please review recent visit and phone call with patient  Would you be able to see him in followup - as he is still not feeling well?  I made a referral to vascular as well     He has return of cold symptoms  Has echo scheduled for frequent pac's and pvc's and was advised by me to f/u with cardio soon  And now having back pain    Thanks  bianka

## 2024-01-11 NOTE — RESULT ENCOUNTER NOTE
Hi hub  Your chest xray is negative for pneumonia  Did you get my earlier message?  I would suggest a followup visit.    - dr rey

## 2024-01-12 ENCOUNTER — OFFICE VISIT (OUTPATIENT)
Dept: FAMILY MEDICINE CLINIC | Facility: CLINIC | Age: 74
End: 2024-01-12
Payer: MEDICARE

## 2024-01-12 VITALS
DIASTOLIC BLOOD PRESSURE: 80 MMHG | BODY MASS INDEX: 29.39 KG/M2 | WEIGHT: 217 LBS | HEIGHT: 72 IN | OXYGEN SATURATION: 96 % | HEART RATE: 82 BPM | SYSTOLIC BLOOD PRESSURE: 130 MMHG | RESPIRATION RATE: 18 BRPM | TEMPERATURE: 98.5 F

## 2024-01-12 DIAGNOSIS — R09.81 NASAL SINUS CONGESTION: ICD-10-CM

## 2024-01-12 DIAGNOSIS — R05.2 SUBACUTE COUGH: Primary | ICD-10-CM

## 2024-01-12 PROCEDURE — 99213 OFFICE O/P EST LOW 20 MIN: CPT | Performed by: FAMILY MEDICINE

## 2024-01-12 RX ORDER — DOXYCYCLINE HYCLATE 100 MG/1
100 CAPSULE ORAL EVERY 12 HOURS SCHEDULED
Start: 2024-01-12 | End: 2024-01-17

## 2024-01-12 NOTE — PROGRESS NOTES
FAMILY PRACTICE OFFICE VISIT  Jose Roberto Mejia D.O.    [facility]  [facility]  [address]   [address]  [place], [address]      NAME: Oni Nagy  AGE: 73 y.o. SEX: male  : 1950   MRN: [id number]    DATE: 2024  TIME: 1:24 PM    Assessment and Plan     Problem List Items Addressed This Visit    None  Visit Diagnoses       Subacute cough    -  Primary    Relevant Medications    doxycycline hyclate (VIBRAMYCIN) 100 mg capsule    Nasal sinus congestion                Patient Instructions   Reviewed  visit with Dr. Greene here, has had recurrent congestion with degree of cough since early November.  Did have COVID 3 to 4 weeks ago with relatively mild symptoms.    He has noted improvement with using doxycycline, I would like him to use an additional 5 days, he believes he has a supply he can use at home.  He did have CT scanning of sinus 1 year ago.  Fortunately has had no significant nosebleeds recently.  Does use Chanda pot.  CT of chest was stable back in November, recent chest x-ray was clear.  Lungs are clear here today.  If he is not improving next week I would like him to reevaluate with ENT.    Note he is on tamsulosin, can have degree of nasal congestion with that but I do not feel that is the only cause of his symptoms.  Does have elevated PSA, sees urology.    He will be seeing cardiology at [facility] with echocardiogram in 1 week, last echocardiogram in 2023 showed ejection fraction 40 to 45%.    Low back pain should improve with time, can use stretching, Tylenol, heat.  No vertebral tenderness, see no indication for imaging, did have CT abdomen/pelvis 1 year ago.  Abdominal exam is totally benign here today.    Chief Complaint     Chief Complaint   Patient presents with    Cough    Back Pain       History of Present Illness   Oni Nagy is a 73 y.o.-year-old male who is in for a recheck, see recent visit with   Ramona    He started with cold symptoms back in early November, lasted about a week, since then has had some recurrent upper congestion, slight cough off and on, symptoms recurred about 4 days ago.  He is completing 1 week doxycycline, notes some improvement.  Has chronic sinus issues, congestion.  Does use [place] pot.  No fevers or chills.  No increase shortness of breath.  No chest pain.  No abdominal pain.  Does have some left-sided low back pain, atraumatic, nonradicular.    Did have COVID by history about 3 to 4 weeks ago which was relatively mild      Review of Systems   Review of Systems   Constitutional:         See HPI       Active Problem List     Patient Active Problem List   Diagnosis    Abdominal hernia    Benign essential hypertension    Proteinuria    Neurocardiogenic syncope    Insomnia    Hypercholesterolemia    Hematuria, microscopic    Elevated PSA w neg Bx 2014    Coronary atherosclerosis    Paroxysmal atrial fibrillation (HCC)     Localized, primary osteoarthritis of shoulder region    Urinary retention    Pulmonary nodules    Thoracic aortic aneurysm without rupture (HCC)    Status post aortic valve replacement    S/P mitral valve repair    BPH (benign prostatic hyperplasia)    Inguinal Hernia Right    Frequent PVCs       Past Medical History:  Reviewed    Past Surgical History:  Reviewed    Family History:  Reviewed    Social History:  Reviewed    Objective     Vitals:    01/12/24 1126   BP: 130/80   BP Location: Left arm   Patient Position: Sitting   Cuff Size: Standard   Pulse: 82   Resp: 18   Temp: 98.5 °F (36.9 °C)   TempSrc: Tympanic   SpO2: 96%   Weight: 98.4 kg (217 lb)   Height: 6' (1.829 m)     Body mass index is 29.43 kg/m².    BP Readings from Last 3 Encounters:   01/12/24 130/80   01/05/24 126/82   10/31/23 134/80       Wt Readings from Last 3 Encounters:   01/12/24 98.4 kg (217 lb)   01/05/24 98.7 kg (217 lb 9.6 oz)   10/31/23 101 kg (222 lb)       Physical  Exam  Constitutional:       Comments: Seated no acute distress, does have some nasal congestion.  Heart is regular rate and rhythm with murmur, lungs are clear and equal bilaterally without rhonchi, rales, wheezes.  Abdomen is soft, nontender, benign.    Straight leg raising is negative bilaterally, no weakness in legs.  He has no vertebral tenderness, has mild tenderness left lower lumbar with muscle tightness         ALLERGIES:  No Known Allergies    Current Medications     Current Outpatient Medications   Medication Sig Dispense Refill    doxycycline hyclate (VIBRAMYCIN) 100 mg capsule Take 1 capsule (100 mg total) by mouth every 12 (twelve) hours for 5 days Take with food; eat a yogurt while on antibiotic      benzonatate (TESSALON PERLES) 100 mg capsule Take 1 capsule (100 mg total) by mouth 3 (three) times a day as needed for cough 20 capsule 0    Cholecalciferol 50 MCG (2000 UT) CAPS Take by mouth      lovastatin (MEVACOR) 40 MG tablet Take 1 tablet by mouth      Magnesium 250 MG TABS Take 1 tablet by mouth daily        Saw Palmetto, Serenoa repens, (SAW PALMETTO PO) Take by mouth daily 250 mg      tamsulosin (FLOMAX) 0.4 mg TAKE 1 CAPSULE BY MOUTH EVERY DAY AT NIGHT       No current facility-administered medications for this visit.            No orders of the defined types were placed in this encounter.        Jose Roberto Mejia DO

## 2024-01-12 NOTE — PATIENT INSTRUCTIONS
Reviewed January 5 visit with Dr. Greene here, has had recurrent congestion with degree of cough since early November.  Did have COVID 3 to 4 weeks ago with relatively mild symptoms.    He has noted improvement with using doxycycline, I would like him to use an additional 5 days, he believes he has a supply he can use at home.  He did have CT scanning of sinus 1 year ago.  Fortunately has had no significant nosebleeds recently.  Does use Rudyard pot.  CT of chest was stable back in November, recent chest x-ray was clear.  Lungs are clear here today.  If he is not improving next week I would like him to reevaluate with ENT.    Note he is on tamsulosin, can have degree of nasal congestion with that but I do not feel that is the only cause of his symptoms.  Does have elevated PSA, sees urology.    He will be seeing cardiology at [facility] with echocardiogram in 1 week, last echocardiogram in April 2023 showed ejection fraction 40 to 45%.    Low back pain should improve with time, can use stretching, Tylenol, heat.  No vertebral tenderness, see no indication for imaging, did have CT abdomen/pelvis 1 year ago.  Abdominal exam is totally benign here today.

## 2024-03-01 ENCOUNTER — OFFICE VISIT (OUTPATIENT)
Dept: FAMILY MEDICINE CLINIC | Facility: CLINIC | Age: 74
End: 2024-03-01
Payer: MEDICARE

## 2024-03-01 VITALS
HEART RATE: 70 BPM | SYSTOLIC BLOOD PRESSURE: 140 MMHG | TEMPERATURE: 97 F | WEIGHT: 218.6 LBS | OXYGEN SATURATION: 97 % | BODY MASS INDEX: 29.61 KG/M2 | HEIGHT: 72 IN | DIASTOLIC BLOOD PRESSURE: 80 MMHG

## 2024-03-01 DIAGNOSIS — M54.42 ACUTE LEFT-SIDED LOW BACK PAIN WITH LEFT-SIDED SCIATICA: Primary | ICD-10-CM

## 2024-03-01 PROCEDURE — 99214 OFFICE O/P EST MOD 30 MIN: CPT | Performed by: FAMILY MEDICINE

## 2024-03-01 PROCEDURE — G2211 COMPLEX E/M VISIT ADD ON: HCPCS | Performed by: FAMILY MEDICINE

## 2024-03-01 NOTE — PATIENT INSTRUCTIONS
1. Acute left-sided low back pain with left-sided sciatica  -     Ambulatory Referral to Physical Therapy; Future  -     MRI lumbar spine wo contrast; Future; Expected date: 03/01/2024

## 2024-03-01 NOTE — PROGRESS NOTES
Assessment/Plan:       Problem List Items Addressed This Visit    None  Visit Diagnoses       Acute left-sided low back pain with left-sided sciatica    -  Primary    Relevant Orders    Ambulatory Referral to Physical Therapy    MRI lumbar spine wo contrast          1. Acute left-sided low back pain with left-sided sciatica  Start PT, due to severe pain, and unsure if ib able to tolerate PT will order MRI as well.    - Ambulatory Referral to Physical Therapy; Future  - MRI lumbar spine wo contrast; Future      Subjective:      Patient ID: Oni Nagy is a 74 y.o. male.    HPI    74 year old with pmh of afib, htn, presenting for back pain for 10 days,    Pain radiates into left buttocks.    Reports he sometimes feels tingling in his foot as well.    No known injury    Denies any weakness, bowel incontinence, fevers,,or urinary incontinence.    The following portions of the patient's history were reviewed and updated as appropriate: allergies, current medications, past family history, past medical history, past social history, past surgical history and problem list.      Current Outpatient Medications:     Cholecalciferol 50 MCG (2000 UT) CAPS, Take by mouth, Disp: , Rfl:     lovastatin (MEVACOR) 40 MG tablet, Take 1 tablet by mouth, Disp: , Rfl:     Magnesium 250 MG TABS, Take 1 tablet by mouth daily  , Disp: , Rfl:     Saw Palmetto, Serenoa repens, (SAW PALMETTO PO), Take by mouth daily 250 mg, Disp: , Rfl:     tamsulosin (FLOMAX) 0.4 mg, TAKE 1 CAPSULE BY MOUTH EVERY DAY AT NIGHT, Disp: , Rfl:     benzonatate (TESSALON PERLES) 100 mg capsule, Take 1 capsule (100 mg total) by mouth 3 (three) times a day as needed for cough, Disp: 20 capsule, Rfl: 0     Review of Systems   Constitutional:  Negative for activity change and appetite change.   Respiratory:  Negative for apnea and chest tightness.    Cardiovascular:  Negative for chest pain and palpitations.   Gastrointestinal:  Negative for abdominal distention  and abdominal pain.   Musculoskeletal:  Positive for back pain. Negative for arthralgias.         Objective:      /80 (BP Location: Left arm, Patient Position: Sitting, Cuff Size: Adult)   Pulse 70   Temp (!) 97 °F (36.1 °C) (Temporal)   Ht 6' (1.829 m)   Wt 99.2 kg (218 lb 9.6 oz)   SpO2 97%   BMI 29.65 kg/m²          Physical Exam  Constitutional:       Appearance: Normal appearance.   Cardiovascular:      Rate and Rhythm: Normal rate.   Pulmonary:      Effort: Pulmonary effort is normal.      Breath sounds: Normal breath sounds.   Musculoskeletal:         General: Normal range of motion.      Lumbar back: Normal. No tenderness or bony tenderness. Normal range of motion. Negative right straight leg raise test and negative left straight leg raise test.      Right hip: Normal.      Left hip: Normal.   Skin:     General: Skin is warm.      Capillary Refill: Capillary refill takes less than 2 seconds.   Neurological:      General: No focal deficit present.      Mental Status: He is alert and oriented to person, place, and time.           Gage Cedeno MD

## 2024-03-12 ENCOUNTER — EVALUATION (OUTPATIENT)
Dept: PHYSICAL THERAPY | Facility: CLINIC | Age: 74
End: 2024-03-12
Payer: MEDICARE

## 2024-03-12 DIAGNOSIS — M54.42 ACUTE LEFT-SIDED LOW BACK PAIN WITH LEFT-SIDED SCIATICA: Primary | ICD-10-CM

## 2024-03-12 PROCEDURE — 97162 PT EVAL MOD COMPLEX 30 MIN: CPT

## 2024-03-12 PROCEDURE — 97112 NEUROMUSCULAR REEDUCATION: CPT

## 2024-03-12 PROCEDURE — 97110 THERAPEUTIC EXERCISES: CPT

## 2024-03-12 NOTE — PROGRESS NOTES
PT Evaluation     Today's date: 3/12/2024  Patient name: Oni Nagy  : 1950  MRN: [id number]  Referring provider: Gage Cedeno*  Dx:   Encounter Diagnosis     ICD-10-CM    1. Acute left-sided low back pain with left-sided sciatica  M54.42 Ambulatory Referral to Physical Therapy          Start Time: 1440  Stop Time: 1535  Total time in clinic (min): 55 minutes    Assessment  Assessment details: Pt is a 74 y.o. year old male presenting to physical therapy for Acute left-sided low back pain with left-sided sciatica. He presents with the following impairments: decreased ROM in lumbar side-beings, extension, and rotation, decreased strength globally throughout left hip musculature, increased tension in left lumbar paraspinals, piriformis, hamstring, quad, and ITB, increased neural tension in left sciatic nerve, and poor squatting form  affecting his function with walking, standing, photography, and other ADLs. Pt will benefit from skilled physical therapy to address functional limitations noted in evaluation and meet patient goals.   Impairments: abnormal muscle firing, abnormal or restricted ROM, activity intolerance, impaired physical strength, lacks appropriate home exercise program, pain with function and poor body mechanics    Symptom irritability: moderateUnderstanding of Dx/Px/POC: good   Prognosis: good    Goals  ST. Pt will be independent with HEP.  2. Pt will improve lumbar mobility deficits by 50%   3. Pt will improve FOTO score from baseline set during initial evaluation  LT. Pt will be able to walking for 5 miles with no increase in symptoms  2. Pt will improve left hip strength grossly by 2 grades or more  3. Pt will be able to stand for 4 hours without increase in symptoms  4. Pt will reach FOTO goal set by ajay during initial evaluation     Plan  Patient would benefit from: PT eval and skilled physical therapy  Planned modality interventions: biofeedback, manual  electrical stimulation, microcurrent electrical stimulation, TENS, electrical stimulation/Russian stimulation, thermotherapy: hydrocollator packs, cryotherapy and unattended electrical stimulation  Planned therapy interventions: abdominal trunk stabilization, joint mobilization, manual therapy, massage, ADL retraining, neuromuscular re-education, body mechanics training, patient education, postural training, strengthening, stretching, therapeutic activities, therapeutic exercise, flexibility, functional ROM exercises and home exercise program  Frequency: 2x week  Duration in weeks: 6  Treatment plan discussed with: patient      Subjective Evaluation    History of Present Illness  Mechanism of injury: Pt is a 74 y.o. male presenting with acute low back pain with left sided sciatica. Pt reports that this pain has been present for 3 weeks now, with no GABRIELLE. Pt reports that he was walking and randomly got a sharp pain in his low back and left upper buttock regain. Pt reports that he occasionally gets radiating symptoms down his left leg if he is sitting. Pt reports that walking or standing for longer than 5 minutes, and navigating stairs is what causes them the most pain and are the most difficult movements for them to perfrom. Pt reports that laying down and stretching helps relieve the pain. Pt stated that their main goals for therapy are pain reduction and improved function with walking, standing, photography, and other ADLs.   Quality of life: good    Patient Goals  Patient goals for therapy: decreased edema, decreased pain, improved balance, increased motion, increased strength and independence with ADLs/IADLs    Pain  Current pain ratin  At best pain ratin  At worst pain ratin  Quality: radiating, dull ache and sharp  Relieving factors: relaxation  Aggravating factors: stair climbing, walking and standing          Objective     Neurological Testing     Sensation     Lumbar   Left   Intact: light touch,  pin prick and sharp/dull discrimination    Right   Intact: light touch, pin prick and sharp/dull discrimination    Reflexes   Left   Patellar (L4): normal (2+)  Achilles (S1): normal (2+)  Clonus sign: negative    Right   Patellar (L4): normal (2+)  Achilles (S1): normal (2+)  Clonus sign: negative    Active Range of Motion     Lumbar   Flexion:  WFL  Extension:  Restriction level: minimal  Left lateral flexion:  Restriction level: moderate  Right lateral flexion:  Restriction level: moderate  Left rotation:  Restriction level: minimal  Right rotation:  Restriction level: minimal    Strength/Myotome Testing     Left Hip   Planes of Motion   Flexion: 4  Extension: 3+  Abduction: 3+  External rotation: 4-  Internal rotation: 4-    Right Hip   Planes of Motion   Flexion: 5  Extension: 5  Abduction: 5  External rotation: 5  Internal rotation: 5    Left Knee   Flexion: 4  Extension: 5    Right Knee   Flexion: 5  Extension: 5    Left Ankle/Foot   Dorsiflexion: 5  Plantar flexion: 5    Right Ankle/Foot   Dorsiflexion: 5  Plantar flexion: 5    Tests     Lumbar     Left   Positive slump test.   Negative crossed SLR and passive SLR.     Right   Negative crossed SLR, passive SLR and slump test.     Left Pelvic Girdle/Sacrum   Negative: thigh thrust and active SLR test.     Right Pelvic Girdle/Sacrum   Negative: thigh thrust and active SLR test.     Left Hip   Negative ROMEO, FADIR and long sit.     Right Hip   Negative ROMEO, FADIR and long sit.     Additional Tests Details  Negative deerfield test b/l    General Comments:      Lumbar Comments  Gait: WFL  Squatting: Narrow CASEY, weight forward, good depth  Increased tension in left piriformis, hamstring, quad, and ITB  Increased tension in left lumbar paraspinals  Good TA activation and endurance  Proper firing on multifidi             Precautions: Falls Risk      Date 3/12            Visit # IE            FOTO IE             Re-eval IE               Manuals 3/12           "  Paraspinal STM             Lumbar Gapping                                       Neuro Re-Ed 3/12            Sciatic Nerve Glides 20x            Bridges 10x 3\" GTB            Hamstring str             Standing Flexion Str             SB Rollout             Birdbog             Deadbug             DKTC             SKTC 10x 3\"            Ther Ex 3/12            Bike             Piriformis Sts 3x30\"            LTR 10x5\"            Clamshells  10x 3\" GTB            SLR             S/L Hip Abd             Side Plank             Pallof Press             Side-Stepping             Monster Walks                          Ther Activity 3/12            Squatting             Step-Ups             Gait Training 3/12                                      Modalities 3/12                                              "

## 2024-03-13 DIAGNOSIS — Z00.6 ENCOUNTER FOR EXAMINATION FOR NORMAL COMPARISON OR CONTROL IN CLINICAL RESEARCH PROGRAM: ICD-10-CM

## 2024-03-20 ENCOUNTER — OFFICE VISIT (OUTPATIENT)
Dept: PHYSICAL THERAPY | Facility: CLINIC | Age: 74
End: 2024-03-20
Payer: MEDICARE

## 2024-03-20 DIAGNOSIS — M54.42 ACUTE LEFT-SIDED LOW BACK PAIN WITH LEFT-SIDED SCIATICA: Primary | ICD-10-CM

## 2024-03-20 PROCEDURE — 97110 THERAPEUTIC EXERCISES: CPT

## 2024-03-20 PROCEDURE — 97112 NEUROMUSCULAR REEDUCATION: CPT

## 2024-03-20 NOTE — PROGRESS NOTES
"Daily Note     Today's date: 3/20/2024  Patient name: Oni Nagy  : 1950  MRN: [id number]  Referring provider: Gage Cedeno*  Dx:   Encounter Diagnosis     ICD-10-CM    1. Acute left-sided low back pain with left-sided sciatica  M54.42           Start Time: 1150  Stop Time: 1230  Total time in clinic (min): 40 minutes    Subjective: Pt reports no change in symptoms since the last therapy session.      Objective: See treatment diary below      Assessment: Pt tolerated treatment well. Added SLR, DL leg press, SL leg press, 3-way hip kicks, side-stepping, and monster walks to challenge the strength and endurance of LE and posterior chain musculature. Added 3-way SB rollout and hamstring stretch to both improve ROM in both lumbar spine and hip, as well as reduce tension in associated musculature. Pt completed all exercises with proper form and appropriate levels of fatigue. Patient exhibited good technique with therapeutic exercises and would benefit from continued PT      Plan: Continue per plan of care.  Progress treatment as tolerated.       Precautions: Falls Risk      Date 3/12 3/20           Visit # IE 2           FOTO IE             Re-eval IE               Manuals 3/12 3/20           Paraspinal STM             Lumbar Gapping                                       Neuro Re-Ed 3/12 3/20           Sciatic Nerve Glides 20x            Bridges 10x 3\" GTB 20x 3\" GTB           Hamstring str  3x30\"           Standing Flexion Str             SB Rollout  10x10\" ea 3-way           Birdbog             Deadbug             DKTC             SKTC 10x 3\"            Ther Ex 3/12 3/20           Bike  6'           Piriformis Sts 3x30\"            LTR 10x5\" 10x5\"           Clamshells  10x 3\" GTB            SLR  2x10 2\"           S/L Hip Abd             3-way hip kick  15x GTB           Leg Press  3x10 135#           SL Leg Press  3x10 75#           Side Plank             Pallof Press             Side-Stepping  " 5 laps GTB           Monster Walks  5 laps GTB                        Ther Activity 3/12 3/20           Squatting             Step-Ups             Gait Training 3/12 3/20                                     Modalities 3/12 3/20

## 2024-03-22 ENCOUNTER — APPOINTMENT (OUTPATIENT)
Dept: PHYSICAL THERAPY | Facility: CLINIC | Age: 74
End: 2024-03-22
Payer: MEDICARE

## 2024-03-25 ENCOUNTER — OFFICE VISIT (OUTPATIENT)
Dept: PHYSICAL THERAPY | Facility: CLINIC | Age: 74
End: 2024-03-25
Payer: MEDICARE

## 2024-03-25 DIAGNOSIS — M54.42 ACUTE LEFT-SIDED LOW BACK PAIN WITH LEFT-SIDED SCIATICA: Primary | ICD-10-CM

## 2024-03-25 PROCEDURE — 97112 NEUROMUSCULAR REEDUCATION: CPT

## 2024-03-25 PROCEDURE — 97110 THERAPEUTIC EXERCISES: CPT

## 2024-03-25 NOTE — PROGRESS NOTES
"Daily Note     Today's date: 3/25/2024  Patient name: Oni Nagy  : 1950  MRN: [id number]  Referring provider: Gage Cedeno*  Dx:   Encounter Diagnosis     ICD-10-CM    1. Acute left-sided low back pain with left-sided sciatica  M54.42           Start Time: 1400  Stop Time: 1500  Total time in clinic (min): 60 minutes    Subjective: Pt reports he had increased symptoms since the last therapy session, states he had increased radiating symptoms down the LLE to the knee. Pt reports he could not find a relieving position.      Objective: See treatment diary below      Assessment: Pt tolerated treatment well. Pt performed reduced intensity exercises due to increase in symptoms. Pt shows no adverse symptoms with today's exercises. Attempted extension biased due to Pt's reports of relieving postures. Patient exhibited good technique with therapeutic exercises and would benefit from continued PT      Plan: Continue per plan of care.  Progress treatment as tolerated.       Precautions: Falls Risk      Date 3/12 3/20 3/25          Visit # IE 2 3          FOTO IE             Re-eval IE               Manuals 3/12 3/20 3/25          Paraspinal STM             Lumbar Gapping                                       Neuro Re-Ed 3/12 3/20 3/25          Sciatic Nerve Glides 20x            Bridges 10x 3\" GTB 20x 3\" GTB 20x 3\" GTB          Hamstring str  3x30\" 5x20\" ea          Standing Flexion Str             SB Rollout  10x10\" ea 3-way 10x10\" ea 3-way          Birdbog             Deadbug             DKTC   Open books 10x5\"          SKTC 10x 3\"  PPU 10x           Ther Ex 3/12 3/20 3/25          Bike  6' 6'          Piriformis Sts 3x30\"  3x30\"          LTR 10x5\" 10x5\" 10x10\"          Clamshells  10x 3\" GTB            SLR  2x10 2\" 2x10 2\"          S/L Hip Abd             3-way hip kick  15x GTB nv          Leg Press  3x10 135# nv          SL Leg Press  3x10 75# nv          Side Plank             Pallof Press      "        Side-Stepping  5 laps GTB nv          Monster Walks  5 laps GTB nv                       Ther Activity 3/12 3/20 3/25          Squatting             Step-Ups             Gait Training 3/12 3/20 3/25                                    Modalities 3/12 3/20 3/25

## 2024-03-26 ENCOUNTER — OFFICE VISIT (OUTPATIENT)
Dept: FAMILY MEDICINE CLINIC | Facility: CLINIC | Age: 74
End: 2024-03-26
Payer: MEDICARE

## 2024-03-26 VITALS
BODY MASS INDEX: 29.5 KG/M2 | RESPIRATION RATE: 16 BRPM | WEIGHT: 217.8 LBS | HEART RATE: 71 BPM | OXYGEN SATURATION: 99 % | HEIGHT: 72 IN | SYSTOLIC BLOOD PRESSURE: 128 MMHG | TEMPERATURE: 97.7 F | DIASTOLIC BLOOD PRESSURE: 80 MMHG

## 2024-03-26 DIAGNOSIS — M54.42 ACUTE LEFT-SIDED LOW BACK PAIN WITH LEFT-SIDED SCIATICA: Primary | ICD-10-CM

## 2024-03-26 PROCEDURE — 99213 OFFICE O/P EST LOW 20 MIN: CPT | Performed by: FAMILY MEDICINE

## 2024-03-26 PROCEDURE — G2211 COMPLEX E/M VISIT ADD ON: HCPCS | Performed by: FAMILY MEDICINE

## 2024-03-26 NOTE — PATIENT INSTRUCTIONS
1. Acute left-sided low back pain with left-sided sciatica  -     Ambulatory referral to Spine & Pain Management; Future

## 2024-03-26 NOTE — PROGRESS NOTES
Assessment/Plan:       Problem List Items Addressed This Visit    None  Visit Diagnoses       Acute left-sided low back pain with left-sided sciatica    -  Primary          1. Acute left-sided low back pain with left-sided sciatica    Symptoms concerning for neurogenic claudication MRI scheduled, refer to pain management, discussed medications options such as gabapentin  patient prefers to hold off after discussing side effects.    Patient will reach out to cardiologist to make sure valve is MRI compatable.    - Ambulatory referral to Spine & Pain Management; Future      Subjective:      Patient ID: Oni Nagy is a 74 y.o. male.    Back Pain  This is a chronic problem. The current episode started more than 1 month ago. The problem occurs constantly. The problem has been rapidly worsening since onset. The pain is present in the sacro-iliac. The quality of the pain is described as aching, burning, shooting and stabbing. The pain radiates to the left foot and left thigh. The pain is at a severity of 10/10. The pain is Worse during the day. The symptoms are aggravated by bending, position, sitting and standing. Stiffness is present All day. Associated symptoms include leg pain, numbness, paresthesias, tingling and weakness. Pertinent negatives include no bladder incontinence, bowel incontinence, chest pain, dysuria, fever, headaches, paresis, pelvic pain, perianal numbness or weight loss.       74 year old presenting for follow up on back pain.    Using motrin and tylenol as needed.    No pain when sitting.    Pain with walking 50 yards after this has severe pain, in left buttocsk and sometimes tingling into foot.    Has been doing PT, feels about the same, since last visit.      The following portions of the patient's history were reviewed and updated as appropriate: allergies, current medications, past family history, past medical history, past social history, past surgical history and problem  list.      Current Outpatient Medications:     lovastatin (MEVACOR) 40 MG tablet, Take 1 tablet by mouth, Disp: , Rfl:     Magnesium 250 MG TABS, Take 1 tablet by mouth daily  , Disp: , Rfl:     Saw Palmetto, Serenoa repens, (SAW PALMETTO PO), Take by mouth daily 250 mg, Disp: , Rfl:     tamsulosin (FLOMAX) 0.4 mg, TAKE 1 CAPSULE BY MOUTH EVERY DAY AT NIGHT, Disp: , Rfl:     benzonatate (TESSALON PERLES) 100 mg capsule, Take 1 capsule (100 mg total) by mouth 3 (three) times a day as needed for cough, Disp: 20 capsule, Rfl: 0    Cholecalciferol 50 MCG (2000 UT) CAPS, Take by mouth, Disp: , Rfl:      Review of Systems   Constitutional:  Negative for fever and weight loss.   Cardiovascular:  Negative for chest pain.   Gastrointestinal:  Negative for bowel incontinence.   Genitourinary:  Negative for bladder incontinence, dysuria and pelvic pain.   Musculoskeletal:  Positive for back pain.   Neurological:  Positive for tingling, weakness, numbness and paresthesias. Negative for headaches.         Objective:      /80 (BP Location: Left arm, Cuff Size: Standard)   Pulse 71   Temp 97.7 °F (36.5 °C) (Tympanic)   Resp 16   Ht 6' (1.829 m)   Wt 98.8 kg (217 lb 12.8 oz)   SpO2 99%   BMI 29.54 kg/m²          Physical Exam  Constitutional:       Appearance: Normal appearance.   Cardiovascular:      Rate and Rhythm: Normal rate and regular rhythm.      Pulses: Normal pulses.      Heart sounds: Normal heart sounds.   Pulmonary:      Effort: Pulmonary effort is normal.   Abdominal:      General: Abdomen is flat.   Neurological:      Mental Status: He is alert.           Gage Cedeno MD

## 2024-03-27 ENCOUNTER — OFFICE VISIT (OUTPATIENT)
Dept: PHYSICAL THERAPY | Facility: CLINIC | Age: 74
End: 2024-03-27
Payer: MEDICARE

## 2024-03-27 DIAGNOSIS — M54.42 ACUTE LEFT-SIDED LOW BACK PAIN WITH LEFT-SIDED SCIATICA: Primary | ICD-10-CM

## 2024-03-27 PROCEDURE — 97112 NEUROMUSCULAR REEDUCATION: CPT

## 2024-03-27 PROCEDURE — 97140 MANUAL THERAPY 1/> REGIONS: CPT

## 2024-03-27 PROCEDURE — 97014 ELECTRIC STIMULATION THERAPY: CPT

## 2024-03-27 PROCEDURE — 97010 HOT OR COLD PACKS THERAPY: CPT

## 2024-03-27 NOTE — PROGRESS NOTES
"Daily Note     Today's date: 3/27/2024  Patient name: Oni Nagy  : 1950  MRN: [id number]  Referring provider: Gage Cedeno*  Dx:   Encounter Diagnosis     ICD-10-CM    1. Acute left-sided low back pain with left-sided sciatica  M54.42           Start Time: 1200  Stop Time: 1240  Total time in clinic (min): 40 minutes    Subjective: Pt reported that he feels the same as when he started PT with no change in symptoms so far throughout therapy sessions.      Objective: See treatment diary below      Assessment: Pt tolerated treatment well. Focused today's therapy session on symptom reduction techniques due to pt having minimal success with previously performed therapy sessions. Pt responded well to manual STM with successful symptom reduction and improve mobility post session. Utilized MHP and TENs at end of therapy session for further reduction of tension in lumbar paraspinals and pain relief. Patient exhibited good technique with therapeutic exercises and would benefit from continued PT      Plan: Continue per plan of care.  Progress treatment as tolerated.       Precautions: Falls Risk      Date 3/12 3/20 3/25 3/27         Visit # IE 2 3 4         FOTO IE             Re-eval IE               Manuals 3/12 3/20 3/25 3/27         Paraspinal STM    PWK         Lumbar Gapping                                       Neuro Re-Ed 3/12 3/20 3/25 3/27         Sciatic Nerve Glides 20x            Bridges 10x 3\" GTB 20x 3\" GTB 20x 3\" GTB          Hamstring str  3x30\" 5x20\" ea 3x30\"          Standing Flexion Str             SB Rollout  10x10\" ea 3-way 10x10\" ea 3-way 10x10\" ea 3-way         Birdbog             Deadbug             Open books    10x5\" R only         DKTC   Open books 10x5\" 10x 5\" SB         SKTC 10x 3\"  PPU 10x  5x5\" ea         Ther Ex 3/12 3/20 3/25 3/27         Bike  6' 6' 6'         Piriformis Sts 3x30\"  3x30\"          LTR 10x5\" 10x5\" 10x10\"          Clamshells  10x 3\" GTB            SLR  " "2x10 2\" 2x10 2\"          S/L Hip Abd             3-way hip kick  15x GTB nv          Leg Press  3x10 135# nv          SL Leg Press  3x10 75# nv          Side Plank             Pallof Press             Side-Stepping  5 laps GTB nv          Monster Walks  5 laps GTB nv                       Ther Activity 3/12 3/20 3/25 3/27         Squatting             Step-Ups             Gait Training 3/12 3/20 3/25                                    Modalities 3/12 3/20 3/25 3/27         TENs    8'         MHP    8'                   "

## 2024-04-01 ENCOUNTER — HOSPITAL ENCOUNTER (OUTPATIENT)
Dept: MRI IMAGING | Facility: HOSPITAL | Age: 74
Discharge: HOME | End: 2024-04-01
Attending: FAMILY MEDICINE
Payer: MEDICARE

## 2024-04-01 DIAGNOSIS — M54.42 ACUTE LEFT-SIDED LOW BACK PAIN WITH LEFT-SIDED SCIATICA: ICD-10-CM

## 2024-04-01 PROCEDURE — 72148 MRI LUMBAR SPINE W/O DYE: CPT

## 2024-04-03 ENCOUNTER — PATIENT MESSAGE (OUTPATIENT)
Dept: FAMILY MEDICINE CLINIC | Facility: CLINIC | Age: 74
End: 2024-04-03

## 2024-04-03 DIAGNOSIS — M54.42 ACUTE LEFT-SIDED LOW BACK PAIN WITH LEFT-SIDED SCIATICA: Primary | ICD-10-CM

## 2024-04-05 RX ORDER — METHYLPREDNISOLONE 4 MG/1
TABLET ORAL
Qty: 21 EACH | Refills: 0 | Status: SHIPPED | OUTPATIENT
Start: 2024-04-05

## 2024-04-09 ENCOUNTER — TELEPHONE (OUTPATIENT)
Dept: FAMILY MEDICINE CLINIC | Facility: CLINIC | Age: 74
End: 2024-04-09

## 2024-04-09 DIAGNOSIS — M54.42 ACUTE LEFT-SIDED LOW BACK PAIN WITH LEFT-SIDED SCIATICA: Primary | ICD-10-CM

## 2024-04-09 DIAGNOSIS — R93.89 ABNORMAL MRI: Primary | ICD-10-CM

## 2024-04-10 ENCOUNTER — TELEPHONE (OUTPATIENT)
Dept: PHYSICAL THERAPY | Facility: OTHER | Age: 74
End: 2024-04-10

## 2024-04-10 NOTE — TELEPHONE ENCOUNTER
Patient's wife called into [facility] today.    I explained Comprehensive [facility] program and what we offer.    Patient has a PT referral and was in PT last month.    Patient has a [facility] referral for Dr. Foss.    I transferred call to Ortho for further assistance with scheduling patient's appt.

## 2024-04-17 ENCOUNTER — HOSPITAL ENCOUNTER (OUTPATIENT)
Dept: RADIOLOGY | Facility: HOSPITAL | Age: 74
Discharge: HOME | End: 2024-04-17
Attending: ORTHOPAEDIC SURGERY
Payer: MEDICARE

## 2024-04-17 ENCOUNTER — OFFICE VISIT (OUTPATIENT)
Dept: OBGYN CLINIC | Facility: HOSPITAL | Age: 74
End: 2024-04-17
Payer: MEDICARE

## 2024-04-17 VITALS
SYSTOLIC BLOOD PRESSURE: 120 MMHG | WEIGHT: 217.81 LBS | BODY MASS INDEX: 29.5 KG/M2 | DIASTOLIC BLOOD PRESSURE: 78 MMHG | HEIGHT: 72 IN | HEART RATE: 64 BPM

## 2024-04-17 DIAGNOSIS — R52 PAIN: ICD-10-CM

## 2024-04-17 DIAGNOSIS — R52 PAIN: Primary | ICD-10-CM

## 2024-04-17 DIAGNOSIS — M54.42 ACUTE LEFT-SIDED LOW BACK PAIN WITH LEFT-SIDED SCIATICA: ICD-10-CM

## 2024-04-17 DIAGNOSIS — M48.061 SPINAL STENOSIS OF LUMBAR REGION, UNSPECIFIED WHETHER NEUROGENIC CLAUDICATION PRESENT: ICD-10-CM

## 2024-04-17 PROCEDURE — 72110 X-RAY EXAM L-2 SPINE 4/>VWS: CPT

## 2024-04-17 PROCEDURE — 99204 OFFICE O/P NEW MOD 45 MIN: CPT | Performed by: ORTHOPAEDIC SURGERY

## 2024-04-17 RX ORDER — GABAPENTIN 300 MG/1
300 CAPSULE ORAL
Qty: 30 CAPSULE | Refills: 0 | Status: SHIPPED | OUTPATIENT
Start: 2024-04-17

## 2024-04-17 NOTE — PROGRESS NOTES
Assessment & Plan/Medical Decision Makin y.o. male with Back Pain, Left Radicular Leg Pain, and Left Gluteal Pain and imaging findings most notable for lumbar spondylosis , L3-4 and L4-5 spinal stenosis        The clinical, physical and imaging findings were reviewed with the patient.  Oni  has a constellation of findings consistent with Lumbar Radiculopathy and Ambulatory Dysfunction in the setting of lumbar degenerative disease. Ongoing left sided low back/gluteal region pain with radiation into left lower extremity. Worsening ambulatory dysfunction.  Fortunately patient remains neurologically intact and functional. Physical exam showing no marky weakness.  We discussed the treatment options including physical therapy, at home exercises, activity modifications, chiropractic medicine, oral medications, interventional spine procedures.  In addition we did discuss surgical options, but at this time recommend continued conservative treatment.  Would recommend patient keep upcoming appointment with Dr. Gamble at [facility] scheduled for 2024. Would recommend undergoing lumbar WILDA, interventional spine procedure. Discussed potential role of steroid injection at or near the source of pain to provide targeted relief. No new referral required at today's visit.  Gabapentin rx sent to patient's pharmacy. Discussed reasoning for prescribing medication, proper usage, and potential side effects.  Continue to maintain at home exercises and stretches.  Patient instructed to return to office/ER sooner if symptoms are not improving, getting worse, or new worrisome/neurologic symptoms arise.  Patient will follow up in 3 months for repeat evaluation after conservative treatment.     Subjective:      Chief Complaint: Back and Left Lower Extremity Pain    HPI:  Oni Nagy is a 74 y.o. male presenting for initial visit with chief complaint of back pain. Ongoing, worsening left sided low back pain x8  weeks without inciting injury or event. Patient notes initial onset of decreased ambulation in June 2023 when walking around [place] that eventually dissipated. However, reports worsening ambulation and ongoing left sided low back pain that radiates into posterior left lower thigh with intermittent numbness and tingling in his left foot x8 weeks . Only able to walk a few blocks before needing to stop and rest due to back and left leg pain. Notes left leg aches when sitting/lying down at night. Pain is constant. Denies marky lower extremity weakness. Denies right sided symptoms. Positive shopping cart sign. Denies any marky trauma. Denies fever or chills, no night sweats. Denies any bladder or bowel changes.      Denies heart or lung disease. Denies diabetes or kidney disease.    Conservative therapy includes the following:   Medications: medrol dose sarah without relief    Injections: denies, has upcoming appointment with Dr. Gamble scheduled 4/24/2024  Physical Therapy: has attempted, made pain worse  Chiropractic Medicine: has not attempted  Accupunture/Massage Therapy: has not attempted   These therapeutic modalities were ineffective at providing sustained pain relief/functional improvement.     Nicotine dependent: denies  Occupation: photography, artist,    Living situation: Lives with family   ADLs: patient is able to perform     Objective:     Family History   Problem Relation Age of Onset    Alzheimer's disease Mother 80    Esophageal cancer Mother     Diabetes Father     Diabetes Sister     Kidney failure Sister        Past Medical History:   Diagnosis Date    Benign essential hypertension 03/23/2011    BPH (benign prostatic hyperplasia) 02/20/2023    Last Assessment & Plan:  Formatting of this note might be different from the original. Doing well.  Cont tamsulosin and saw palmetto.  We did discuss adding finasteride but will have repeat psa prior.  Also discussed indications for holep.  As  of now, he's doing great. Last episode of retention was clearly related to anesthesia.  Fu 4-6w with psa    Cardiogenic shock ([facility]) 07/05/2022    Chronic kidney disease, stage III (moderate) ([facility])     Coronary atherosclerosis 03/23/2011    Last Assessment & Plan:  There are no symptoms of an anginal syndrome. I provided education regarding the nature of coronary artery disease and our recommendations to reduce his long-term risk of ACS.  I recommended he continue utilizing aspirin and statins to minimize risk.  A lipid profile was requested with the option to use high intensity statins as an alternative to Mevacor in response to per    Enlarged prostate     Epistaxis 01/09/2022    History of aortic valve disease     History of mitral valve disease     History of transfusion     May 2021 - no adverse reaction    Paroxysmal atrial fibrillation ([facility])  02/06/2020    Pulmonary nodules 02/05/2021    PVC's (premature ventricular contractions)     S/P mitral valve repair 06/15/2021    May 2021 at [facility]    Status post aortic valve replacement 05/31/2021    At [facility] May 2021    SVT (supraventricular tachycardia)     Syncope, cardiogenic     Thoracic aortic aneurysm without rupture ([facility]) 02/05/2021    4.5cm on CT 1/21  Formatting of this note might be different from the original. 4.5cm on CT chest 1/28/21  Last Assessment & Plan:  Formatting of this note might be different from the original. 6/6/21 chest CT showed stable 4.3 cm ascending thoracic aortic aneurysm.    Thrombocytopenia ([facility]) 07/05/2022       Current Outpatient Medications   Medication Sig Dispense Refill    gabapentin (Neurontin) 300 mg capsule Take 1 capsule (300 mg total) by mouth daily at bedtime Gabapentin may cause vision changes, clumsiness, unsteadiness, dizziness, drowsiness, sleepiness, or trouble with thinking.l. 30 capsule 0    lovastatin (MEVACOR) 40 MG tablet Take 1 tablet by mouth      Magnesium 250 MG TABS Take 1 tablet by mouth  daily        methylPREDNISolone 4 MG tablet therapy pack Use as directed on package 21 each 0    Saw Palmetto, Serenoa repens, (SAW PALMETTO PO) Take by mouth daily 250 mg      tamsulosin (FLOMAX) 0.4 mg TAKE 1 CAPSULE BY MOUTH EVERY DAY AT NIGHT      benzonatate (TESSALON PERLES) 100 mg capsule Take 1 capsule (100 mg total) by mouth 3 (three) times a day as needed for cough 20 capsule 0    Cholecalciferol 50 MCG (2000 UT) CAPS Take by mouth       No current facility-administered medications for this visit.       Past Surgical History:   Procedure Laterality Date    CARDIAC SURGERY      MV repair, AV replaced and aortic aneurysm repaired    CAUTERIZE INNER NOSE Left 01/09/2022    Procedure: Sphenopalatine artery ligation;  Surgeon: Alfonso Rodriguez MD;  Location: [facility];  Service: ENT    COLONOSCOPY  2007    Dr. Schreiber.  Tubular adenoma descending colon polyp.    COLONOSCOPY  2010    Dr. Schreiber.  Diverticulosis.    COLONOSCOPY  08/2020    Dr. Schreiber.  12 mm descending colon inflammatory polyp, 3 mm benign lymphoid aggregate, diverticulosis.    FACIAL/NECK BIOPSY Left 11/30/2021    Procedure: EXCISION PYOGENIC GRANULOMA; FREE MUCOSAL GRAFT FROM NOSE;  Surgeon: Alfonso Rodriguez MD;  Location: [facility];  Service: ENT    INGUINAL HERNIA REPAIR      TONSILLECTOMY         Social History     Socioeconomic History    Marital status: /Civil Union     Spouse name: Not on file    Number of children: Not on file    Years of education: Not on file    Highest education level: Not on file   Occupational History    Occupation: /      Comment: full-time   Tobacco Use    Smoking status: Never    Smokeless tobacco: Never   Vaping Use    Vaping status: Never Used   Substance and Sexual Activity    Alcohol use: No    Drug use: No    Sexual activity: Not on file   Other Topics Concern    Not on file   Social History Narrative    Not on file     Social Determinants of Health      Financial Resource Strain: Low Risk  (10/24/2023)    Overall Financial Resource Strain (CARDIA)     Difficulty of Paying Living Expenses: Not hard at all   Food Insecurity: No Food Insecurity (4/16/2023)    Received from [facility]    Hunger Vital Sign     Worried About Running Out of Food in the Last Year: Never true     Ran Out of Food in the Last Year: Never true   Transportation Needs: No Transportation Needs (10/24/2023)    PRAPARE - Transportation     Lack of Transportation (Medical): No     Lack of Transportation (Non-Medical): No   Physical Activity: Not on file   Stress: Not on file   Social Connections: Not on file   Intimate Partner Violence: Not At Risk (4/16/2023)    Received from [facility]    Humiliation, Afraid, Rape, and Kick questionnaire     Fear of Current or Ex-Partner: No     Emotionally Abused: No     Physically Abused: No     Sexually Abused: No   Housing Stability: Low Risk  (4/16/2023)    Received from [facility]    Housing Stability Vital Sign     Unable to Pay for Housing in the Last Year: No     Number of Places Lived in the Last Year: 1     Unstable Housing in the Last Year: No       No Known Allergies    Review of Systems  General- denies fever/chills  HEENT- denies hearing loss or sore throat  Eyes- denies eye pain or visual disturbances, denies red eyes  Respiratory- denies cough or SOB  Cardio- denies chest pain or palpitations  GI- denies abdominal pain  Endocrine- denies urinary frequency  Urinary- denies pain with urination  Musculoskeletal- Negative except noted above  Skin- denies rashes or wounds  Neurological- denies dizziness or headache  Psychiatric- denies anxiety or difficulty concentrating    Physical Exam  /78   Pulse 64   Ht 6' (1.829 m)   Wt 98.8 kg (217 lb 13 oz)   BMI 29.54 kg/m²     General/Constitutional: No apparent distress: well-nourished and well developed.  Lymphatic: No appreciable  lymphadenopathy  Respiratory: Non-labored breathing  Vascular: No edema, swelling or tenderness, except as noted in detailed exam.  Integumentary: No impressive skin lesions present, except as noted in detailed exam.  Psych: Normal mood and affect, oriented to person, place and time.  MSK: normal other than stated in HPI and exam  Gait & balance: no evidence of myelopathic gait, ambulates Independently     Lumbar spine range of motion:  -Forward flexion to 90  -Extension to neutral  -Lateral bend 25 right, 25 left  -Rotation 25 right, 25 left  There tenderness with palpation along lumbar paraspinal musculature, no midline tenderness     Neurologic:  Lower Extremity Motor Function    Right  Left    Iliopsoas  5/5  5/5    Quadriceps 5/5 5/5   Tibialis anterior  5/5  5/5    EHL  5/5  5/5    Gastroc. muscle  5/5  5/5    Heel rise  5/5  5/5    Toe rise  5/5  5/5      Sensory: light touch is intact to bilateral upper and lower extremities     Reflexes:    Right Left   Patellar 1+ 1+   Achilles 1+ 1+   Babinski neg neg     Other tests:  Straight Leg Raise: negative  Mary SI: negative  ROMEO SI: negative  Greater troch: no tenderness   Internal/external hip ROM: intact, no pain   Flexion/extension knee ROM: intact, no pain   Vascular: WWP extremities, 2+DP bilateral      Diagnostic Tests   IMAGING: I have personally reviewed the images and these are my findings:  Lumbar Spine X-rays from 4/17/2024: multi level lumbar spondylosis with loss of disc height, osteophyte formation and facet hypertrophy, L4-5 degenerative spondylolisthesis, no appreciated lytic/blastic lesions, no obvious instability    Lumbar Spine MRI from 4/1/2024: multi level lumbar disc degeneration with disc desiccation, loss of disc height, facet and ligamentum hypertrophy, L3-4 and L4-5 central, lateral recess, stenosis     Electronic Medical Records were reviewed including office notes, imaging studies    Procedures, if performed today     None  "performed       Portions of the record may have been created with voice recognition software.  Occasional wrong word or \"sound a like\" substitutions may have occurred due to the inherent limitations of voice recognition software.  Read the chart carefully and recognize, using context, where substitutions have occurred.  "

## 2024-04-24 ENCOUNTER — CONSULT (OUTPATIENT)
Dept: PAIN MEDICINE | Facility: CLINIC | Age: 74
End: 2024-04-24
Payer: MEDICARE

## 2024-04-24 VITALS
BODY MASS INDEX: 29.39 KG/M2 | DIASTOLIC BLOOD PRESSURE: 82 MMHG | SYSTOLIC BLOOD PRESSURE: 128 MMHG | WEIGHT: 217 LBS | HEIGHT: 72 IN

## 2024-04-24 DIAGNOSIS — M54.16 LUMBAR RADICULOPATHY: Primary | ICD-10-CM

## 2024-04-24 DIAGNOSIS — M47.817 LUMBOSACRAL SPONDYLOSIS WITHOUT MYELOPATHY: ICD-10-CM

## 2024-04-24 DIAGNOSIS — M48.062 SPINAL STENOSIS OF LUMBAR REGION WITH NEUROGENIC CLAUDICATION: ICD-10-CM

## 2024-04-24 PROCEDURE — G2211 COMPLEX E/M VISIT ADD ON: HCPCS | Performed by: ANESTHESIOLOGY

## 2024-04-24 PROCEDURE — 99204 OFFICE O/P NEW MOD 45 MIN: CPT | Performed by: ANESTHESIOLOGY

## 2024-04-24 NOTE — PROGRESS NOTES
Assessment  1. Lumbar radiculopathy    2. Lumbosacral spondylosis without myelopathy    3. Spinal stenosis of lumbar region with neurogenic claudication      Patient presenting with chronic back and left radiating leg pain for greater than 1 year, worsening over the past 2 months.  Pain is consistent with lumbar radicular pain, lumbar spinal stenosis accompanied by pain 8+/10 on the pain scale with inability to participate in IADLs for >6 weeks.     Patient has participated with physical therapy (3/12/24 - 3/27/24) as well as home exercises and stretches with worsening symptoms and obtained lumbar MRI afterwards.    Patient has tried Tylenol, ibuprofen, oral prednisone, gabapentin with modest benefit.    Denies any bowel or bladder incontinence, saddle anesthesia.    Independently reviewed and interpreted lumbar MRI - this showed severe stenosis at L3-4 and L4-5 with severe bilateral foraminal narrowing at L3-4 and severe left foraminal stenosis at L4-5.    Plan    - Discussed and recommended for left L4 and L5 TF ESIs at this time.   Risks, benefits, and alternatives to epidural steroid injections thoroughly discussed with patient.   Complete risks and benefits including bleeding, infection, tissue reaction, nerve injury and allergic reaction were discussed. The approach was demonstrated using models and literature was provided. Verbal consent was obtained.    RTC 4 weeks after TF ESIs.    Reviewed external notes from Orthopedic surgery (4/17/24), PT (3/27/24), PCP (3/26/24) in regards to recent and prior relevant medical histories, treatment recommendations, medication and/or interventional treatment responses.    Reviewed hemoglobin A1c, renal function, CBC and/or PT/INR prior to discussing/offering interventional modalities.    [place] Prescription Drug Monitoring Program report was reviewed and was appropriate     My impressions and treatment recommendations were discussed in detail with the patient who  verbalized understanding and had no further questions.  Discharge instructions were provided. I personally saw and examined the patient and I agree with the above discussed plan of care.    Orders Placed This Encounter   Procedures    FL spine and pain procedure     Standing Status:   Future     Standing Expiration Date:   4/24/2028     Order Specific Question:   Reason for Exam:     Answer:   left L4 and L5 TF ESIs     Order Specific Question:   Anticoagulant hold needed?     Answer:   no     No orders of the defined types were placed in this encounter.      History of Present Illness    Oni Nagy is a 74 y.o. male presenting for new patient visit (referred by. Dr. Cedeno) at [facility] for exam and evaluation of chronic back and left radiating leg pain for greater than 1 year, worsening over the past 2 months. Pain started without any precipitating injury or trauma. Over the past month, the intensity of pain has been Severe. Pain is currently 8-10/10. Pain does interfere with age appropriate activities of daily living. Pain is intermittent, with no typical pattern throughout the day. Pain is described as sharp, throbbing. Patient denies weakness in the lower extremities. Assistance device used: None.    Pain is increased with bending, walking, exercise.   Pain is decreased with resting, sitting, lying down.    Treatments tried:   Heat/ice  PT  Chiropractic therapy: no  Injections: no   Previous spine surgery: No    Anticoagulation: no    Medications tried:   Tylenol, Ibuprofen, gabapentin, oral steroids    I have personally reviewed and/or updated the patient's past medical history, past surgical history, family history, social history, current medications, allergies, and vital signs today.     Review of Systems   Constitutional:  Negative for chills and fever.   HENT:  Negative for ear pain and sore throat.    Eyes:  Negative for pain and visual disturbance.   Respiratory:   Negative for cough and shortness of breath.    Cardiovascular:  Negative for chest pain and palpitations.   Gastrointestinal:  Negative for abdominal pain and vomiting.   Genitourinary:  Negative for dysuria and hematuria.   Musculoskeletal:  Positive for back pain and gait problem. Negative for arthralgias.   Skin:  Negative for color change and rash.   Neurological:  Positive for weakness and numbness. Negative for seizures and syncope.   All other systems reviewed and are negative.      Patient Active Problem List   Diagnosis    Abdominal hernia    Benign essential hypertension    Proteinuria    Neurocardiogenic syncope    Insomnia    Hypercholesterolemia    Hematuria, microscopic    Elevated PSA w neg Bx 2014    Coronary atherosclerosis    Paroxysmal atrial fibrillation (HCC)     Localized, primary osteoarthritis of shoulder region    Urinary retention    Pulmonary nodules    Thoracic aortic aneurysm without rupture (HCC)    Status post aortic valve replacement    S/P mitral valve repair    BPH (benign prostatic hyperplasia)    Inguinal Hernia Right    Frequent PVCs       Past Medical History:   Diagnosis Date    Benign essential hypertension 03/23/2011    BPH (benign prostatic hyperplasia) 02/20/2023    Last Assessment & Plan:  Formatting of this note might be different from the original. Doing well.  Cont tamsulosin and saw palmetto.  We did discuss adding finasteride but will have repeat psa prior.  Also discussed indications for holep.  As of now, he's doing great. Last episode of retention was clearly related to anesthesia.  Fu 4-6w with psa    Cardiogenic shock (HCC) 07/05/2022    Chronic kidney disease, stage III (moderate) ([facility])     Coronary atherosclerosis 03/23/2011    Last Assessment & Plan:  There are no symptoms of an anginal syndrome. I provided education regarding the nature of coronary artery disease and our recommendations to reduce his long-term risk of ACS.  I recommended he continue utilizing  aspirin and statins to minimize risk.  A lipid profile was requested with the option to use high intensity statins as an alternative to Mevacor in response to per    Enlarged prostate     Epistaxis 01/09/2022    History of aortic valve disease     History of mitral valve disease     History of transfusion     May 2021 - no adverse reaction    Paroxysmal atrial fibrillation (HCC)  02/06/2020    Pulmonary nodules 02/05/2021    PVC's (premature ventricular contractions)     S/P mitral valve repair 06/15/2021    May 2021 at [facility]    Status post aortic valve replacement 05/31/2021    At [facility] May 2021    SVT (supraventricular tachycardia)     Syncope, cardiogenic     Thoracic aortic aneurysm without rupture (HCC) 02/05/2021    4.5cm on CT 1/21  Formatting of this note might be different from the original. 4.5cm on CT chest 1/28/21  Last Assessment & Plan:  Formatting of this note might be different from the original. 6/6/21 chest CT showed stable 4.3 cm ascending thoracic aortic aneurysm.    Thrombocytopenia (HCC) 07/05/2022       Past Surgical History:   Procedure Laterality Date    CARDIAC SURGERY      MV repair, AV replaced and aortic aneurysm repaired    CAUTERIZE INNER NOSE Left 01/09/2022    Procedure: Sphenopalatine artery ligation;  Surgeon: Alfonso Rodriguez MD;  Location: [facility];  Service: ENT    COLONOSCOPY  2007    Dr. cShreiber.  Tubular adenoma descending colon polyp.    COLONOSCOPY  2010    Dr. Schreiber.  Diverticulosis.    COLONOSCOPY  08/2020    Dr. Schreiber.  12 mm descending colon inflammatory polyp, 3 mm benign lymphoid aggregate, diverticulosis.    FACIAL/NECK BIOPSY Left 11/30/2021    Procedure: EXCISION PYOGENIC GRANULOMA; FREE MUCOSAL GRAFT FROM NOSE;  Surgeon: Alfonso Rodriguez MD;  Location: [facility];  Service: ENT    INGUINAL HERNIA REPAIR      TONSILLECTOMY         Family History   Problem Relation Age of Onset    Alzheimer's disease Mother 80    Esophageal cancer  Mother     Diabetes Father     Diabetes Sister     Kidney failure Sister        Social History     Occupational History    Occupation: /      Comment: full-time   Tobacco Use    Smoking status: Never    Smokeless tobacco: Never   Vaping Use    Vaping status: Never Used   Substance and Sexual Activity    Alcohol use: No    Drug use: No    Sexual activity: Not on file       Current Outpatient Medications on File Prior to Visit   Medication Sig    lovastatin (MEVACOR) 40 MG tablet Take 1 tablet by mouth    Magnesium 250 MG TABS Take 1 tablet by mouth daily      Saw Palmetto, Serenoa repens, (SAW PALMETTO PO) Take by mouth daily 250 mg    tamsulosin (FLOMAX) 0.4 mg TAKE 1 CAPSULE BY MOUTH EVERY DAY AT NIGHT    benzonatate (TESSALON PERLES) 100 mg capsule Take 1 capsule (100 mg total) by mouth 3 (three) times a day as needed for cough (Patient not taking: Reported on 4/24/2024)    Cholecalciferol 50 MCG (2000 UT) CAPS Take by mouth (Patient not taking: Reported on 4/24/2024)    gabapentin (Neurontin) 300 mg capsule Take 1 capsule (300 mg total) by mouth daily at bedtime Gabapentin may cause vision changes, clumsiness, unsteadiness, dizziness, drowsiness, sleepiness, or trouble with thinking.l. (Patient not taking: Reported on 4/24/2024)    methylPREDNISolone 4 MG tablet therapy pack Use as directed on package (Patient not taking: Reported on 4/24/2024)     No current facility-administered medications on file prior to visit.       No Known Allergies    Physical Exam    /82   Ht 6' (1.829 m)   Wt 98.4 kg (217 lb)   BMI 29.43 kg/m²     Constitutional: normal, well developed, well nourished, alert, in no distress and non-toxic and no overt pain behavior.  Eyes: anicteric  HEENT: grossly intact  Neck: supple, symmetric, trachea midline and no masses   Pulmonary:even and unlabored  Cardiovascular:No edema or pitting edema present  Skin:Normal without rashes or lesions and well  hydrated  Psychiatric:Mood and affect appropriate  Neurologic: Motor function is grossly intact with no focal neurologic deficits   Musculoskeletal: limited lumbar spine ROM with nonantalgic gait.    Imaging  MRI lumbar spine 4/1/24  FINDINGS:     VERTEBRAL BODIES: There are short ribs (riblets) at T12 and 5 nonrib-bearing lumbar-type vertebrae.     There is levoscoliosis with apex at L2. Degenerative grade 1 anterolisthesis at L4-5.     There is 1 cm T1 and T2 hypointense lesion within T11 vertebral body seen on series 5 image 13.     SACRUM:  Normal signal within the sacrum. No evidence of insufficiency or stress fracture.     DISTAL CORD AND CONUS:  Normal size and signal within the distal cord and conus.     PARASPINAL SOFT TISSUES:  Paraspinal soft tissues are unremarkable.     LOWER THORACIC DISC SPACES:  Normal disc height and signal.  No disc herniation, canal stenosis or foraminal narrowing.     LUMBAR DISC SPACES:     L1-L2: Disc bulge. Small left foraminal disc protrusion. Moderate bilateral facet arthropathy. Mild ligamentum flavum thickening. Mild canal stenosis. Mild bilateral foraminal narrowing.     L2-L3: Disc bulge. Moderate facet arthropathy and ligamentum flavum thickening. Mild canal stenosis. Mild bilateral foraminal narrowing.     L3-L4: Disc bulge. Severe bilateral facet arthropathy. Bilateral ligamentum flavum thickening. Severe bilateral lateral recess and canal stenosis. Mild bilateral foraminal narrowing.     L4-L5: Degenerative grade 1 anterolisthesis uncovering posterior disc margin. Severe bilateral facet arthropathy and ligamentum flavum thickening. There is 0.5 cm left facet/ligamentum flavum degenerative cyst (series 7 image 18). There are smaller right   ligament flavum degenerative cyst (series 7 image 18). There is severe canal stenosis with compression of the thecal sac. Moderate to severe left and moderate right foraminal stenosis.     L5-S1: No disc bulge. Mild to moderate  facet arthropathy. No significant canal or foraminal stenosis.     OTHER FINDINGS: Right renal cysts.     2.3 cm infrarenal abdominal aortic ectasia, similar to CT 2/21/2023     IMPRESSION:     1.  There are short ribs (riblets) at T12 with  5 nonrib-bearing lumbar-type vertebrae.  2.  1 cm T1 hypointense ovoid lesion within T11 vertebral body which is indeterminate and may represent atypical (lipid poor) hemangioma. Given atypical T1 signal malignancy should be excluded. Recommend 3 months follow-up MRI to ensure stability.  3.  Grade 1 degenerative anterolisthesis at level L4-5 with severe canal stenosis and thecal sac compression.  4.  Severe canal stenosis at level L3-4.  5.  Moderate to severe left and moderate right foraminal stenosis at level L4-5.

## 2024-05-09 ENCOUNTER — HOSPITAL ENCOUNTER (OUTPATIENT)
Dept: RADIOLOGY | Facility: MEDICAL CENTER | Age: 74
Discharge: HOME | End: 2024-05-09
Admitting: ANESTHESIOLOGY
Payer: MEDICARE

## 2024-05-09 VITALS
DIASTOLIC BLOOD PRESSURE: 96 MMHG | TEMPERATURE: 97.5 F | SYSTOLIC BLOOD PRESSURE: 153 MMHG | OXYGEN SATURATION: 95 % | HEART RATE: 95 BPM | RESPIRATION RATE: 16 BRPM

## 2024-05-09 DIAGNOSIS — M48.062 SPINAL STENOSIS OF LUMBAR REGION WITH NEUROGENIC CLAUDICATION: ICD-10-CM

## 2024-05-09 DIAGNOSIS — M54.16 LUMBAR RADICULOPATHY: ICD-10-CM

## 2024-05-09 PROCEDURE — 64484 NJX AA&/STRD TFRM EPI L/S EA: CPT | Performed by: ANESTHESIOLOGY

## 2024-05-09 PROCEDURE — 64483 NJX AA&/STRD TFRM EPI L/S 1: CPT | Performed by: ANESTHESIOLOGY

## 2024-05-09 RX ORDER — PAPAVERINE HCL 150 MG
15 CAPSULE, EXTENDED RELEASE ORAL ONCE
Status: COMPLETED | OUTPATIENT
Start: 2024-05-09 | End: 2024-05-09

## 2024-05-09 RX ORDER — BUPIVACAINE HCL/PF 2.5 MG/ML
3.5 VIAL (ML) INJECTION ONCE
Status: COMPLETED | OUTPATIENT
Start: 2024-05-09 | End: 2024-05-09

## 2024-05-09 RX ADMIN — IOHEXOL 2 ML: 300 INJECTION, SOLUTION INTRAVENOUS at 15:03

## 2024-05-09 RX ADMIN — Medication 3.5 ML: at 15:04

## 2024-05-09 RX ADMIN — DEXAMETHASONE SODIUM PHOSPHATE 15 MG: 10 INJECTION INTRAMUSCULAR; INTRAVENOUS at 15:04

## 2024-05-09 NOTE — DISCHARGE INSTRUCTIONS
Epidural Steroid Injection   WHAT YOU NEED TO KNOW:   An epidural steroid injection (WILDA) is a procedure to inject steroid medicine into the epidural space. The epidural space is between your spinal cord and vertebrae. Steroids reduce inflammation and fluid buildup in your spine that may be causing pain. You may be given pain medicine along with the steroids.          ACTIVITY  Do not drive or operate machinery today.  No strenuous activity today - bending, lifting, etc.  You may resume normal activites starting tomorrow - start slowly and as tolerated.  You may shower today, but no tub baths or hot tubs.  You may have numbness for several hours from the local anesthetic. Please use caution and common sense, especially with weight-bearing activities.    CARE OF THE INJECTION SITE  If you have soreness or pain, apply ice to the area today (20 minutes on/20 minutes off).  Starting tomorrow, you may use warm, moist heat or ice if needed.  You may have an increase or change in your discomfort for 36-48 hours after your treatment.  Apply ice and continue with any pain medication you have been prescribed.  Notify the [facility] if you have any of the following: redness, drainage, swelling, headache, stiff neck or fever above 100°F.    SPECIAL INSTRUCTIONS  Our office will contact you in approximately 7 days for a progress report.    MEDICATIONS  Continue to take all routine medications.  Our office may have instructed you to hold some medications.    As no general anesthesia was used in today's procedure, you should not experience any side effects related to anesthesia.     If you are diabetic, the steroids used in today's injection may temporarily increase your blood sugar levels after the first few days after your injection. Please keep a close eye on your sugars and alert the doctor who manages your diabetes if your sugars are significantly high from your baseline or you are symptomatic.     If you have a  problem specifically related to your procedure, please call our office at [phone].  Problems not related to your procedure should be directed to your primary care physician.

## 2024-05-09 NOTE — H&P
History of Present Illness: The patient is a 74 y.o. male who presents with complaints of back and leg pain    Past Medical History:   Diagnosis Date    Benign essential hypertension 03/23/2011    BPH (benign prostatic hyperplasia) 02/20/2023    Last Assessment & Plan:  Formatting of this note might be different from the original. Doing well.  Cont tamsulosin and saw palmetto.  We did discuss adding finasteride but will have repeat psa prior.  Also discussed indications for holep.  As of now, he's doing great. Last episode of retention was clearly related to anesthesia.  Fu 4-6w with psa    Cardiogenic shock (HCC) 07/05/2022    Chronic kidney disease, stage III (moderate) ([facility])     Coronary atherosclerosis 03/23/2011    Last Assessment & Plan:  There are no symptoms of an anginal syndrome. I provided education regarding the nature of coronary artery disease and our recommendations to reduce his long-term risk of ACS.  I recommended he continue utilizing aspirin and statins to minimize risk.  A lipid profile was requested with the option to use high intensity statins as an alternative to Mevacor in response to per    Enlarged prostate     Epistaxis 01/09/2022    History of aortic valve disease     History of mitral valve disease     History of transfusion     May 2021 - no adverse reaction    Paroxysmal atrial fibrillation ([facility])  02/06/2020    Pulmonary nodules 02/05/2021    PVC's (premature ventricular contractions)     S/P mitral valve repair 06/15/2021    May 2021 at [facility]    Status post aortic valve replacement 05/31/2021    At [facility] May 2021    SVT (supraventricular tachycardia)     Syncope, cardiogenic     Thoracic aortic aneurysm without rupture ([facility]) 02/05/2021    4.5cm on CT 1/21  Formatting of this note might be different from the original. 4.5cm on CT chest 1/28/21  Last Assessment & Plan:  Formatting of this note might be different from the original. 6/6/21 chest CT showed stable 4.3 cm  ascending thoracic aortic aneurysm.    Thrombocytopenia (HCC) 07/05/2022       Past Surgical History:   Procedure Laterality Date    CARDIAC SURGERY      MV repair, AV replaced and aortic aneurysm repaired    CAUTERIZE INNER NOSE Left 01/09/2022    Procedure: Sphenopalatine artery ligation;  Surgeon: Alfonso Rodriguez MD;  [facility];  Service: ENT    COLONOSCOPY  2007    Dr. Schreiber.  Tubular adenoma descending colon polyp.    COLONOSCOPY  2010    Dr. Schreiber.  Diverticulosis.    COLONOSCOPY  08/2020    Dr. Schreiber.  12 mm descending colon inflammatory polyp, 3 mm benign lymphoid aggregate, diverticulosis.    FACIAL/NECK BIOPSY Left 11/30/2021    Procedure: EXCISION PYOGENIC GRANULOMA; FREE MUCOSAL GRAFT FROM NOSE;  Surgeon: Alfonso Rodriguez MD;  [facility];  Service: ENT    INGUINAL HERNIA REPAIR      TONSILLECTOMY           Current Outpatient Medications:     benzonatate (TESSALON PERLES) 100 mg capsule, Take 1 capsule (100 mg total) by mouth 3 (three) times a day as needed for cough (Patient not taking: Reported on 4/24/2024), Disp: 20 capsule, Rfl: 0    gabapentin (Neurontin) 300 mg capsule, Take 1 capsule (300 mg total) by mouth daily at bedtime Gabapentin may cause vision changes, clumsiness, unsteadiness, dizziness, drowsiness, sleepiness, or trouble with thinking.l. (Patient not taking: Reported on 4/24/2024), Disp: 30 capsule, Rfl: 0    lovastatin (MEVACOR) 40 MG tablet, Take 1 tablet by mouth, Disp: , Rfl:     Magnesium 250 MG TABS, Take 1 tablet by mouth daily  , Disp: , Rfl:     Saw Palmetto, Serenoa repens, (SAW PALMETTO PO), Take by mouth daily 250 mg, Disp: , Rfl:     tamsulosin (FLOMAX) 0.4 mg, TAKE 1 CAPSULE BY MOUTH EVERY DAY AT NIGHT, Disp: , Rfl:     Current Facility-Administered Medications:     bupivacaine (PF) (MARCAINE) 0.25 % injection 3.5 mL, 3.5 mL, Epidural, Once, Will Calin Gamble MD    dexamethasone (PF) (DECADRON) injection 15 mg, 15 mg, Epidural, Once,  Will Calin Gamble MD    iohexol (OMNIPAQUE) 300 mg/mL injection 2 mL, 2 mL, Epidural, Once, Will Calin Gamble MD    No Known Allergies    Physical Exam:   Vitals:    05/09/24 1441   BP: 131/84   Pulse: 60   Resp: 16   Temp: 97.5 °F (36.4 °C)   SpO2: 92%     General: Awake, Alert, Oriented x 3, Mood and affect appropriate  Respiratory: Respirations even and unlabored  Cardiovascular: Peripheral pulses intact; no edema  Musculoskeletal Exam: back and leg pain    ASA Score: 3    Patient/Chart Verification  Patient ID Verified: Verbal  Consents Confirmed: Procedural, To be obtained in the Pre-Procedure area  H&P( within 30 days) Verified: To be obtained in the Pre-Procedure area  Allergies Reviewed: Yes  Anticoag/NSAID held?: NA  Currently on antibiotics?: No  Pregnancy denied?: NA    Assessment:   1. Lumbar radiculopathy    2. Spinal stenosis of lumbar region with neurogenic claudication        Plan: left L4 and L5 TF ESIs

## 2024-05-16 ENCOUNTER — TELEPHONE (OUTPATIENT)
Dept: PAIN MEDICINE | Facility: CLINIC | Age: 74
End: 2024-05-16

## 2024-06-04 ENCOUNTER — OFFICE VISIT (OUTPATIENT)
Dept: PAIN MEDICINE | Facility: CLINIC | Age: 74
End: 2024-06-04
Payer: MEDICARE

## 2024-06-04 VITALS
DIASTOLIC BLOOD PRESSURE: 70 MMHG | HEIGHT: 72 IN | BODY MASS INDEX: 29.39 KG/M2 | WEIGHT: 217 LBS | SYSTOLIC BLOOD PRESSURE: 144 MMHG

## 2024-06-04 DIAGNOSIS — M48.061 SPINAL STENOSIS OF LUMBAR REGION, UNSPECIFIED WHETHER NEUROGENIC CLAUDICATION PRESENT: ICD-10-CM

## 2024-06-04 DIAGNOSIS — M54.16 LUMBAR RADICULOPATHY: Primary | ICD-10-CM

## 2024-06-04 PROCEDURE — 99214 OFFICE O/P EST MOD 30 MIN: CPT | Performed by: ANESTHESIOLOGY

## 2024-06-04 PROCEDURE — G2211 COMPLEX E/M VISIT ADD ON: HCPCS | Performed by: ANESTHESIOLOGY

## 2024-06-04 NOTE — PROGRESS NOTES
Assessment:  1. Lumbar radiculopathy    2. Spinal stenosis of lumbar region, unspecified whether neurogenic claudication present      Patient presenting for follow up visit. He has a history of chronic back and left radiating leg pain for greater than 1 year, worsening over the past 3 months.    Pain is consistent with lumbar radicular pain, lumbar spinal stenosis accompanied by pain 8+/10 on the pain scale with inability to participate in IADLs for >6 weeks.      Patient has participated with physical therapy (3/12/24 - 3/27/24) as well as home exercises and stretches with worsening symptoms and obtained lumbar MRI afterwards.     Patient has tried Tylenol, ibuprofen, oral prednisone, gabapentin with modest benefit.    Denies any bowel or bladder incontinence, saddle anesthesia.     Independently reviewed and interpreted lumbar MRI - this showed severe stenosis at L3-4 and L4-5 with severe bilateral foraminal narrowing at L3-4 and severe left foraminal stenosis at L4-5.     After left L4 and L5 TF ESIs, he noticed no improvement and continues to have severe pain radiating in the left leg.    Plan:     - Discussed and recommended for left L5-S1 LESI with interlaminar approach.  Risks, benefits, and alternatives to epidural steroid injections thoroughly discussed with patient.   Complete risks and benefits including bleeding, infection, tissue reaction, nerve injury and allergic reaction were discussed. The approach was demonstrated using models and literature was provided. Verbal consent was obtained.     RTC 4 weeks after LESI     Reviewed hemoglobin A1c, renal function, CBC and/or PT/INR prior to discussing/offering interventional modalities.     [place] Prescription Drug Monitoring Program report was reviewed and was appropriate      My impressions and treatment recommendations were discussed in detail with the patient who verbalized understanding and had no further questions.  Discharge instructions were  provided. I personally saw and examined the patient and I agree with the above discussed plan of care.    Orders Placed This Encounter   Procedures    FL spine and pain procedure     Standing Status:   Future     Standing Expiration Date:   6/4/2028     Order Specific Question:   Reason for Exam:     Answer:   left L5-S1 LESI     Order Specific Question:   Anticoagulant hold needed?     Answer:   no     No orders of the defined types were placed in this encounter.      History of Present Illness:  Oni Nagy is a 74 y.o. male who presents for a follow up office visit in regards to Back Pain and Leg Pain (Left leg ).   The patient’s current symptoms include worsening back and left radicular leg pain symptoms. Pain is rated 9/10 and described as a constant sharp pain with numbness.    I have personally reviewed and/or updated the patient's past medical history, past surgical history, family history, social history, current medications, allergies, and vital signs today.     Review of Systems   Constitutional:  Negative for chills and fever.   HENT:  Negative for ear pain and sore throat.    Eyes:  Negative for pain and visual disturbance.   Respiratory:  Negative for cough and shortness of breath.    Cardiovascular:  Negative for chest pain and palpitations.   Gastrointestinal:  Negative for abdominal pain and vomiting.   Genitourinary:  Negative for dysuria and hematuria.   Musculoskeletal:  Positive for back pain, gait problem and myalgias. Negative for arthralgias.   Skin:  Negative for color change and rash.   Neurological:  Positive for weakness and numbness. Negative for seizures and syncope.   All other systems reviewed and are negative.      Patient Active Problem List   Diagnosis    Abdominal hernia    Benign essential hypertension    Proteinuria    Neurocardiogenic syncope    Insomnia    Hypercholesterolemia    Hematuria, microscopic    Elevated PSA w neg Bx 2014    Coronary atherosclerosis     Paroxysmal atrial fibrillation (HCC)     Localized, primary osteoarthritis of shoulder region    Urinary retention    Pulmonary nodules    Thoracic aortic aneurysm without rupture (HCC)    Status post aortic valve replacement    S/P mitral valve repair    BPH (benign prostatic hyperplasia)    Inguinal Hernia Right    Frequent PVCs    Lumbar radiculopathy       Past Medical History:   Diagnosis Date    Benign essential hypertension 03/23/2011    BPH (benign prostatic hyperplasia) 02/20/2023    Last Assessment & Plan:  Formatting of this note might be different from the original. Doing well.  Cont tamsulosin and saw palmetto.  We did discuss adding finasteride but will have repeat psa prior.  Also discussed indications for holep.  As of now, he's doing great. Last episode of retention was clearly related to anesthesia.  Fu 4-6w with psa    Cardiogenic shock (HCC) 07/05/2022    Chronic kidney disease, stage III (moderate) ([facility])     Coronary atherosclerosis 03/23/2011    Last Assessment & Plan:  There are no symptoms of an anginal syndrome. I provided education regarding the nature of coronary artery disease and our recommendations to reduce his long-term risk of ACS.  I recommended he continue utilizing aspirin and statins to minimize risk.  A lipid profile was requested with the option to use high intensity statins as an alternative to Mevacor in response to per    Enlarged prostate     Epistaxis 01/09/2022    History of aortic valve disease     History of mitral valve disease     History of transfusion     May 2021 - no adverse reaction    Paroxysmal atrial fibrillation (HCC)  02/06/2020    Pulmonary nodules 02/05/2021    PVC's (premature ventricular contractions)     S/P mitral valve repair 06/15/2021    May 2021 at [facility]    Status post aortic valve replacement 05/31/2021    At [facility] May 2021    SVT (supraventricular tachycardia)     Syncope, cardiogenic     Thoracic aortic aneurysm without rupture  ([facility]) 02/05/2021    4.5cm on CT 1/21  Formatting of this note might be different from the original. 4.5cm on CT chest 1/28/21  Last Assessment & Plan:  Formatting of this note might be different from the original. 6/6/21 chest CT showed stable 4.3 cm ascending thoracic aortic aneurysm.    Thrombocytopenia ([facility]) 07/05/2022       Past Surgical History:   Procedure Laterality Date    CARDIAC SURGERY      MV repair, AV replaced and aortic aneurysm repaired    CAUTERIZE INNER NOSE Left 01/09/2022    Procedure: Sphenopalatine artery ligation;  Surgeon: Alfonso Rodriguez MD;  [facility]: [facility];  Service: ENT    COLONOSCOPY  2007    Dr. Schreiber.  Tubular adenoma descending colon polyp.    COLONOSCOPY  2010    Dr. Schreiber.  Diverticulosis.    COLONOSCOPY  08/2020    Dr. Schreiber.  12 mm descending colon inflammatory polyp, 3 mm benign lymphoid aggregate, diverticulosis.    FACIAL/NECK BIOPSY Left 11/30/2021    Procedure: EXCISION PYOGENIC GRANULOMA; FREE MUCOSAL GRAFT FROM NOSE;  Surgeon: Alfonso Rodriguez MD;  [facility];  Service: ENT    INGUINAL HERNIA REPAIR      TONSILLECTOMY         Family History   Problem Relation Age of Onset    Alzheimer's disease Mother 80    Esophageal cancer Mother     Diabetes Father     Diabetes Sister     Kidney failure Sister        Social History     Occupational History    Occupation: /      Comment: full-time   Tobacco Use    Smoking status: Never    Smokeless tobacco: Never   Vaping Use    Vaping status: Never Used   Substance and Sexual Activity    Alcohol use: No    Drug use: No    Sexual activity: Not on file       Current Outpatient Medications on File Prior to Visit   Medication Sig    lovastatin (MEVACOR) 40 MG tablet Take 1 tablet by mouth    Magnesium 250 MG TABS Take 1 tablet by mouth daily      Saw Palmetto, Serenoa repens, (SAW PALMETTO PO) Take by mouth daily 250 mg    tamsulosin (FLOMAX) 0.4 mg TAKE 1 CAPSULE BY MOUTH EVERY  DAY AT NIGHT    benzonatate (TESSALON PERLES) 100 mg capsule Take 1 capsule (100 mg total) by mouth 3 (three) times a day as needed for cough (Patient not taking: Reported on 4/24/2024)    gabapentin (Neurontin) 300 mg capsule Take 1 capsule (300 mg total) by mouth daily at bedtime Gabapentin may cause vision changes, clumsiness, unsteadiness, dizziness, drowsiness, sleepiness, or trouble with thinking.l. (Patient not taking: Reported on 4/24/2024)     No current facility-administered medications on file prior to visit.       No Known Allergies    Physical Exam:    /70   Ht 6' (1.829 m)   Wt 98.4 kg (217 lb)   BMI 29.43 kg/m²     Constitutional:normal, well developed, well nourished, alert, in no distress and non-toxic and no overt pain behavior.  Eyes:anicteric  HEENT:grossly intact  Neck:supple, symmetric, trachea midline and no masses   Pulmonary:even and unlabored  Cardiovascular:No edema or pitting edema present  Skin:Normal without rashes or lesions and well hydrated  Psychiatric:Mood and affect appropriate  Neurologic: Motor function is grossly intact with no focal neurologic deficits   Musculoskeletal: positve left SLR    Imaging  MRI LUMBAR SPINE WITHOUT CONTRAST     INDICATION: M54.42: Lumbago with sciatica, left side.     COMPARISON:  None.     TECHNIQUE:  Multiplanar, multisequence imaging of the lumbar spine was performed. .        IMAGE QUALITY:  Diagnostic     FINDINGS:     VERTEBRAL BODIES: There are short ribs (riblets) at T12 and 5 nonrib-bearing lumbar-type vertebrae.     There is levoscoliosis with apex at L2. Degenerative grade 1 anterolisthesis at L4-5.     There is 1 cm T1 and T2 hypointense lesion within T11 vertebral body seen on series 5 image 13.     SACRUM:  Normal signal within the sacrum. No evidence of insufficiency or stress fracture.     DISTAL CORD AND CONUS:  Normal size and signal within the distal cord and conus.     PARASPINAL SOFT TISSUES:  Paraspinal soft tissues are  unremarkable.     LOWER THORACIC DISC SPACES:  Normal disc height and signal.  No disc herniation, canal stenosis or foraminal narrowing.     LUMBAR DISC SPACES:     L1-L2: Disc bulge. Small left foraminal disc protrusion. Moderate bilateral facet arthropathy. Mild ligamentum flavum thickening. Mild canal stenosis. Mild bilateral foraminal narrowing.     L2-L3: Disc bulge. Moderate facet arthropathy and ligamentum flavum thickening. Mild canal stenosis. Mild bilateral foraminal narrowing.     L3-L4: Disc bulge. Severe bilateral facet arthropathy. Bilateral ligamentum flavum thickening. Severe bilateral lateral recess and canal stenosis. Mild bilateral foraminal narrowing.     L4-L5: Degenerative grade 1 anterolisthesis uncovering posterior disc margin. Severe bilateral facet arthropathy and ligamentum flavum thickening. There is 0.5 cm left facet/ligamentum flavum degenerative cyst (series 7 image 18). There are smaller right   ligament flavum degenerative cyst (series 7 image 18). There is severe canal stenosis with compression of the thecal sac. Moderate to severe left and moderate right foraminal stenosis.     L5-S1: No disc bulge. Mild to moderate facet arthropathy. No significant canal or foraminal stenosis.     OTHER FINDINGS: Right renal cysts.     2.3 cm infrarenal abdominal aortic ectasia, similar to CT 2/21/2023     IMPRESSION:     1.  There are short ribs (riblets) at T12 with  5 nonrib-bearing lumbar-type vertebrae.  2.  1 cm T1 hypointense ovoid lesion within T11 vertebral body which is indeterminate and may represent atypical (lipid poor) hemangioma. Given atypical T1 signal malignancy should be excluded. Recommend 3 months follow-up MRI to ensure stability.  3.  Grade 1 degenerative anterolisthesis at level L4-5 with severe canal stenosis and thecal sac compression.  4.  Severe canal stenosis at level L3-4.  5.  Moderate to severe left and moderate right foraminal stenosis at level L4-5.

## 2024-06-06 ENCOUNTER — HOSPITAL ENCOUNTER (OUTPATIENT)
Dept: RADIOLOGY | Facility: MEDICAL CENTER | Age: 74
Discharge: HOME | End: 2024-06-06
Admitting: ANESTHESIOLOGY
Payer: MEDICARE

## 2024-06-06 VITALS
OXYGEN SATURATION: 95 % | TEMPERATURE: 97.5 F | RESPIRATION RATE: 20 BRPM | DIASTOLIC BLOOD PRESSURE: 93 MMHG | HEART RATE: 65 BPM | SYSTOLIC BLOOD PRESSURE: 148 MMHG

## 2024-06-06 DIAGNOSIS — M54.16 LUMBAR RADICULOPATHY: ICD-10-CM

## 2024-06-06 PROCEDURE — 62323 NJX INTERLAMINAR LMBR/SAC: CPT | Performed by: ANESTHESIOLOGY

## 2024-06-06 RX ORDER — METHYLPREDNISOLONE ACETATE 80 MG/ML
80 INJECTION, SUSPENSION INTRA-ARTICULAR; INTRALESIONAL; INTRAMUSCULAR; PARENTERAL; SOFT TISSUE ONCE
Status: COMPLETED | OUTPATIENT
Start: 2024-06-06 | End: 2024-06-06

## 2024-06-06 RX ORDER — BUPIVACAINE HCL/PF 2.5 MG/ML
1 VIAL (ML) INJECTION ONCE
Status: COMPLETED | OUTPATIENT
Start: 2024-06-06 | End: 2024-06-06

## 2024-06-06 RX ADMIN — BUPIVACAINE HYDROCHLORIDE 1 ML: 2.5 INJECTION, SOLUTION EPIDURAL; INFILTRATION; INTRACAUDAL at 14:30

## 2024-06-06 RX ADMIN — IOHEXOL 1 ML: 300 INJECTION, SOLUTION INTRAVENOUS at 14:29

## 2024-06-06 RX ADMIN — METHYLPREDNISOLONE ACETATE 80 MG: 80 INJECTION, SUSPENSION INTRA-ARTICULAR; INTRALESIONAL; INTRAMUSCULAR; PARENTERAL; SOFT TISSUE at 14:30

## 2024-06-06 NOTE — H&P
History of Present Illness: The patient is a 74 y.o. male who presents with complaints of back and leg pain    Past Medical History:   Diagnosis Date    Benign essential hypertension 03/23/2011    BPH (benign prostatic hyperplasia) 02/20/2023    Last Assessment & Plan:  Formatting of this note might be different from the original. Doing well.  Cont tamsulosin and saw palmetto.  We did discuss adding finasteride but will have repeat psa prior.  Also discussed indications for holep.  As of now, he's doing great. Last episode of retention was clearly related to anesthesia.  Fu 4-6w with psa    Cardiogenic shock (HCC) 07/05/2022    Chronic kidney disease, stage III (moderate) ([facility])     Coronary atherosclerosis 03/23/2011    Last Assessment & Plan:  There are no symptoms of an anginal syndrome. I provided education regarding the nature of coronary artery disease and our recommendations to reduce his long-term risk of ACS.  I recommended he continue utilizing aspirin and statins to minimize risk.  A lipid profile was requested with the option to use high intensity statins as an alternative to Mevacor in response to per    Enlarged prostate     Epistaxis 01/09/2022    History of aortic valve disease     History of mitral valve disease     History of transfusion     May 2021 - no adverse reaction    Paroxysmal atrial fibrillation ([facility])  02/06/2020    Pulmonary nodules 02/05/2021    PVC's (premature ventricular contractions)     S/P mitral valve repair 06/15/2021    May 2021 at [facility]    Status post aortic valve replacement 05/31/2021    At [facility] May 2021    SVT (supraventricular tachycardia)     Syncope, cardiogenic     Thoracic aortic aneurysm without rupture ([facility]) 02/05/2021    4.5cm on CT 1/21  Formatting of this note might be different from the original. 4.5cm on CT chest 1/28/21  Last Assessment & Plan:  Formatting of this note might be different from the original. 6/6/21 chest CT showed stable 4.3 cm  ascending thoracic aortic aneurysm.    Thrombocytopenia (HCC) 07/05/2022       Past Surgical History:   Procedure Laterality Date    CARDIAC SURGERY      MV repair, AV replaced and aortic aneurysm repaired    CAUTERIZE INNER NOSE Left 01/09/2022    Procedure: Sphenopalatine artery ligation;  Surgeon: Alfonso Rodriguez MD;  [facility];  Service: ENT    COLONOSCOPY  2007    Dr. Schreiber.  Tubular adenoma descending colon polyp.    COLONOSCOPY  2010    Dr. Schreiber.  Diverticulosis.    COLONOSCOPY  08/2020    Dr. Schreiber.  12 mm descending colon inflammatory polyp, 3 mm benign lymphoid aggregate, diverticulosis.    FACIAL/NECK BIOPSY Left 11/30/2021    Procedure: EXCISION PYOGENIC GRANULOMA; FREE MUCOSAL GRAFT FROM NOSE;  Surgeon: Alfonso Rodriguez MD;  [facility];  Service: ENT    INGUINAL HERNIA REPAIR      TONSILLECTOMY           Current Outpatient Medications:     lovastatin (MEVACOR) 40 MG tablet, Take 1 tablet by mouth, Disp: , Rfl:     Magnesium 250 MG TABS, Take 1 tablet by mouth daily  , Disp: , Rfl:     Saw Palmetto, Serenoa repens, (SAW PALMETTO PO), Take by mouth daily 250 mg, Disp: , Rfl:     tamsulosin (FLOMAX) 0.4 mg, TAKE 1 CAPSULE BY MOUTH EVERY DAY AT NIGHT, Disp: , Rfl:     Current Facility-Administered Medications:     bupivacaine (PF) (MARCAINE) 0.25 % injection 1 mL, 1 mL, Epidural, Once, Will Calin Gamble MD    iohexol (OMNIPAQUE) 300 mg/mL injection 1 mL, 1 mL, Epidural, Once, Will Calin Gamble MD    methylPREDNISolone acetate (DEPO-MEDROL) injection 80 mg, 80 mg, Epidural, Once, Will Calin Gamble MD    No Known Allergies    Physical Exam:   Vitals:    06/06/24 1414   BP: 146/89   Pulse: 65   Resp: 20   Temp: 97.5 °F (36.4 °C)   SpO2: 95%       General: Awake, Alert, Oriented x 3, Mood and affect appropriate  Respiratory: Respirations even and unlabored  Cardiovascular: Peripheral pulses intact; no edema  Musculoskeletal Exam: back and leg pain    ASA Score:  3    Patient/Chart Verification  Patient ID Verified: Verbal  ID Band Applied: No  Consents Confirmed: Procedural, To be obtained in the Pre-Procedure area  H&P( within 30 days) Verified: To be obtained in the Pre-Procedure area  Interval H&P(within 24 hr) Complete (required for Outpatients and Surgery Admit only): To be obtained in the Pre-Procedure area  Allergies Reviewed: Yes  Anticoag/NSAID held?: NA  Currently on antibiotics?: No    Assessment:   1. Lumbar radiculopathy        Plan: left L5-S1 LESI

## 2024-06-06 NOTE — DISCHARGE INSTRUCTIONS
Epidural Steroid Injection   WHAT YOU NEED TO KNOW:   An epidural steroid injection (WILDA) is a procedure to inject steroid medicine into the epidural space. The epidural space is between your spinal cord and vertebrae. Steroids reduce inflammation and fluid buildup in your spine that may be causing pain. You may be given pain medicine along with the steroids.          ACTIVITY  Do not drive or operate machinery today.  No strenuous activity today - bending, lifting, etc.  You may resume normal activites starting tomorrow - start slowly and as tolerated.  You may shower today, but no tub baths or hot tubs.  You may have numbness for several hours from the local anesthetic. Please use caution and common sense, especially with weight-bearing activities.    CARE OF THE INJECTION SITE  If you have soreness or pain, apply ice to the area today (20 minutes on/20 minutes off).  Starting tomorrow, you may use warm, moist heat or ice if needed.  You may have an increase or change in your discomfort for 36-48 hours after your treatment.  Apply ice and continue with any pain medication you have been prescribed.  Notify the [facility] if you have any of the following: redness, drainage, swelling, headache, stiff neck or fever above 100°F.    SPECIAL INSTRUCTIONS  Our office will contact you in approximately 14 days for a progress report.    MEDICATIONS  Continue to take all routine medications.  Our office may have instructed you to hold some medications.    As no general anesthesia was used in today's procedure, you should not experience any side effects related to anesthesia.     If you are diabetic, the steroids used in today's injection may temporarily increase your blood sugar levels after the first few days after your injection. Please keep a close eye on your sugars and alert the doctor who manages your diabetes if your sugars are significantly high from your baseline or you are symptomatic.     If you have a  problem specifically related to your procedure, please call our office at [phone].  Problems not related to your procedure should be directed to your primary care physician.

## 2024-06-20 ENCOUNTER — TELEPHONE (OUTPATIENT)
Dept: PAIN MEDICINE | Facility: CLINIC | Age: 74
End: 2024-06-20

## 2024-06-21 PROBLEM — M48.061 SPINAL STENOSIS OF LUMBAR REGION AT MULTIPLE LEVELS: Status: ACTIVE | Noted: 2024-04-08

## 2024-06-24 NOTE — TELEPHONE ENCOUNTER
Patient report: 50 % improvement post injection  Pain Level: /10  Sitting fine, walking more pain - doing things in small increments; has improved since first shot; first one did not do much but second one was better, he can now function.

## 2024-07-01 ENCOUNTER — TELEPHONE (OUTPATIENT)
Age: 74
End: 2024-07-01

## 2024-07-01 NOTE — TELEPHONE ENCOUNTER
Ishmael from [facility] in [facility] called and stated patient was there due to experiencing chest pain.; Patient had a CT scan done and Fahad stated patient needs to follow up with pcp. Please contact patient to schedule a follow up visit. Ishmael stated she will be faxing over all of the necessary information.

## 2024-07-02 ENCOUNTER — RA CDI HCC (OUTPATIENT)
Dept: OTHER | Facility: HOSPITAL | Age: 74
End: 2024-07-02

## 2024-07-09 ENCOUNTER — HOSPITAL ENCOUNTER (OUTPATIENT)
Dept: MRI IMAGING | Facility: HOSPITAL | Age: 74
Discharge: HOME | End: 2024-07-09
Attending: FAMILY MEDICINE
Payer: MEDICARE

## 2024-07-09 DIAGNOSIS — R93.89 ABNORMAL MRI: ICD-10-CM

## 2024-07-09 PROCEDURE — 72148 MRI LUMBAR SPINE W/O DYE: CPT

## 2024-07-22 PROBLEM — Z97.8 FOLEY CATHETER IN PLACE: Status: ACTIVE | Noted: 2024-07-22

## 2024-07-22 PROBLEM — N39.0 URINARY TRACT INFECTION DUE TO PSEUDOMONAS AERUGINOSA: Status: ACTIVE | Noted: 2024-07-22

## 2024-07-22 PROBLEM — B96.5 URINARY TRACT INFECTION DUE TO PSEUDOMONAS AERUGINOSA: Status: ACTIVE | Noted: 2024-07-22

## 2024-07-23 ENCOUNTER — OFFICE VISIT (OUTPATIENT)
Dept: FAMILY MEDICINE CLINIC | Facility: CLINIC | Age: 74
End: 2024-07-23
Payer: MEDICARE

## 2024-07-23 VITALS
HEIGHT: 72 IN | OXYGEN SATURATION: 98 % | BODY MASS INDEX: 28.23 KG/M2 | WEIGHT: 208.4 LBS | DIASTOLIC BLOOD PRESSURE: 70 MMHG | SYSTOLIC BLOOD PRESSURE: 102 MMHG | HEART RATE: 50 BPM | RESPIRATION RATE: 12 BRPM

## 2024-07-23 DIAGNOSIS — I25.10 ATHEROSCLEROSIS OF NATIVE CORONARY ARTERY OF NATIVE HEART WITHOUT ANGINA PECTORIS: ICD-10-CM

## 2024-07-23 DIAGNOSIS — R91.8 PULMONARY NODULES: Primary | ICD-10-CM

## 2024-07-23 DIAGNOSIS — I71.20 THORACIC AORTIC ANEURYSM WITHOUT RUPTURE, UNSPECIFIED PART (HCC): ICD-10-CM

## 2024-07-23 DIAGNOSIS — Z97.8 FOLEY CATHETER IN PLACE: ICD-10-CM

## 2024-07-23 DIAGNOSIS — I10 BENIGN ESSENTIAL HYPERTENSION: ICD-10-CM

## 2024-07-23 DIAGNOSIS — M48.061 SPINAL STENOSIS OF LUMBAR REGION AT MULTIPLE LEVELS: ICD-10-CM

## 2024-07-23 DIAGNOSIS — I48.0 PAROXYSMAL ATRIAL FIBRILLATION (HCC): ICD-10-CM

## 2024-07-23 DIAGNOSIS — R33.9 URINARY RETENTION: ICD-10-CM

## 2024-07-23 DIAGNOSIS — N39.0 URINARY TRACT INFECTION DUE TO PSEUDOMONAS AERUGINOSA: ICD-10-CM

## 2024-07-23 DIAGNOSIS — B96.5 URINARY TRACT INFECTION DUE TO PSEUDOMONAS AERUGINOSA: ICD-10-CM

## 2024-07-23 PROCEDURE — G2211 COMPLEX E/M VISIT ADD ON: HCPCS | Performed by: FAMILY MEDICINE

## 2024-07-23 PROCEDURE — 99214 OFFICE O/P EST MOD 30 MIN: CPT | Performed by: FAMILY MEDICINE

## 2024-07-23 RX ORDER — APIXABAN 5 MG/1
5 TABLET, FILM COATED ORAL 2 TIMES DAILY
COMMUNITY
Start: 2024-07-16

## 2024-07-23 RX ORDER — PANTOPRAZOLE SODIUM 40 MG/1
1 TABLET, DELAYED RELEASE ORAL
COMMUNITY
Start: 2024-07-13 | End: 2024-08-12

## 2024-07-23 NOTE — PATIENT INSTRUCTIONS
1. Pulmonary nodules  Assessment & Plan:  CT scans appear stable regarding 8 mm right upper lobe nodule, can continue to check that yearly  2. Paroxysmal atrial fibrillation (HCC) s/p ablation  Assessment & Plan:  Status post July 12 ablation, he will continue to see cardiology.  Watch for orthostatic symptoms, he does have history of same.  He is back on metoprolol 25 twice daily, we may need to drop that to 12.5 twice daily.  Blood pressure is low here today but he is feeling okay.    Continues on anticoagulation with Eliquis.  3. Atherosclerosis of native coronary artery  4. Benign essential hypertension  5. Urinary retention  Assessment & Plan:  Continues with Garcia catheter, hopefully he can have this removed later this week, he will follow-up with urology  6. Garcia catheter in place  7. Urinary tract infection due to Pseudomonas aeruginosa  Assessment & Plan:  He was treated with 1 dose gentamicin 70 mg/kg on July 19 at [facility], he does have follow-up with urology July 26, watch for fevers, other symptoms  8. Thoracic aortic aneurysm without rupture, unspecified part ([facility])  Assessment & Plan:  CT scanning shows aneurysm appears stable, continue to check that yearly, continue to control blood pressure  9. Spinal stenosis of lumbar region at multiple levels  Assessment & Plan:  MRI 4/24 and 7/24  1. Stable T1, T2 hypointense lesion involving the T11 vertebral body without corresponding STIR signal hyperintensity. 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 Recommend follow-up imaging with a lumbar spine MRI in 12   months time to document continued stability.  2. Stable multilevel lumbar spondylosis as described above, contributing to at most severe canal stenosis/bilateral lateral recess stenosis at L3-L5, and severe left and moderate right neural foraminal stenosis at L4-L5.    His back pain has been improved with rest, has had epidural steroids in the past, we discussed  finding on MRI of what appears to be atypical hemangioma       We will see him again in November 4 for his annual wellness, call us sooner if needed.  He will see urology July 26, cardiology September 23.    Emergency room blood work from July 19 showed hemoglobin 13.1, WBC 11.6, nonfasting glucose 115.  BUN/creatinine 29/1.28 with potassium 4.0.    PSA in June was elevated at 7.61, he will follow-up with urology regarding this also.    TSH in June was okay at 1.65    Last colonoscopy with Dr. Bernstein was in May 2023, redo 5 years was advised.

## 2024-07-23 NOTE — ASSESSMENT & PLAN NOTE
He was treated with 1 dose gentamicin 70 mg/kg on July 19 at [facility], he does have follow-up with urology July 26, watch for fevers, other symptoms

## 2024-07-23 NOTE — ASSESSMENT & PLAN NOTE
Status post July 12 ablation, he will continue to see cardiology.  Watch for orthostatic symptoms, he does have history of same.  He is back on metoprolol 25 twice daily, we may need to drop that to 12.5 twice daily.  Blood pressure is low here today but he is feeling okay.    Continues on anticoagulation with Eliquis.

## 2024-07-23 NOTE — ASSESSMENT & PLAN NOTE
Continues with Garcia catheter, hopefully he can have this removed later this week, he will follow-up with urology

## 2024-07-23 NOTE — PROGRESS NOTES
FAMILY PRACTICE OFFICE VISIT  Jose Roberto Mejia D.O.    [facility]  [facility]  [address]   [address]  [place], [address]      NAME: Oni Nagy  AGE: 74 y.o. SEX: male  : 1950   MRN: [id number]    DATE: 2024  TIME: 12:57 PM      Assessment and Plan         Patient Instructions   1. Pulmonary nodules  Assessment & Plan:  CT scans appear stable regarding 8 mm right upper lobe nodule, can continue to check that yearly  2. Paroxysmal atrial fibrillation (HCC) s/p ablation  Assessment & Plan:  Status post  ablation, he will continue to see cardiology.  Watch for orthostatic symptoms, he does have history of same.  He is back on metoprolol 25 twice daily, we may need to drop that to 12.5 twice daily.  Blood pressure is low here today but he is feeling okay.    Continues on anticoagulation with Eliquis.  3. Atherosclerosis of native coronary artery  4. Benign essential hypertension  5. Urinary retention  Assessment & Plan:  Continues with Garcia catheter, hopefully he can have this removed later this week, he will follow-up with urology  6. Garcia catheter in place  7. Urinary tract infection due to Pseudomonas aeruginosa  Assessment & Plan:  He was treated with 1 dose gentamicin 70 mg/kg on  at [facility], he does have follow-up with urology , watch for fevers, other symptoms  8. Thoracic aortic aneurysm without rupture, unspecified part ([facility])  Assessment & Plan:  CT scanning shows aneurysm appears stable, continue to check that yearly, continue to control blood pressure  9. Spinal stenosis of lumbar region at multiple levels  Assessment & Plan:  MRI  and   1. Stable T1, T2 hypointense lesion involving the T11 vertebral body without corresponding STIR signal hyperintensity. 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 Recommend follow-up imaging with a lumbar spine MRI in 12   months time to document  continued stability.  2. Stable multilevel lumbar spondylosis as described above, contributing to at most severe canal stenosis/bilateral lateral recess stenosis at L3-L5, and severe left and moderate right neural foraminal stenosis at L4-L5.    His back pain has been improved with rest, has had epidural steroids in the past, we discussed finding on MRI of what appears to be atypical hemangioma       We will see him again in November 4 for his annual wellness, call us sooner if needed.  He will see urology July 26, cardiology September 23.    Emergency room blood work from July 19 showed hemoglobin 13.1, WBC 11.6, nonfasting glucose 115.  BUN/creatinine 29/1.28 with potassium 4.0.    PSA in June was elevated at 7.61, he will follow-up with urology regarding this also.    TSH in June was okay at 1.65    Last colonoscopy with Dr. Bernstein was in May 2023, redo 5 years was advised.    Chief Complaint     Chief Complaint   Patient presents with    er follow up        History of Present Illness   Oni Nagy is a 74 y.o.-year-old male who is in for a follow-up, he is in today accompanied by his wife.    He had a cardiac ablation July 12, subsequently developed urinary retention, required a Garcia catheter.  Was seen at the emergency room a few times, had UTI, required gentamicin dose x 1 for Pseudomonas.  Has no recent fevers, urine with occasional blood, is on anticoagulation, feels UTI symptoms have improved.  He is hopeful he can have Garcia catheter removed later this week at his urology visit.    He has had no recurrence palpitations, is on beta-blocker again, no lightheadedness.  No chest pain, no increased shortness of breath.    Continues with low back pain but has been improved with rest, does do epidural steroid injections at times, just had another MRI which showed stability T11 hemangioma lesion.  He has no new weakness in legs    Had been seen at the emergency room in [place], CT scanning there showed  nodule which has been seen on prior scanning here    Review of Systems   Review of Systems   Constitutional:         See HPI       Active Problem List     Patient Active Problem List   Diagnosis    Abdominal hernia    Benign essential hypertension    Proteinuria    Neurocardiogenic syncope    Insomnia    Hypercholesterolemia    Hematuria, microscopic    Elevated PSA w neg Bx 2014    Coronary atherosclerosis    Paroxysmal atrial fibrillation (HCC)     Localized, primary osteoarthritis of shoulder region    Urinary retention    Pulmonary nodules    Thoracic aortic aneurysm without rupture (HCC)    Status post aortic valve replacement    S/P mitral valve repair    BPH (benign prostatic hyperplasia)    Inguinal Hernia Right    Frequent PVCs    Lumbar radiculopathy    Spinal stenosis of lumbar region at multiple levels    Urinary tract infection due to Pseudomonas aeruginosa    Garcia catheter in place       Past Medical History:  Reviewed    Past Surgical History:  Reviewed    Family History:  Reviewed    Social History:  Reviewed    Objective     Vitals:    07/23/24 1016   BP: 102/70   BP Location: Left arm   Patient Position: Sitting   Cuff Size: Standard   Pulse: (!) 50   Resp: 12   SpO2: 98%   Weight: 94.5 kg (208 lb 6.4 oz)   Height: 6' (1.829 m)     Body mass index is 28.26 kg/m².    BP Readings from Last 3 Encounters:   07/23/24 102/70   06/06/24 148/93   06/04/24 144/70       Wt Readings from Last 3 Encounters:   07/23/24 94.5 kg (208 lb 6.4 oz)   06/04/24 98.4 kg (217 lb)   04/24/24 98.4 kg (217 lb)       Physical Exam  Constitutional:       Comments: Seated in chair, has Garcia catheter.  No respiratory distress.  Heart is regular today.  Bradycardic.  Lungs are clear and equal bilaterally.  Abdomen is soft, nontender.  He has no significant ankle edema.  Neurologically he is at baseline         ALLERGIES:  No Known Allergies    Current Medications     Current Outpatient Medications   Medication Sig Dispense  Refill    Eliquis 5 MG Take 5 mg by mouth 2 (two) times a day      hyoscyamine (LEVSIN/SL) 0.125 mg SL tablet Take 0.125 mg by mouth every 4 (four) hours as needed      metoprolol tartrate (LOPRESSOR) 25 mg tablet Take 25 mg by mouth 2 (two) times a day      pantoprazole (PROTONIX) 40 mg tablet Take 1 tablet by mouth 2 (two) times a day before meals      lovastatin (MEVACOR) 40 MG tablet Take 1 tablet by mouth      Magnesium 250 MG TABS Take 1 tablet by mouth daily        Saw Palmetto, Serenoa repens, (SAW PALMETTO PO) Take by mouth daily 250 mg      tamsulosin (FLOMAX) 0.4 mg TAKE 1 CAPSULE BY MOUTH EVERY DAY AT NIGHT       No current facility-administered medications for this visit.            No orders of the defined types were placed in this encounter.        Jose Roberto Mejia DO

## 2024-07-23 NOTE — ASSESSMENT & PLAN NOTE
Cholesterol last year 139 with HDL 29, LDL 76.  Blood pressure controlled.  Continue with medication as is

## 2024-07-23 NOTE — ASSESSMENT & PLAN NOTE
MRI 4/24 and 7/24  1. Stable T1, T2 hypointense lesion involving the T11 vertebral body without corresponding STIR signal hyperintensity. 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 Recommend follow-up imaging with a lumbar spine MRI in 12   months time to document continued stability.  2. Stable multilevel lumbar spondylosis as described above, contributing to at most severe canal stenosis/bilateral lateral recess stenosis at L3-L5, and severe left and moderate right neural foraminal stenosis at L4-L5.    His back pain has been improved with rest, has had epidural steroids in the past, we discussed finding on MRI of what appears to be atypical hemangioma

## 2024-07-23 NOTE — ASSESSMENT & PLAN NOTE
CT scanning shows aneurysm appears stable, continue to check that yearly, continue to control blood pressure

## 2024-07-29 ENCOUNTER — OFFICE VISIT (OUTPATIENT)
Dept: OBGYN CLINIC | Facility: HOSPITAL | Age: 74
End: 2024-07-29
Payer: MEDICARE

## 2024-07-29 VITALS
HEART RATE: 53 BPM | WEIGHT: 208.34 LBS | SYSTOLIC BLOOD PRESSURE: 115 MMHG | HEIGHT: 72 IN | BODY MASS INDEX: 28.22 KG/M2 | DIASTOLIC BLOOD PRESSURE: 76 MMHG

## 2024-07-29 DIAGNOSIS — M48.061 SPINAL STENOSIS OF LUMBAR REGION, UNSPECIFIED WHETHER NEUROGENIC CLAUDICATION PRESENT: Primary | ICD-10-CM

## 2024-07-29 PROCEDURE — 99212 OFFICE O/P EST SF 10 MIN: CPT | Performed by: ORTHOPAEDIC SURGERY

## 2024-07-29 NOTE — PROGRESS NOTES
Assessment & Plan/Medical Decision Makin y.o. male with Back Pain, Left Radicular Leg Pain, and Left Gluteal Pain and imaging findings most notable for lumbar spondylosis , L3-4 and L4-5 spinal stenosis.  We discussed surgery considering he has attempted multiple non op treatments.  He recently had heart ablation procedure and had significant urinary issues post op.  He will plan for evaluation with Urology and prostate procedure prior to proceeding with lumbar surgery.  In the interim he will have injection WILDA to help with pain.  Hub will follow up in a few months after Urology evaluation for further surgical planning.     Subjective:      Chief Complaint: Back and Left Lower Extremity Pain    HPI:  Oni Nagy is a 74 y.o. male presenting for follow up with chief complaint of back pain.   He was seen by Dr Gamble and he has had 2 injections. He states the first injection did not help much but the 2nd injection gave him about a 50%  improvement. He states he can walk a block before pain comes back and he can work for about 15 minute increments .  He states 3 weeks ago had heat ablasion so he was more bed ridden for about 2-3 weeks but has been more active and pain has returned. His pain is about the same as after his 2nd injection since getting back on his feet from the heatt ablasion  He did have an MRI in July as well        Last visit 2024:  Ongoing, worsening left sided low back pain x8 weeks without inciting injury or event. Patient notes initial onset of decreased ambulation in 2023 when walking around [place] that eventually dissipated. However, reports worsening ambulation and ongoing left sided low back pain that radiates into posterior left lower thigh with intermittent numbness and tingling in his left foot x8 weeks . Only able to walk a few blocks before needing to stop and rest due to back and left leg pain. Notes left leg aches when sitting/lying down at night. Pain is  constant. Denies marky lower extremity weakness. Denies right sided symptoms. Positive shopping cart sign. Denies any marky trauma. Denies fever or chills, no night sweats. Denies any bladder or bowel changes.      Denies heart or lung disease. Denies diabetes or kidney disease.    Conservative therapy includes the following:   Medications: medrol dose sarah without relief    Injections: denies, has upcoming appointment with Dr. Gamble scheduled 4/24/2024  Physical Therapy: has attempted, made pain worse  Chiropractic Medicine: has not attempted  Accupunture/Massage Therapy: has not attempted   These therapeutic modalities were ineffective at providing sustained pain relief/functional improvement.     Nicotine dependent: denies  Occupation: photography, artist,    Living situation: Lives with family   ADLs: patient is able to perform     Objective:     Family History   Problem Relation Age of Onset    Alzheimer's disease Mother 80    Esophageal cancer Mother     Diabetes Father     Diabetes Sister     Kidney failure Sister        Past Medical History:   Diagnosis Date    Benign essential hypertension 03/23/2011    BPH (benign prostatic hyperplasia) 02/20/2023    Last Assessment & Plan:  Formatting of this note might be different from the original. Doing well.  Cont tamsulosin and saw palmetto.  We did discuss adding finasteride but will have repeat psa prior.  Also discussed indications for holep.  As of now, he's doing great. Last episode of retention was clearly related to anesthesia.  Fu 4-6w with psa    Cardiogenic shock (HCC) 07/05/2022    Chronic kidney disease, stage III (moderate) ([facility])     Coronary atherosclerosis 03/23/2011    Last Assessment & Plan:  There are no symptoms of an anginal syndrome. I provided education regarding the nature of coronary artery disease and our recommendations to reduce his long-term risk of ACS.  I recommended he continue utilizing aspirin and statins to minimize  risk.  A lipid profile was requested with the option to use high intensity statins as an alternative to Mevacor in response to per    Enlarged prostate     Epistaxis 01/09/2022    History of aortic valve disease     History of mitral valve disease     History of transfusion     May 2021 - no adverse reaction    Paroxysmal atrial fibrillation (HCC)  02/06/2020    Pulmonary nodules 02/05/2021    PVC's (premature ventricular contractions)     S/P mitral valve repair 06/15/2021    May 2021 at [facility]    Status post aortic valve replacement 05/31/2021    At [facility] May 2021    SVT (supraventricular tachycardia)     Syncope, cardiogenic     Thoracic aortic aneurysm without rupture (HCC) 02/05/2021    4.5cm on CT 1/21  Formatting of this note might be different from the original. 4.5cm on CT chest 1/28/21  Last Assessment & Plan:  Formatting of this note might be different from the original. 6/6/21 chest CT showed stable 4.3 cm ascending thoracic aortic aneurysm.    Thrombocytopenia (HCC) 07/05/2022       Current Outpatient Medications   Medication Sig Dispense Refill    Eliquis 5 MG Take 5 mg by mouth 2 (two) times a day      hyoscyamine (LEVSIN/SL) 0.125 mg SL tablet Take 0.125 mg by mouth every 4 (four) hours as needed      lovastatin (MEVACOR) 40 MG tablet Take 1 tablet by mouth      Magnesium 250 MG TABS Take 1 tablet by mouth daily        metoprolol tartrate (LOPRESSOR) 25 mg tablet Take 25 mg by mouth 2 (two) times a day      pantoprazole (PROTONIX) 40 mg tablet Take 1 tablet by mouth 2 (two) times a day before meals      Saw Palmetto, Serenoa repens, (SAW PALMETTO PO) Take by mouth daily 250 mg      tamsulosin (FLOMAX) 0.4 mg TAKE 1 CAPSULE BY MOUTH EVERY DAY AT NIGHT       No current facility-administered medications for this visit.       Past Surgical History:   Procedure Laterality Date    CARDIAC SURGERY      MV repair, AV replaced and aortic aneurysm repaired    CAUTERIZE INNER NOSE Left 01/09/2022     Procedure: Sphenopalatine artery ligation;  Surgeon: Alfonso Rodriguez MD;  Location: [facility];  Service: ENT    COLONOSCOPY  2007    Dr. Schreiber.  Tubular adenoma descending colon polyp.    COLONOSCOPY  2010    Dr. Schreiber.  Diverticulosis.    COLONOSCOPY  08/2020    Dr. Schreiber.  12 mm descending colon inflammatory polyp, 3 mm benign lymphoid aggregate, diverticulosis.    FACIAL/NECK BIOPSY Left 11/30/2021    Procedure: EXCISION PYOGENIC GRANULOMA; FREE MUCOSAL GRAFT FROM NOSE;  Surgeon: Alfonso Rodriguez MD;  Location: [facility];  Service: ENT    INGUINAL HERNIA REPAIR      TONSILLECTOMY         Social History     Socioeconomic History    Marital status: /Civil Union     Spouse name: Not on file    Number of children: Not on file    Years of education: Not on file    Highest education level: Not on file   Occupational History    Occupation: /      Comment: full-time   Tobacco Use    Smoking status: Never    Smokeless tobacco: Never   Vaping Use    Vaping status: Never Used   Substance and Sexual Activity    Alcohol use: No    Drug use: No    Sexual activity: Not on file   Other Topics Concern    Not on file   Social History Narrative    Not on file     Social Determinants of Health     Financial Resource Strain: Low Risk  (7/13/2024)    Received from [facility]    Overall Financial Resource Strain (CARDIA)     Difficulty of Paying Living Expenses: Not very hard   Food Insecurity: No Food Insecurity (7/13/2024)    Received from [facility]    Hunger Vital Sign     Worried About Running Out of Food in the Last Year: Never true     Ran Out of Food in the Last Year: Never true   Transportation Needs: No Transportation Needs (7/13/2024)    Received from [facility]    PRAPARE - Transportation     Lack of Transportation (Medical): No     Lack of Transportation (Non-Medical): No   Physical Activity: Not on file    Stress: Not on file   Social Connections: Not on file   Intimate Partner Violence: Not At Risk (7/13/2024)    Received from [facility]    Humiliation, Afraid, Rape, and Kick questionnaire     Fear of Current or Ex-Partner: No     Emotionally Abused: No     Physically Abused: No     Sexually Abused: No   Housing Stability: Low Risk  (7/13/2024)    Received from [facility]    Housing Stability Vital Sign     Unable to Pay for Housing in the Last Year: No     Number of Times Moved in the Last Year: 0     Homeless in the Last Year: No       No Known Allergies    Review of Systems  General- denies fever/chills  HEENT- denies hearing loss or sore throat  Eyes- denies eye pain or visual disturbances, denies red eyes  Respiratory- denies cough or SOB  Cardio- denies chest pain or palpitations  GI- denies abdominal pain  Endocrine- denies urinary frequency  Urinary- denies pain with urination  Musculoskeletal- Negative except noted above  Skin- denies rashes or wounds  Neurological- denies dizziness or headache  Psychiatric- denies anxiety or difficulty concentrating    Physical Exam  /76   Pulse (!) 53   Ht 6' (1.829 m)   Wt 94.5 kg (208 lb 5.4 oz)   BMI 28.26 kg/m²     General/Constitutional: No apparent distress: well-nourished and well developed.  Lymphatic: No appreciable lymphadenopathy  Respiratory: Non-labored breathing  Vascular: No edema, swelling or tenderness, except as noted in detailed exam.  Integumentary: No impressive skin lesions present, except as noted in detailed exam.  Psych: Normal mood and affect, oriented to person, place and time.  MSK: normal other than stated in HPI and exam  Gait & balance: no evidence of myelopathic gait, ambulates Independently     Lumbar spine range of motion:  -Forward flexion to 90  -Extension to neutral  -Lateral bend 25 right, 25 left  -Rotation 25 right, 25 left  There tenderness with palpation along lumbar paraspinal  "musculature, no midline tenderness     Neurologic:  Lower Extremity Motor Function    Right  Left    Iliopsoas  5/5  5/5    Quadriceps 5/5 5/5   Tibialis anterior  5/5  5/5    EHL  5/5  5/5    Gastroc. muscle  5/5  5/5    Heel rise  5/5  5/5    Toe rise  5/5  5/5      Sensory: light touch is intact to bilateral upper and lower extremities     Reflexes:    Right Left   Patellar 1+ 1+   Achilles 1+ 1+   Babinski neg neg     Other tests:  Straight Leg Raise: negative  Mary SI: negative  ROMEO SI: negative  Greater troch: no tenderness   Internal/external hip ROM: intact, no pain   Flexion/extension knee ROM: intact, no pain   Vascular: WWP extremities, 2+DP bilateral      Diagnostic Tests   IMAGING: I have personally reviewed the images and these are my findings:  Lumbar Spine X-rays from 4/17/2024: multi level lumbar spondylosis with loss of disc height, osteophyte formation and facet hypertrophy, L4-5 degenerative spondylolisthesis, no appreciated lytic/blastic lesions, no obvious instability    Lumbar Spine MRI from 4/1/2024: multi level lumbar disc degeneration with disc desiccation, loss of disc height, facet and ligamentum hypertrophy, L3-4 and L4-5 central, lateral recess, stenosis     Electronic Medical Records were reviewed including office notes, imaging studies    Procedures, if performed today     None performed       Portions of the record may have been created with voice recognition software.  Occasional wrong word or \"sound a like\" substitutions may have occurred due to the inherent limitations of voice recognition software.  Read the chart carefully and recognize, using context, where substitutions have occurred.  "

## 2024-07-30 ENCOUNTER — TELEPHONE (OUTPATIENT)
Age: 74
End: 2024-07-30

## 2024-07-30 NOTE — TELEPHONE ENCOUNTER
Left a message letting the patient know that a message was sent over to Dr Mejia about holding his Eliquis. Once I hear back I will give him a call to schedule.

## 2024-07-30 NOTE — TELEPHONE ENCOUNTER
S/w pt, requesting repeat left L5-S1 LESI last performed 6/6. Pt states symptoms have not changed in location (L lower back, LLE), received approx 50% relief from previous injection.     FYI  Pt had recent cardiac ablation since last injection. Pt states he is on eliquis.

## 2024-07-30 NOTE — TELEPHONE ENCOUNTER
Eliquis is prescribed by [facility] cardiology, hold request needs to be sent to them

## 2024-07-30 NOTE — TELEPHONE ENCOUNTER
Caller: pt    Doctor: lila    Reason for call: pt would like to get another injection since he is not allowed to get surgery yet. Please advise.    Call back#: [phone]

## 2024-07-30 NOTE — TELEPHONE ENCOUNTER
Patient is being considered for a neuraxial injection, such as an epidural injection, nerve root block, etc. for the management of their pain.  However, the patient is currently on an anticoagulant per your orders.  The American Society of Regional Anesthesia Guideline of neuraxial procedures and anti-coagulation indicates that medication should be held as follows:      Eliquis 5 mg 3 days prior to Lumbar Epidural Steroid Injection.     Can the patient hold their Eliquis for 3 days prior to this injection?

## 2024-08-13 ENCOUNTER — DOCUMENTATION (OUTPATIENT)
Dept: FAMILY MEDICINE CLINIC | Facility: CLINIC | Age: 74
End: 2024-08-13

## 2024-08-13 NOTE — PROGRESS NOTES
I returned his wife's phone call, he does have excruciating back pain, limited ability to get out, was able to get out with grocery store scooter today.  We did discuss scooter would not be covered by Medicare but perhaps they could look into renting 1    They are awaiting follow-up from cardiology regarding hold on Eliquis prior to epidural steroid injection.    He did see spinal surgeon late July, plan was to do epidural steroid, see urology and do prostate procedure first prior to surgery on low back as he did have urinary retention    Has November 4 AWV with us

## 2024-08-21 PROBLEM — N39.0 URINARY TRACT INFECTION DUE TO PSEUDOMONAS AERUGINOSA: Status: RESOLVED | Noted: 2024-07-22 | Resolved: 2024-08-21

## 2024-08-21 PROBLEM — B96.5 URINARY TRACT INFECTION DUE TO PSEUDOMONAS AERUGINOSA: Status: RESOLVED | Noted: 2024-07-22 | Resolved: 2024-08-21

## 2024-08-29 ENCOUNTER — HOSPITAL ENCOUNTER (OUTPATIENT)
Dept: RADIOLOGY | Facility: MEDICAL CENTER | Age: 74
Discharge: HOME | End: 2024-08-29
Payer: MEDICARE

## 2024-08-29 VITALS
RESPIRATION RATE: 16 BRPM | OXYGEN SATURATION: 96 % | DIASTOLIC BLOOD PRESSURE: 76 MMHG | SYSTOLIC BLOOD PRESSURE: 147 MMHG | HEART RATE: 58 BPM | TEMPERATURE: 97.9 F

## 2024-08-29 DIAGNOSIS — M54.16 LUMBAR RADICULOPATHY: ICD-10-CM

## 2024-08-29 PROCEDURE — 62323 NJX INTERLAMINAR LMBR/SAC: CPT | Performed by: ANESTHESIOLOGY

## 2024-08-29 RX ORDER — METHYLPREDNISOLONE ACETATE 80 MG/ML
80 INJECTION, SUSPENSION INTRA-ARTICULAR; INTRALESIONAL; INTRAMUSCULAR; PARENTERAL; SOFT TISSUE ONCE
Status: COMPLETED | OUTPATIENT
Start: 2024-08-29 | End: 2024-08-29

## 2024-08-29 RX ORDER — BUPIVACAINE HCL/PF 2.5 MG/ML
1 VIAL (ML) INJECTION ONCE
Status: COMPLETED | OUTPATIENT
Start: 2024-08-29 | End: 2024-08-29

## 2024-08-29 RX ADMIN — IOHEXOL 1 ML: 300 INJECTION, SOLUTION INTRAVENOUS at 14:24

## 2024-08-29 RX ADMIN — BUPIVACAINE HYDROCHLORIDE 1 ML: 2.5 INJECTION, SOLUTION EPIDURAL; INFILTRATION; INTRACAUDAL at 14:25

## 2024-08-29 RX ADMIN — METHYLPREDNISOLONE ACETATE 80 MG: 80 INJECTION, SUSPENSION INTRA-ARTICULAR; INTRALESIONAL; INTRAMUSCULAR; PARENTERAL; SOFT TISSUE at 14:25

## 2024-08-29 NOTE — H&P
History of Present Illness: The patient is a 74 y.o. male who presents with complaints of back and leg pain    Past Medical History:   Diagnosis Date    Benign essential hypertension 03/23/2011    BPH (benign prostatic hyperplasia) 02/20/2023    Last Assessment & Plan:  Formatting of this note might be different from the original. Doing well.  Cont tamsulosin and saw palmetto.  We did discuss adding finasteride but will have repeat psa prior.  Also discussed indications for holep.  As of now, he's doing great. Last episode of retention was clearly related to anesthesia.  Fu 4-6w with psa    Cardiogenic shock (HCC) 07/05/2022    Chronic kidney disease, stage III (moderate) ([facility])     Coronary atherosclerosis 03/23/2011    Last Assessment & Plan:  There are no symptoms of an anginal syndrome. I provided education regarding the nature of coronary artery disease and our recommendations to reduce his long-term risk of ACS.  I recommended he continue utilizing aspirin and statins to minimize risk.  A lipid profile was requested with the option to use high intensity statins as an alternative to Mevacor in response to per    Enlarged prostate     Epistaxis 01/09/2022    History of aortic valve disease     History of mitral valve disease     History of transfusion     May 2021 - no adverse reaction    Paroxysmal atrial fibrillation ([facility])  02/06/2020    Pulmonary nodules 02/05/2021    PVC's (premature ventricular contractions)     S/P mitral valve repair 06/15/2021    May 2021 at [facility]    Status post aortic valve replacement 05/31/2021    At [facility] May 2021    SVT (supraventricular tachycardia)     Syncope, cardiogenic     Thoracic aortic aneurysm without rupture ([facility]) 02/05/2021    4.5cm on CT 1/21  Formatting of this note might be different from the original. 4.5cm on CT chest 1/28/21  Last Assessment & Plan:  Formatting of this note might be different from the original. 6/6/21 chest CT showed stable 4.3 cm  ascending thoracic aortic aneurysm.    Thrombocytopenia (HCC) 07/05/2022       Past Surgical History:   Procedure Laterality Date    CARDIAC SURGERY      MV repair, AV replaced and aortic aneurysm repaired    CAUTERIZE INNER NOSE Left 01/09/2022    Procedure: Sphenopalatine artery ligation;  Surgeon: Alfonso Rodriguez MD;  [facility]: [facility];  Service: ENT    COLONOSCOPY  2007    Dr. Schreiber.  Tubular adenoma descending colon polyp.    COLONOSCOPY  2010    Dr. Schreiber.  Diverticulosis.    COLONOSCOPY  08/2020    Dr. Schreiber.  12 mm descending colon inflammatory polyp, 3 mm benign lymphoid aggregate, diverticulosis.    FACIAL/NECK BIOPSY Left 11/30/2021    Procedure: EXCISION PYOGENIC GRANULOMA; FREE MUCOSAL GRAFT FROM NOSE;  Surgeon: Alfonso Rodriguez MD;  [facility]: [facility];  Service: ENT    INGUINAL HERNIA REPAIR      TONSILLECTOMY           Current Outpatient Medications:     Eliquis 5 MG, Take 5 mg by mouth 2 (two) times a day, Disp: , Rfl:     hyoscyamine (LEVSIN/SL) 0.125 mg SL tablet, Take 0.125 mg by mouth every 4 (four) hours as needed, Disp: , Rfl:     lovastatin (MEVACOR) 40 MG tablet, Take 1 tablet by mouth, Disp: , Rfl:     Magnesium 250 MG TABS, Take 1 tablet by mouth daily  , Disp: , Rfl:     metoprolol tartrate (LOPRESSOR) 25 mg tablet, Take 25 mg by mouth 2 (two) times a day, Disp: , Rfl:     pantoprazole (PROTONIX) 40 mg tablet, Take 1 tablet by mouth 2 (two) times a day before meals, Disp: , Rfl:     Saw Palmetto, Serenoa repens, (SAW PALMETTO PO), Take by mouth daily 250 mg, Disp: , Rfl:     tamsulosin (FLOMAX) 0.4 mg, TAKE 1 CAPSULE BY MOUTH EVERY DAY AT NIGHT, Disp: , Rfl:     Current Facility-Administered Medications:     bupivacaine (PF) (MARCAINE) 0.25 % injection 1 mL, 1 mL, Epidural, Once, Will Calin Gamble MD    iohexol (OMNIPAQUE) 300 mg/mL injection 1 mL, 1 mL, Epidural, Once, Will Calin Gamble MD    methylPREDNISolone acetate (DEPO-MEDROL) injection 80 mg, 80 mg, Epidural,  Once, Will Calin Gamble MD    No Known Allergies    Physical Exam:   Vitals:    08/29/24 1413   BP: 124/80   Pulse: 58   Resp: 16   Temp: 97.9 °F (36.6 °C)   SpO2: 96%         General: Awake, Alert, Oriented x 3, Mood and affect appropriate  Respiratory: Respirations even and unlabored  Cardiovascular: Peripheral pulses intact; no edema  Musculoskeletal Exam: back and leg pain    ASA Score: 3    Patient/Chart Verification  Patient ID Verified: Verbal  Consents Confirmed: Procedural, To be obtained in the Pre-Procedure area  H&P( within 30 days) Verified: To be obtained in the Pre-Procedure area  Allergies Reviewed: Yes  Anticoag/NSAID held?: Yes (Eliquis last dose 8/25)  Currently on antibiotics?: No  Pregnancy denied?: NA    Assessment:   1. Lumbar radiculopathy        Plan: left L5-S1 LESI

## 2024-08-29 NOTE — DISCHARGE INSTRUCTIONS
Epidural Steroid Injection   WHAT YOU NEED TO KNOW:   An epidural steroid injection (WILDA) is a procedure to inject steroid medicine into the epidural space. The epidural space is between your spinal cord and vertebrae. Steroids reduce inflammation and fluid buildup in your spine that may be causing pain. You may be given pain medicine along with the steroids.          ACTIVITY  Do not drive or operate machinery today.  No strenuous activity today - bending, lifting, etc.  You may resume normal activites starting tomorrow - start slowly and as tolerated.  You may shower today, but no tub baths or hot tubs.  You may have numbness for several hours from the local anesthetic. Please use caution and common sense, especially with weight-bearing activities.    CARE OF THE INJECTION SITE  If you have soreness or pain, apply ice to the area today (20 minutes on/20 minutes off).  Starting tomorrow, you may use warm, moist heat or ice if needed.  You may have an increase or change in your discomfort for 36-48 hours after your treatment.  Apply ice and continue with any pain medication you have been prescribed.  Notify the [facility] if you have any of the following: redness, drainage, swelling, headache, stiff neck or fever above 100°F.    SPECIAL INSTRUCTIONS  Our office will contact you in approximately 14 days for a progress report.    MEDICATIONS  Continue to take all routine medications.  Our office may have instructed you to hold some medications.  Resume Eliquis tomorrow after 2:45 pm.    As no general anesthesia was used in today's procedure, you should not experience any side effects related to anesthesia.     If you are diabetic, the steroids used in today's injection may temporarily increase your blood sugar levels after the first few days after your injection. Please keep a close eye on your sugars and alert the doctor who manages your diabetes if your sugars are significantly high from your baseline or  you are symptomatic.     If you have a problem specifically related to your procedure, please call our office at [phone].  Problems not related to your procedure should be directed to your primary care physician.

## 2024-09-12 ENCOUNTER — TELEPHONE (OUTPATIENT)
Dept: RADIOLOGY | Facility: MEDICAL CENTER | Age: 74
End: 2024-09-12

## 2024-09-16 ENCOUNTER — OFFICE VISIT (OUTPATIENT)
Dept: OBGYN CLINIC | Facility: HOSPITAL | Age: 74
End: 2024-09-16
Payer: MEDICARE

## 2024-09-16 ENCOUNTER — HOSPITAL ENCOUNTER (OUTPATIENT)
Dept: RADIOLOGY | Facility: HOSPITAL | Age: 74
Discharge: HOME | End: 2024-09-16
Payer: MEDICARE

## 2024-09-16 VITALS
BODY MASS INDEX: 28.44 KG/M2 | HEIGHT: 72 IN | DIASTOLIC BLOOD PRESSURE: 82 MMHG | WEIGHT: 210 LBS | HEART RATE: 80 BPM | SYSTOLIC BLOOD PRESSURE: 124 MMHG

## 2024-09-16 DIAGNOSIS — M25.59 PAIN IN OTHER SPECIFIED JOINT: ICD-10-CM

## 2024-09-16 DIAGNOSIS — Z01.818 PRE-OP EVALUATION: ICD-10-CM

## 2024-09-16 DIAGNOSIS — M54.16 RADICULOPATHY, LUMBAR REGION: ICD-10-CM

## 2024-09-16 DIAGNOSIS — M48.061 SPINAL STENOSIS OF LUMBAR REGION, UNSPECIFIED WHETHER NEUROGENIC CLAUDICATION PRESENT: Primary | ICD-10-CM

## 2024-09-16 PROCEDURE — 71046 X-RAY EXAM CHEST 2 VIEWS: CPT

## 2024-09-16 PROCEDURE — 99215 OFFICE O/P EST HI 40 MIN: CPT | Performed by: ORTHOPAEDIC SURGERY

## 2024-09-16 RX ORDER — CEFAZOLIN SODIUM 2 G/50ML
2000 SOLUTION INTRAVENOUS ONCE
OUTPATIENT
Start: 2024-09-16 | End: 2024-09-16

## 2024-09-16 RX ORDER — CHLORHEXIDINE GLUCONATE ORAL RINSE 1.2 MG/ML
15 SOLUTION DENTAL ONCE
OUTPATIENT
Start: 2024-09-16 | End: 2024-09-16

## 2024-09-16 RX ORDER — TRANEXAMIC ACID 10 MG/ML
1000 INJECTION, SOLUTION INTRAVENOUS ONCE
OUTPATIENT
Start: 2024-09-16

## 2024-09-16 NOTE — PROGRESS NOTES
Assessment & Plan/Medical Decision Makin y.o. male with Back Pain, Left Radicular Leg Pain, and Left Gluteal Pain, dilatory dysfunction and imaging findings most notable for lumbar spondylosis , L3-4 and L4-5 spinal stenosis.          The clinical, physical and imaging findings were reviewed with the patient.  Minor  has a constellation of findings consistent with Lumbar Radiculopathy, neurogenic claudication/ambulatory dysfunction in the setting of lumbar degenerative disease, L4-5 spondylolisthesis, L2-5 spinal stenosis.       We discussed the differential diagnosis including extremity/musculoskeltal pathology, peripheral nerve compression, vascular claudication etc. Minor's symptoms, exam findings and imaging are most consistent with lumbar radiculopathy and neurogenic claudication.  In my opinion, no further diagnostic work-up (EMG, etc) is warranted at this time.  Discussed treatment options.  Reviewed the role of further non operative treatment such as physical therapy, activity modification, medication management and interventional spine procedures.  Given that his symptoms have persisted despite these conservative interventions, recommend consideration of surgical intervention.  Minor would like to proceed with lumbar surgery.       We discussed surgical options.  In my opinion, would be posterior lumbar decompression L2-L5 and posterior lumbar fusion with instrumentation L3-L5, with transforaminal lumbar interbody fusion to address mechanical back pain, spondylolisthesis, concern for instability.  We reviewed the options - such as instrumented vs non-instrumented, posterolateral vs interbody, local autograft vs ICBG vs allograft +/- biologic/graft extender/graft substitute supplementation for obtaining solid arthrodesis as well as the associated risk/benefit profiles and fusion efficacy as well as the FDA status of the instrumentation and products.  We also discussed the role of decompression surgery  alone, but given concerns of instability and ongoing mechanical back pain we reviewed the role of fusion.  After discussion with the patient will plan on posterolateral instrumented + TLIF with use of local autograft and allograft with graft extender/substitute supplementation.  Explained at length the rationale for surgical invention and postoperative expectations.  We discussed and Hub expressed his understanding, that in general, spine surgery is more predictive in improving extremity/radicular discomfort rather than axial spine pain, and arresting the progression of spinal cord/nerve dysfunction rather than improving.  Will utilize LSO brace immobilization during post operative recovery to help reduce pain by restricting mobility of the trunk and facilitate healing.      I reviewed with the patient possible risks of surgery which included the risk of infection, blood loss, risk of damage to adjacent nerves, vessels organs, risk of spinal fluid leak and meningitis, risk of chronic pain, incomplete resolution of symptoms, instability, adjacent segment degeneration & disease, risk of nonunion and instrumentation failure, disease transmission, need for further surgery, DVT, pulmonary embolism, blindness, paralysis and even death, as well as other risk on consent form.   We also discussed patient specific risks and mitigation including advanced age, history of postoperative urinary retention, cardiac history and associated gallo- and post- operative related risks.       We reviewed infection prevention.  Reviewed medications; instructed patient medications to stop before surgery (i.e. blood thinners, NSAIDs, DMARDs and vitamins)     Also reviewed with patient our narcotic policy.  We will provide medications up to 60 days postop.  In the event patient requires more medications, will need to consult their PCP and/or see a pain management physician.     I have queried the [place] database for  Minor Nagy to better assist in clinical decision making regarding prescriptions for controlled medications.  After querying the database and considering the clinical picture I have determined that he is a candidate for a prescription for control medication in the initial postoperative period.     All questions were answered.  Patient verbalized understanding.  Informed consent was obtained.  Electronic consent form was signed.  Minor's wife, was also present who had no further questions.     He received a pre-operative chest Xray at today's visit. Orders for preoperative labs (CBC, CMP, PTT, INR, type & screen, and ECG) were also placed at today's visit. Discussed pre-operative clearances, medical optimization and risk stratification.  Minor needs pre-operative clearance from PCP, urologist and cardiologist and obtain PATs.      Pre operative patient reported outcome measures were obtained.      Patient instructed to return to office/ER sooner if symptoms are not improving, getting worse, or new worrisome/neurologic symptoms arise.    Subjective:     Chief Complaint: Back and Left Lower Extremity Pain     HPI:  Oni Nagy is a 74 y.o. male presenting for initial visit with chief complaint of back pain. Ongoing, worsening left sided low back pain x8 weeks without inciting injury or event. Patient notes initial onset of decreased ambulation in June 2023 when walking around [place] that eventually dissipated. However, reports worsening ambulation and ongoing left sided low back pain that radiates into posterior left lower thigh with intermittent numbness and tingling in his left foot x8 weeks . Only able to walk a few blocks before needing to stop and rest due to back and left leg pain. Notes left leg aches when sitting/lying down at night. Pain is constant. Denies marky lower extremity weakness. Denies right sided symptoms. Positive shopping cart sign. Denies any marky trauma. Denies fever or chills, no night  sweats. Denies any bladder or bowel changes.       Denies heart or lung disease. Denies diabetes or kidney disease.     Conservative therapy includes the following:   Medications: medrol dose sarah without relief    Injections: denies, has upcoming appointment with Dr. Gamble scheduled 4/24/2024  Physical Therapy: has attempted, made pain worse  Chiropractic Medicine: has not attempted  Accupunture/Massage Therapy: has not attempted   These therapeutic modalities were ineffective at providing sustained pain relief/functional improvement.      Nicotine dependent: denies  Occupation: photography, artist,    Living situation: Lives with family   ADLs: patient is able to perform      7/29/2024 update  He was seen by Dr Gamble and he has had 2 injections. He states the first injection did not help much but the 2nd injection gave him about a 50%  improvement. He states he can walk a block before pain comes back and he can work for about 15 minute increments .  He states 3 weeks ago had heat ablasion so he was more bed ridden for about 2-3 weeks but has been more active and pain has returned. His pain is about the same as after his 2nd injection since getting back on his feet from the heatt ablasion  He did have an MRI in July as well    9/16/2024 update:  The patient would like to scheduled surgery.  Today he complains of low back/ buttock pain with left posterolateral thigh pain.  Standing or walking for 10-15 minutes aggravates.  He can walk 1/2-1 block before pain increases.   He rates his pain at 2/10 sitting and 8/10 at its worse.  His pain effects his daily activity and gait.      Patient has history of urinary retention following past surgery.  He mentions recent cardiac ablation with recent urinary retention and subsequent sepsis. Patient has been cleared by urologist and cardiologist for surgery.    Objective:     Family History   Problem Relation Age of Onset    Alzheimer's disease Mother 80     Esophageal cancer Mother     Diabetes Father     Diabetes Sister     Kidney failure Sister        Past Medical History:   Diagnosis Date    Benign essential hypertension 03/23/2011    BPH (benign prostatic hyperplasia) 02/20/2023    Last Assessment & Plan:  Formatting of this note might be different from the original. Doing well.  Cont tamsulosin and saw palmetto.  We did discuss adding finasteride but will have repeat psa prior.  Also discussed indications for holep.  As of now, he's doing great. Last episode of retention was clearly related to anesthesia.  Fu 4-6w with psa    Cardiogenic shock (HCC) 07/05/2022    Chronic kidney disease, stage III (moderate) ([facility])     Coronary atherosclerosis 03/23/2011    Last Assessment & Plan:  There are no symptoms of an anginal syndrome. I provided education regarding the nature of coronary artery disease and our recommendations to reduce his long-term risk of ACS.  I recommended he continue utilizing aspirin and statins to minimize risk.  A lipid profile was requested with the option to use high intensity statins as an alternative to Mevacor in response to per    Enlarged prostate     Epistaxis 01/09/2022    History of aortic valve disease     History of mitral valve disease     History of transfusion     May 2021 - no adverse reaction    Paroxysmal atrial fibrillation (HCC)  02/06/2020    Pulmonary nodules 02/05/2021    PVC's (premature ventricular contractions)     S/P mitral valve repair 06/15/2021    May 2021 at [facility]    Status post aortic valve replacement 05/31/2021    At [facility] May 2021    SVT (supraventricular tachycardia)     Syncope, cardiogenic     Thoracic aortic aneurysm without rupture ([facility]) 02/05/2021    4.5cm on CT 1/21  Formatting of this note might be different from the original. 4.5cm on CT chest 1/28/21  Last Assessment & Plan:  Formatting of this note might be different from the original. 6/6/21 chest CT showed stable 4.3 cm ascending  thoracic aortic aneurysm.    Thrombocytopenia (HCC) 07/05/2022       Current Outpatient Medications   Medication Sig Dispense Refill    Eliquis 5 MG Take 5 mg by mouth 2 (two) times a day      lovastatin (MEVACOR) 40 MG tablet Take 1 tablet by mouth      Magnesium 250 MG TABS Take 1 tablet by mouth daily        metoprolol tartrate (LOPRESSOR) 25 mg tablet Take 25 mg by mouth 2 (two) times a day      Saw Palmetto, Serenoa repens, (SAW PALMETTO PO) Take by mouth daily 250 mg      tamsulosin (FLOMAX) 0.4 mg TAKE 1 CAPSULE BY MOUTH EVERY DAY AT NIGHT      hyoscyamine (LEVSIN/SL) 0.125 mg SL tablet Take 0.125 mg by mouth every 4 (four) hours as needed      pantoprazole (PROTONIX) 40 mg tablet Take 1 tablet by mouth 2 (two) times a day before meals       No current facility-administered medications for this visit.       Past Surgical History:   Procedure Laterality Date    CARDIAC SURGERY      MV repair, AV replaced and aortic aneurysm repaired    CAUTERIZE INNER NOSE Left 01/09/2022    Procedure: Sphenopalatine artery ligation;  Surgeon: Alfonso Rodriguez MD;  [facility];  Service: ENT    COLONOSCOPY  2007    Dr. Schreiber.  Tubular adenoma descending colon polyp.    COLONOSCOPY  2010    Dr. Schreiber.  Diverticulosis.    COLONOSCOPY  08/2020    Dr. Schreiber.  12 mm descending colon inflammatory polyp, 3 mm benign lymphoid aggregate, diverticulosis.    FACIAL/NECK BIOPSY Left 11/30/2021    Procedure: EXCISION PYOGENIC GRANULOMA; FREE MUCOSAL GRAFT FROM NOSE;  Surgeon: Alfonso Rodriguez MD;  [facility];  Service: ENT    INGUINAL HERNIA REPAIR      TONSILLECTOMY         Social History     Socioeconomic History    Marital status: /Civil Union     Spouse name: Not on file    Number of children: Not on file    Years of education: Not on file    Highest education level: Not on file   Occupational History    Occupation: /      Comment: full-time   Tobacco Use    Smoking  status: Never    Smokeless tobacco: Never   Vaping Use    Vaping status: Never Used   Substance and Sexual Activity    Alcohol use: No    Drug use: No    Sexual activity: Not on file   Other Topics Concern    Not on file   Social History Narrative    Not on file     Social Determinants of Health     Financial Resource Strain: Low Risk  (7/13/2024)    Received from [facility] [address]    Overall Financial Resource Strain (CARDIA)     Difficulty of Paying Living Expenses: Not very hard   Food Insecurity: No Food Insecurity (7/13/2024)    Received from [facility] [address]    Hunger Vital Sign     Worried About Running Out of Food in the Last Year: Never true     Ran Out of Food in the Last Year: Never true   Transportation Needs: No Transportation Needs (7/13/2024)    Received from [facility] [address]    PRAPARE - Transportation     Lack of Transportation (Medical): No     Lack of Transportation (Non-Medical): No   Physical Activity: Not on file   Stress: Not on file   Social Connections: Not on file   Intimate Partner Violence: Not At Risk (7/13/2024)    Received from [facility] [address]    Humiliation, Afraid, Rape, and Kick questionnaire     Fear of Current or Ex-Partner: No     Emotionally Abused: No     Physically Abused: No     Sexually Abused: No   Housing Stability: Low Risk  (7/13/2024)    Received from [facility] [address]    Housing Stability Vital Sign     Unable to Pay for Housing in the Last Year: No     Number of Times Moved in the Last Year: 0     Homeless in the Last Year: No       No Known Allergies    Review of Systems  General- denies fever/chills  HEENT- denies hearing loss or sore throat  Eyes- denies eye pain or visual disturbances, denies red eyes  Respiratory- denies cough or SOB  Cardio- denies chest pain or palpitations  GI- denies abdominal pain  Endocrine- denies urinary frequency  Urinary- denies pain with urination  Musculoskeletal- Negative  except noted above  Skin- denies rashes or wounds  Neurological- denies dizziness or headache  Psychiatric- denies anxiety or difficulty concentrating    Physical Exam  /82   Pulse 80   Ht 6' (1.829 m)   Wt 95.3 kg (210 lb)   BMI 28.48 kg/m²     General/Constitutional: No apparent distress: well-nourished and well developed.  Lymphatic: No appreciable lymphadenopathy  Respiratory: Non-labored breathing  Vascular: No edema, swelling or tenderness, except as noted in detailed exam.  Integumentary: No impressive skin lesions present, except as noted in detailed exam.  Psych: Normal mood and affect, oriented to person, place and time.  MSK: normal other than stated in HPI and exam  Gait & balance: no evidence of myelopathic gait, ambulates Independently     Lumbar spine range of motion:  -Forward flexion to 90  -Extension to neutral  -Lateral bend 25 right, 25 left  -Rotation 25 right, 25 left  There tenderness with palpation along lumbar paraspinal musculature, no midline tenderness     Neurologic:  Lower Extremity Motor Function    Right  Left    Iliopsoas  5/5  5/5    Quadriceps 5/5 5/5   Tibialis anterior  5/5  5/5    EHL  5/5  5/5    Gastroc. muscle  5/5  5/5    Heel rise  5/5  5/5    Toe rise  5/5  5/5      Sensory: light touch is intact to bilateral upper and lower extremities     Reflexes:    Right Left   Patellar 1+ 1+   Achilles 1+ 1+   Babinski neg neg     Other tests:  Straight Leg Raise: negative  Mary SI: negative  ROMEO SI: negative  Greater troch: no tenderness   Internal/external hip ROM: intact, no pain   Flexion/extension knee ROM: intact, no pain   Vascular: WWP extremities, 2+DP bilateral      Diagnostic Tests   IMAGING: I have personally reviewed the images and these are my findings:  Lumbar Spine X-rays from 4/17/2024: multi level lumbar spondylosis with loss of disc height, osteophyte formation and facet hypertrophy, L4-5 degenerative spondylolisthesis, no appreciated lytic/blastic  "lesions, no obvious instability    Lumbar Spine MRI from 4/1/2024: multi level lumbar disc degeneration with disc desiccation, loss of disc height, facet and ligamentum hypertrophy, L3-4 and L4-5 central, lateral recess, stenosis, also with bilateral L2-3 lateral recess stenosis    Electronic Medical Records were reviewed including office notes, imaging studies    Procedures, if performed today     None performed       Portions of the record may have been created with voice recognition software.  Occasional wrong word or \"sound a like\" substitutions may have occurred due to the inherent limitations of voice recognition software.  Read the chart carefully and recognize, using context, where substitutions have occurred.  "

## 2024-09-16 NOTE — H&P (VIEW-ONLY)
Assessment & Plan/Medical Decision Makin y.o. male with Back Pain, Left Radicular Leg Pain, and Left Gluteal Pain, dilatory dysfunction and imaging findings most notable for lumbar spondylosis , L3-4 and L4-5 spinal stenosis.          The clinical, physical and imaging findings were reviewed with the patient.  Minor  has a constellation of findings consistent with Lumbar Radiculopathy, neurogenic claudication/ambulatory dysfunction in the setting of lumbar degenerative disease, L4-5 spondylolisthesis, L2-5 spinal stenosis.       We discussed the differential diagnosis including extremity/musculoskeltal pathology, peripheral nerve compression, vascular claudication etc. Minor's symptoms, exam findings and imaging are most consistent with lumbar radiculopathy and neurogenic claudication.  In my opinion, no further diagnostic work-up (EMG, etc) is warranted at this time.  Discussed treatment options.  Reviewed the role of further non operative treatment such as physical therapy, activity modification, medication management and interventional spine procedures.  Given that his symptoms have persisted despite these conservative interventions, recommend consideration of surgical intervention.  Minor would like to proceed with lumbar surgery.       We discussed surgical options.  In my opinion, would be posterior lumbar decompression L2-L5 and posterior lumbar fusion with instrumentation L3-L5, with transforaminal lumbar interbody fusion to address mechanical back pain, spondylolisthesis, concern for instability.  We reviewed the options - such as instrumented vs non-instrumented, posterolateral vs interbody, local autograft vs ICBG vs allograft +/- biologic/graft extender/graft substitute supplementation for obtaining solid arthrodesis as well as the associated risk/benefit profiles and fusion efficacy as well as the FDA status of the instrumentation and products.  We also discussed the role of decompression surgery  alone, but given concerns of instability and ongoing mechanical back pain we reviewed the role of fusion.  After discussion with the patient will plan on posterolateral instrumented + TLIF with use of local autograft and allograft with graft extender/substitute supplementation.  Explained at length the rationale for surgical invention and postoperative expectations.  We discussed and Hub expressed his understanding, that in general, spine surgery is more predictive in improving extremity/radicular discomfort rather than axial spine pain, and arresting the progression of spinal cord/nerve dysfunction rather than improving.  Will utilize LSO brace immobilization during post operative recovery to help reduce pain by restricting mobility of the trunk and facilitate healing.      I reviewed with the patient possible risks of surgery which included the risk of infection, blood loss, risk of damage to adjacent nerves, vessels organs, risk of spinal fluid leak and meningitis, risk of chronic pain, incomplete resolution of symptoms, instability, adjacent segment degeneration & disease, risk of nonunion and instrumentation failure, disease transmission, need for further surgery, DVT, pulmonary embolism, blindness, paralysis and even death, as well as other risk on consent form.   We also discussed patient specific risks and mitigation including advanced age, history of postoperative urinary retention, cardiac history and associated gallo- and post- operative related risks.       We reviewed infection prevention.  Reviewed medications; instructed patient medications to stop before surgery (i.e. blood thinners, NSAIDs, DMARDs and vitamins)     Also reviewed with patient our narcotic policy.  We will provide medications up to 60 days postop.  In the event patient requires more medications, will need to consult their PCP and/or see a pain management physician.     I have queried the [place] database for  Minor Nagy to better assist in clinical decision making regarding prescriptions for controlled medications.  After querying the database and considering the clinical picture I have determined that he is a candidate for a prescription for control medication in the initial postoperative period.     All questions were answered.  Patient verbalized understanding.  Informed consent was obtained.  Electronic consent form was signed.  Minor's wife, was also present who had no further questions.     He received a pre-operative chest Xray at today's visit. Orders for preoperative labs (CBC, CMP, PTT, INR, type & screen, and ECG) were also placed at today's visit. Discussed pre-operative clearances, medical optimization and risk stratification.  Minor needs pre-operative clearance from PCP, urologist and cardiologist and obtain PATs.      Pre operative patient reported outcome measures were obtained.      Patient instructed to return to office/ER sooner if symptoms are not improving, getting worse, or new worrisome/neurologic symptoms arise.    Subjective:     Chief Complaint: Back and Left Lower Extremity Pain     HPI:  Oni Nagy is a 74 y.o. male presenting for initial visit with chief complaint of back pain. Ongoing, worsening left sided low back pain x8 weeks without inciting injury or event. Patient notes initial onset of decreased ambulation in June 2023 when walking around [place] that eventually dissipated. However, reports worsening ambulation and ongoing left sided low back pain that radiates into posterior left lower thigh with intermittent numbness and tingling in his left foot x8 weeks . Only able to walk a few blocks before needing to stop and rest due to back and left leg pain. Notes left leg aches when sitting/lying down at night. Pain is constant. Denies marky lower extremity weakness. Denies right sided symptoms. Positive shopping cart sign. Denies any marky trauma. Denies fever or chills, no night  sweats. Denies any bladder or bowel changes.       Denies heart or lung disease. Denies diabetes or kidney disease.     Conservative therapy includes the following:   Medications: medrol dose sarah without relief    Injections: denies, has upcoming appointment with Dr. Gamble scheduled 4/24/2024  Physical Therapy: has attempted, made pain worse  Chiropractic Medicine: has not attempted  Accupunture/Massage Therapy: has not attempted   These therapeutic modalities were ineffective at providing sustained pain relief/functional improvement.      Nicotine dependent: denies  Occupation: photography, artist,    Living situation: Lives with family   ADLs: patient is able to perform      7/29/2024 update  He was seen by Dr Gamble and he has had 2 injections. He states the first injection did not help much but the 2nd injection gave him about a 50%  improvement. He states he can walk a block before pain comes back and he can work for about 15 minute increments .  He states 3 weeks ago had heat ablasion so he was more bed ridden for about 2-3 weeks but has been more active and pain has returned. His pain is about the same as after his 2nd injection since getting back on his feet from the heatt ablasion  He did have an MRI in July as well    9/16/2024 update:  The patient would like to scheduled surgery.  Today he complains of low back/ buttock pain with left posterolateral thigh pain.  Standing or walking for 10-15 minutes aggravates.  He can walk 1/2-1 block before pain increases.   He rates his pain at 2/10 sitting and 8/10 at its worse.  His pain effects his daily activity and gait.      Patient has history of urinary retention following past surgery.  He mentions recent cardiac ablation with recent urinary retention and subsequent sepsis. Patient has been cleared by urologist and cardiologist for surgery.    Objective:     Family History   Problem Relation Age of Onset    Alzheimer's disease Mother 80     Esophageal cancer Mother     Diabetes Father     Diabetes Sister     Kidney failure Sister        Past Medical History:   Diagnosis Date    Benign essential hypertension 03/23/2011    BPH (benign prostatic hyperplasia) 02/20/2023    Last Assessment & Plan:  Formatting of this note might be different from the original. Doing well.  Cont tamsulosin and saw palmetto.  We did discuss adding finasteride but will have repeat psa prior.  Also discussed indications for holep.  As of now, he's doing great. Last episode of retention was clearly related to anesthesia.  Fu 4-6w with psa    Cardiogenic shock (HCC) 07/05/2022    Chronic kidney disease, stage III (moderate) ([facility])     Coronary atherosclerosis 03/23/2011    Last Assessment & Plan:  There are no symptoms of an anginal syndrome. I provided education regarding the nature of coronary artery disease and our recommendations to reduce his long-term risk of ACS.  I recommended he continue utilizing aspirin and statins to minimize risk.  A lipid profile was requested with the option to use high intensity statins as an alternative to Mevacor in response to per    Enlarged prostate     Epistaxis 01/09/2022    History of aortic valve disease     History of mitral valve disease     History of transfusion     May 2021 - no adverse reaction    Paroxysmal atrial fibrillation (HCC)  02/06/2020    Pulmonary nodules 02/05/2021    PVC's (premature ventricular contractions)     S/P mitral valve repair 06/15/2021    May 2021 at [facility]    Status post aortic valve replacement 05/31/2021    At [facility] May 2021    SVT (supraventricular tachycardia)     Syncope, cardiogenic     Thoracic aortic aneurysm without rupture ([facility]) 02/05/2021    4.5cm on CT 1/21  Formatting of this note might be different from the original. 4.5cm on CT chest 1/28/21  Last Assessment & Plan:  Formatting of this note might be different from the original. 6/6/21 chest CT showed stable 4.3 cm ascending  thoracic aortic aneurysm.    Thrombocytopenia (HCC) 07/05/2022       Current Outpatient Medications   Medication Sig Dispense Refill    Eliquis 5 MG Take 5 mg by mouth 2 (two) times a day      lovastatin (MEVACOR) 40 MG tablet Take 1 tablet by mouth      Magnesium 250 MG TABS Take 1 tablet by mouth daily        metoprolol tartrate (LOPRESSOR) 25 mg tablet Take 25 mg by mouth 2 (two) times a day      Saw Palmetto, Serenoa repens, (SAW PALMETTO PO) Take by mouth daily 250 mg      tamsulosin (FLOMAX) 0.4 mg TAKE 1 CAPSULE BY MOUTH EVERY DAY AT NIGHT      hyoscyamine (LEVSIN/SL) 0.125 mg SL tablet Take 0.125 mg by mouth every 4 (four) hours as needed      pantoprazole (PROTONIX) 40 mg tablet Take 1 tablet by mouth 2 (two) times a day before meals       No current facility-administered medications for this visit.       Past Surgical History:   Procedure Laterality Date    CARDIAC SURGERY      MV repair, AV replaced and aortic aneurysm repaired    CAUTERIZE INNER NOSE Left 01/09/2022    Procedure: Sphenopalatine artery ligation;  Surgeon: Alfonso Rodriguez MD;  Location: [facility];  Service: ENT    COLONOSCOPY  2007    Dr. Schreiber.  Tubular adenoma descending colon polyp.    COLONOSCOPY  2010    Dr. Schreiber.  Diverticulosis.    COLONOSCOPY  08/2020    Dr. Schreiber.  12 mm descending colon inflammatory polyp, 3 mm benign lymphoid aggregate, diverticulosis.    FACIAL/NECK BIOPSY Left 11/30/2021    Procedure: EXCISION PYOGENIC GRANULOMA; FREE MUCOSAL GRAFT FROM NOSE;  Surgeon: Alfonso Rodriguez MD;  Location: [facility];  Service: ENT    INGUINAL HERNIA REPAIR      TONSILLECTOMY         Social History     Socioeconomic History    Marital status: /Civil Union     Spouse name: Not on file    Number of children: Not on file    Years of education: Not on file    Highest education level: Not on file   Occupational History    Occupation: /      Comment: full-time   Tobacco Use    Smoking  status: Never    Smokeless tobacco: Never   Vaping Use    Vaping status: Never Used   Substance and Sexual Activity    Alcohol use: No    Drug use: No    Sexual activity: Not on file   Other Topics Concern    Not on file   Social History Narrative    Not on file     Social Determinants of Health     Financial Resource Strain: Low Risk  (7/13/2024)    Received from [facility]    Overall Financial Resource Strain (CARDIA)     Difficulty of Paying Living Expenses: Not very hard   Food Insecurity: No Food Insecurity (7/13/2024)    Received from [facility]    Hunger Vital Sign     Worried About Running Out of Food in the Last Year: Never true     Ran Out of Food in the Last Year: Never true   Transportation Needs: No Transportation Needs (7/13/2024)    Received from [facility]    PRAPARE - Transportation     Lack of Transportation (Medical): No     Lack of Transportation (Non-Medical): No   Physical Activity: Not on file   Stress: Not on file   Social Connections: Not on file   Intimate Partner Violence: Not At Risk (7/13/2024)    Received from [facility]    Humiliation, Afraid, Rape, and Kick questionnaire     Fear of Current or Ex-Partner: No     Emotionally Abused: No     Physically Abused: No     Sexually Abused: No   Housing Stability: Low Risk  (7/13/2024)    Received from [facility]    Housing Stability Vital Sign     Unable to Pay for Housing in the Last Year: No     Number of Times Moved in the Last Year: 0     Homeless in the Last Year: No       No Known Allergies    Review of Systems  General- denies fever/chills  HEENT- denies hearing loss or sore throat  Eyes- denies eye pain or visual disturbances, denies red eyes  Respiratory- denies cough or SOB  Cardio- denies chest pain or palpitations  GI- denies abdominal pain  Endocrine- denies urinary frequency  Urinary- denies pain with urination  Musculoskeletal- Negative  except noted above  Skin- denies rashes or wounds  Neurological- denies dizziness or headache  Psychiatric- denies anxiety or difficulty concentrating    Physical Exam  /82   Pulse 80   Ht 6' (1.829 m)   Wt 95.3 kg (210 lb)   BMI 28.48 kg/m²     General/Constitutional: No apparent distress: well-nourished and well developed.  Lymphatic: No appreciable lymphadenopathy  Respiratory: Non-labored breathing  Vascular: No edema, swelling or tenderness, except as noted in detailed exam.  Integumentary: No impressive skin lesions present, except as noted in detailed exam.  Psych: Normal mood and affect, oriented to person, place and time.  MSK: normal other than stated in HPI and exam  Gait & balance: no evidence of myelopathic gait, ambulates Independently     Lumbar spine range of motion:  -Forward flexion to 90  -Extension to neutral  -Lateral bend 25 right, 25 left  -Rotation 25 right, 25 left  There tenderness with palpation along lumbar paraspinal musculature, no midline tenderness     Neurologic:  Lower Extremity Motor Function    Right  Left    Iliopsoas  5/5  5/5    Quadriceps 5/5 5/5   Tibialis anterior  5/5  5/5    EHL  5/5  5/5    Gastroc. muscle  5/5  5/5    Heel rise  5/5  5/5    Toe rise  5/5  5/5      Sensory: light touch is intact to bilateral upper and lower extremities     Reflexes:    Right Left   Patellar 1+ 1+   Achilles 1+ 1+   Babinski neg neg     Other tests:  Straight Leg Raise: negative  Mary SI: negative  ROMEO SI: negative  Greater troch: no tenderness   Internal/external hip ROM: intact, no pain   Flexion/extension knee ROM: intact, no pain   Vascular: WWP extremities, 2+DP bilateral      Diagnostic Tests   IMAGING: I have personally reviewed the images and these are my findings:  Lumbar Spine X-rays from 4/17/2024: multi level lumbar spondylosis with loss of disc height, osteophyte formation and facet hypertrophy, L4-5 degenerative spondylolisthesis, no appreciated lytic/blastic  "lesions, no obvious instability    Lumbar Spine MRI from 4/1/2024: multi level lumbar disc degeneration with disc desiccation, loss of disc height, facet and ligamentum hypertrophy, L3-4 and L4-5 central, lateral recess, stenosis, also with bilateral L2-3 lateral recess stenosis    Electronic Medical Records were reviewed including office notes, imaging studies    Procedures, if performed today     None performed       Portions of the record may have been created with voice recognition software.  Occasional wrong word or \"sound a like\" substitutions may have occurred due to the inherent limitations of voice recognition software.  Read the chart carefully and recognize, using context, where substitutions have occurred.  "

## 2024-09-18 ENCOUNTER — TELEPHONE (OUTPATIENT)
Dept: FAMILY MEDICINE CLINIC | Facility: CLINIC | Age: 74
End: 2024-09-18

## 2024-09-18 NOTE — TELEPHONE ENCOUNTER
Received fax through [employer] from [facility].  Pt has appt 9-23-24 at 1:00 PM with Julia.  Form has been placed in Julia's folder and given to LINWOOD [place].

## 2024-09-20 ENCOUNTER — APPOINTMENT (OUTPATIENT)
Dept: LAB | Facility: MEDICAL CENTER | Age: 74
End: 2024-09-20
Payer: MEDICARE

## 2024-09-20 ENCOUNTER — LAB REQUISITION (OUTPATIENT)
Dept: LAB | Facility: HOSPITAL | Age: 74
End: 2024-09-20
Payer: MEDICARE

## 2024-09-20 DIAGNOSIS — M48.061 SPINAL STENOSIS OF LUMBAR REGION, UNSPECIFIED WHETHER NEUROGENIC CLAUDICATION PRESENT: ICD-10-CM

## 2024-09-20 DIAGNOSIS — M54.16 LUMBAR RADICULOPATHY: ICD-10-CM

## 2024-09-20 DIAGNOSIS — Z01.818 PRE-OP EVALUATION: ICD-10-CM

## 2024-09-20 DIAGNOSIS — M25.59 PAIN IN OTHER SPECIFIED JOINT: ICD-10-CM

## 2024-09-20 DIAGNOSIS — M48.061 SPINAL STENOSIS, LUMBAR REGION WITHOUT NEUROGENIC CLAUDICATION: ICD-10-CM

## 2024-09-20 DIAGNOSIS — M48.061 SPINAL STENOSIS, LUMBAR REGION, WITHOUT NEUROGENIC CLAUDICATION: ICD-10-CM

## 2024-09-20 DIAGNOSIS — Z01.818 ENCOUNTER FOR OTHER PREPROCEDURAL EXAMINATION: ICD-10-CM

## 2024-09-20 DIAGNOSIS — M54.16 RADICULOPATHY, LUMBAR REGION: ICD-10-CM

## 2024-09-20 DIAGNOSIS — Z01.818 OTHER SPECIFIED PRE-OPERATIVE EXAMINATION: ICD-10-CM

## 2024-09-20 DIAGNOSIS — Z00.6 ENCOUNTER FOR EXAMINATION FOR NORMAL COMPARISON OR CONTROL IN CLINICAL RESEARCH PROGRAM: ICD-10-CM

## 2024-09-20 LAB
ABO GROUP BLD: NORMAL
ALBUMIN SERPL BCG-MCNC: 4.4 G/DL (ref 3.5–5)
ALP SERPL-CCNC: 81 U/L (ref 34–104)
ALT SERPL W P-5'-P-CCNC: 14 U/L (ref 7–52)
ANION GAP SERPL CALCULATED.3IONS-SCNC: 8 MMOL/L (ref 4–13)
APTT PPP: 32 SECONDS (ref 23–34)
AST SERPL W P-5'-P-CCNC: 17 U/L (ref 13–39)
BASOPHILS # BLD AUTO: 0.09 THOUSANDS/ΜL (ref 0–0.1)
BASOPHILS NFR BLD AUTO: 1 % (ref 0–1)
BILIRUB SERPL-MCNC: 0.73 MG/DL (ref 0.2–1)
BLD GP AB SCN SERPL QL: NEGATIVE
BUN SERPL-MCNC: 23 MG/DL (ref 5–25)
CALCIUM SERPL-MCNC: 9.5 MG/DL (ref 8.4–10.2)
CHLORIDE SERPL-SCNC: 104 MMOL/L (ref 96–108)
CO2 SERPL-SCNC: 27 MMOL/L (ref 21–32)
CREAT SERPL-MCNC: 1.07 MG/DL (ref 0.6–1.3)
EOSINOPHIL # BLD AUTO: 0.44 THOUSAND/ΜL (ref 0–0.61)
EOSINOPHIL NFR BLD AUTO: 7 % (ref 0–6)
ERYTHROCYTE [DISTWIDTH] IN BLOOD BY AUTOMATED COUNT: 13.1 % (ref 11.6–15.1)
GFR SERPL CREATININE-BSD FRML MDRD: 68 ML/MIN/1.73SQ M
GLUCOSE P FAST SERPL-MCNC: 97 MG/DL (ref 65–99)
HCT VFR BLD AUTO: 47 % (ref 36.5–49.3)
HGB BLD-MCNC: 15.3 G/DL (ref 12–17)
IMM GRANULOCYTES # BLD AUTO: 0.01 THOUSAND/UL (ref 0–0.2)
IMM GRANULOCYTES NFR BLD AUTO: 0 % (ref 0–2)
INR PPP: 1.03 (ref 0.85–1.19)
LYMPHOCYTES # BLD AUTO: 1.57 THOUSANDS/ΜL (ref 0.6–4.47)
LYMPHOCYTES NFR BLD AUTO: 24 % (ref 14–44)
MCH RBC QN AUTO: 29.8 PG (ref 26.8–34.3)
MCHC RBC AUTO-ENTMCNC: 32.6 G/DL (ref 31.4–37.4)
MCV RBC AUTO: 91 FL (ref 82–98)
MONOCYTES # BLD AUTO: 0.48 THOUSAND/ΜL (ref 0.17–1.22)
MONOCYTES NFR BLD AUTO: 7 % (ref 4–12)
NEUTROPHILS # BLD AUTO: 4.04 THOUSANDS/ΜL (ref 1.85–7.62)
NEUTS SEG NFR BLD AUTO: 61 % (ref 43–75)
NRBC BLD AUTO-RTO: 0 /100 WBCS
PLATELET # BLD AUTO: 159 THOUSANDS/UL (ref 149–390)
PMV BLD AUTO: 11.1 FL (ref 8.9–12.7)
POTASSIUM SERPL-SCNC: 4.4 MMOL/L (ref 3.5–5.3)
PROT SERPL-MCNC: 7.5 G/DL (ref 6.4–8.4)
PROTHROMBIN TIME: 13.8 SECONDS (ref 12.3–15)
RBC # BLD AUTO: 5.14 MILLION/UL (ref 3.88–5.62)
RH BLD: POSITIVE
SODIUM SERPL-SCNC: 139 MMOL/L (ref 135–147)
SPECIMEN EXPIRATION DATE: NORMAL
WBC # BLD AUTO: 6.63 THOUSAND/UL (ref 4.31–10.16)

## 2024-09-20 PROCEDURE — 86900 BLOOD TYPING SEROLOGIC ABO: CPT | Performed by: ORTHOPAEDIC SURGERY

## 2024-09-20 PROCEDURE — 85730 THROMBOPLASTIN TIME PARTIAL: CPT

## 2024-09-20 PROCEDURE — 86901 BLOOD TYPING SEROLOGIC RH(D): CPT | Performed by: ORTHOPAEDIC SURGERY

## 2024-09-20 PROCEDURE — 85025 COMPLETE CBC W/AUTO DIFF WBC: CPT

## 2024-09-20 PROCEDURE — 80053 COMPREHEN METABOLIC PANEL: CPT

## 2024-09-20 PROCEDURE — 36415 COLL VENOUS BLD VENIPUNCTURE: CPT

## 2024-09-20 PROCEDURE — 86850 RBC ANTIBODY SCREEN: CPT | Performed by: ORTHOPAEDIC SURGERY

## 2024-09-20 PROCEDURE — 85610 PROTHROMBIN TIME: CPT

## 2024-09-23 ENCOUNTER — CONSULT (OUTPATIENT)
Dept: FAMILY MEDICINE CLINIC | Facility: CLINIC | Age: 74
End: 2024-09-23
Payer: MEDICARE

## 2024-09-23 VITALS
OXYGEN SATURATION: 98 % | SYSTOLIC BLOOD PRESSURE: 112 MMHG | WEIGHT: 207.2 LBS | HEART RATE: 64 BPM | DIASTOLIC BLOOD PRESSURE: 74 MMHG | BODY MASS INDEX: 28.1 KG/M2

## 2024-09-23 DIAGNOSIS — I10 BENIGN ESSENTIAL HYPERTENSION: ICD-10-CM

## 2024-09-23 DIAGNOSIS — I49.3 FREQUENT PVCS: ICD-10-CM

## 2024-09-23 DIAGNOSIS — Z87.898 HISTORY OF URINARY RETENTION: ICD-10-CM

## 2024-09-23 DIAGNOSIS — Z01.818 PRE-OP EXAMINATION: Primary | ICD-10-CM

## 2024-09-23 DIAGNOSIS — M48.061 SPINAL STENOSIS OF LUMBAR REGION AT MULTIPLE LEVELS: ICD-10-CM

## 2024-09-23 DIAGNOSIS — I25.10 ATHEROSCLEROSIS OF NATIVE CORONARY ARTERY OF NATIVE HEART WITHOUT ANGINA PECTORIS: ICD-10-CM

## 2024-09-23 DIAGNOSIS — M54.16 LUMBAR RADICULOPATHY: ICD-10-CM

## 2024-09-23 DIAGNOSIS — I48.0 PAROXYSMAL ATRIAL FIBRILLATION (HCC): ICD-10-CM

## 2024-09-23 PROCEDURE — 99214 OFFICE O/P EST MOD 30 MIN: CPT | Performed by: PHYSICIAN ASSISTANT

## 2024-09-23 PROCEDURE — 93000 ELECTROCARDIOGRAM COMPLETE: CPT | Performed by: PHYSICIAN ASSISTANT

## 2024-09-23 RX ORDER — ACETAMINOPHEN 160 MG
2000 TABLET,DISINTEGRATING ORAL DAILY
COMMUNITY

## 2024-09-23 NOTE — ASSESSMENT & PLAN NOTE
S/p ablation in July. Patient is on Eliquis but has only been taking once daily. Stressed the importance of taking twice daily, but he will need to stop a few days prior to surgery. Will allow Cardiology to direct on exact number of days that they would like the hold to occur for.

## 2024-09-23 NOTE — ASSESSMENT & PLAN NOTE
** Patient has had several occasions of urinary retention after surgery with anesthesia and requests that delay in placing a mccord catheter be avoided. He has history of recurrent urinary retention due to his BPH which will be surgically addressed in January.

## 2024-09-23 NOTE — ASSESSMENT & PLAN NOTE
Patient had non-specific ST changes on EKG. This appears similar to EKG in office in 1/2024. Patient was given copy to bring to his Cardiology visit this afternoon.

## 2024-09-23 NOTE — PATIENT INSTRUCTIONS
Pre-operative Medication Instructions    Avoid herbs or non-directed vitamins one week prior to surgery  Avoid aspirin containing medications or non-steroidal anti-inflammatory drugs one week preceding surgery  May take tylenol for pain up until the night before surgery    Alpha-1 Adrenergic Blockers     Medication Name     tamsulosin (FLOMAX) 0.4 mg      Continue to take this medication on your normal schedule.  If this is an oral medication and you take it in the morning, then you may take this medicine with a sip of water.    Beta Blockers     Medication Name     metoprolol tartrate (LOPRESSOR) 25 mg tablet      Continue to take this heart medication on your normal schedule.  If this is an oral medication and you take it in the morning, then you may take this medicine with a sip of water.    Direct Xa Inhibitor     Medication Name     Eliquis 5 MG      STOP taking this medication at least 3 days prior to surgery/procedure with prescribing Physician and Surgeon consultation.    Cholesterol lowering meds     Medication Name     lovastatin (MEVACOR) 40 MG tablet      Continue to take this medication on your normal schedule.  If this is an oral medication and you take it in the morning, then you may take this medicine with a sip of water.           Pre-operative Medication Instructions    Avoid herbs or non-directed vitamins one week prior to surgery  Avoid aspirin containing medications or non-steroidal anti-inflammatory drugs one week preceding surgery  May take tylenol for pain up until the night before surgery    Alpha-1 Adrenergic Blockers     Medication Name     tamsulosin (FLOMAX) 0.4 mg      Continue to take this medication on your normal schedule.  If this is an oral medication and you take it in the morning, then you may take this medicine with a sip of water.    Beta Blockers     Medication Name     metoprolol tartrate (LOPRESSOR) 25 mg tablet      Continue to take this heart medication on your normal  schedule.  If this is an oral medication and you take it in the morning, then you may take this medicine with a sip of water.    Direct Xa Inhibitor     Medication Name     Eliquis 5 MG      STOP taking this medication at least 3 days prior to surgery/procedure with prescribing Physician and Surgeon consultation.    Cholesterol lowering meds     Medication Name     lovastatin (MEVACOR) 40 MG tablet      Continue to take this medication on your normal schedule.  If this is an oral medication and you take it in the morning, then you may take this medicine with a sip of water.

## 2024-09-23 NOTE — ASSESSMENT & PLAN NOTE
Reviewed that EKG again demonstrated PVCs. He denies feeling these. He will continue under care of Cardiology as directed.

## 2024-09-23 NOTE — ASSESSMENT & PLAN NOTE
Reports only taking metoprolol 25 mg once daily since BP has been low and previously was having HR in the 40s. Patient was encouraged to instead use 1/2 tab twice daily so he has 24 hour coverage. He was also encouraged to discuss with Cardiology at his visit this afternoon.

## 2024-09-23 NOTE — PROGRESS NOTES
Pre-operative Clearance  Name: Oni Nagy      : 1950      MRN: [id number]  Encounter Provider: Julia Xiong PA-C  Encounter Date: 2024   Encounter department: [facility] PRIMARY CARE    Assessment & Plan  Pre-op examination   Surgical clearance:  The patient is found to be at increased risk from a cardiovascular standpoint.  Patient is seeing Cardiology this afternoon for clearance.  Preoperative testing reviewed includes labs, CXR and EKG.  Re-evaluation is needed if the patient should present with symptoms prior to surgery/ procedure.  Surgical clearance will be faxed to Dr. Foss.         Spinal stenosis of lumbar region at multiple levels  To be addressed with upcoming procedure on 10/10/24.       Lumbar radiculopathy  To be addressed with upcoming procedure on 10/10/24.       Benign essential hypertension  Reports only taking metoprolol 25 mg once daily since BP has been low and previously was having HR in the 40s. Patient was encouraged to instead use 1/2 tab twice daily so he has 24 hour coverage. He was also encouraged to discuss with Cardiology at his visit this afternoon.        Atherosclerosis of native coronary artery of native heart without angina pectoris  Patient had non-specific ST changes on EKG. This appears similar to EKG in office in 2024. Patient was given copy to bring to his Cardiology visit this afternoon.        Paroxysmal atrial fibrillation (HCC)   S/p ablation in July. Patient is on Eliquis but has only been taking once daily. Stressed the importance of taking twice daily, but he will need to stop a few days prior to surgery. Will allow Cardiology to direct on exact number of days that they would like the hold to occur for.       Frequent PVCs  Reviewed that EKG again demonstrated PVCs. He denies feeling these. He will continue under care of Cardiology as directed.        History of urinary retention  ** Patient has had several occasions of  urinary retention after surgery with anesthesia and requests that delay in placing a mccord catheter be avoided. He has history of recurrent urinary retention due to his BPH which will be surgically addressed in January.        Pre-operative Clearance:     Medication Instructions:   - Alpha-1 Adrenergic Blocker (ie, tamsulosin, doxazosin, alfusozin): Continue to take this medication on your normal schedule.  - Beta blockers:  Continue to take this medication on your normal schedule.  - Direct Xa Inhibitor (ie, Eliquis, Xarelto): Stop taking medication 3 days prior to procedure/surgery.  - Hyperlipidemia meds: Continue to take this medication on your normal schedule.       History of Present Illness     Pre-Op Visit:  Patient presents today for preoperative clearance for Navigated L2-5 laminectomy/decompression, L3-5 instrumented fusion, TLIF (Bilateral: Spine Lumbar) with Dr. Jerod Foss MD at [facility] on 10/10/2024 for Spinal stenosis of lumbar region, unspecified whether neurogenic claudication present and radiculopathy, lumbar region.     Surgical Risk Assessment:  Prior Anesthesia:  The patient confirms prior anesthesia and confirms problems with anesthesia - recurrent issues after surgery has had trouble with urinary retention from not getting catheter soon enough after anesthesia.  Pertinent Past Medical History:  The patient confirms history of CAD and arrhythmia and denies  diabetes,  angina,  DVT,  pulmonary embolism,  chronic liver disease,  chronic kidney disease,  COPD,  asthma,  thyroid disease, and  seizure disorder.   Exercise Capacity:  The patient denies ability to walk 4 blocks without symptoms and denies ability to walk two flights of stairs without symptoms - would be limited by back but not chest pain or SOB.   Lifestyle Factors:  The patient denies alcohol use, denies tobacco use, and denies drug use.   Symptoms:  The patient denies easy bruising,  chest pain,  cough,  dyspnea,  edema,   palpitations, and  wheezing.   Pertinent Family History:  The patient confirms family history of  ischemic heart diease and stroke (sister), but denies family history of  adverse reaction to anesthesia,  aneurysm,  bleeding problems, and  sudden early death.   Living Situation:  The patient's reports home is secure and denies any post-op concerns with current living situation.      Current medical history includes paroxysmal atrial fibrillation-currently on Eliquis 5 mg twice daily and metoprolol 25 mg twice daily    Preadmission testing included CBC, CMP, INR, APTT, which were all normal.  His chest x-ray was without any acute changes since January.  EKG has not yet been done.  He will be seeing cardiology today for clearance as well. He just had cardioversion and ablation on 7/12/24.        * NEEDS A CATHETER TO PREVENT ISSUES WITH RETENTION - HAS HAD RECURRENT ISSUES WITH URINARY RETENTION AFTER ANESTHESIA LEADING TO ED VISITS *          Review of Systems   Constitutional:  Negative for chills and fever.   HENT:  Negative for congestion, rhinorrhea and sore throat.    Respiratory:  Negative for cough, shortness of breath and wheezing.    Cardiovascular:  Negative for chest pain, palpitations and leg swelling.   Gastrointestinal:  Negative for abdominal pain, blood in stool, constipation, diarrhea, nausea and vomiting.   Genitourinary:  Negative for dysuria and frequency.        Nocturia 2-3 times nightly   Musculoskeletal:  Positive for back pain. Negative for arthralgias and myalgias.   Skin:  Negative for rash.   Neurological:  Negative for dizziness, syncope and headaches.   Hematological:  Does not bruise/bleed easily.   Psychiatric/Behavioral:  Negative for dysphoric mood. The patient is not nervous/anxious.      Past Medical History   Past Medical History:   Diagnosis Date    Benign essential hypertension 03/23/2011    BPH (benign prostatic hyperplasia) 02/20/2023    Last Assessment & Plan:  Formatting of  this note might be different from the original. Doing well.  Cont tamsulosin and saw palmetto.  We did discuss adding finasteride but will have repeat psa prior.  Also discussed indications for holep.  As of now, he's doing great. Last episode of retention was clearly related to anesthesia.  Fu 4-6w with psa    Cardiogenic shock ([facility]) 07/05/2022    Chronic kidney disease, stage III (moderate) ([facility])     Coronary atherosclerosis 03/23/2011    Last Assessment & Plan:  There are no symptoms of an anginal syndrome. I provided education regarding the nature of coronary artery disease and our recommendations to reduce his long-term risk of ACS.  I recommended he continue utilizing aspirin and statins to minimize risk.  A lipid profile was requested with the option to use high intensity statins as an alternative to Mevacor in response to per    Enlarged prostate     Epistaxis 01/09/2022    History of aortic valve disease     History of mitral valve disease     History of transfusion     May 2021 - no adverse reaction    Paroxysmal atrial fibrillation ([facility])  02/06/2020    Pulmonary nodules 02/05/2021    PVC's (premature ventricular contractions)     S/P mitral valve repair 06/15/2021    May 2021 at [facility]    Status post aortic valve replacement 05/31/2021    At [facility] May 2021    SVT (supraventricular tachycardia)     Syncope, cardiogenic     Thoracic aortic aneurysm without rupture ([facility]) 02/05/2021    4.5cm on CT 1/21  Formatting of this note might be different from the original. 4.5cm on CT chest 1/28/21  Last Assessment & Plan:  Formatting of this note might be different from the original. 6/6/21 chest CT showed stable 4.3 cm ascending thoracic aortic aneurysm.    Thrombocytopenia ([facility]) 07/05/2022     Past Surgical History:   Procedure Laterality Date    CARDIAC ELECTROPHYSIOLOGY STUDY AND ABLATION  07/2024    CARDIAC SURGERY      MV repair, AV replaced and aortic aneurysm repaired    CAUTERIZE INNER NOSE  Left 01/09/2022    Procedure: Sphenopalatine artery ligation;  Surgeon: Alfonso Rodriguez MD;  Location: [facility];  Service: ENT    COLONOSCOPY  2007    Dr. Schreiber.  Tubular adenoma descending colon polyp.    COLONOSCOPY  2010    Dr. Schreiber.  Diverticulosis.    COLONOSCOPY  08/2020    Dr. Schreiber.  12 mm descending colon inflammatory polyp, 3 mm benign lymphoid aggregate, diverticulosis.    FACIAL/NECK BIOPSY Left 11/30/2021    Procedure: EXCISION PYOGENIC GRANULOMA; FREE MUCOSAL GRAFT FROM NOSE;  Surgeon: Alfonso Rodriguez MD;  Location: [facility];  Service: ENT    INGUINAL HERNIA REPAIR      TONSILLECTOMY       Family History   Problem Relation Age of Onset    Alzheimer's disease Mother 80    Esophageal cancer Mother     Diabetes Father     Diabetes Sister     Kidney failure Sister     Stroke Sister     Coronary artery disease Sister         MI    Breast cancer Sister     Diabetes Maternal Grandmother     Coronary artery disease Maternal Uncle         massive MI - age 49 and 50     Social History     Tobacco Use    Smoking status: Never    Smokeless tobacco: Never   Vaping Use    Vaping status: Never Used   Substance and Sexual Activity    Alcohol use: No    Drug use: Never    Sexual activity: Not on file     Current Outpatient Medications on File Prior to Visit   Medication Sig    Cholecalciferol (Vitamin D3) 50 MCG (2000 UT) capsule Take 2,000 Units by mouth daily    Eliquis 5 MG Take 5 mg by mouth 2 (two) times a day    lovastatin (MEVACOR) 40 MG tablet Take 1 tablet by mouth daily at bedtime    Magnesium 250 MG TABS Take 1 tablet by mouth daily      metoprolol tartrate (LOPRESSOR) 25 mg tablet Take 25 mg by mouth 2 (two) times a day    Saw Palmetto, Serenoa repens, (SAW PALMETTO PO) Take 1 capsule by mouth daily 250 mg    tamsulosin (FLOMAX) 0.4 mg TAKE 1 CAPSULE BY MOUTH EVERY DAY AT NIGHT    [DISCONTINUED] pantoprazole (PROTONIX) 40 mg tablet Take 1 tablet by mouth 2 (two) times a day before  meals    [DISCONTINUED] hyoscyamine (LEVSIN/SL) 0.125 mg SL tablet Take 0.125 mg by mouth every 4 (four) hours as needed     No Known Allergies  Objective     /74   Pulse 64   Wt 94 kg (207 lb 3.2 oz)   SpO2 98%   BMI 28.10 kg/m²     Physical Exam  Vitals reviewed.   Constitutional:       General: He is not in acute distress.     Appearance: Normal appearance. He is well-developed. He is not ill-appearing.   HENT:      Head: Normocephalic and atraumatic.      Right Ear: Tympanic membrane, ear canal and external ear normal.      Left Ear: Tympanic membrane, ear canal and external ear normal.      Mouth/Throat:      Mouth: Mucous membranes are moist.      Pharynx: No oropharyngeal exudate or posterior oropharyngeal erythema.   Eyes:      Conjunctiva/sclera: Conjunctivae normal.      Pupils: Pupils are equal, round, and reactive to light.   Neck:      Thyroid: No thyromegaly.      Vascular: No carotid bruit.   Cardiovascular:      Rate and Rhythm: Normal rate and regular rhythm.      Pulses: Normal pulses.      Heart sounds: Normal heart sounds. No murmur heard.  Pulmonary:      Effort: Pulmonary effort is normal.      Breath sounds: Normal breath sounds. No wheezing, rhonchi or rales.   Abdominal:      General: Bowel sounds are normal. There is no distension.      Palpations: Abdomen is soft. There is no mass.      Tenderness: There is no abdominal tenderness.   Musculoskeletal:      Cervical back: Normal range of motion and neck supple.      Right lower leg: No edema.      Left lower leg: No edema.   Lymphadenopathy:      Cervical: No cervical adenopathy.   Skin:     General: Skin is warm and dry.      Findings: No rash.   Neurological:      Mental Status: He is alert.   Psychiatric:         Mood and Affect: Mood normal.         Behavior: Behavior normal.         Thought Content: Thought content normal.         Judgment: Judgment normal.           Julia Xiong PA-C

## 2024-09-27 RX ORDER — METOPROLOL SUCCINATE 25 MG/1
25 TABLET, EXTENDED RELEASE ORAL 2 TIMES DAILY
COMMUNITY
Start: 2024-09-26

## 2024-10-04 NOTE — DISCHARGE INSTR - AVS FIRST PAGE
Dr. Foss Spine Surgery  Post-op Information and Instructions  Lumbar Fusion    1. Follow-up  - You should return to the office for your follow-up appointment approximately 2 weeks post-op. This should have been scheduled prior to your surgery.  If you do not have an appointment scheduled, please call [phone] to do so as soon as possible.  - At the first post op appointment we will check your incision, remove any staples, and make sure that you are healing well.  - X-rays will be taken at this appointment to evaluate your hardware.  - If you need any refills on your pain medications, this will be addressed at your follow-up appointment.  - You can expect to have post-op appointments at 2 weeks, 6 weeks, 3 months, 6 months and 1 year after surgery.  After that time, we will continue to follow you yearly to monitor your fusion.    2. Incision Care:  - You will have a surgical dressing over the area. Keep dressing intact and incision dry until 2 week post-op visit. The surgical dressing is placed in a sterile environment and is important to allow the wound to begin healing in a sterile environment.  - You have staples closing your incision and also sutures that are buried beneath the skin and will dissolve with time. The staples will be removed at your follow-up visit, please do not remove them prior to your follow-up appointment. The incision may be raised initially, although this will improve as the sutures dissolve.  - Please keep surgical dressing on and incision dry until your dressing is removed at 2 week post-op visit. Avoid getting incision wet in the shower until dressing are removed at follow-up visit. After 2 weeks, your incision may get wet in the shower, but DO NOT submerge your incision (bath, hot tub, pool, lake, etc.) until you have been cleared to do so. You may gently wash it with soap and water. Do not scrub the incision. Air-dry the incision or pat dry with clean, fresh towel before  reapplying the dry dressing.  - If the dressing falls off prior to 2 week post-op visit, please keep the incision covered with a non-adhesive dressing. Dressing material can be purchased over the counter at any drug store. Materials include: square gauze, ABD dressing, and skin tape. Place dry dressing over dry incision and tape the sides.  - Please do not apply any ointments or salves to the incision unless instructed to do so.  - If you notice any drainage, foul odor, increased redness, swelling or pain of the incision, please call our office immediately.  These may be signs of an infection and should be evaluated.    3. Medications  - You will be given a prescription for pain medication when you are discharged from the hospital.  - It is recommended that you take the medication as prescribed for the first 24-48 hours after surgery, even if your pain is minimal.  It is much easier to control your pain when you stay on top of the medications than if you wait to take them until your pain is severe.  - After the first few days, you should try to take the pain medication less often, and only when needed.  - You may take Tylenol in place of, not in addition to, your pain medication if you wish.  - DO NOT take NSAIDs (Advil, Aleve, Ibuprofen, Motrin, Naproxen, etc.) for 3 months following your surgery.  This may impede the healing of your fusion.  - DO NOT drive while you are taking narcotic pain medications.  - If there is an issue with any of your discharge medications, please call our office at [phone] to discuss.  - It is very common for surgery and narcotic pain medications to cause constipation.  It is very important that you eat a high fiber diet, drink lots of water, and also consider taking a stool softener while taking pain medications to help with this.  It is not uncommon for you to go several days without a bowel movement after surgery.  If you are constipated and it is accompanied by severe  abdominal pain, nausea and vomiting, inability to keep food or drink down, and/or a fever, please call our office as these may be signs of a more serious medical condition.    4. DVT prophylaxis:  - You were given a prescription of aspirin 81mg. Please take aspirin 81mg twice daily over the next 4 weeks for blood clot prevention.    5. Activity:  - You should have been fitted for a lumbar brace. You may remove this for hygiene (showering, etc.), and for sleeping. The brace should otherwise be worn for the first 3 months post-operatively. If you have any issues with the fit of your brace, you can call the office and we can have you come in to see our brace fitter.  - You should avoid any bending, twisting. If you must bend over, please use your knees.  - You CANNOT lift greater than 5lbs, or a gallon of milk, until instructed.  - You should try to avoid sitting for greater than 20 minutes at a time as this will cause your muscles to stiffen and spasm, making you more uncomfortable.  - You will start PT (physical therapy) around 3 months after surgery.  - You may resume your normal daily activities as tolerated.  - Walking is the best exercise and you should try to walk up to 1 mile throughout the day as you are recovering.  - You should NOT smoke following spinal fusion as this will impede your healing.    6. Diet:  - It is important to eat a well balanced diet to help your body heal.  - You should make sure that you are drinking plenty of fluids - water is best    7. Return to Work:  - You can expect to be out of work for at least 6 weeks up to 3 months.  We will discuss your return to work status at your post-op appointments, but please do not hesitate to call if you have any questions or concerns.  - Please make sure that any short-term disability paperwork is dropped off at the .    8. Anticoagulation:  -May resume home Eliquis on day discharged home.    Please call our office if you have any of the  following after surgery:  - Persistent fever greater than 100.5 degrees  - Foul odor, drainage, redness, swelling or increased pain around your incision site  - A headache that is worse with standing or sitting, and is alleviated with lying flat on your back.  - New onset arm or leg weakness, numbness or tingling  - Significantly increased pain that is not alleviated with pain medications, rest and ice  - New-onset calf pain    If you experience any chest pain or shortness of breath after you are discharged, call 911 immediately.

## 2024-10-04 NOTE — PRE-PROCEDURE INSTRUCTIONS
Pre-Surgery Instructions:   Medication Instructions    Cholecalciferol (Vitamin D3) 50 MCG (2000 UT) capsule Stop taking 7 days prior to surgery.    Eliquis 5 MG Instructions provided by MD    lovastatin (MEVACOR) 40 MG tablet Take night before surgery    Magnesium 250 MG TABS Stop taking 7 days prior to surgery.    metoprolol succinate (TOPROL-XL) 25 mg 24 hr tablet Take day of surgery.    Saw Palmetto, Serenoa repens, (SAW PALMETTO PO) Stop taking 7 days prior to surgery.    tamsulosin (FLOMAX) 0.4 mg Take night before surgery      Medication instructions for day surgery reviewed. Please use only a sip of water to take your instructed medications. Avoid all over the counter vitamins, supplements and NSAIDS for one week prior to surgery per anesthesia guidelines. Tylenol is ok to take as needed.     You will receive a call one business day prior to surgery with an arrival time and hospital directions. If your surgery is scheduled on a Monday, the hospital will be calling you on the Friday prior to your surgery. If you have not heard from anyone by 8pm, please call the hospital supervisor through the hospital  at [phone]. ([place] [phone] or [place] [phone]).    Do not eat or drink anything after midnight the night before your surgery, including candy, mints, lifesavers, or chewing gum. Do not drink alcohol 24hrs before your surgery. Try not to smoke at least 24hrs before your surgery.       Follow the pre surgery showering instructions as listed in the “My Surgical Experience Booklet” or otherwise provided by your surgeon's office. Do not use a blade to shave the surgical area 1 week before surgery. It is okay to use a clean electric clippers up to 24 hours before surgery. Do not apply any lotions, creams, including makeup, cologne, deodorant, or perfumes after showering on the day of your surgery. Do not use dry shampoo, hair spray, hair gel, or any type of hair products.     No contact  lenses, eye make-up, or artificial eyelashes. Remove nail polish, including gel polish, and any artificial, gel, or acrylic nails if possible. Remove all jewelry including rings and body piercing jewelry.     Wear causal clothing that is easy to take on and off. Consider your type of surgery.    Keep any valuables, jewelry, piercings at home. Please bring any specially ordered equipment (sling, braces) if indicated.    Arrange for a responsible person to drive you to and from the hospital on the day of your surgery. Please confirm the visitor policy for the day of your procedure when you receive your phone call with an arrival time.     Call the surgeon's office with any new illnesses, exposures, or additional questions prior to surgery.    Please reference your “My Surgical Experience Booklet” for additional information to prepare for your upcoming surgery.

## 2024-10-08 ENCOUNTER — TELEPHONE (OUTPATIENT)
Age: 74
End: 2024-10-08

## 2024-10-08 NOTE — TELEPHONE ENCOUNTER
Caller: Brenda at [facility]    Doctor: Pipo    Reason for call:     She wanted the fax number to send over risk assessment for upcoming surgery on 10/10/24, confirmed the fax number.    Call back#: n/a

## 2024-10-10 ENCOUNTER — ANESTHESIA EVENT (OUTPATIENT)
Dept: PERIOP | Facility: HOSPITAL | Age: 74
DRG: 448 | End: 2024-10-10
Payer: MEDICARE

## 2024-10-10 ENCOUNTER — ANESTHESIA (OUTPATIENT)
Dept: PERIOP | Facility: HOSPITAL | Age: 74
DRG: 448 | End: 2024-10-10
Payer: MEDICARE

## 2024-10-10 ENCOUNTER — HOSPITAL ENCOUNTER (OUTPATIENT)
Dept: RADIOLOGY | Facility: HOSPITAL | Age: 74
Setting detail: OUTPATIENT SURGERY
Discharge: HOME | DRG: 448 | End: 2024-10-10
Payer: MEDICARE

## 2024-10-10 ENCOUNTER — HOSPITAL ENCOUNTER (INPATIENT)
Facility: HOSPITAL | Age: 74
DRG: 448 | End: 2024-10-10
Attending: ORTHOPAEDIC SURGERY | Admitting: ORTHOPAEDIC SURGERY
Payer: MEDICARE

## 2024-10-10 DIAGNOSIS — I71.20 THORACIC AORTIC ANEURYSM WITHOUT RUPTURE, UNSPECIFIED PART (HCC): ICD-10-CM

## 2024-10-10 DIAGNOSIS — E78.00 HYPERCHOLESTEROLEMIA: ICD-10-CM

## 2024-10-10 DIAGNOSIS — M54.16 LUMBAR RADICULOPATHY: ICD-10-CM

## 2024-10-10 DIAGNOSIS — M48.061 SPINAL STENOSIS OF LUMBAR REGION AT MULTIPLE LEVELS: ICD-10-CM

## 2024-10-10 DIAGNOSIS — M48.061 SPINAL STENOSIS OF LUMBAR REGION, UNSPECIFIED WHETHER NEUROGENIC CLAUDICATION PRESENT: ICD-10-CM

## 2024-10-10 DIAGNOSIS — I25.10 ATHEROSCLEROSIS OF NATIVE CORONARY ARTERY OF NATIVE HEART WITHOUT ANGINA PECTORIS: ICD-10-CM

## 2024-10-10 DIAGNOSIS — Z95.2 STATUS POST AORTIC VALVE REPLACEMENT: ICD-10-CM

## 2024-10-10 DIAGNOSIS — I10 BENIGN ESSENTIAL HYPERTENSION: ICD-10-CM

## 2024-10-10 DIAGNOSIS — I48.0 PAROXYSMAL ATRIAL FIBRILLATION (HCC): ICD-10-CM

## 2024-10-10 DIAGNOSIS — Z98.1 S/P LUMBAR FUSION: Primary | ICD-10-CM

## 2024-10-10 PROCEDURE — 8E0WXBG COMPUTER ASSISTED PROCEDURE OF TRUNK REGION, WITH COMPUTERIZED TOMOGRAPHY: ICD-10-PCS | Performed by: ORTHOPAEDIC SURGERY

## 2024-10-10 PROCEDURE — 22614 ARTHRD PST TQ 1NTRSPC EA ADD: CPT | Performed by: ORTHOPAEDIC SURGERY

## 2024-10-10 PROCEDURE — C1713 ANCHOR/SCREW BN/BN,TIS/BN: HCPCS | Performed by: ORTHOPAEDIC SURGERY

## 2024-10-10 PROCEDURE — 63048 LAM FACETEC &FORAMOT EA ADDL: CPT

## 2024-10-10 PROCEDURE — 22612 ARTHRD PST TQ 1NTRSPC LUMBAR: CPT | Performed by: ORTHOPAEDIC SURGERY

## 2024-10-10 PROCEDURE — 20939 BONE MARROW ASPIR BONE GRFG: CPT | Performed by: ORTHOPAEDIC SURGERY

## 2024-10-10 PROCEDURE — 22842 INSERT SPINE FIXATION DEVICE: CPT

## 2024-10-10 PROCEDURE — 22842 INSERT SPINE FIXATION DEVICE: CPT | Performed by: ORTHOPAEDIC SURGERY

## 2024-10-10 PROCEDURE — 63047 LAM FACETEC & FORAMOT LUMBAR: CPT | Performed by: ORTHOPAEDIC SURGERY

## 2024-10-10 PROCEDURE — 72100 X-RAY EXAM L-S SPINE 2/3 VWS: CPT

## 2024-10-10 PROCEDURE — 0SG1071 FUSION OF 2 OR MORE LUMBAR VERTEBRAL JOINTS WITH AUTOLOGOUS TISSUE SUBSTITUTE, POSTERIOR APPROACH, POSTERIOR COLUMN, OPEN APPROACH: ICD-10-PCS | Performed by: ORTHOPAEDIC SURGERY

## 2024-10-10 PROCEDURE — 07DR3ZZ EXTRACTION OF ILIAC BONE MARROW, PERCUTANEOUS APPROACH: ICD-10-PCS | Performed by: ORTHOPAEDIC SURGERY

## 2024-10-10 PROCEDURE — 99024 POSTOP FOLLOW-UP VISIT: CPT | Performed by: ORTHOPAEDIC SURGERY

## 2024-10-10 PROCEDURE — 20936 SP BONE AGRFT LOCAL ADD-ON: CPT | Performed by: ORTHOPAEDIC SURGERY

## 2024-10-10 PROCEDURE — 20930 SP BONE ALGRFT MORSEL ADD-ON: CPT | Performed by: ORTHOPAEDIC SURGERY

## 2024-10-10 PROCEDURE — 63047 LAM FACETEC & FORAMOT LUMBAR: CPT

## 2024-10-10 PROCEDURE — 22612 ARTHRD PST TQ 1NTRSPC LUMBAR: CPT

## 2024-10-10 PROCEDURE — C1781 MESH (IMPLANTABLE): HCPCS | Performed by: ORTHOPAEDIC SURGERY

## 2024-10-10 PROCEDURE — 22614 ARTHRD PST TQ 1NTRSPC EA ADD: CPT

## 2024-10-10 PROCEDURE — 63048 LAM FACETEC &FORAMOT EA ADDL: CPT | Performed by: ORTHOPAEDIC SURGERY

## 2024-10-10 PROCEDURE — 01NB0ZZ RELEASE LUMBAR NERVE, OPEN APPROACH: ICD-10-PCS | Performed by: ORTHOPAEDIC SURGERY

## 2024-10-10 PROCEDURE — 99222 1ST HOSP IP/OBS MODERATE 55: CPT

## 2024-10-10 DEVICE — SCREW 55840007550 5.5/6 MAS 7.5X50 CC
Type: IMPLANTABLE DEVICE | Site: SPINE LUMBAR | Status: FUNCTIONAL
Brand: CD HORIZON® SPINAL SYSTEM

## 2024-10-10 DEVICE — DBM T45005 5CC PASTE GRAFTON PLUS
Type: IMPLANTABLE DEVICE | Site: SPINE LUMBAR | Status: FUNCTIONAL
Brand: GRAFTON®AND GRAFTON PLUS®DEMINERALIZED BONE MATRIX (DBM)

## 2024-10-10 DEVICE — I-FACTOR PUTTY, 1.0CC SYRINGE
Type: IMPLANTABLE DEVICE | Site: SPINE LUMBAR | Status: FUNCTIONAL
Brand: I-FACTOR PEPTIDE ENHANCED BONE GRAFT

## 2024-10-10 RX ORDER — SENNOSIDES 8.6 MG
650 CAPSULE ORAL EVERY 8 HOURS PRN
Qty: 30 TABLET | Refills: 0 | Status: SHIPPED | OUTPATIENT
Start: 2024-10-10

## 2024-10-10 RX ORDER — TAMSULOSIN HYDROCHLORIDE 0.4 MG/1
0.4 CAPSULE ORAL
Status: DISCONTINUED | OUTPATIENT
Start: 2024-10-10 | End: 2024-10-16 | Stop reason: HOSPADM

## 2024-10-10 RX ORDER — SUCCINYLCHOLINE/SOD CL,ISO/PF 100 MG/5ML
SYRINGE (ML) INTRAVENOUS AS NEEDED
Status: DISCONTINUED | OUTPATIENT
Start: 2024-10-10 | End: 2024-10-10

## 2024-10-10 RX ORDER — FENTANYL CITRATE 50 UG/ML
INJECTION, SOLUTION INTRAMUSCULAR; INTRAVENOUS AS NEEDED
Status: DISCONTINUED | OUTPATIENT
Start: 2024-10-10 | End: 2024-10-10

## 2024-10-10 RX ORDER — ONDANSETRON 2 MG/ML
4 INJECTION INTRAMUSCULAR; INTRAVENOUS EVERY 6 HOURS PRN
Status: DISCONTINUED | OUTPATIENT
Start: 2024-10-10 | End: 2024-10-16 | Stop reason: HOSPADM

## 2024-10-10 RX ORDER — UREA 10 %
250 LOTION (ML) TOPICAL DAILY
Status: DISCONTINUED | OUTPATIENT
Start: 2024-10-10 | End: 2024-10-16 | Stop reason: HOSPADM

## 2024-10-10 RX ORDER — CHLORHEXIDINE GLUCONATE ORAL RINSE 1.2 MG/ML
15 SOLUTION DENTAL ONCE
Status: COMPLETED | OUTPATIENT
Start: 2024-10-10 | End: 2024-10-10

## 2024-10-10 RX ORDER — VANCOMYCIN HYDROCHLORIDE 1 G/20ML
INJECTION, POWDER, LYOPHILIZED, FOR SOLUTION INTRAVENOUS AS NEEDED
Status: DISCONTINUED | OUTPATIENT
Start: 2024-10-10 | End: 2024-10-10 | Stop reason: HOSPADM

## 2024-10-10 RX ORDER — PROPOFOL 10 MG/ML
INJECTION, EMULSION INTRAVENOUS CONTINUOUS PRN
Status: DISCONTINUED | OUTPATIENT
Start: 2024-10-10 | End: 2024-10-10

## 2024-10-10 RX ORDER — ATORVASTATIN CALCIUM 20 MG/1
20 TABLET, FILM COATED ORAL DAILY
COMMUNITY

## 2024-10-10 RX ORDER — ACETAMINOPHEN 325 MG/1
650 TABLET ORAL EVERY 6 HOURS SCHEDULED
Status: DISCONTINUED | OUTPATIENT
Start: 2024-10-10 | End: 2024-10-13

## 2024-10-10 RX ORDER — ONDANSETRON 2 MG/ML
4 INJECTION INTRAMUSCULAR; INTRAVENOUS ONCE AS NEEDED
Status: DISCONTINUED | OUTPATIENT
Start: 2024-10-10 | End: 2024-10-10 | Stop reason: HOSPADM

## 2024-10-10 RX ORDER — BUPIVACAINE HYDROCHLORIDE 2.5 MG/ML
INJECTION, SOLUTION EPIDURAL; INFILTRATION; INTRACAUDAL AS NEEDED
Status: DISCONTINUED | OUTPATIENT
Start: 2024-10-10 | End: 2024-10-10 | Stop reason: HOSPADM

## 2024-10-10 RX ORDER — SODIUM CHLORIDE 9 MG/ML
INJECTION, SOLUTION INTRAVENOUS CONTINUOUS PRN
Status: DISCONTINUED | OUTPATIENT
Start: 2024-10-10 | End: 2024-10-10

## 2024-10-10 RX ORDER — OXYCODONE HYDROCHLORIDE 5 MG/1
5 TABLET ORAL EVERY 4 HOURS PRN
Qty: 30 TABLET | Refills: 0 | Status: SHIPPED | OUTPATIENT
Start: 2024-10-10 | End: 2024-10-20

## 2024-10-10 RX ORDER — DILTIAZEM HYDROCHLORIDE 120 MG/1
120 CAPSULE, EXTENDED RELEASE ORAL DAILY
COMMUNITY

## 2024-10-10 RX ORDER — SODIUM CHLORIDE, SODIUM LACTATE, POTASSIUM CHLORIDE, CALCIUM CHLORIDE 600; 310; 30; 20 MG/100ML; MG/100ML; MG/100ML; MG/100ML
20 INJECTION, SOLUTION INTRAVENOUS CONTINUOUS
Status: DISCONTINUED | OUTPATIENT
Start: 2024-10-10 | End: 2024-10-10

## 2024-10-10 RX ORDER — LIDOCAINE HYDROCHLORIDE 10 MG/ML
INJECTION, SOLUTION EPIDURAL; INFILTRATION; INTRACAUDAL; PERINEURAL AS NEEDED
Status: DISCONTINUED | OUTPATIENT
Start: 2024-10-10 | End: 2024-10-10

## 2024-10-10 RX ORDER — DOCUSATE SODIUM 100 MG/1
100 CAPSULE, LIQUID FILLED ORAL 2 TIMES DAILY
Status: DISCONTINUED | OUTPATIENT
Start: 2024-10-10 | End: 2024-10-16 | Stop reason: HOSPADM

## 2024-10-10 RX ORDER — DILTIAZEM HYDROCHLORIDE 120 MG/1
120 CAPSULE, EXTENDED RELEASE ORAL DAILY
Status: DISCONTINUED | OUTPATIENT
Start: 2024-10-10 | End: 2024-10-11

## 2024-10-10 RX ORDER — ALBUMIN, HUMAN INJ 5% 5 %
SOLUTION INTRAVENOUS CONTINUOUS PRN
Status: DISCONTINUED | OUTPATIENT
Start: 2024-10-10 | End: 2024-10-10

## 2024-10-10 RX ORDER — MIDAZOLAM HYDROCHLORIDE 2 MG/2ML
INJECTION, SOLUTION INTRAMUSCULAR; INTRAVENOUS AS NEEDED
Status: DISCONTINUED | OUTPATIENT
Start: 2024-10-10 | End: 2024-10-10

## 2024-10-10 RX ORDER — ROCURONIUM BROMIDE 10 MG/ML
INJECTION, SOLUTION INTRAVENOUS AS NEEDED
Status: DISCONTINUED | OUTPATIENT
Start: 2024-10-10 | End: 2024-10-10

## 2024-10-10 RX ORDER — SENNOSIDES 8.6 MG
1 TABLET ORAL DAILY
Status: DISCONTINUED | OUTPATIENT
Start: 2024-10-11 | End: 2024-10-16 | Stop reason: HOSPADM

## 2024-10-10 RX ORDER — ONDANSETRON 2 MG/ML
INJECTION INTRAMUSCULAR; INTRAVENOUS AS NEEDED
Status: DISCONTINUED | OUTPATIENT
Start: 2024-10-10 | End: 2024-10-10

## 2024-10-10 RX ORDER — DEXAMETHASONE SODIUM PHOSPHATE 10 MG/ML
INJECTION, SOLUTION INTRAMUSCULAR; INTRAVENOUS AS NEEDED
Status: DISCONTINUED | OUTPATIENT
Start: 2024-10-10 | End: 2024-10-10

## 2024-10-10 RX ORDER — OXYCODONE HYDROCHLORIDE 5 MG/1
5 TABLET ORAL EVERY 4 HOURS PRN
Status: DISCONTINUED | OUTPATIENT
Start: 2024-10-10 | End: 2024-10-13

## 2024-10-10 RX ORDER — CEFAZOLIN SODIUM 2 G/50ML
2000 SOLUTION INTRAVENOUS ONCE
Status: COMPLETED | OUTPATIENT
Start: 2024-10-10 | End: 2024-10-11

## 2024-10-10 RX ORDER — CALCIUM CARBONATE 500 MG/1
1000 TABLET, CHEWABLE ORAL DAILY PRN
Status: DISCONTINUED | OUTPATIENT
Start: 2024-10-10 | End: 2024-10-16 | Stop reason: HOSPADM

## 2024-10-10 RX ORDER — HEPARIN SODIUM 5000 [USP'U]/ML
5000 INJECTION, SOLUTION INTRAVENOUS; SUBCUTANEOUS EVERY 8 HOURS SCHEDULED
Status: DISCONTINUED | OUTPATIENT
Start: 2024-10-11 | End: 2024-10-14

## 2024-10-10 RX ORDER — HYDROMORPHONE HCL IN WATER/PF 6 MG/30 ML
0.2 PATIENT CONTROLLED ANALGESIA SYRINGE INTRAVENOUS
Status: COMPLETED | OUTPATIENT
Start: 2024-10-10 | End: 2024-10-10

## 2024-10-10 RX ORDER — HYDRALAZINE HYDROCHLORIDE 20 MG/ML
10 INJECTION INTRAMUSCULAR; INTRAVENOUS EVERY 6 HOURS PRN
Status: DISCONTINUED | OUTPATIENT
Start: 2024-10-10 | End: 2024-10-16 | Stop reason: HOSPADM

## 2024-10-10 RX ORDER — METHOCARBAMOL 500 MG/1
500 TABLET, FILM COATED ORAL 3 TIMES DAILY PRN
Qty: 30 TABLET | Refills: 0 | Status: SHIPPED | OUTPATIENT
Start: 2024-10-10

## 2024-10-10 RX ORDER — METOPROLOL SUCCINATE 25 MG/1
25 TABLET, EXTENDED RELEASE ORAL 2 TIMES DAILY
Status: DISCONTINUED | OUTPATIENT
Start: 2024-10-10 | End: 2024-10-11

## 2024-10-10 RX ORDER — GLYCOPYRROLATE 0.2 MG/ML
INJECTION INTRAMUSCULAR; INTRAVENOUS AS NEEDED
Status: DISCONTINUED | OUTPATIENT
Start: 2024-10-10 | End: 2024-10-10

## 2024-10-10 RX ORDER — CEFAZOLIN SODIUM 2 G/50ML
2000 SOLUTION INTRAVENOUS EVERY 8 HOURS
Status: COMPLETED | OUTPATIENT
Start: 2024-10-11 | End: 2024-10-11

## 2024-10-10 RX ORDER — EPHEDRINE SULFATE 50 MG/ML
INJECTION INTRAVENOUS AS NEEDED
Status: DISCONTINUED | OUTPATIENT
Start: 2024-10-10 | End: 2024-10-10

## 2024-10-10 RX ORDER — SODIUM CHLORIDE, SODIUM LACTATE, POTASSIUM CHLORIDE, CALCIUM CHLORIDE 600; 310; 30; 20 MG/100ML; MG/100ML; MG/100ML; MG/100ML
50 INJECTION, SOLUTION INTRAVENOUS CONTINUOUS
Status: DISCONTINUED | OUTPATIENT
Start: 2024-10-10 | End: 2024-10-11

## 2024-10-10 RX ORDER — MAGNESIUM HYDROXIDE/ALUMINUM HYDROXICE/SIMETHICONE 120; 1200; 1200 MG/30ML; MG/30ML; MG/30ML
30 SUSPENSION ORAL EVERY 6 HOURS PRN
Status: DISCONTINUED | OUTPATIENT
Start: 2024-10-10 | End: 2024-10-16 | Stop reason: HOSPADM

## 2024-10-10 RX ORDER — METHOCARBAMOL 500 MG/1
500 TABLET, FILM COATED ORAL EVERY 6 HOURS SCHEDULED
Status: DISCONTINUED | OUTPATIENT
Start: 2024-10-10 | End: 2024-10-16 | Stop reason: HOSPADM

## 2024-10-10 RX ORDER — TRANEXAMIC ACID 100 MG/ML
INJECTION, SOLUTION INTRAVENOUS AS NEEDED
Status: DISCONTINUED | OUTPATIENT
Start: 2024-10-10 | End: 2024-10-10

## 2024-10-10 RX ORDER — FENTANYL CITRATE/PF 50 MCG/ML
25 SYRINGE (ML) INJECTION
Status: COMPLETED | OUTPATIENT
Start: 2024-10-10 | End: 2024-10-10

## 2024-10-10 RX ORDER — OXYCODONE HYDROCHLORIDE 10 MG/1
10 TABLET ORAL EVERY 4 HOURS PRN
Status: DISCONTINUED | OUTPATIENT
Start: 2024-10-10 | End: 2024-10-13

## 2024-10-10 RX ORDER — ATORVASTATIN CALCIUM 20 MG/1
20 TABLET, FILM COATED ORAL DAILY
Status: DISCONTINUED | OUTPATIENT
Start: 2024-10-11 | End: 2024-10-16 | Stop reason: HOSPADM

## 2024-10-10 RX ORDER — PROPOFOL 10 MG/ML
INJECTION, EMULSION INTRAVENOUS AS NEEDED
Status: DISCONTINUED | OUTPATIENT
Start: 2024-10-10 | End: 2024-10-10

## 2024-10-10 RX ADMIN — HYDROMORPHONE HYDROCHLORIDE 0.2 MG: 0.2 INJECTION, SOLUTION INTRAMUSCULAR; INTRAVENOUS; SUBCUTANEOUS at 17:44

## 2024-10-10 RX ADMIN — ACETAMINOPHEN 650 MG: 325 TABLET ORAL at 23:03

## 2024-10-10 RX ADMIN — FENTANYL CITRATE 50 MCG: 50 INJECTION INTRAMUSCULAR; INTRAVENOUS at 12:26

## 2024-10-10 RX ADMIN — TAMSULOSIN HYDROCHLORIDE 0.4 MG: 0.4 CAPSULE ORAL at 18:42

## 2024-10-10 RX ADMIN — DOCUSATE SODIUM 100 MG: 100 CAPSULE, LIQUID FILLED ORAL at 18:42

## 2024-10-10 RX ADMIN — MIDAZOLAM 1 MG: 1 INJECTION INTRAMUSCULAR; INTRAVENOUS at 12:05

## 2024-10-10 RX ADMIN — LIDOCAINE HYDROCHLORIDE 50 MG: 10 INJECTION, SOLUTION EPIDURAL; INFILTRATION; INTRACAUDAL; PERINEURAL at 12:14

## 2024-10-10 RX ADMIN — CEFAZOLIN SODIUM 2000 MG: 2 SOLUTION INTRAVENOUS at 16:20

## 2024-10-10 RX ADMIN — OXYCODONE HYDROCHLORIDE 10 MG: 10 TABLET ORAL at 23:03

## 2024-10-10 RX ADMIN — ALBUMIN (HUMAN): 12.5 INJECTION, SOLUTION INTRAVENOUS at 15:35

## 2024-10-10 RX ADMIN — Medication 250 MG: at 21:00

## 2024-10-10 RX ADMIN — TRANEXAMIC ACID 1 G: 100 INJECTION INTRAVENOUS at 12:55

## 2024-10-10 RX ADMIN — Medication 100 MG: at 12:14

## 2024-10-10 RX ADMIN — SUGAMMADEX 200 MG: 100 INJECTION, SOLUTION INTRAVENOUS at 13:50

## 2024-10-10 RX ADMIN — DEXAMETHASONE SODIUM PHOSPHATE 10 MG: 10 INJECTION, SOLUTION INTRAMUSCULAR; INTRAVENOUS at 13:15

## 2024-10-10 RX ADMIN — FENTANYL CITRATE 25 MCG: 50 INJECTION INTRAMUSCULAR; INTRAVENOUS at 17:25

## 2024-10-10 RX ADMIN — METHOCARBAMOL 500 MG: 500 TABLET ORAL at 23:05

## 2024-10-10 RX ADMIN — CEFAZOLIN SODIUM 2000 MG: 2 SOLUTION INTRAVENOUS at 12:40

## 2024-10-10 RX ADMIN — HYDROMORPHONE HYDROCHLORIDE 0.2 MG: 0.2 INJECTION, SOLUTION INTRAMUSCULAR; INTRAVENOUS; SUBCUTANEOUS at 17:32

## 2024-10-10 RX ADMIN — FENTANYL CITRATE 25 MCG: 50 INJECTION INTRAMUSCULAR; INTRAVENOUS at 17:22

## 2024-10-10 RX ADMIN — SODIUM CHLORIDE, SODIUM LACTATE, POTASSIUM CHLORIDE, AND CALCIUM CHLORIDE: .6; .31; .03; .02 INJECTION, SOLUTION INTRAVENOUS at 12:08

## 2024-10-10 RX ADMIN — PROPOFOL 160 MG: 10 INJECTION, EMULSION INTRAVENOUS at 12:14

## 2024-10-10 RX ADMIN — ACETAMINOPHEN 650 MG: 325 TABLET ORAL at 18:42

## 2024-10-10 RX ADMIN — DILTIAZEM HYDROCHLORIDE 120 MG: 120 CAPSULE, EXTENDED RELEASE ORAL at 20:59

## 2024-10-10 RX ADMIN — HYDROMORPHONE HYDROCHLORIDE 0.2 MG: 0.2 INJECTION, SOLUTION INTRAMUSCULAR; INTRAVENOUS; SUBCUTANEOUS at 17:39

## 2024-10-10 RX ADMIN — SODIUM CHLORIDE, SODIUM LACTATE, POTASSIUM CHLORIDE, AND CALCIUM CHLORIDE: .6; .31; .03; .02 INJECTION, SOLUTION INTRAVENOUS at 16:49

## 2024-10-10 RX ADMIN — FENTANYL CITRATE 50 MCG: 50 INJECTION INTRAMUSCULAR; INTRAVENOUS at 16:29

## 2024-10-10 RX ADMIN — EPHEDRINE SULFATE 10 MG: 50 INJECTION, SOLUTION INTRAVENOUS at 12:26

## 2024-10-10 RX ADMIN — HYDROMORPHONE HYDROCHLORIDE 0.2 MG: 0.2 INJECTION, SOLUTION INTRAMUSCULAR; INTRAVENOUS; SUBCUTANEOUS at 17:55

## 2024-10-10 RX ADMIN — METHOCARBAMOL 500 MG: 500 TABLET ORAL at 18:42

## 2024-10-10 RX ADMIN — ONDANSETRON 4 MG: 2 INJECTION INTRAMUSCULAR; INTRAVENOUS at 16:26

## 2024-10-10 RX ADMIN — PHENYLEPHRINE HYDROCHLORIDE 30 MCG/MIN: 10 INJECTION INTRAVENOUS at 12:25

## 2024-10-10 RX ADMIN — CEFAZOLIN SODIUM 2000 MG: 2 SOLUTION INTRAVENOUS at 23:17

## 2024-10-10 RX ADMIN — FENTANYL CITRATE 25 MCG: 50 INJECTION INTRAMUSCULAR; INTRAVENOUS at 17:15

## 2024-10-10 RX ADMIN — CHLORHEXIDINE GLUCONATE 0.12% ORAL RINSE 15 ML: 1.2 LIQUID ORAL at 11:31

## 2024-10-10 RX ADMIN — EPHEDRINE SULFATE 5 MG: 50 INJECTION, SOLUTION INTRAVENOUS at 12:22

## 2024-10-10 RX ADMIN — FENTANYL CITRATE 50 MCG: 50 INJECTION INTRAMUSCULAR; INTRAVENOUS at 12:14

## 2024-10-10 RX ADMIN — EPHEDRINE SULFATE 5 MG: 50 INJECTION, SOLUTION INTRAVENOUS at 12:33

## 2024-10-10 RX ADMIN — SODIUM CHLORIDE: 0.9 INJECTION, SOLUTION INTRAVENOUS at 12:22

## 2024-10-10 RX ADMIN — SODIUM CHLORIDE 0.2 MCG/KG/MIN: 900 INJECTION INTRAVENOUS at 12:25

## 2024-10-10 RX ADMIN — FENTANYL CITRATE 25 MCG: 50 INJECTION INTRAMUSCULAR; INTRAVENOUS at 17:19

## 2024-10-10 RX ADMIN — GLYCOPYRROLATE 0.2 MG: 0.2 INJECTION, SOLUTION INTRAMUSCULAR; INTRAVENOUS at 16:26

## 2024-10-10 RX ADMIN — OXYCODONE HYDROCHLORIDE 10 MG: 10 TABLET ORAL at 18:38

## 2024-10-10 RX ADMIN — PROPOFOL 100 MCG/KG/MIN: 10 INJECTION, EMULSION INTRAVENOUS at 12:25

## 2024-10-10 RX ADMIN — MIDAZOLAM 1 MG: 1 INJECTION INTRAMUSCULAR; INTRAVENOUS at 15:00

## 2024-10-10 RX ADMIN — ROCURONIUM BROMIDE 40 MG: 10 INJECTION, SOLUTION INTRAVENOUS at 12:50

## 2024-10-10 RX ADMIN — FENTANYL CITRATE 50 MCG: 50 INJECTION INTRAMUSCULAR; INTRAVENOUS at 16:41

## 2024-10-10 RX ADMIN — SODIUM CHLORIDE, SODIUM LACTATE, POTASSIUM CHLORIDE, AND CALCIUM CHLORIDE 20 ML/HR: .6; .31; .03; .02 INJECTION, SOLUTION INTRAVENOUS at 11:42

## 2024-10-10 RX ADMIN — HYDROMORPHONE HYDROCHLORIDE 0.2 MG: 0.2 INJECTION, SOLUTION INTRAMUSCULAR; INTRAVENOUS; SUBCUTANEOUS at 17:27

## 2024-10-10 NOTE — ANESTHESIA POSTPROCEDURE EVALUATION
Post-Op Assessment Note    CV Status:  Stable  Pain Score: 0    Pain management: adequate       Mental Status:  Alert and awake   Hydration Status:  Euvolemic   PONV Controlled:  Controlled   Airway Patency:  Patent  Two or more mitigation strategies used for obstructive sleep apnea   Post Op Vitals Reviewed: Yes    No anethesia notable event occurred.    Staff: CRNA           Last Filed PACU Vitals:  Vitals Value Taken Time   Temp     Pulse 90 10/10/24 1702   /98 10/10/24 1703   Resp 16 10/10/24 1702   SpO2 97 % 10/10/24 1702   Vitals shown include unfiled device data.    Modified Tabby:  No data recorded

## 2024-10-10 NOTE — ANESTHESIA PREPROCEDURE EVALUATION
Procedure:  Navigated L2-5 laminectomy/decompression, L3-5 instrumented fusion, TLIF (Bilateral: Spine Lumbar)    Relevant Problems   CARDIO   (+) Benign essential hypertension   (+) Coronary atherosclerosis   (+) Frequent PVCs   (+) Hypercholesterolemia   (+) Paroxysmal atrial fibrillation (HCC)    (+) Thoracic aortic aneurysm without rupture (HCC)      /RENAL   (+) BPH (benign prostatic hyperplasia)      MUSCULOSKELETAL   (+) Localized, primary osteoarthritis of shoulder region        Physical Exam    Airway    Mallampati score: II  TM Distance: >3 FB  Neck ROM: full     Dental   No notable dental hx     Cardiovascular  Rhythm: regular, Rate: normal    Pulmonary   Breath sounds clear to auscultation    Other Findings        Anesthesia Plan  ASA Score- 3     Anesthesia Type- general with ASA Monitors.         Additional Monitors: arterial line.    Airway Plan: ETT.           Plan Factors-Exercise tolerance (METS): >4 METS.    Chart reviewed. EKG reviewed.  Existing labs reviewed. Patient summary reviewed.    Patient is not a current smoker.      Obstructive sleep apnea risk education given perioperatively.        Induction- intravenous.    Postoperative Plan- Plan for postoperative opioid use.     Perioperative Resuscitation Plan - Level 1 - Full Code.       Informed Consent- Anesthetic plan and risks discussed with patient.  I personally reviewed this patient with the CRNA. Discussed and agreed on the Anesthesia Plan with the CRNA..

## 2024-10-10 NOTE — ANESTHESIA PROCEDURE NOTES
"Arterial Line Insertion    Performed by: Margarette Ariza CRNA  Authorized by: Frank Grullon MD  Consent: Written consent obtained.  Risks and benefits: risks, benefits and alternatives were discussed  Consent given by: patient  Patient understanding: patient states understanding of the procedure being performed  Patient consent: the patient's understanding of the procedure matches consent given  Procedure consent: procedure consent matches procedure scheduled  Relevant documents: relevant documents present and verified  Test results: test results available and properly labeled  Site marked: the operative site was marked  Radiology Images: Radiology Images displayed and confirmed. If images not available, report reviewed  Required items: required blood products, implants, devices, and special equipment available  Patient identity confirmed: verbally with patient, arm band and provided demographic data  Time out: Immediately prior to procedure a \"time out\" was called to verify the correct patient, procedure, equipment, support staff and site/side marked as required.  Preparation: Patient was prepped and draped in the usual sterile fashion.  Indications: hemodynamic monitoring  Orientation:  Left  Location: radial artery  Sedation:  Patient sedated: yes (geta)    Procedure Details:  Jerry's test normal: yes  Needle gauge: 20  Seldinger technique: Seldinger technique used  Number of attempts: 1    Post-procedure:  Post-procedure: dressing applied and line sutured  Waveform: good waveform and waveform confirmed  Post-procedure CNS: normal          "

## 2024-10-10 NOTE — PROGRESS NOTES
Progress Note - Orthopedics   Name: Oni Nagy 74 y.o. male I MRN: [id number]  Unit/Bed#: -01 I Date of Admission: 10/10/2024   Date of Service: 10/11/2024 I Hospital Day: 0    Assessment & Plan  Spinal stenosis of lumbar region at multiple levels  POD 1  Navigated L2-5 laminectomy/decompression, L3-5 instrumented fusion, TLIF stable postoperatively    WBAT BLE  PT/OT  Incentive spirometry  Pain control  DVT ppx: SQH   Lumbar spine xrays to be completed prior to DC  Monitor drain output  Diet: regular  Monitor for acute blood loss anemia and administer or recommend IVF/prbc as indicated for greater than 2 gram Hgb drop or Hgb < 7    Benign essential hypertension    Elevated PSA w neg Bx 2014    Paroxysmal atrial fibrillation (HCC)     S/P mitral valve repair    BPH (benign prostatic hyperplasia)      Please contact the SecureChat role for the Orthopedics service with any questions/concerns.    History of Present Illness   74 y.o. male No acute events, no acute distress. Denies fever/chills, SOB. Pain well controlled.    Objective      Temp:  [96.5 °F (35.8 °C)-98.3 °F (36.8 °C)] 98.3 °F (36.8 °C)  HR:  [] 90  BP: (122-180)/() 122/85  Resp:  [16-24] 17  SpO2:  [91 %-98 %] 95 %  O2 Device: None (Room air)  O2 Device: None (Room air)          I/O         10/09 0701  10/10 0700 10/10 0701  10/11 0700    I.V. (mL/kg)  2250 (24.7)    IV Piggyback  250    Total Intake(mL/kg)  2500 (27.4)    Urine (mL/kg/hr)  1260    Drains  140    Blood  250    Total Output  1650    Net  +850                Lines/Drains/Airways       Active Status       Name Placement date Placement time Site Days    Closed/Suction Drain Left Back Bulb 19 Fr. 10/10/24  1621  Back  less than 1    Urethral Catheter Latex 16 Fr. 10/10/24  1230  Latex  less than 1                  Physical Exam   Musculoskeletal: Bilateral Lower Extremities  Dressing with moderate amount of strikethrough but no peripheral drainage.  SINA drain in place  "with ss output. There is a small amount of clotted blood in the SINA bulb.   TTP gallo-incisionally  Sensation intact to light touch L3-S1  Motor intact L2-S1  2+ DP pulse  No calf swelling or ttp      Lab Results: I have reviewed the following results:  No results for input(s): \"WBC\", \"HGB\", \"HCT\", \"PLT\", \"BANDSPCT\", \"BUN\", \"CREATININE\", \"PTT\", \"INR\", \"ESR\", \"CRP\" in the last 72 hours.  Blood Culture:  No results found for: \"BLOODCX\"  Wound Culture: No results found for: \"WOUNDCULT\"    "

## 2024-10-10 NOTE — PROGRESS NOTES
Progress Note - Orthopedics   Name: Oni Nagy 74 y.o. male I MRN: [id number]  Unit/Bed#: OR POOL I Date of Admission: 10/10/2024   Date of Service: 10/10/2024 I Hospital Day: 0    Assessment & Plan  Spinal stenosis of lumbar region at multiple levels  74 year old male POD 0 L2-5 laminectomy/decompression, L3-5 instrumented fusion, TLIF. Doing well postoperatively.    Plan    WBAT BLE  Monitor drain output  PT/OT  Pain control PRN  DVT ppx: ASA81 BID for 28 days  Will monitor for ABLA and administer IVF/prbc as indicated for Greater than 2 gram drop or Hgb < 7  Medical management per SLIM  Dispo planning      Please contact the SecureChat role for the Orthopedics service with any questions/concerns.    History of Present Illness   74 y.o. male POD 0 L2-5 laminectomy/decompression, L3-5 instrumented fusion, TLIF.     No acute events, no acute distress. Denies fever/chills, SOB, nausea, vomiting, numbness, and tingling. Pain well controlled.     Objective      Temp:  [96.5 °F (35.8 °C)-97.9 °F (36.6 °C)] 97.2 °F (36.2 °C)  HR:  [] 98  BP: (133-180)/() 153/106  Resp:  [16-24] 24  SpO2:  [91 %-98 %] 95 %  O2 Device: None (Room air)  O2 Device: None (Room air)          I/O         10/08 0701  10/09 0700 10/09 0701  10/10 0700 10/10 0701  10/11 0700    I.V. (mL/kg)   2250 (24.7)    IV Piggyback   250    Total Intake(mL/kg)   2500 (27.4)    Urine (mL/kg/hr)   460    Blood   250    Total Output   710    Net   +1790                 Lines/Drains/Airways       Active Status       Name Placement date Placement time Site Days    Closed/Suction Drain Left Back Bulb 19 Fr. 10/10/24  1621  Back  less than 1    Urethral Catheter Latex 16 Fr. 10/10/24  1230  Latex  less than 1                  Physical Exam   Musculoskeletal: Bilateral Lower Extremity  Dressing C/D/I. Drain in place, pulling suction appropriately.   TTP gallo-incisionally  Sensation intact to light touch L3-S1  Motor intact L2-S1  2+ DP  "pulse  Digits warm and well-perfused. Brisk capillary refill <2 seconds.      Lab Results: I have reviewed the following results:  No results for input(s): \"WBC\", \"HGB\", \"HCT\", \"PLT\", \"BANDSPCT\", \"BUN\", \"CREATININE\", \"PTT\", \"INR\", \"ESR\", \"CRP\" in the last 72 hours.  Blood Culture:  No results found for: \"BLOODCX\"  Wound Culture: No results found for: \"WOUNDCULT\"    "

## 2024-10-10 NOTE — ASSESSMENT & PLAN NOTE
74 year old male POD 0 L2-5 laminectomy/decompression, L3-5 instrumented fusion, TLIF. Doing well postoperatively.    Plan    WBAT BLE  Monitor drain output  PT/OT  Pain control PRN  DVT ppx: ASA81 BID for 28 days  Will monitor for ABLA and administer IVF/prbc as indicated for Greater than 2 gram drop or Hgb < 7  Medical management per SLIM  Dispo planning

## 2024-10-10 NOTE — INTERVAL H&P NOTE
10/10/24 H&P Update  See detailed H&P office note from 9/16/24.    H&P reviewed. After examining the patient I find no significant changes in the patients condition since the H&P had been written.  Continues with back and radicular leg pain, ambulatory dysfunction.  Wife at bedside - Imaging, clinical findings and procedure reviewed with patient.  Plan to proceed with decompression and fusion surgery.       General: appears well, no acute distress  Respiratory: No SOB, no abnormal effort   Abdomen: Soft. No tenderness.    Cardiac: RRR, warm & well perfused    Vitals:    10/10/24 1128   BP: 133/94   Pulse: 72   Resp: 16   Temp: 97.9 °F (36.6 °C)   SpO2: 98%

## 2024-10-10 NOTE — INTERIM OP NOTE
Navigated L2-5 laminectomy/decompression, L3-5 instrumented fusion, TLIF  Postoperative Note  PATIENT NAME: Oni Nagy  : 1950  MRN: [id number]  [facility] OR ROOM 18    Surgery Date: 10/10/2024    Preop Diagnosis:  Spinal stenosis of lumbar region, unspecified whether neurogenic claudication present [M48.061]  Radiculopathy, lumbar region [M54.16]    Post-Op Diagnosis Codes:     * Spinal stenosis of lumbar region, unspecified whether neurogenic claudication present [M48.061]     * Radiculopathy, lumbar region [M54.16]    Procedure(s) (LRB):  Navigated L2-5 laminectomy/decompression, L3-5 instrumented fusion, TLIF (Bilateral)    Surgeons and Role:     * Jerod Foss MD - Primary     * Traci Jaime PA-C - Assisting    Specimens:  * No specimens in log *    Estimated Blood Loss:   250 mL    Anesthesia Type:   General     Findings:   Facet and ligamentum hypertrophy, spinal stenosis     Complications:   None      SIGNATURE: Jerod Foss MD   DATE: October 10, 2024   TIME: 5:18 PM

## 2024-10-11 ENCOUNTER — APPOINTMENT (OUTPATIENT)
Dept: RADIOLOGY | Facility: HOSPITAL | Age: 74
DRG: 448 | End: 2024-10-11
Payer: MEDICARE

## 2024-10-11 PROBLEM — R29.90 STROKE-LIKE SYMPTOMS: Status: ACTIVE | Noted: 2024-10-11

## 2024-10-11 LAB
2HR DELTA HS TROPONIN: -2 NG/L
4HR DELTA HS TROPONIN: 1 NG/L
ANION GAP SERPL CALCULATED.3IONS-SCNC: 9 MMOL/L (ref 4–13)
ANION GAP SERPL CALCULATED.3IONS-SCNC: 9 MMOL/L (ref 4–13)
APOB+LDLR+PCSK9 GENE MUT ANL BLD/T: NOT DETECTED
BASOPHILS # BLD AUTO: 0.01 THOUSANDS/ΜL (ref 0–0.1)
BASOPHILS NFR BLD AUTO: 0 % (ref 0–1)
BRCA1+BRCA2 DEL+DUP + FULL MUT ANL BLD/T: NOT DETECTED
BUN SERPL-MCNC: 12 MG/DL (ref 5–25)
BUN SERPL-MCNC: 14 MG/DL (ref 5–25)
CALCIUM SERPL-MCNC: 8.9 MG/DL (ref 8.4–10.2)
CALCIUM SERPL-MCNC: 9.3 MG/DL (ref 8.4–10.2)
CARDIAC TROPONIN I PNL SERPL HS: 29 NG/L
CARDIAC TROPONIN I PNL SERPL HS: 31 NG/L
CARDIAC TROPONIN I PNL SERPL HS: 32 NG/L
CHLORIDE SERPL-SCNC: 104 MMOL/L (ref 96–108)
CHLORIDE SERPL-SCNC: 104 MMOL/L (ref 96–108)
CO2 SERPL-SCNC: 23 MMOL/L (ref 21–32)
CO2 SERPL-SCNC: 25 MMOL/L (ref 21–32)
CREAT SERPL-MCNC: 0.87 MG/DL (ref 0.6–1.3)
CREAT SERPL-MCNC: 1.08 MG/DL (ref 0.6–1.3)
EOSINOPHIL # BLD AUTO: 0 THOUSAND/ΜL (ref 0–0.61)
EOSINOPHIL NFR BLD AUTO: 0 % (ref 0–6)
ERYTHROCYTE [DISTWIDTH] IN BLOOD BY AUTOMATED COUNT: 12.7 % (ref 11.6–15.1)
GFR SERPL CREATININE-BSD FRML MDRD: 67 ML/MIN/1.73SQ M
GFR SERPL CREATININE-BSD FRML MDRD: 85 ML/MIN/1.73SQ M
GLUCOSE P FAST SERPL-MCNC: 166 MG/DL (ref 65–99)
GLUCOSE SERPL-MCNC: 150 MG/DL (ref 65–140)
GLUCOSE SERPL-MCNC: 166 MG/DL (ref 65–140)
GLUCOSE SERPL-MCNC: 171 MG/DL (ref 65–140)
HCT VFR BLD AUTO: 36.5 % (ref 36.5–49.3)
HGB BLD-MCNC: 12.2 G/DL (ref 12–17)
IMM GRANULOCYTES # BLD AUTO: 0.06 THOUSAND/UL (ref 0–0.2)
IMM GRANULOCYTES NFR BLD AUTO: 1 % (ref 0–2)
LYMPHOCYTES # BLD AUTO: 0.51 THOUSANDS/ΜL (ref 0.6–4.47)
LYMPHOCYTES NFR BLD AUTO: 4 % (ref 14–44)
MCH RBC QN AUTO: 29.9 PG (ref 26.8–34.3)
MCHC RBC AUTO-ENTMCNC: 33.4 G/DL (ref 31.4–37.4)
MCV RBC AUTO: 90 FL (ref 82–98)
MLH1+MSH2+MSH6+PMS2 GN DEL+DUP+FUL M: NOT DETECTED
MONOCYTES # BLD AUTO: 0.88 THOUSAND/ΜL (ref 0.17–1.22)
MONOCYTES NFR BLD AUTO: 7 % (ref 4–12)
NEUTROPHILS # BLD AUTO: 10.95 THOUSANDS/ΜL (ref 1.85–7.62)
NEUTS SEG NFR BLD AUTO: 88 % (ref 43–75)
NRBC BLD AUTO-RTO: 0 /100 WBCS
PLATELET # BLD AUTO: 166 THOUSANDS/UL (ref 149–390)
PMV BLD AUTO: 10.8 FL (ref 8.9–12.7)
POTASSIUM SERPL-SCNC: 3.7 MMOL/L (ref 3.5–5.3)
POTASSIUM SERPL-SCNC: 4.1 MMOL/L (ref 3.5–5.3)
RBC # BLD AUTO: 4.08 MILLION/UL (ref 3.88–5.62)
SODIUM SERPL-SCNC: 136 MMOL/L (ref 135–147)
SODIUM SERPL-SCNC: 138 MMOL/L (ref 135–147)
WBC # BLD AUTO: 12.41 THOUSAND/UL (ref 4.31–10.16)

## 2024-10-11 PROCEDURE — 80048 BASIC METABOLIC PNL TOTAL CA: CPT

## 2024-10-11 PROCEDURE — 84484 ASSAY OF TROPONIN QUANT: CPT

## 2024-10-11 PROCEDURE — 82948 REAGENT STRIP/BLOOD GLUCOSE: CPT

## 2024-10-11 PROCEDURE — 99024 POSTOP FOLLOW-UP VISIT: CPT | Performed by: ORTHOPAEDIC SURGERY

## 2024-10-11 PROCEDURE — 97163 PT EVAL HIGH COMPLEX 45 MIN: CPT

## 2024-10-11 PROCEDURE — 85025 COMPLETE CBC W/AUTO DIFF WBC: CPT

## 2024-10-11 PROCEDURE — 70496 CT ANGIOGRAPHY HEAD: CPT

## 2024-10-11 PROCEDURE — 70498 CT ANGIOGRAPHY NECK: CPT

## 2024-10-11 PROCEDURE — 99232 SBSQ HOSP IP/OBS MODERATE 35: CPT | Performed by: NURSE PRACTITIONER

## 2024-10-11 PROCEDURE — 97167 OT EVAL HIGH COMPLEX 60 MIN: CPT

## 2024-10-11 PROCEDURE — 93005 ELECTROCARDIOGRAM TRACING: CPT

## 2024-10-11 PROCEDURE — 99223 1ST HOSP IP/OBS HIGH 75: CPT | Performed by: PSYCHIATRY & NEUROLOGY

## 2024-10-11 RX ORDER — LACTULOSE 10 G/15ML
20 SOLUTION ORAL ONCE AS NEEDED
Status: COMPLETED | OUTPATIENT
Start: 2024-10-11 | End: 2024-10-11

## 2024-10-11 RX ORDER — BISACODYL 5 MG/1
5 TABLET, DELAYED RELEASE ORAL DAILY PRN
Status: DISCONTINUED | OUTPATIENT
Start: 2024-10-11 | End: 2024-10-16 | Stop reason: HOSPADM

## 2024-10-11 RX ORDER — DILTIAZEM HYDROCHLORIDE 120 MG/1
120 CAPSULE, COATED, EXTENDED RELEASE ORAL DAILY
Status: DISCONTINUED | OUTPATIENT
Start: 2024-10-11 | End: 2024-10-16 | Stop reason: HOSPADM

## 2024-10-11 RX ADMIN — ATORVASTATIN CALCIUM 20 MG: 20 TABLET, FILM COATED ORAL at 08:26

## 2024-10-11 RX ADMIN — METHOCARBAMOL 500 MG: 500 TABLET ORAL at 05:28

## 2024-10-11 RX ADMIN — OXYCODONE HYDROCHLORIDE 10 MG: 10 TABLET ORAL at 15:11

## 2024-10-11 RX ADMIN — METOPROLOL SUCCINATE 25 MG: 25 TABLET, EXTENDED RELEASE ORAL at 08:26

## 2024-10-11 RX ADMIN — OXYCODONE HYDROCHLORIDE 10 MG: 10 TABLET ORAL at 05:28

## 2024-10-11 RX ADMIN — DOCUSATE SODIUM 100 MG: 100 CAPSULE, LIQUID FILLED ORAL at 08:26

## 2024-10-11 RX ADMIN — IOHEXOL 75 ML: 350 INJECTION, SOLUTION INTRAVENOUS at 14:09

## 2024-10-11 RX ADMIN — LACTULOSE 20 G: 20 SOLUTION ORAL at 22:36

## 2024-10-11 RX ADMIN — SENNOSIDES 8.6 MG: 8.6 TABLET, FILM COATED ORAL at 08:26

## 2024-10-11 RX ADMIN — ONDANSETRON 4 MG: 2 INJECTION INTRAMUSCULAR; INTRAVENOUS at 09:00

## 2024-10-11 RX ADMIN — ONDANSETRON 4 MG: 2 INJECTION INTRAMUSCULAR; INTRAVENOUS at 15:11

## 2024-10-11 RX ADMIN — ACETAMINOPHEN 650 MG: 325 TABLET ORAL at 05:29

## 2024-10-11 RX ADMIN — METHOCARBAMOL 500 MG: 500 TABLET ORAL at 11:10

## 2024-10-11 RX ADMIN — Medication 1000 UNITS: at 08:26

## 2024-10-11 RX ADMIN — ACETAMINOPHEN 650 MG: 325 TABLET ORAL at 17:06

## 2024-10-11 RX ADMIN — TAMSULOSIN HYDROCHLORIDE 0.4 MG: 0.4 CAPSULE ORAL at 15:11

## 2024-10-11 RX ADMIN — METHOCARBAMOL 500 MG: 500 TABLET ORAL at 17:06

## 2024-10-11 RX ADMIN — DOCUSATE SODIUM 100 MG: 100 CAPSULE, LIQUID FILLED ORAL at 17:06

## 2024-10-11 RX ADMIN — ACETAMINOPHEN 650 MG: 325 TABLET ORAL at 11:10

## 2024-10-11 RX ADMIN — HEPARIN SODIUM 5000 UNITS: 5000 INJECTION INTRAVENOUS; SUBCUTANEOUS at 05:29

## 2024-10-11 RX ADMIN — DILTIAZEM HYDROCHLORIDE 120 MG: 120 CAPSULE, EXTENDED RELEASE ORAL at 08:27

## 2024-10-11 RX ADMIN — HEPARIN SODIUM 5000 UNITS: 5000 INJECTION INTRAVENOUS; SUBCUTANEOUS at 21:39

## 2024-10-11 RX ADMIN — Medication 250 MG: at 08:28

## 2024-10-11 RX ADMIN — HEPARIN SODIUM 5000 UNITS: 5000 INJECTION INTRAVENOUS; SUBCUTANEOUS at 15:06

## 2024-10-11 RX ADMIN — OXYCODONE HYDROCHLORIDE 10 MG: 10 TABLET ORAL at 10:31

## 2024-10-11 RX ADMIN — CEFAZOLIN SODIUM 2000 MG: 2 SOLUTION INTRAVENOUS at 08:27

## 2024-10-11 NOTE — NURSING NOTE
"Patient noted by wife and SLIM NP, Dr. De La Rosa to make difficulty word finding, stating \"[place]\" instead of \"xray\" and stating \"100%\" when attempting to state \"ten.\" Neurology team already at bedside and activated RRT so that stroke alert could be activated. VSS CTA of head. Neck done.  CT of head done.EKG done. Symptoms rapidly resolving.Will monitor pt.  "

## 2024-10-11 NOTE — CONSULTS
Consultation - Hospitalist   Name: Oni Nagy 74 y.o. male I MRN: [id number]  Unit/Bed#: -01 I Date of Admission: 10/10/2024   Date of Service: 10/10/2024 I Hospital Day: 0   Inpatient consult to Internal Medicine  Consult performed by: Sachin Coles MD  Consult ordered by: Traci Jaime PA-C        Physician Requesting Evaluation: Jerod Foss MD   Reason for Evaluation / Principal Problem: Post op medical managment    Assessment & Plan  Spinal stenosis of lumbar region at multiple levels  Patient is status post laminectomy and decompression with orthopedics.  Currently on board for medical management  Continue pain control per orthopedics  PT OT  Blood glucose within normal limits  Blood pressures trending higher postprocedure which can be expected due to pain, will give as needed hydralazine for SBP greater than 160  Benign essential hypertension  Continue diltiazem  Patient no longer on metoprolol  As needed hydralazine for SBP greater than 160  Elevated PSA w neg Bx 2014  Follows with urology as op  Paroxysmal atrial fibrillation (HCC)   Continue diltiazem 120 mg daily  Hold eliquis for 48 hours post surgery    S/P mitral valve repair  Currently stable follow with cardiology as OP  BPH (benign prostatic hyperplasia)  Continue tamsulosin  Medications reviewed. Continue current medications at prescribed doses.      VTE Pharmacologic Prophylaxis:   High Risk (Score >/= 5) - Pharmacological DVT Prophylaxis Ordered: heparin. Sequential Compression Devices Ordered.  Code Status: Per primary team    History of Present Illness   Chief Complaint: Post op    Oni Nagy is a 74 y.o. male with a Past medical history of ascending aortic aneurysm status post SAVR, paroxysmal atrial fibrillation status post ablation on June 12, on Eliquis, frequent PVCs, mild CAD, spinal stenosis, BPH,.  Patient has spinal stenosis status post laminectomy/decompression on 10/10/2024, doing well  postop, aspirin 81 mg twice daily for 28 days for DVT prophylaxis,    Patient takes tamsulosin 0.4 mg once daily  Atorvastatin 20 mg daily  Diltiazem 120 mg daily  Eliquis 5 mg twice daily     Review of Systems   Constitutional:  Negative for chills and fever.   HENT:  Negative for ear pain and sore throat.    Eyes:  Negative for pain and visual disturbance.   Respiratory:  Negative for cough and shortness of breath.    Cardiovascular:  Negative for chest pain and palpitations.   Gastrointestinal:  Negative for abdominal pain and vomiting.   Genitourinary:  Negative for dysuria and hematuria.   Musculoskeletal:  Negative for arthralgias and back pain.   Skin:  Negative for color change and rash.   Neurological:  Negative for seizures and syncope.   All other systems reviewed and are negative.      Historical Information   Past Medical History:   Diagnosis Date    Anesthesia complication     urinary retention for all of surgeries    Benign essential hypertension 03/23/2011    BPH (benign prostatic hyperplasia) 02/20/2023    Last Assessment & Plan:  Formatting of this note might be different from the original. Doing well.  Cont tamsulosin and saw palmetto.  We did discuss adding finasteride but will have repeat psa prior.  Also discussed indications for holep.  As of now, he's doing great. Last episode of retention was clearly related to anesthesia.  Fu 4-6w with psa    Cardiogenic shock (HCC) 07/05/2022    Chronic kidney disease, stage III (moderate) ([facility])     Coronary atherosclerosis 03/23/2011    Last Assessment & Plan:  There are no symptoms of an anginal syndrome. I provided education regarding the nature of coronary artery disease and our recommendations to reduce his long-term risk of ACS.  I recommended he continue utilizing aspirin and statins to minimize risk.  A lipid profile was requested with the option to use high intensity statins as an alternative to Mevacor in response to per    Enlarged prostate      Epistaxis 01/09/2022    History of aortic valve disease     History of mitral valve disease     History of transfusion     May 2021 - no adverse reaction    Paroxysmal atrial fibrillation (HCC)  02/06/2020    Pulmonary nodules 02/05/2021    PVC's (premature ventricular contractions)     S/P mitral valve repair 06/15/2021    May 2021 at [facility]    Status post aortic valve replacement 05/31/2021    At [facility] May 2021    SVT (supraventricular tachycardia) ([facility])     Syncope, cardiogenic     Thoracic aortic aneurysm without rupture (HCC) 02/05/2021    4.5cm on CT 1/21  Formatting of this note might be different from the original. 4.5cm on CT chest 1/28/21  Last Assessment & Plan:  Formatting of this note might be different from the original. 6/6/21 chest CT showed stable 4.3 cm ascending thoracic aortic aneurysm.    Thrombocytopenia (HCC) 07/05/2022     Past Surgical History:   Procedure Laterality Date    CARDIAC ELECTROPHYSIOLOGY STUDY AND ABLATION  07/2024    CARDIAC SURGERY      MV repair, AV replaced and aortic aneurysm repaired    CAUTERIZE INNER NOSE Left 01/09/2022    Procedure: Sphenopalatine artery ligation;  Surgeon: Alfonso Rodriguez MD;  Location: [facility];  Service: ENT    COLONOSCOPY  2007    Dr. Schreiber.  Tubular adenoma descending colon polyp.    COLONOSCOPY  2010    Dr. Schreiber.  Diverticulosis.    COLONOSCOPY  08/2020    Dr. Schreiber.  12 mm descending colon inflammatory polyp, 3 mm benign lymphoid aggregate, diverticulosis.    FACIAL/NECK BIOPSY Left 11/30/2021    Procedure: EXCISION PYOGENIC GRANULOMA; FREE MUCOSAL GRAFT FROM NOSE;  Surgeon: Alfonso Rodriguez MD;  Location: [facility];  Service: ENT    INGUINAL HERNIA REPAIR      TONSILLECTOMY       Social History     Tobacco Use    Smoking status: Never    Smokeless tobacco: Never   Vaping Use    Vaping status: Never Used   Substance and Sexual Activity    Alcohol use: No    Drug use: Never    Sexual activity: Not on file      E-Cigarette/Vaping    E-Cigarette Use Never User      E-Cigarette/Vaping Substances    Nicotine No     THC No     CBD No     Flavoring No     Other No     Unknown No      Family history non-contributory  Social History:  Marital Status: /Civil Union   Occupation: None  Patient Pre-hospital Living Situation: Home  Patient Pre-hospital Level of Mobility: walks  Patient Pre-hospital Diet Restrictions: None    Meds/Allergies   Medications Prior to Admission   Medication Sig Dispense Refill Last Dispense    atorvastatin (LIPITOR) 20 mg tablet Take 20 mg by mouth daily   Unknown (patient-reported)    Cholecalciferol (Vitamin D3) 50 MCG (2000 UT) capsule Take 2,000 Units by mouth daily   Unknown (patient-reported)    diltiazem (DILACOR XR) 120 MG 24 hr capsule Take 120 mg by mouth daily   Unknown (patient-reported)    Eliquis 5 MG Take 5 mg by mouth 2 (two) times a day Instructed to hold starting 10/8. Pt states 10/7 will be last dose per sx   Unknown (patient-reported)    lovastatin (MEVACOR) 40 MG tablet Take 1 tablet by mouth daily at bedtime   Unknown (patient-reported)    Magnesium 250 MG TABS Take 1 tablet by mouth daily     Unknown (patient-reported)    metoprolol succinate (TOPROL-XL) 25 mg 24 hr tablet Take 25 mg by mouth 2 (two) times a day   Unknown (patient-reported)    Saw Palmetto, Serenoa repens, (SAW PALMETTO PO) Take 1 capsule by mouth daily 250 mg   Unknown (patient-reported)    tamsulosin (FLOMAX) 0.4 mg TAKE 1 CAPSULE BY MOUTH EVERY DAY AT NIGHT   Unknown (patient-reported)     No Known Allergies    Objective :  Temp:  [96.5 °F (35.8 °C)-97.9 °F (36.6 °C)] 97.5 °F (36.4 °C)  HR:  [] 95  BP: (133-180)/() 146/94  Resp:  [16-24] 18  SpO2:  [91 %-98 %] 95 %  O2 Device: None (Room air)    Physical Exam  Vitals and nursing note reviewed.   Constitutional:       Appearance: He is well-developed and normal weight.   HENT:      Head: Normocephalic and atraumatic.      Nose: Nose normal.       Mouth/Throat:      Mouth: Mucous membranes are moist.   Eyes:      Conjunctiva/sclera: Conjunctivae normal.   Cardiovascular:      Rate and Rhythm: Normal rate and regular rhythm.      Heart sounds: Normal heart sounds. No murmur heard.  Pulmonary:      Effort: Pulmonary effort is normal. No respiratory distress.      Breath sounds: Normal breath sounds.   Abdominal:      General: Abdomen is flat.      Palpations: Abdomen is soft.      Tenderness: There is no abdominal tenderness.   Musculoskeletal:         General: No swelling.      Cervical back: Neck supple.      Right lower leg: No edema.      Left lower leg: No edema.   Skin:     General: Skin is warm and dry.      Capillary Refill: Capillary refill takes less than 2 seconds.   Neurological:      General: No focal deficit present.      Mental Status: He is alert and oriented to person, place, and time. Mental status is at baseline.   Psychiatric:         Mood and Affect: Mood normal.         Lines/Drains:  Lines/Drains/Airways       Active Status       Name Placement date Placement time Site Days    Closed/Suction Drain Left Back Bulb 19 Fr. 10/10/24  1621  Back  less than 1    Urethral Catheter Latex 16 Fr. 10/10/24  1230  Latex  less than 1                  Urinary Catheter:  Goal for removal: N/A - Chronic Garcia               Lab Results: I have reviewed the following results:                  Lab Results   Component Value Date    HGBA1C 5.8 (H) 05/06/2021    HGBA1C 5.6 06/05/2020    HGBA1C 5.6 06/05/2020           Imaging Results Review: No pertinent imaging studies reviewed.  Other Study Results Review: EKG was reviewed.     Administrative Statements   I have spent a total time of 70 minutes in caring for this patient on the day of the visit/encounter including Diagnostic results, Prognosis, Risks and benefits of tx options, Instructions for management, Patient and family education, Importance of tx compliance, Risk factor reductions, Impressions,  Counseling / Coordination of care, Documenting in the medical record, Reviewing / ordering tests, medicine, procedures  , Obtaining or reviewing history  , and Communicating with other healthcare professionals .    ** Please Note: This note has been constructed using a voice recognition system. **

## 2024-10-11 NOTE — PLAN OF CARE
Problem: PAIN - ADULT  Goal: Verbalizes/displays adequate comfort level or baseline comfort level  Description: Interventions:  - Encourage patient to monitor pain and request assistance  - Assess pain using appropriate pain scale  - Administer analgesics based on type and severity of pain and evaluate response  - Implement non-pharmacological measures as appropriate and evaluate response  - Consider cultural and social influences on pain and pain management  - Notify physician/advanced practitioner if interventions unsuccessful or patient reports new pain  Outcome: Progressing     Problem: INFECTION - ADULT  Goal: Absence or prevention of progression during hospitalization  Description: INTERVENTIONS:  - Assess and monitor for signs and symptoms of infection  - Monitor lab/diagnostic results  - Monitor all insertion sites, i.e. indwelling lines, tubes, and drains  - Monitor endotracheal if appropriate and nasal secretions for changes in amount and color  - [place] appropriate cooling/warming therapies per order  - Administer medications as ordered  - Instruct and encourage patient and family to use good hand hygiene technique  - Identify and instruct in appropriate isolation precautions for identified infection/condition  Outcome: Progressing  Goal: Absence of fever/infection during neutropenic period  Description: INTERVENTIONS:  - Monitor WBC    Outcome: Progressing     Problem: SAFETY ADULT  Goal: Patient will remain free of falls  Description: INTERVENTIONS:  - Educate patient/family on patient safety including physical limitations  - Instruct patient to call for assistance with activity   - Consult OT/PT to assist with strengthening/mobility   - Keep Call bell within reach  - Keep bed low and locked with side rails adjusted as appropriate  - Keep care items and personal belongings within reach  - Initiate and maintain comfort rounds  - Make Fall Risk Sign visible to staff  - Offer Toileting every 2 Hours,  in advance of need  - Initiate/Maintain alarm  - Obtain necessary fall risk management equipment:   - Apply yellow socks and bracelet for high fall risk patients  - Consider moving patient to room near nurses station  Outcome: Progressing  Goal: Maintain or return to baseline ADL function  Description: INTERVENTIONS:  -  Assess patient's ability to carry out ADLs; assess patient's baseline for ADL function and identify physical deficits which impact ability to perform ADLs (bathing, care of mouth/teeth, toileting, grooming, dressing, etc.)  - Assess/evaluate cause of self-care deficits   - Assess range of motion  - Assess patient's mobility; develop plan if impaired  - Assess patient's need for assistive devices and provide as appropriate  - Encourage maximum independence but intervene and supervise when necessary  - Involve family in performance of ADLs  - Assess for home care needs following discharge   - Consider OT consult to assist with ADL evaluation and planning for discharge  - Provide patient education as appropriate  Outcome: Progressing  Goal: Maintains/Returns to pre admission functional level  Description: INTERVENTIONS:  - Perform AM-PAC 6 Click Basic Mobility/ Daily Activity assessment daily.  - Set and communicate daily mobility goal to care team and patient/family/caregiver.   - Collaborate with rehabilitation services on mobility goals if consulted  - Perform Range of Motion 3 times a day.  - Reposition patient every 2 hours.  - Dangle patient 3 times a day  - Stand patient 3 times a day  - Ambulate patient 3 times a day  - Out of bed to chair 3 times a day   - Out of bed for meals 3 times a day  - Out of bed for toileting  - Record patient progress and toleration of activity level   Outcome: Progressing     Problem: DISCHARGE PLANNING  Goal: Discharge to home or other facility with appropriate resources  Description: INTERVENTIONS:  - Identify barriers to discharge w/patient and caregiver  -  Arrange for needed discharge resources and transportation as appropriate  - Identify discharge learning needs (meds, wound care, etc.)  - Arrange for interpretive services to assist at discharge as needed  - Refer to Case Management Department for coordinating discharge planning if the patient needs post-hospital services based on physician/advanced practitioner order or complex needs related to functional status, cognitive ability, or social support system  Outcome: Progressing     Problem: Knowledge Deficit  Goal: Patient/family/caregiver demonstrates understanding of disease process, treatment plan, medications, and discharge instructions  Description: Complete learning assessment and assess knowledge base.  Interventions:  - Provide teaching at level of understanding  - Provide teaching via preferred learning methods  Outcome: Progressing

## 2024-10-11 NOTE — OCCUPATIONAL THERAPY NOTE
Occupational Therapy Evaluation     Patient Name: Oni Nagy  Today's Date: 10/11/2024  Problem List  Principal Problem:    Spinal stenosis of lumbar region at multiple levels  Active Problems:    Benign essential hypertension    Elevated PSA w neg Bx 2014    Paroxysmal atrial fibrillation (HCC)     S/P mitral valve repair    BPH (benign prostatic hyperplasia)    Past Medical History  Past Medical History:   Diagnosis Date    Anesthesia complication     urinary retention for all of surgeries    Benign essential hypertension 03/23/2011    BPH (benign prostatic hyperplasia) 02/20/2023    Last Assessment & Plan:  Formatting of this note might be different from the original. Doing well.  Cont tamsulosin and saw palmetto.  We did discuss adding finasteride but will have repeat psa prior.  Also discussed indications for holep.  As of now, he's doing great. Last episode of retention was clearly related to anesthesia.  Fu 4-6w with psa    Cardiogenic shock (HCC) 07/05/2022    Chronic kidney disease, stage III (moderate) ([facility])     Coronary atherosclerosis 03/23/2011    Last Assessment & Plan:  There are no symptoms of an anginal syndrome. I provided education regarding the nature of coronary artery disease and our recommendations to reduce his long-term risk of ACS.  I recommended he continue utilizing aspirin and statins to minimize risk.  A lipid profile was requested with the option to use high intensity statins as an alternative to Mevacor in response to per    Enlarged prostate     Epistaxis 01/09/2022    History of aortic valve disease     History of mitral valve disease     History of transfusion     May 2021 - no adverse reaction    Paroxysmal atrial fibrillation (HCC)  02/06/2020    Pulmonary nodules 02/05/2021    PVC's (premature ventricular contractions)     S/P mitral valve repair 06/15/2021    May 2021 at [facility]    Status post aortic valve replacement 05/31/2021    At [facility] May 2021     SVT (supraventricular tachycardia) (HCC)     Syncope, cardiogenic     Thoracic aortic aneurysm without rupture (HCC) 02/05/2021    4.5cm on CT 1/21  Formatting of this note might be different from the original. 4.5cm on CT chest 1/28/21  Last Assessment & Plan:  Formatting of this note might be different from the original. 6/6/21 chest CT showed stable 4.3 cm ascending thoracic aortic aneurysm.    Thrombocytopenia (HCC) 07/05/2022     Past Surgical History  Past Surgical History:   Procedure Laterality Date    CARDIAC ELECTROPHYSIOLOGY STUDY AND ABLATION  07/2024    CARDIAC SURGERY      MV repair, AV replaced and aortic aneurysm repaired    CAUTERIZE INNER NOSE Left 01/09/2022    Procedure: Sphenopalatine artery ligation;  Surgeon: Alfonso Rodriguez MD;  Location: [facility];  Service: ENT    COLONOSCOPY  2007    Dr. Schreiber.  Tubular adenoma descending colon polyp.    COLONOSCOPY  2010    Dr. Schreiber.  Diverticulosis.    COLONOSCOPY  08/2020    Dr. Schreiber.  12 mm descending colon inflammatory polyp, 3 mm benign lymphoid aggregate, diverticulosis.    FACIAL/NECK BIOPSY Left 11/30/2021    Procedure: EXCISION PYOGENIC GRANULOMA; FREE MUCOSAL GRAFT FROM NOSE;  Surgeon: Alfonso Rodriguez MD;  Location: [facility];  Service: ENT    INGUINAL HERNIA REPAIR      LUMBAR FUSION Bilateral 10/10/2024    Procedure: Navigated L2-5 laminectomy/decompression, L3-5 instrumented fusion, TLIF;  Surgeon: Jerod Foss MD;  Location: [facility];  Service: Orthopedics    TONSILLECTOMY           10/11/24 1115   OT Last Visit   OT Visit Date 10/11/24   Note Type   Note type Evaluation   Pain Assessment   Pain Assessment Tool 0-10   Pain Score 8   Pain Location/Orientation Location: Back   Hospital Pain Intervention(s) Repositioned;Ambulation/increased activity;Emotional support;Relaxation technique   Restrictions/Precautions   Weight Bearing Precautions Per Order No   Braces or Orthoses LSO  (declined [place] LSO stating he has  "a brace - wife brought in brace which is more like elastic support band - ortho notified and will look at brace later when rounding on pt)   Other Precautions Chair Alarm;Bed Alarm;Fall Risk;Pain;Spinal precautions   Prior Function   Level of [place] Independent with ADLs;Independent with functional mobility;Needs assistance with IADLS   Lives With Spouse   Receives Help From Family   IADLs Independent with driving;Independent with meal prep;Independent with medication management  (spouse assists PRN)   Falls in the last 6 months 0   Vocational Retired   Lifestyle   Autonomy I adls and mobility - i iadls - wife assists PRN   Reciprocal Relationships supportive family   Service to Others retired   Intrinsic Gratification mostly sedentary PTA 2* pain   Subjective   Subjective \"When I get nauseous I vaso-vagal and pass out\"   ADL   Eating Assistance 5  Supervision/Setup   Grooming Assistance 4  Minimal Assistance   UB Bathing Assistance 3  Moderate Assistance   LB Bathing Assistance 2  Maximal Assistance   UB Dressing Assistance 3  Moderate Assistance   LB Dressing Assistance 2  Maximal Assistance   Toileting Assistance  2  Maximal Assistance   Bed Mobility   Supine to Sit 3  Moderate assistance   Additional items Assist x 2   Sit to Supine 3  Moderate assistance   Additional items Assist x 2   Transfers   Sit to Stand 3  Moderate assistance   Additional items Assist x 2   Stand to Sit 3  Moderate assistance   Additional items Assist x 2   Functional Mobility   Functional Mobility 3  Moderate assistance   Additional Comments x2 with cues t/o to stand upright, straighten LE's and utilize UE's on walker for support   Additional items Rolling walker   Balance   Static Sitting Fair   Dynamic Sitting Fair -   Static Standing Poor +   Dynamic Standing Poor   Ambulatory Poor   Activity Tolerance   Activity Tolerance Patient limited by fatigue;Patient limited by pain   Medical Staff Made Aware PT present for co-eval 2* " "medical complexity, comorbidities and limited overall tolerance to activities   RUE Assessment   RUE Assessment WFL   LUE Assessment   LUE Assessment WFL   Cognition   Arousal/Participation Alert;Cooperative   Attention Attends with cues to redirect   Orientation Level Oriented X4   Memory Decreased recall of precautions   Following Commands Follows one step commands without difficulty   Comments focused on pain, nausea, dizziness and tendency to \"pass out\" with nausea therefore difficult to redirect at times   Assessment   Limitation Decreased ADL status;Decreased endurance;Decreased self-care trans;Decreased high-level ADLs   Prognosis Good;Fair   Assessment Pt is a 74 y.o. male who was [facility] to [facility] on 10/10/2024 with Spinal stenosis of lumbar region at multiple levels POD 1  Navigated L2-5 laminectomy/decompression, L3-5 instrumented fusion, TLIF . Patient  has a past medical history of Anesthesia complication, Benign essential hypertension, BPH (benign prostatic hyperplasia), Cardiogenic shock ([facility]), Chronic kidney disease, stage III (moderate) ([facility]), Coronary atherosclerosis, Enlarged prostate, Epistaxis, History of aortic valve disease, History of mitral valve disease, History of transfusion, Paroxysmal atrial fibrillation ([facility]) , Pulmonary nodules, PVC's (premature ventricular contractions), S/P mitral valve repair, Status post aortic valve replacement, SVT (supraventricular tachycardia) ([facility]), Syncope, cardiogenic, Thoracic aortic aneurysm without rupture ([facility]), and Thrombocytopenia ([facility]).   At baseline pt was completing adls and mobility independently - I iadls - shares homemaking with spouse. Pt lives with spouse. Currently pt requires mod to max assist for overall ADLS and mod a x 2 for functional mobility/transfers. Pt currently presents with impairments in the following categories -steps to enter environment, limited home support, difficulty performing ADLS, difficulty performing " IADLS , decreased initiation and engagement , and environment activity tolerance, endurance, standing balance/tolerance, and sitting balance/tolerance. These impairments, as well as pt's fatigue, pain, spinal precautions, risk for falls, and home environment  limit pt's ability to safely engage in all baseline areas of occupation, includingbathing, dressing, toileting, functional mobility/transfers, community mobility, laundry , driving, house maintenance, meal prep, cleaning, social participation , and leisure activities  From OT standpoint, recommend Level II resources post d/c  upon D/C. OT will continue to follow to address the below stated goals.   Goals   Patient Goals have less pain   Long Term Goal #1 1) Min a UB/LB adls after setup with use of LHAE PRN  2)  Mod I toileting and clothing management  3) SBA bed mobility  4) Min a functional mob/transfers to and from all surfaces with fair+ to good balance/safety   5) Increase activity tolerance to 30-35min for participation in adls and enjoyable activities  6) Assess DME needs   7) Demonstrate good carryover with safe use of AD, brace management and carryover with spinal precautions/use of proper body mechanichs during functional tasks   8) Assess DME needs   9) Assist with safe d/c recommendations   Plan   Treatment Interventions ADL retraining;Functional transfer training;Endurance training;Patient/family training;Equipment evaluation/education;Compensatory technique education;Activityengagement   Goal Expiration Date 10/25/24   OT Frequency 2-3x/wk   Discharge Recommendation   Rehab Resource Intensity Level, OT II (Moderate Resource Intensity)   AM-PAC Daily Activity Inpatient   Lower Body Dressing 2   Bathing 2   Toileting 2   Upper Body Dressing 2   Grooming 3   Eating 4   Daily Activity Raw Score 15   Daily Activity Standardized Score (Calc for Raw Score >=11) 34.69   AM-PAC Applied Cognition Inpatient   Following a Speech/Presentation 4   Understanding  Ordinary Conversation 4   Taking Medications 4   Remembering Where Things Are Placed or Put Away 4   Remembering List of 4-5 Errands 3   Taking Care of Complicated Tasks 3   Applied Cognition Raw Score 22   Applied Cognition Standardized Score 47.83   End of Consult   Education Provided Yes;Family or social support of family present for education by provider   Patient Position at End of Consult Supine;All needs within reach;Other (comment)  (on stretcher to go down for upright xrays)   Nurse Communication Nurse aware of consult   End of Consult Comments RN aware of poor fitting brace and ortho notified         The patient's raw score on the AM-PAC Daily Activity Inpatient Short Form is 15. A raw score of less than 19 suggests the patient may benefit from discharge to post-acute rehabilitation services. Please refer to the recommendation of the Occupational Therapist for safe discharge planning.      Documentation Completed By:    CARY Vasquez/L  [employer] Certified - [id number]

## 2024-10-11 NOTE — ASSESSMENT & PLAN NOTE
POD 1  Navigated L2-5 laminectomy/decompression, L3-5 instrumented fusion, TLIF stable postoperatively    WBAT BLE  PT/OT  Incentive spirometry  Pain control  DVT ppx: SQH   Lumbar spine xrays to be completed prior to DC  Monitor drain output  Diet: regular  Monitor for acute blood loss anemia and administer or recommend IVF/prbc as indicated for greater than 2 gram Hgb drop or Hgb < 7

## 2024-10-11 NOTE — PLAN OF CARE
Problem: PAIN - ADULT  Goal: Verbalizes/displays adequate comfort level or baseline comfort level  Description: Interventions:  - Encourage patient to monitor pain and request assistance  - Assess pain using appropriate pain scale  - Administer analgesics based on type and severity of pain and evaluate response  - Implement non-pharmacological measures as appropriate and evaluate response  - Consider cultural and social influences on pain and pain management  - Notify physician/advanced practitioner if interventions unsuccessful or patient reports new pain  Outcome: Progressing     Problem: INFECTION - ADULT  Goal: Absence or prevention of progression during hospitalization  Description: INTERVENTIONS:  - Assess and monitor for signs and symptoms of infection  - Monitor lab/diagnostic results  - Monitor all insertion sites, i.e. indwelling lines, tubes, and drains  - Monitor endotracheal if appropriate and nasal secretions for changes in amount and color  - [place] appropriate cooling/warming therapies per order  - Administer medications as ordered  - Instruct and encourage patient and family to use good hand hygiene technique  - Identify and instruct in appropriate isolation precautions for identified infection/condition  Outcome: Progressing  Goal: Absence of fever/infection during neutropenic period  Description: INTERVENTIONS:  - Monitor WBC    Outcome: Progressing     Problem: SAFETY ADULT  Goal: Patient will remain free of falls  Description: INTERVENTIONS:  - Educate patient/family on patient safety including physical limitations  - Instruct patient to call for assistance with activity   - Consult OT/PT to assist with strengthening/mobility   - Keep Call bell within reach  - Keep bed low and locked with side rails adjusted as appropriate  - Keep care items and personal belongings within reach  - Initiate and maintain comfort rounds  - Make Fall Risk Sign visible to staff  - Offer Toileting every 2 Hours,  in advance of need  - Initiate/Maintain bed alarm  - Obtain necessary fall risk management equipment  - Apply yellow socks and bracelet for high fall risk patients  - Consider moving patient to room near nurses station  Outcome: Progressing  Goal: Maintain or return to baseline ADL function  Description: INTERVENTIONS:  -  Assess patient's ability to carry out ADLs; assess patient's baseline for ADL function and identify physical deficits which impact ability to perform ADLs (bathing, care of mouth/teeth, toileting, grooming, dressing, etc.)  - Assess/evaluate cause of self-care deficits   - Assess range of motion  - Assess patient's mobility; develop plan if impaired  - Assess patient's need for assistive devices and provide as appropriate  - Encourage maximum independence but intervene and supervise when necessary  - Involve family in performance of ADLs  - Assess for home care needs following discharge   - Consider OT consult to assist with ADL evaluation and planning for discharge  - Provide patient education as appropriate  Outcome: Progressing  Goal: Maintains/Returns to pre admission functional level  Description: INTERVENTIONS:  - Perform AM-PAC 6 Click Basic Mobility/ Daily Activity assessment daily.  - Set and communicate daily mobility goal to care team and patient/family/caregiver.   - Collaborate with rehabilitation services on mobility goals if consulted  - Perform Range of Motion 3 times a day.  - Reposition patient every 2 hours.  - Dangle patient 3 times a day  - Stand patient 3 times a day  - Ambulate patient 3 times a day  - Out of bed to chair 3 times a day   - Out of bed for meals 3 times a day  - Out of bed for toileting  - Record patient progress and toleration of activity level   Outcome: Progressing     Problem: DISCHARGE PLANNING  Goal: Discharge to home or other facility with appropriate resources  Description: INTERVENTIONS:  - Identify barriers to discharge w/patient and caregiver  -  Arrange for needed discharge resources and transportation as appropriate  - Identify discharge learning needs (meds, wound care, etc.)  - Arrange for interpretive services to assist at discharge as needed  - Refer to Case Management Department for coordinating discharge planning if the patient needs post-hospital services based on physician/advanced practitioner order or complex needs related to functional status, cognitive ability, or social support system  Outcome: Progressing     Problem: Knowledge Deficit  Goal: Patient/family/caregiver demonstrates understanding of disease process, treatment plan, medications, and discharge instructions  Description: Complete learning assessment and assess knowledge base.  Interventions:  - Provide teaching at level of understanding  - Provide teaching via preferred learning methods  Outcome: Progressing

## 2024-10-11 NOTE — PLAN OF CARE
Problem: PHYSICAL THERAPY ADULT  Goal: Performs mobility at highest level of function for planned discharge setting.  See evaluation for individualized goals.  Description: Treatment/Interventions: ADL retraining, Functional transfer training, Endurance training, Bed mobility, Gait training, Spoke to nursing, OT          See flowsheet documentation for full assessment, interventions and recommendations.  10/11/2024 1600 by Tk Thompson  Note: Prognosis: Fair  Problem List: Decreased strength, Decreased endurance, Impaired balance, Decreased mobility, Decreased coordination, Orthopedic restrictions, Pain  Assessment: Pt is a 74 y.o. male who was admitted to [facility] on 10/10/2024 with Spinal stenosis of lumbar region at multiple levels POD 1 Navigated L2-5 laminectomy/decompression, L3-5 instrumented fusion, TLIF . Patient has a past medical history of Anesthesia complication, Benign essential hypertension, BPH (benign prostatic hyperplasia), Cardiogenic shock ([facility]), Chronic kidney disease, stage III (moderate) ([facility]), Coronary atherosclerosis, Enlarged prostate, Epistaxis, History of aortic valve disease, History of mitral valve disease, History of transfusion, Paroxysmal atrial fibrillation ([facility]) , Pulmonary nodules, PVC's (premature ventricular contractions), S/P mitral valve repair, Status post aortic valve replacement, SVT (supraventricular tachycardia) ([facility]), Syncope, cardiogenic, Thoracic aortic aneurysm without rupture ([facility]), and Thrombocytopenia ([facility]). Prior to admission pt reports independent with ADLs and IADLs. Pt was alert and oriented and able to answer all questions about home setup and prior level of function. Pt presenting with 8/10 pain and requiring moderate assist of 2 for all bed mobility, transfers and gait. Pt reporting very difficult to move his legs to walk but was able to walk to transport bed with wheeled walker and mod assist of 2. Pt complained of feeling like he was going  to pass out (which patient has history of) and seemed to momentarily do this during the process of getting him supine in the transport bed but quickly regained consciousness and able to pump ankles when told to do it. Pt would benefit from continued PT to address deficits in strength, endurance, balance and mobility. At this time, PT is recommending level 2 resources.        Rehab Resource Intensity Level, PT: II (Moderate Resource Intensity)    See flowsheet documentation for full assessment.

## 2024-10-11 NOTE — PLAN OF CARE
Problem: OCCUPATIONAL THERAPY ADULT  Goal: Performs self-care activities at highest level of function for planned discharge setting.  See evaluation for individualized goals.  Description: Treatment Interventions: ADL retraining, Functional transfer training, Endurance training, Patient/family training, Equipment evaluation/education, Compensatory technique education, Activityengagement          See flowsheet documentation for full assessment, interventions and recommendations.   Note: Limitation: Decreased ADL status, Decreased endurance, Decreased self-care trans, Decreased high-level ADLs  Prognosis: Good, Fair  Assessment: Pt is a 74 y.o. male who was admitted to [facility] on 10/10/2024 with Spinal stenosis of lumbar region at multiple levels POD 1  Navigated L2-5 laminectomy/decompression, L3-5 instrumented fusion, TLIF . Patient  has a past medical history of Anesthesia complication, Benign essential hypertension, BPH (benign prostatic hyperplasia), Cardiogenic shock ([facility]), Chronic kidney disease, stage III (moderate) ([facility]), Coronary atherosclerosis, Enlarged prostate, Epistaxis, History of aortic valve disease, History of mitral valve disease, History of transfusion, Paroxysmal atrial fibrillation ([facility]) , Pulmonary nodules, PVC's (premature ventricular contractions), S/P mitral valve repair, Status post aortic valve replacement, SVT (supraventricular tachycardia) ([facility]), Syncope, cardiogenic, Thoracic aortic aneurysm without rupture ([facility]), and Thrombocytopenia ([facility]).   At baseline pt was completing adls and mobility independently - I iadls - shares homemaking with spouse. Pt lives with spouse. Currently pt requires mod to max assist for overall ADLS and mod a x 2 for functional mobility/transfers. Pt currently presents with impairments in the following categories -steps to enter environment, limited home support, difficulty performing ADLS, difficulty performing IADLS , decreased initiation and  engagement , and environment activity tolerance, endurance, standing balance/tolerance, and sitting balance/tolerance. These impairments, as well as pt's fatigue, pain, spinal precautions, risk for falls, and home environment  limit pt's ability to safely engage in all baseline areas of occupation, includingbathing, dressing, toileting, functional mobility/transfers, community mobility, laundry , driving, house maintenance, meal prep, cleaning, social participation , and leisure activities  From OT standpoint, recommend Level II resources post d/c  upon D/C. OT will continue to follow to address the below stated goals.     Rehab Resource Intensity Level, OT: II (Moderate Resource Intensity)

## 2024-10-11 NOTE — RESTORATIVE TECHNICIAN NOTE
Restorative Technician Note      Patient Name: Oni Nagy     Restorative Tech Visit Date: 10/11/24  Note Type: Mobility  Patient Position Upon Consult: Supine  Mobility / Activity Provided: pt back brace from home is not appropriate for use;attempted to follow up with new LSO back brace however pt was a rapid response; will attempt to follow up tomorrow as long as pt is medical appopriate  Patient Position at End of Consult: Supine; All needs within reach; Bed/Chair alarm activated  Nurse Communication: Nurse aware of consult, application of brace      Please contact [facility] PT Restorative tech on Epic Secure Chat  in regards to bracing instruction and/or adjustment.       Aleshia Greene, Restorative Technician

## 2024-10-11 NOTE — PHYSICAL THERAPY NOTE
PHYSICAL THERAPY NOTE          Patient Name: Oni Nagy  Today's Date: 10/11/2024     10/11/24 1116   PT Last Visit   PT Visit Date 10/11/24   Note Type   Note type Evaluation   Pain Assessment   Pain Assessment Tool 0-10   Pain Score 8   Pain Location/Orientation Location: Back   Hospital Pain Intervention(s) Repositioned;Ambulation/increased activity   Restrictions/Precautions   Weight Bearing Precautions Per Order No   Braces or Orthoses (S)  LSO  (Hospital LSO brace declined as pt stated his wife would bring one from home. Put on during PT/OT session and noted to be ill-fitting/not appropriate for pt's needs. Ortho notified, will look at brace when rounding.)   Other Precautions Chair Alarm;Bed Alarm;Fall Risk;Pain;Spinal precautions   Home Living   Type of Home House   Home Layout Two level;Bed/bath upstairs;1/2 bath on main level;Able to live on main level with bedroom/bathroom;Stairs to enter with rails  (3 steps to enter. Has recliner chair that can lay flat on ground floor)   Bathroom Shower/Tub Tub/shower unit   Bathroom Toilet Raised   Bathroom Equipment Grab bars in shower   Home Equipment Walker;Cane  (not using PTA)   Prior Function   Level of [place] Independent with ADLs;Independent with functional mobility;Needs assistance with IADLS   Lives With Spouse   Receives Help From Family   IADLs Independent with driving;Independent with meal prep;Independent with medication management  (spouse assists prn)   Falls in the last 6 months 0   Vocational Retired   General   Family/Caregiver Present Yes   Cognition   Overall Cognitive Status WFL   Orientation Level Oriented X4   Subjective   Subjective Everytime i try to move even 1/4 inch i get pain   RLE Assessment   RLE Assessment X  (grossly 3 - 3+, knee buckling during standing and walking)   LLE Assessment   LLE Assessment X  (grossly 3 - 3+, knee buckling during  standing and walking)   Coordination   Movements are Fluid and Coordinated 0   Coordination and Movement Description movements not fluid due to pain and weakness   Bed Mobility   Supine to Sit 3  Moderate assistance   Additional items Assist x 2   Sit to Supine 3  Moderate assistance   Additional items Assist x 2   Transfers   Sit to Stand 3  Moderate assistance   Additional items Assist x 2   Stand to Sit 3  Moderate assistance   Additional items Assist x 2   Ambulation/Elevation   Gait pattern Decreased foot clearance;Shuffling;Short stride;R Knee Naveed;L Knee Naveed   Gait Assistance 3  Moderate assist   Additional items Assist x 2   Assistive Device Rolling walker   Distance 8' to transport bed   Stair Management Assistance Not tested   Balance   Static Sitting Fair   Dynamic Sitting Fair -   Static Standing Poor +   Dynamic Standing Poor   Ambulatory Poor   Endurance Deficit   Endurance Deficit Yes   Endurance Deficit Description limited by muscular fatigue and pain   Activity Tolerance   Activity Tolerance Patient limited by fatigue;Patient limited by pain   Medical Staff Made Aware OT present for co-evaluation   Nurse Made Aware Nursing cleared pt for therapy   Assessment   Prognosis Fair   Problem List Decreased strength;Decreased endurance;Impaired balance;Decreased mobility;Decreased coordination;Orthopedic restrictions;Pain   Assessment Pt is a 74 y.o. male who was admitted to [facility] on 10/10/2024 with Spinal stenosis of lumbar region at multiple levels POD 1 Navigated L2-5 laminectomy/decompression, L3-5 instrumented fusion, TLIF . Patient has a past medical history of Anesthesia complication, Benign essential hypertension, BPH (benign prostatic hyperplasia), Cardiogenic shock (HCC), Chronic kidney disease, stage III (moderate) (HCC), Coronary atherosclerosis, Enlarged prostate, Epistaxis, History of aortic valve disease, History of mitral valve disease, History of transfusion,  Paroxysmal atrial fibrillation (HCC) , Pulmonary nodules, PVC's (premature ventricular contractions), S/P mitral valve repair, Status post aortic valve replacement, SVT (supraventricular tachycardia) (HCC), Syncope, cardiogenic, Thoracic aortic aneurysm without rupture (HCC), and Thrombocytopenia (HCC). Prior to admission pt reports independent with ADLs and IADLs. Pt was alert and oriented and able to answer all questions about home setup and prior level of function. Pt presenting with 8/10 pain and requiring moderate assist of 2 for all bed mobility, transfers and gait. Pt reporting very difficult to move his legs to walk but was able to walk to transport bed with wheeled walker and mod assist of 2. Pt complained of feeling like he was going to pass out (which patient has history of) and seemed to momentarily do this during the process of getting him supine in the transport bed but quickly regained consciousness and able to pump ankles when told to do it. Pt would benefit from continued PT to address deficits in strength, endurance, balance and mobility. At this time, PT is recommending level 2 resources.   Goals   Patient Goals To not have pain   STG Expiration Date 10/25/24   Short Term Goal #1 1) Pt will perform all bed mobility and transfers with supervision to increase independence and ability to participate in ADLs, 2) pt will be able to ambulate 250' or more with least restrictive AD to increase muscular endurance and ability to ambulate in community, 3) pt able to negotiate full flight of stairs to allow pt to navigate home environment, 4) Pt will improve balance to fair+ or better to improve patient safety and decrease risk of future falls, 5) Pt to be independent with maintaining spinal precautions   Plan   Treatment/Interventions ADL retraining;Functional transfer training;Endurance training;Bed mobility;Gait training;Spoke to nursing;OT   PT Frequency 2-3x/wk   Discharge Recommendation   Rehab Resource  Intensity Level, PT II (Moderate Resource Intensity)   AM-PAC Basic Mobility Inpatient   Turning in Flat Bed Without Bedrails 2   Lying on Back to Sitting on Edge of Flat Bed Without Bedrails 2   Moving Bed to Chair 2   Standing Up From Chair Using Arms 2   Walk in Room 2   Climb 3-5 Stairs With Railing 2   Basic Mobility Inpatient Raw Score 12   Basic Mobility Standardized Score 32.23   Brian Downing Highest Level Of Mobility   -HLM Goal 4: Move to chair/commode   JH-HLM Achieved 6: Walk 10 steps or more   End of Consult   Patient Position at End of Consult Supine;Other (comment)  (in transport bed for trip down to x-ray)   Tk Thompson, SPT

## 2024-10-11 NOTE — CONSULTS
NEUROLOGY   STROKE ALERT   CONSULT   Name: Oni Nagy  Age & Sex: 74 y.o. male  MRN: [id number]  Unit/Bed#: -01   Encounter: [id number] Length of Stay: 0  ASSESSMENT & PLAN   Stroke-like symptoms  Assessment & Plan  74 y.o. male with pertinent PMH of paroxysmal A-fib, hypertension, hyperlipidemia, lumbar radiculopathy, CKD stage III, s/p POD 1 lumbar fusion. Stroke alert activated on  10/11/24 at 1327 for sxs concerning for stroke consisting of word-finding difficulties. LKW approximately 1200. Initial NIHSS 3 (2 points for left lower extremity motor and 1 point for dysarthria.). Initial BP Blood Pressure: 133/94, FSBG POC Glucose: (!) 171. NC CTH revealed mild chronic microangiopathic changes, no loss of gray-white differentiation, acute hemorrhage or mass effect and CTA H/N showed no LVO, significant stenosis, dissection.  As a result of Symptoms resolved/clearly non disabling patient was not determined to be a candidate for thrombolysis (TNK). Given absence of IR target pt was deemed not a candidate for mechanical thrombectomy.   - Home Antiplatelet /Anticoagulants PTA: Apixaban (Eliquis)  - Stroke risk factors: HTN, HLD, Atrial Fibrillation, and Over weight  - Prior Stroke Hx: none  - Past Neurological Hx: neurosurgical procedures    Workup:  - CTH: NC CTH revealed mild chronic microangiopathic changes, no loss of gray-white differentiation, acute hemorrhage or mass effect and CTA H/N showed no LVO, significant stenosis, dissection.  - Labs: Hemoglobin A1c 5.8 (5/6/2021), LDL No results in last 5 years (No results in last 5 years)    Impression: 74 y.o. male with history of  paroxysmal A-fib, hypertension, hyperlipidemia, lumbar radiculopathy, CKD stage III, s/p POD 1 lumbar fusion currently on Apixaban (Eliquis) who presented with word finding difficulties that came on acutely however lasted transiently.  By the time of the neuro evaluation he had no obvious difficulty with speech.  Patient was  given oxycodone 10 mg around 1100 so may have been a factor with the speech difficulties that occurred afterwards.  With no findings on CT imaging and resolution of symptoms no further need for CVA evaluation.    Plan: Discussed plan with neurology attending, Dr. Hubbard  BP Goals: normotension  Antiplatelet agents:  None  Anticoagulation: Eliquis held due to lumbar fusion yesterday  Statin: Lipitor 40 mg qd  Euthermic/Euglycemic  DVT PPx: Heparin SQ, SCDs  Rest of other care per primary team appreciated    Disposition: PT Recommendations - Rehab Resource Intensity Level, PT: II (Moderate Resource Intensity)          No outpatient follow up with neurology as needed.  He will not require any outpatient neurological testing.    CHIEF COMPLAINT   No chief complaint on file.    Inpatient consult to Neurology  Consult performed by: Albino Burdick DO  Consult ordered by: Jose Roberto Caro MD           HISTORY OF PRESENT ILLNESS   Reason for Consult: Stroke Alert  Hx and PE limited by: none.   Patient Last Known Well: 1200  Stroke alert called: 1327  Neurology time of arrival: Immediate    HPI: Oni Nagy is a 74 y.o. male with a pertinent past medical history of paroxysmal A-fib, hypertension, hyperlipidemia, lumbar radiculopathy, CKD stage III who presents with word find difficulties that began after the patient went down for imaging after having lumbar fusion yesterday.  This reported that the patient was saying the wrong words, for example he would say [place] instead of x-ray.  Patient's wife is in the room and noted that he had continued difficulty with speaking when talking to Dr. Foss.  While speaking with neurology he did not seem to have the same kind of word finding difficulties.  There was concern for stroke and a rapid response was activated, patient was taken for CT afterwards.  He has never had any word finding events like this in the past.    Patient otherwise denies any headache, dizziness,  visual disturbances, new weakness, new numbness or tingling.       Stroke risk factors: atrial fibrillation, hyperlipidemia, and hypertension.  Prior stroke history: none.   The patient is on chronic anticoagulation. Eliquis 5 mg BID    Upon arrival, initial BP was noted to be Blood Pressure: 143/87 Glucose 171.   They had a NIHSS of 3 for left lower extremity motor drift and dysarthria.    Patient was taken to the CT scanner and NC CTH revealed mild chronic microangiopathic changes, no loss of gray-white differentiation, acute hemorrhage or mass effect and CTA H/N showed no LVO, significant stenosis, dissection.    TNK Decision: Not administered as patient not a candidate for thrombolytic medications.  Contraindication(s) to TNK administration: Symptoms resolved/clearly non disabling    Review of previous medical records was completed.   REVIEW OF SYSTEMS   Review of Systems   Unable to perform ROS: Acuity of condition     PAST MEDICAL HISTORY   Past Medical History:  has a past medical history of Anesthesia complication, Benign essential hypertension, BPH (benign prostatic hyperplasia), Cardiogenic shock ([facility]), Chronic kidney disease, stage III (moderate) ([facility]), Coronary atherosclerosis, Enlarged prostate, Epistaxis, History of aortic valve disease, History of mitral valve disease, History of transfusion, Paroxysmal atrial fibrillation ([facility]) , Pulmonary nodules, PVC's (premature ventricular contractions), S/P mitral valve repair, Status post aortic valve replacement, SVT (supraventricular tachycardia) ([facility]), Syncope, cardiogenic, Thoracic aortic aneurysm without rupture ([facility]), and Thrombocytopenia ([facility]).     Allergies:   No Known Allergies    PAST SURGICAL HISTORY     Past Surgical History:   Procedure Laterality Date    CARDIAC ELECTROPHYSIOLOGY STUDY AND ABLATION  07/2024    CARDIAC SURGERY      MV repair, AV replaced and aortic aneurysm repaired    CAUTERIZE INNER NOSE Left 01/09/2022    Procedure:  Sphenopalatine artery ligation;  Surgeon: Alfonso Rodriguez MD;  Location: [facility];  Service: ENT    COLONOSCOPY      Dr. Schreiber.  Tubular adenoma descending colon polyp.    COLONOSCOPY      Dr. Schreiber.  Diverticulosis.    COLONOSCOPY  2020    Dr. Schreiber.  12 mm descending colon inflammatory polyp, 3 mm benign lymphoid aggregate, diverticulosis.    FACIAL/NECK BIOPSY Left 2021    Procedure: EXCISION PYOGENIC GRANULOMA; FREE MUCOSAL GRAFT FROM NOSE;  Surgeon: Alfonso Rodriguez MD;  Location: [facility];  Service: ENT    INGUINAL HERNIA REPAIR      LUMBAR FUSION Bilateral 10/10/2024    Procedure: Navigated L2-5 laminectomy/decompression, L3-5 instrumented fusion, TLIF;  Surgeon: Jerod Foss MD;  Location: [facility];  Service: Orthopedics    TONSILLECTOMY       SOCIAL & FAMILY HISTORY     Social History     Substance and Sexual Activity   Alcohol Use No       Social History     Substance and Sexual Activity   Drug Use Never     Social History     Tobacco Use   Smoking Status Never   Smokeless Tobacco Never       Marital Status: /Civil Union     Family History:  Family History   Problem Relation Age of Onset    Alzheimer's disease Mother 80    Esophageal cancer Mother     Diabetes Father     Diabetes Sister     Kidney failure Sister     Stroke Sister     Coronary artery disease Sister         MI    Breast cancer Sister     Diabetes Maternal Grandmother     Coronary artery disease Maternal Uncle         massive MI - age 49 and 50       Code Status: Prior  Advance Directive and Living Will:      Power of :    POLST:    OBJECTIVE     Vitals:    10/11/24 1323 10/11/24 1326 10/11/24 1326 10/11/24 1510   BP: 143/87   119/75   Pulse:   86 65   Resp:       Temp:    98.8 °F (37.1 °C)   TempSrc:       SpO2:  98% 96% 97%   Weight:       Height:            Temperature:   Temp (24hrs), Av.9 °F (36.6 °C), Min:97.2 °F (36.2 °C), Max:98.8 °F (37.1 °C)    Temperature: 98.8 °F  (37.1 °C)    Intake & Output:  I/O         10/09 0701  10/10 0700 10/10 0701  10/11 0700 10/11 0701  10/12 0700    I.V. (mL/kg)  2250 (24.7)     IV Piggyback  250     Total Intake(mL/kg)  2500 (27.4)     Urine (mL/kg/hr)  1260 3300 (3.3)    Drains  210 90    Blood  250     Total Output  1720 3390    Net  +780 -3390                   Weights:   IBW (Ideal Body Weight): 79.9 kg    Body mass index is 26.52 kg/m².  Weight (last 2 days)       Date/Time Weight    10/10/24 1128 91.2 (201)            GENERAL EXAM:  Constitutional:Alert. Not in acute distress. Not ill-appearing, toxic-appearing or diaphoretic.   HENT: Normocephalic and atraumatic. Nose and Ears normal.   Eyes: No scleral icterus. No discharge.   Neck: Neck Supple. ROM normal.  Cardiovascular: Distal extremities warm without palpable edema or tenderness, no observed significant swelling.   Pulmonary: Pulmonary effort is normal. Not in respiratory distress  Abdominal: Abdomen is flat and not distended  Musculoskeletal: No swelling or deformity.  Skin: Warm and dry  Psychiatric: Normal behavior and appropriate affect     NEUROLOGIC  EXAM:  Mental Status: alertness: alert, orientation: time, person, city, speech:normal rate, normal volume, fluent, coherent, dysarthric, affect: normal, thought content exhibits logical connections  Cranial Nerves:  II: Pupils equal, round, reactive to light and accommodation, Visual Fields normal  III, IV, VI: EOM full and intact  VII: facial symmetry equal  VIII: normal hearing to speech  IX, X: normal palatal elevation, no uvular deviation  XI: 5/5 head turn and 5/5 shoulder shrug bilaterally  XII: midline tongue protrusion  Motor: Normal bulk, tone, no involuntary movements or tremors  Sensory:Normal light touch sensation and Normal pin prick sensation  Coordination: Cerebellar arm drift absent, Finger-to-nose, bilaterally intact Heel To [place] normal bilaterally  Station/Gait: deferred d/t medical acuity     NIHSS:  1a.Level of  Consciousness: 0 = Alert   1b. LOC Questions: 0 = Answers both correctly   1c. LOC Commands: 0 = Obeys both correctly   2. Best Gaze: 0 = Normal   3. Visual: 0 = No visual field loss   4. Facial Palsy: 0=Normal symmetric movement   5a. Motor Right Arm: 0=No drift, limb holds 90 (or 45) degrees for full 10 seconds   5b. Motor Left Arm: 0=No drift, limb holds 90 (or 45) degrees for full 10 seconds   6a. Motor Right Le=No drift, limb holds 90 (or 45) degrees for full 10 seconds   6b. Motor Left Le=Some effort against gravity, limb cannot get to or maintain (if cured) 90 (or 45) degrees, drifts down to bed, but has some effort against gravity   7. Limb Ataxia:  0=Absent   8. Sensory: 0=Normal; no sensory loss   9. Best Language:  0=No aphasia, normal   10. Dysarthria: 1=Mild to moderate, patient slurs at least some words and at worst, can be understood with some difficulty   11. Extinction and Inattention (formerly Neglect): 0=No abnormality   Total Score: 3     Time NIHSS was completed: 1340    Modified Vivian Score:  1 (No significant disability. Able to carry out all usual activities, despite some symptoms)    LABORATORY DATA   Labs: I have personally reviewed pertinent reports.    Results from last 7 days   Lab Units 10/11/24  0507   WBC Thousand/uL 12.41*   HEMOGLOBIN g/dL 12.2   HEMATOCRIT % 36.5   PLATELETS Thousands/uL 166   SEGS PCT % 88*   MONO PCT % 7   EOS PCT % 0      Results from last 7 days   Lab Units 10/11/24  1340 10/11/24  0507   SODIUM mmol/L 136 138   POTASSIUM mmol/L 3.7 4.1   CHLORIDE mmol/L 104 104   CO2 mmol/L 23 25   BUN mg/dL 14 12   CREATININE mg/dL 1.08 0.87   CALCIUM mg/dL 9.3 8.9                            Micro:  Lab Results   Component Value Date    URINECX No Growth <1000 cfu/mL 2017       IMAGING & DIAGNOSTIC TESTING   Radiology Results: I have personally reviewed pertinent reports and I have personally reviewed pertinent films in PACS    CT stroke alert brain   Final  Result by Asad العلي MD (10/11 1424)      -No loss of gray-white differentiation, acute hemorrhage or mass effect.   -Mild chronic microangiopathic changes.      Findings were directly discussed with Pedro Hubbard at 2:20 pm on 10/11/2024.      Workstation performed: [id number]         CTA stroke alert (head/neck)   Final Result by Asad العلي MD (10/11 1424)      -No large vessel occlusion, high-grade stenosis, dissection of the [place], carotid or vertebral arteries.   -Left PICA origin is not visualized however likely reconstituted distally.   -Stable 8 mm right upper lobe pulmonary nodule dating back to 2022.         Findings were directly discussed with Pedro Hubbard at 2:20 pm on 10/11/2024.      Workstation performed: [id number]         XR spine lumbar 2 or 3 views injury    (Results Pending)       ACTIVE MEDICATIONS     Current Facility-Administered Medications:     acetaminophen (TYLENOL) tablet 650 mg, Q6H MELODIE    aluminum-magnesium hydroxide-simethicone (MAALOX) oral suspension 30 mL, Q6H PRN    atorvastatin (LIPITOR) tablet 20 mg, Daily    calcium carbonate (TUMS) chewable tablet 1,000 mg, Daily PRN    Cholecalciferol (VITAMIN D3) tablet 1,000 Units, Daily    diltiazem (CARDIZEM CD) 24 hr capsule 120 mg, Daily    docusate sodium (COLACE) capsule 100 mg, BID    heparin (porcine) subcutaneous injection 5,000 Units, Q8H MLEODIE    hydrALAZINE (APRESOLINE) injection 10 mg, Q6H PRN    magnesium gluconate (MAGONATE) tablet 250 mg, Daily    methocarbamol (ROBAXIN) tablet 500 mg, Q6H MELODIE    ondansetron (ZOFRAN) injection 4 mg, Q6H PRN    oxyCODONE (ROXICODONE) immediate release tablet 10 mg, Q4H PRN    oxyCODONE (ROXICODONE) IR tablet 5 mg, Q4H PRN    senna (SENOKOT) tablet 8.6 mg, Daily    tamsulosin (FLOMAX) capsule 0.4 mg, Daily With Dinner    HOME MEDICATIONS     Prior to Admission medications    Medication Sig Start Date End Date Taking? Authorizing Provider   acetaminophen (TYLENOL) 650 mg CR  tablet Take 1 tablet (650 mg total) by mouth every 8 (eight) hours as needed for mild pain 10/10/24  Yes Traci Jaime PA-C   atorvastatin (LIPITOR) 20 mg tablet Take 20 mg by mouth daily   Yes Historical Provider, MD   Cholecalciferol (Vitamin D3) 50 MCG (2000 UT) capsule Take 2,000 Units by mouth daily   Yes Historical Provider, MD   diltiazem (DILACOR XR) 120 MG 24 hr capsule Take 120 mg by mouth daily   Yes Historical Provider, MD   Eliquis 5 MG Take 5 mg by mouth 2 (two) times a day Instructed to hold starting 10/8. Pt states 10/7 will be last dose per sx 7/16/24  Yes Historical Provider, MD   lovastatin (MEVACOR) 40 MG tablet Take 1 tablet by mouth daily at bedtime   Yes Historical Provider, MD   Magnesium 250 MG TABS Take 1 tablet by mouth daily     Yes Historical Provider, MD   methocarbamol (ROBAXIN) 500 mg tablet Take 1 tablet (500 mg total) by mouth 3 (three) times a day as needed for muscle spasms 10/10/24  Yes Traci Jaime PA-C   metoprolol succinate (TOPROL-XL) 25 mg 24 hr tablet Take 25 mg by mouth 2 (two) times a day 9/26/24  Yes Historical Provider, MD   oxyCODONE (Roxicodone) 5 immediate release tablet Take 1 tablet (5 mg total) by mouth every 4 (four) hours as needed for moderate pain for up to 10 days Max Daily Amount: 30 mg 10/10/24 10/20/24 Yes Traci Jaime PA-C   Saw Palmetto, Serenoa repens, (SAW PALMETTO PO) Take 1 capsule by mouth daily 250 mg   Yes Historical Provider, MD   tamsulosin (FLOMAX) 0.4 mg TAKE 1 CAPSULE BY MOUTH EVERY DAY AT NIGHT 5/5/20  Yes Historical Provider, MD       =======================================================================  I have discussed the patient's history, physical exam findings, assessment, and plan in detail with attending, Dr. Stevie Burdick DO   [facility]'s Neurology Residency, PGY-2

## 2024-10-11 NOTE — PROGRESS NOTES
Progress Note - Hospitalist   Name: Oni Nagy 74 y.o. male I MRN: [id number]  Unit/Bed#: -01 I Date of Admission: 10/10/2024   Date of Service: 10/11/2024 I Hospital Day: 0    Assessment & Plan  Spinal stenosis of lumbar region at multiple levels  POD 1  Navigated L2-5 laminectomy/decompression, L3-5 instrumented fusion, TLIF stable postoperatively  Ortho primary   WBAT BLE  PT/OT  Incentive spirometry  Pain control  DVT ppx: SQH   Lumbar spine xrays to be completed prior to DC  Monitor drain output  Benign essential hypertension  Continue diltiazem  Patient no longer on metoprolol  As needed hydralazine for SBP greater than 160  Elevated PSA w neg Bx 2014  Follows with urology as op at [facility]   Planning for outpatient Cystoscopy, holmium enucleation of prostate, possible transurethral resection of prostate   Monitor for urinary retention after mccord removal post op- retention protocol  Paroxysmal atrial fibrillation (HCC)   Continue diltiazem 120 mg daily  Hold eliquis for 48 hours post surgery    S/P mitral valve repair  Currently stable follow with cardiology as OP  BPH (benign prostatic hyperplasia)  Continue tamsulosin  Monitor for urinary retention after mccord removal     VTE Pharmacologic Prophylaxis:   Moderate Risk (Score 3-4) - Pharmacological DVT Prophylaxis Ordered: heparin.    Mobility:   Basic Mobility Inpatient Raw Score: 11  JH-HLM Goal: 4: Move to chair/commode  JH-HLM Achieved: 3: Sit at edge of bed  JH-HLM Goal NOT achieved. Continue with multidisciplinary rounding and encourage appropriate mobility to improve upon JH-HLM goals.    Patient Centered Rounds: I performed bedside rounds with nursing staff today.   Discussions with Specialists or Other Care Team Provider: d/w RN     Education and Discussions with Family / Patient: Patient declined call to .     Current Length of Stay: 0 day(s)  Current Patient Status: Outpatient Surgery   Certification Statement:  per ortho    Discharge Plan: [facility] is following this patient on consult. They are medically stable for discharge when deemed appropriate by primary service.    Code Status: Prior    Subjective   Pt lying in bed. Reports he was laying on his side for 2hrs last night and this triggered severe pain that radiated to his left hip and down into his toes, he reports he experienced numbness in his toes. Once repositioned his pain was improved to a 4/10 but still experiencing some sensation changes in his left toes. + nausea denies vomiting. Denies any other complaints     Objective :  Temp:  [96.5 °F (35.8 °C)-98.4 °F (36.9 °C)] 98.4 °F (36.9 °C)  HR:  [] 80  BP: (122-180)/() 124/83  Resp:  [16-24] 18  SpO2:  [91 %-98 %] 97 %  O2 Device: None (Room air)    Body mass index is 26.52 kg/m².     Input and Output Summary (last 24 hours):     Intake/Output Summary (Last 24 hours) at 10/11/2024 1115  Last data filed at 10/11/2024 0827  Gross per 24 hour   Intake 2500 ml   Output 3280 ml   Net -780 ml       Physical Exam  Constitutional:       General: He is not in acute distress.     Appearance: He is not ill-appearing.   Cardiovascular:      Rate and Rhythm: Normal rate and regular rhythm.      Pulses: Normal pulses.      Heart sounds: Murmur heard.   Pulmonary:      Effort: No respiratory distress.      Breath sounds: Normal breath sounds. No wheezing or rales.   Abdominal:      General: Bowel sounds are normal. There is no distension.      Palpations: Abdomen is soft.      Tenderness: There is no abdominal tenderness.      Hernia: A hernia (b/l inguinal) is present.   Genitourinary:     Comments: Garcia draining yellow urine   Musculoskeletal:         General: No swelling or tenderness.   Skin:     General: Skin is warm and dry.      Findings: No erythema or rash.   Neurological:      General: No focal deficit present.      Mental Status: He is alert. Mental status is at baseline.   Psychiatric:         Attention and Perception:  Attention normal.         Mood and Affect: Mood normal.           Lines/Drains:  Lines/Drains/Airways       Active Status       Name Placement date Placement time Site Days    Closed/Suction Drain Left Back Bulb 19 Fr. 10/10/24  1621  Back  less than 1    Urethral Catheter Latex 16 Fr. 10/10/24  1230  Latex  less than 1                  Urinary Catheter:  Goal for removal: No longer needed. Will place order to discontinue                 Lab Results: I have reviewed the following results:   Results from last 7 days   Lab Units 10/11/24  0507   WBC Thousand/uL 12.41*   HEMOGLOBIN g/dL 12.2   HEMATOCRIT % 36.5   PLATELETS Thousands/uL 166   SEGS PCT % 88*   LYMPHO PCT % 4*   MONO PCT % 7   EOS PCT % 0     Results from last 7 days   Lab Units 10/11/24  0507   SODIUM mmol/L 138   POTASSIUM mmol/L 4.1   CHLORIDE mmol/L 104   CO2 mmol/L 25   BUN mg/dL 12   CREATININE mg/dL 0.87   ANION GAP mmol/L 9   CALCIUM mg/dL 8.9   GLUCOSE RANDOM mg/dL 166*                       Recent Cultures (last 7 days):         Imaging Results Review: I reviewed radiology reports from this admission including: xray(s).  Other Study Results Review: No additional pertinent studies reviewed.    Last 24 Hours Medication List:     Current Facility-Administered Medications:     acetaminophen (TYLENOL) tablet 650 mg, Q6H MELODIE    aluminum-magnesium hydroxide-simethicone (MAALOX) oral suspension 30 mL, Q6H PRN    atorvastatin (LIPITOR) tablet 20 mg, Daily    calcium carbonate (TUMS) chewable tablet 1,000 mg, Daily PRN    Cholecalciferol (VITAMIN D3) tablet 1,000 Units, Daily    docusate sodium (COLACE) capsule 100 mg, BID    heparin (porcine) subcutaneous injection 5,000 Units, Q8H MELODIE    hydrALAZINE (APRESOLINE) injection 10 mg, Q6H PRN    magnesium gluconate (MAGONATE) tablet 250 mg, Daily    methocarbamol (ROBAXIN) tablet 500 mg, Q6H MELODIE    ondansetron (ZOFRAN) injection 4 mg, Q6H PRN    oxyCODONE (ROXICODONE) immediate release tablet 10 mg, Q4H PRN     oxyCODONE (ROXICODONE) IR tablet 5 mg, Q4H PRN    senna (SENOKOT) tablet 8.6 mg, Daily    tamsulosin (FLOMAX) capsule 0.4 mg, Daily With Dinner    Administrative Statements   Today, Patient Was Seen By: DMITRI Tirado      **Please Note: This note may have been constructed using a voice recognition system.**

## 2024-10-11 NOTE — RAPID RESPONSE
"Rapid Response Note  Oni Nagy 74 y.o. male MRN: [id number]  Unit/Bed#: -01 Encounter: [id number]    Rapid Response Notification(s):   Response called date/time:  10/11/2024 1:20 PM  Response team arrival date/time:  10/11/2024 1:21 PM  Response end date/time:  10/11/2024 1:31 PM  Level of care:  [facility]  Rapid response location:  [facility] unit  Primary reason for rapid response call:  Acute change in neuro status    Rapid Response Intervention(s):   Airway:  None  Breathing:  None  Circulation:  None  Fluids administered:  None  Medications administered:  None       Assessment:   Patient noted by wife and SLIM NP to make difficulty word finding, stating \"[place]\" instead of \"xray\" and stating \"100%\" when attempting to state \"ten.\" Neurology team already at bedside and activated RRT so that stroke alert could be activated.     Plan:   Neurology performing NIH   Glucose 171  VS stable  #20g AC IV inserted for contrast study  Stat CTA head/neck/CT head   Stroke pathway   Symptoms rapidly resolving      Rapid Response Outcome:   Transfer:  Remain on floor    Family notified: Yes, Name of Family member contacted wife Sherron at bedside and updated on plan of care          Background/Situation:   Oni Nagy is a 74 y.o. male who is POD #1 Navigated L2-5 laminectomy/decompression, L3-5 instrumented fusion.      Review of Systems-performed by neurology team    Objective:   Vitals:    10/11/24 1320 10/11/24 1323 10/11/24 1323 10/11/24 1326   BP:   143/87    Pulse: 80 81  86   Resp:       Temp:       TempSrc:       SpO2: 97% 97%  96%   Weight:       Height:         Physical Exam-performed by neurology team     "

## 2024-10-11 NOTE — ASSESSMENT & PLAN NOTE
74 y.o. male with pertinent PMH of paroxysmal A-fib, hypertension, hyperlipidemia, lumbar radiculopathy, CKD stage III, s/p POD 1 lumbar fusion. Stroke alert activated on  10/11/24 at 1327 for sxs concerning for stroke consisting of word-finding difficulties. LKW approximately 1200. Initial NIHSS 3 (2 points for left lower extremity motor and 1 point for dysarthria.). Initial BP Blood Pressure: 133/94, FSBG POC Glucose: (!) 171. NC CTH revealed mild chronic microangiopathic changes, no loss of gray-white differentiation, acute hemorrhage or mass effect and CTA H/N showed no LVO, significant stenosis, dissection.  As a result of Symptoms resolved/clearly non disabling patient was not determined to be a candidate for thrombolysis (TNK). Given absence of IR target pt was deemed not a candidate for mechanical thrombectomy.   - Home Antiplatelet /Anticoagulants PTA: Apixaban (Eliquis)  - Stroke risk factors: HTN, HLD, Atrial Fibrillation, and Over weight  - Prior Stroke Hx: none  - Past Neurological Hx: neurosurgical procedures    Workup:  - CTH: NC CTH revealed mild chronic microangiopathic changes, no loss of gray-white differentiation, acute hemorrhage or mass effect and CTA H/N showed no LVO, significant stenosis, dissection.  - Labs: Hemoglobin A1c 5.8 (5/6/2021), LDL No results in last 5 years (No results in last 5 years)    Impression: 74 y.o. male with history of  paroxysmal A-fib, hypertension, hyperlipidemia, lumbar radiculopathy, CKD stage III, s/p POD 1 lumbar fusion currently on Apixaban (Eliquis) who presented with word finding difficulties that came on acutely however lasted transiently.  By the time of the neuro evaluation he had no obvious difficulty with speech.  Patient was given oxycodone 10 mg around 1100 so may have been a factor with the speech difficulties that occurred afterwards.  With no findings on CT imaging and resolution of symptoms no further need for CVA evaluation.    Plan: Discussed  plan with neurology attending, Dr. Hubbard  BP Goals: normotension  Antiplatelet agents:  None  Anticoagulation: Eliquis held due to lumbar fusion yesterday  Statin: Lipitor 40 mg qd  Euthermic/Euglycemic  DVT PPx: Heparin SQ, SCDs  Rest of other care per primary team appreciated    Disposition: PT Recommendations - Rehab Resource Intensity Level, PT: II (Moderate Resource Intensity)

## 2024-10-11 NOTE — PROGRESS NOTES
Pastoral Care Progress Note             10/11/24 1000   Clinical Encounter Type   Visited With Patient   Routine Visit Introduction   Christian Encounters   Christian Needs Prayer      met with pt who was struggling with pain. Conversation seem to distract him from the pain. 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 Pt welcomed prayer and  will continue to check-in on pt and remains available.

## 2024-10-11 NOTE — ASSESSMENT & PLAN NOTE
Patient is status post laminectomy and decompression with orthopedics.  Currently on board for medical management  Continue pain control per orthopedics  PT OT  Blood glucose within normal limits  Blood pressures trending higher postprocedure which can be expected due to pain, will give as needed hydralazine for SBP greater than 160

## 2024-10-11 NOTE — ASSESSMENT & PLAN NOTE
Continue diltiazem  Patient no longer on metoprolol  As needed hydralazine for SBP greater than 160

## 2024-10-11 NOTE — PROGRESS NOTES
[facility]             10/11/24 1300   Clinical Encounter Type   Visited With Patient   Crisis Visit Code      responded to a rapid alert and found pt's wife present. Wife explained that pt became incoherent while talking to a Doc. and was reason for the alert. When pt was being transported to [facility], pt's wife said that she would wait in pt's room and did not need further care.  shared availability if needs arise.

## 2024-10-11 NOTE — CASE MANAGEMENT
Case Management Assessment & Discharge Planning Note    Patient name Oni Nagy  Location /-01 MRN [id number]  : 1950 Date 10/11/2024       Current Admission Date: 10/10/2024  Current Admission Diagnosis:Spinal stenosis of lumbar region at multiple levels   Patient Active Problem List    Diagnosis Date Noted Date Diagnosed    Garcia catheter in place 2024     Lumbar radiculopathy 2024     Spinal stenosis of lumbar region at multiple levels 2024     Frequent PVCs 2023     Inguinal Hernia Right 2023     BPH (benign prostatic hyperplasia) 2023     S/P mitral valve repair 06/15/2021     Status post aortic valve replacement 2021     Pulmonary nodules 2021     Thoracic aortic aneurysm without rupture (HCC) 2021     Localized, primary osteoarthritis of shoulder region 2020     History of urinary retention 2020     Paroxysmal atrial fibrillation (HCC)  2020     Proteinuria 2017     Hematuria, microscopic 2017     Abdominal hernia 2017     Neurocardiogenic syncope 2016     Insomnia 10/14/2014     Hypercholesterolemia 10/14/2014     Elevated PSA w neg Bx 2014 10/14/2014     Benign essential hypertension 2011     Coronary atherosclerosis 2011       LOS (days): 0  Geometric Mean LOS (GMLOS) (days):   Days to GMLOS:     OBJECTIVE:              Current admission status: Outpatient Surgery       Preferred Pharmacy:   [id number] [place] [place] - [address]  [address]  [facility] [address]  Phone: [phone] Fax: [phone]    [id number] [place] [place] - [address]  [address] [facility] [address]  Phone: [phone] Fax: [phone]    Primary Care Provider: Jose Roberto Mejia DO    Primary Insurance: MEDICARE  Secondary Insurance: AARP    ASSESSMENT:  Active Health Care Proxies       Yakov Sherron Health Care Representative - Spouse    Primary Phone: [phone] (Mobile)  Home Phone: [phone]                    Patient Information  Admitted from:: Home  Mental Status: Alert  During Assessment patient was accompanied by: Spouse  Assessment information provided by:: Patient, Spouse  Primary Caregiver: Self  Support Systems: Spouse/significant other  Living Arrangements: Lives w/ Spouse/significant other    Activities of Daily Living Prior to Admission  Functional Status: Independent  Completes ADLs independently?: Yes  Ambulates independently?: Yes  Does patient use assisted devices?: No  Does patient currently own DME?: Yes  What DME does the patient currently own?: Other (Comment) (4 pt walker)  Does patient have a history of Outpatient Therapy (PT/OT)?: No  Does the patient have a history of Short-Term Rehab?: No  Does patient have a history of HHC?: Yes ([facility])  Does patient currently have HHC?: No         Patient Information Continued  Income Source: Pension/detention  Does patient have prescription coverage?: Yes  Does patient receive dialysis treatments?: No  Does patient have a history of substance abuse?: No  Does patient have a history of Mental Health Diagnosis?: No         Means of Transportation  Means of Transport to Appts:: Drives Self          DISCHARGE DETAILS:    Discharge planning discussed with:: Patient  Freedom of Choice: Yes  Comments - Freedom of Choice: Discussed FOC  CM contacted family/caregiver?: Yes             Contacts  Patient Contacts: Wife - Sherron  Relationship to Patient:: Family  Contact Method: In Person  Reason/Outcome: Referral, Discharge Planning, Emergency Contact, Continuity of Care              Other Referral/Resources/Interventions Provided:  Referral Comments: Per am rounds: POD 1 L2-5 laminectomy/decompression, L3-5 instrumented fusion, TLIF.  PT/OT pending - needs brace, pt refused brace previously stating he had one at home & wife was bringing in at 10 am. Pt still has drain - output = 140 cc.         CM met w/pt & wife Sherron at bedside to introduce self & CM role.  Pt lives w/wife Sherron.  Pt IADL's PTH.  Pt has a 4 point walker, would like a RW ordered.  CM to order RW when therapy notes completed in system.   Pt's wife will transport upon dc.  Pt has had [facility] in past s/p his [facility]HS. Agreeable to referral to  [facility] for HH if recommended.  Brace pt's wife brought in does not fit pt - they need a brace from the hospital.     Message received from [facility] re: brace fitting.  Explained brace pt has does not fit. RT will coordinate w/PT to fit pt w/brace.  CM to follow for dcp needs.

## 2024-10-11 NOTE — RESTORATIVE TECHNICIAN NOTE
Restorative Technician Note      Patient Name: Oni Nagy     Restorative Tech Visit Date: 10/11/24  Note Type: Bracing, Initial consult  Patient Position Upon Consult: Supine  Mobility / Activity Provided: upon entering pt room pt was in 8/10 pain; pt states he has a LSO back brace at home that wife will bring in around 10 am; will follow up to refit brace when wife arrives; pt was educated on brace instructions and pt received intructional paper  Patient Position at End of Consult: Supine; All needs within reach; Bed/Chair alarm activated  Nurse Communication: Nurse aware of consult, application of brace          Please contact [facility] Restorative tech on Epic Secure Chat  in regards to bracing instruction and/or adjustment.     Aleshia Greene, Restorative Technician

## 2024-10-11 NOTE — ASSESSMENT & PLAN NOTE
POD 1  Navigated L2-5 laminectomy/decompression, L3-5 instrumented fusion, TLIF stable postoperatively  Ortho primary   WBAT BLE  PT/OT  Incentive spirometry  Pain control  DVT ppx: SQH   Lumbar spine xrays to be completed prior to DC  Monitor drain output

## 2024-10-11 NOTE — ASSESSMENT & PLAN NOTE
Follows with urology as op at [facility]   Planning for outpatient Cystoscopy, holmium enucleation of prostate, possible transurethral resection of prostate   Monitor for urinary retention after mccord removal post op- retention protocol

## 2024-10-12 LAB
ANION GAP SERPL CALCULATED.3IONS-SCNC: 8 MMOL/L (ref 4–13)
BASOPHILS # BLD AUTO: 0.03 THOUSANDS/ΜL (ref 0–0.1)
BASOPHILS NFR BLD AUTO: 0 % (ref 0–1)
BUN SERPL-MCNC: 14 MG/DL (ref 5–25)
CALCIUM SERPL-MCNC: 8.9 MG/DL (ref 8.4–10.2)
CHLORIDE SERPL-SCNC: 103 MMOL/L (ref 96–108)
CO2 SERPL-SCNC: 25 MMOL/L (ref 21–32)
CREAT SERPL-MCNC: 1.02 MG/DL (ref 0.6–1.3)
EOSINOPHIL # BLD AUTO: 0.07 THOUSAND/ΜL (ref 0–0.61)
EOSINOPHIL NFR BLD AUTO: 1 % (ref 0–6)
ERYTHROCYTE [DISTWIDTH] IN BLOOD BY AUTOMATED COUNT: 13.2 % (ref 11.6–15.1)
GFR SERPL CREATININE-BSD FRML MDRD: 72 ML/MIN/1.73SQ M
GLUCOSE SERPL-MCNC: 128 MG/DL (ref 65–140)
HCT VFR BLD AUTO: 33.1 % (ref 36.5–49.3)
HGB BLD-MCNC: 11.1 G/DL (ref 12–17)
IMM GRANULOCYTES # BLD AUTO: 0.04 THOUSAND/UL (ref 0–0.2)
IMM GRANULOCYTES NFR BLD AUTO: 0 % (ref 0–2)
LYMPHOCYTES # BLD AUTO: 1.56 THOUSANDS/ΜL (ref 0.6–4.47)
LYMPHOCYTES NFR BLD AUTO: 13 % (ref 14–44)
MCH RBC QN AUTO: 30.6 PG (ref 26.8–34.3)
MCHC RBC AUTO-ENTMCNC: 33.5 G/DL (ref 31.4–37.4)
MCV RBC AUTO: 91 FL (ref 82–98)
MONOCYTES # BLD AUTO: 1.26 THOUSAND/ΜL (ref 0.17–1.22)
MONOCYTES NFR BLD AUTO: 10 % (ref 4–12)
NEUTROPHILS # BLD AUTO: 9.15 THOUSANDS/ΜL (ref 1.85–7.62)
NEUTS SEG NFR BLD AUTO: 76 % (ref 43–75)
NRBC BLD AUTO-RTO: 0 /100 WBCS
PLATELET # BLD AUTO: 155 THOUSANDS/UL (ref 149–390)
PMV BLD AUTO: 10.6 FL (ref 8.9–12.7)
POTASSIUM SERPL-SCNC: 3.7 MMOL/L (ref 3.5–5.3)
RBC # BLD AUTO: 3.63 MILLION/UL (ref 3.88–5.62)
SODIUM SERPL-SCNC: 136 MMOL/L (ref 135–147)
WBC # BLD AUTO: 12.11 THOUSAND/UL (ref 4.31–10.16)

## 2024-10-12 PROCEDURE — 80048 BASIC METABOLIC PNL TOTAL CA: CPT

## 2024-10-12 PROCEDURE — 99232 SBSQ HOSP IP/OBS MODERATE 35: CPT | Performed by: NURSE PRACTITIONER

## 2024-10-12 PROCEDURE — 85025 COMPLETE CBC W/AUTO DIFF WBC: CPT

## 2024-10-12 PROCEDURE — NC001 PR NO CHARGE: Performed by: ORTHOPAEDIC SURGERY

## 2024-10-12 RX ORDER — SIMETHICONE 80 MG
80 TABLET,CHEWABLE ORAL EVERY 6 HOURS PRN
Status: DISCONTINUED | OUTPATIENT
Start: 2024-10-12 | End: 2024-10-16 | Stop reason: HOSPADM

## 2024-10-12 RX ADMIN — ACETAMINOPHEN 650 MG: 325 TABLET ORAL at 01:17

## 2024-10-12 RX ADMIN — SIMETHICONE 80 MG: 80 TABLET, CHEWABLE ORAL at 21:52

## 2024-10-12 RX ADMIN — TAMSULOSIN HYDROCHLORIDE 0.4 MG: 0.4 CAPSULE ORAL at 15:30

## 2024-10-12 RX ADMIN — METHOCARBAMOL 500 MG: 500 TABLET ORAL at 12:01

## 2024-10-12 RX ADMIN — DOCUSATE SODIUM 100 MG: 100 CAPSULE, LIQUID FILLED ORAL at 18:10

## 2024-10-12 RX ADMIN — ACETAMINOPHEN 650 MG: 325 TABLET ORAL at 06:14

## 2024-10-12 RX ADMIN — HEPARIN SODIUM 5000 UNITS: 5000 INJECTION INTRAVENOUS; SUBCUTANEOUS at 06:14

## 2024-10-12 RX ADMIN — METHOCARBAMOL 500 MG: 500 TABLET ORAL at 22:57

## 2024-10-12 RX ADMIN — SIMETHICONE 80 MG: 80 TABLET, CHEWABLE ORAL at 15:41

## 2024-10-12 RX ADMIN — METHOCARBAMOL 500 MG: 500 TABLET ORAL at 06:14

## 2024-10-12 RX ADMIN — ACETAMINOPHEN 650 MG: 325 TABLET ORAL at 22:55

## 2024-10-12 RX ADMIN — SENNOSIDES 8.6 MG: 8.6 TABLET, FILM COATED ORAL at 09:38

## 2024-10-12 RX ADMIN — Medication 30 ML: at 12:14

## 2024-10-12 RX ADMIN — ONDANSETRON 4 MG: 2 INJECTION INTRAMUSCULAR; INTRAVENOUS at 12:00

## 2024-10-12 RX ADMIN — METHOCARBAMOL 500 MG: 500 TABLET ORAL at 18:10

## 2024-10-12 RX ADMIN — HEPARIN SODIUM 5000 UNITS: 5000 INJECTION INTRAVENOUS; SUBCUTANEOUS at 15:30

## 2024-10-12 RX ADMIN — Medication 1000 UNITS: at 09:38

## 2024-10-12 RX ADMIN — OXYCODONE HYDROCHLORIDE 5 MG: 5 TABLET ORAL at 06:14

## 2024-10-12 RX ADMIN — ATORVASTATIN CALCIUM 20 MG: 20 TABLET, FILM COATED ORAL at 09:38

## 2024-10-12 RX ADMIN — ACETAMINOPHEN 650 MG: 325 TABLET ORAL at 12:01

## 2024-10-12 RX ADMIN — OXYCODONE HYDROCHLORIDE 10 MG: 10 TABLET ORAL at 22:55

## 2024-10-12 RX ADMIN — DOCUSATE SODIUM 100 MG: 100 CAPSULE, LIQUID FILLED ORAL at 09:38

## 2024-10-12 RX ADMIN — HEPARIN SODIUM 5000 UNITS: 5000 INJECTION INTRAVENOUS; SUBCUTANEOUS at 21:52

## 2024-10-12 RX ADMIN — METHOCARBAMOL 500 MG: 500 TABLET ORAL at 01:17

## 2024-10-12 RX ADMIN — DOXYLAMINE SUCCINATE 25 MG: 25 TABLET ORAL at 01:17

## 2024-10-12 RX ADMIN — ACETAMINOPHEN 650 MG: 325 TABLET ORAL at 18:10

## 2024-10-12 RX ADMIN — Medication 250 MG: at 09:39

## 2024-10-12 NOTE — PROGRESS NOTES
Progress Note - Orthopedics   Name: Oni Nagy 74 y.o. male I MRN: [id number]  Unit/Bed#: -01 I Date of Admission: 10/10/2024   Date of Service: 10/12/2024 I Hospital Day: 1    Assessment & Plan  Spinal stenosis of lumbar region at multiple levels  POD 2  Navigated L2-5 laminectomy/decompression, L3-5 instrumented fusion, TLIF stable postoperatively    WBAT BLE  PT/OT  Incentive spirometry  Pain control  DVT ppx: SQH   Lumbar spine xrays to be completed prior to DC  Monitor drain output  Diet: regular  Monitor for acute blood loss anemia and administer or recommend IVF/prbc as indicated for greater than 2 gram Hgb drop or Hgb < 7    Benign essential hypertension    Elevated PSA w neg Bx 2014    Paroxysmal atrial fibrillation (HCC)     S/P mitral valve repair    BPH (benign prostatic hyperplasia)    Stroke-like symptoms      Please contact the SecureChat role for the Orthopedics service with any questions/concerns.    History of Present Illness   74 y.o. male No acute events, no acute distress. Denies fever/chills, SOB. Pain well controlled. Complaining of nausea which he attributes to his medications, history of orthostatic hypotension. Rapid response was called yesterday due to postoperative confusion and difficulty with word finding. CT of the head did not demonstrate any acute findings.    Objective      Temp:  [97.9 °F (36.6 °C)-99.7 °F (37.6 °C)] 98.7 °F (37.1 °C)  HR:  [65-86] 70  BP: (118-143)/(75-88) 124/86  Resp:  [17-20] 20  SpO2:  [96 %-98 %] 97 %  O2 Device: None (Room air)  O2 Device: None (Room air)          I/O last 24 hours:  In: -   Out: 5710 [Urine:5400; Drains:310]  Lines/Drains/Airways       Active Status       Name Placement date Placement time Site Days    Closed/Suction Drain Left Back Bulb 19 Fr. 10/10/24  1621  Back  1                  Physical Exam   Musculoskeletal: Bilateral Lower Extremity  Dressing with small amount of saturation    SINA drain in place with 100 cc output, 10  "cc overnight.  TTP gallo-incisionally  Sensation intact to light touch L3-S1  RLE:  5/5 hip flexion, 5/5 knee flex, 5/5 knee ext, 5/5 ankle DF, 5/5 ankle PF, 5/5 EHL, 5/5 FHL  LLE:  5/5 hip flexion, 5/5 knee flex, 5/5 knee ext, 5/5 ankle DF, 5/5 ankle PF, 5/5 EHL, 5/5 FHL  Digits warm well perfused with brisk cap refill  No calf swelling or ttp        Lab Results: I have reviewed the following results:  Recent Labs     10/11/24  0507 10/11/24  1340 10/12/24  0459   WBC 12.41*  --  12.11*   HGB 12.2  --  11.1*   HCT 36.5  --  33.1*     --  155   BUN 12 14 14   CREATININE 0.87 1.08 1.02     Blood Culture:  No results found for: \"BLOODCX\"  Wound Culture: No results found for: \"WOUNDCULT\"      "

## 2024-10-12 NOTE — ASSESSMENT & PLAN NOTE
POD 2  Navigated L2-5 laminectomy/decompression, L3-5 instrumented fusion, TLIF stable postoperatively    WBAT BLE  PT/OT  Incentive spirometry  Pain control  DVT ppx: SQH   Lumbar spine xrays to be completed prior to DC  Monitor drain output  Diet: regular  Monitor for acute blood loss anemia and administer or recommend IVF/prbc as indicated for greater than 2 gram Hgb drop or Hgb < 7

## 2024-10-12 NOTE — PROGRESS NOTES
Progress Note - Hospitalist   Name: Oni Nagy 74 y.o. male I MRN: [id number]  Unit/Bed#: -01 I Date of Admission: 10/10/2024   Date of Service: 10/12/2024 I Hospital Day: 1    Assessment & Plan  Spinal stenosis of lumbar region at multiple levels  POD#2 Navigated L2-5 laminectomy/decompression, L3-5 instrumented fusion, TLIF stable postoperatively  Ortho primary   WBAT BLE  PT/OT  Incentive spirometry  Pain control  DVT ppx: SQH   Lumbar spine xrays pending   Monitor drain output  Benign essential hypertension  Continue diltiazem  Patient no longer on metoprolol  As needed hydralazine for SBP greater than 160  Elevated PSA w neg Bx 2014  Follows with urology as op at [facility]   Planning for outpatient Cystoscopy, holmium enucleation of prostate, possible transurethral resection of prostate   Monitor for urinary retention since mccord was removed today- DTV at 1030- retention protocol  Paroxysmal atrial fibrillation (HCC)   Continue diltiazem 120 mg daily  Hold eliquis for 48 hours post surgery- will defer resuming elequis to surgery     S/P mitral valve repair  Currently stable follow with cardiology as OP  BPH (benign prostatic hyperplasia)  Continue tamsulosin  Monitor for urinary retention since mccord was removed   Stroke-like symptoms  Rapid response/stroke alert was called on 10/11 d/t word finding difficulties, symptoms resolved while interacting with neurology  Neurology consult appreciated   Etiology is likely secondary to medication effect likely secondary to narcotics in combination with muscle relaxant   CT/CTA head negative for CVA or bleed  Monitor neuro checks   No further neurology input at this time     VTE Pharmacologic Prophylaxis:   Moderate Risk (Score 3-4) - Pharmacological DVT Prophylaxis Ordered: heparin.    Mobility:   Basic Mobility Inpatient Raw Score: 12  JH-HLM Goal: 4: Move to chair/commode  JH-HLM Achieved: 1: Laying in bed  JH-HLM Goal NOT achieved. Continue with  multidisciplinary rounding and encourage appropriate mobility to improve upon [facility] goals.    Patient Centered Rounds: I performed bedside rounds with nursing staff today.   Discussions with Specialists or Other Care Team Provider: d/w RN and ortho     Education and Discussions with Family / Patient: Patient declined call to .     Current Length of Stay: 1 day(s)  Current Patient Status: Inpatient  Discharge Plan: Anticipate discharge in 24-48 hrs to discharge location to be determined pending rehab evaluations.    Code Status: Prior    Subjective   Pt lying in bed. Reports that he is having severe pain when he is moving around because he gets a spasm like pain in his back. He reports the numbness in his left toes has improved. Last BM 10/9, + flatus today. Radha removed this am, reports he hasn't voided yet. Denies any other complaints     Objective :  Temp:  [97.9 °F (36.6 °C)-99.7 °F (37.6 °C)] 98.8 °F (37.1 °C)  HR:  [65-86] 70  BP: ()/(61-88) 99/61  Resp:  [17-20] 17  SpO2:  [96 %-98 %] 97 %  O2 Device: None (Room air)    Body mass index is 26.52 kg/m².     Input and Output Summary (last 24 hours):     Intake/Output Summary (Last 24 hours) at 10/12/2024 0857  Last data filed at 10/12/2024 0622  Gross per 24 hour   Intake --   Output 3140 ml   Net -3140 ml       Physical Exam  Constitutional:       General: He is not in acute distress.     Appearance: He is not ill-appearing.   Cardiovascular:      Rate and Rhythm: Normal rate and regular rhythm.      Pulses: Normal pulses.      Heart sounds: Murmur heard.   Pulmonary:      Effort: No respiratory distress.      Breath sounds: Normal breath sounds. No wheezing or rales.   Abdominal:      General: Bowel sounds are normal. There is no distension.      Palpations: Abdomen is soft.      Tenderness: There is no abdominal tenderness.   Musculoskeletal:         General: No swelling or tenderness.   Skin:     General: Skin is warm and dry.       Findings: No erythema or rash.   Neurological:      General: No focal deficit present.      Mental Status: He is alert and oriented to person, place, and time. Mental status is at baseline.      Motor: No weakness.   Psychiatric:         Attention and Perception: Attention normal.         Mood and Affect: Mood normal.         Lines/Drains:  Lines/Drains/Airways       Active Status       Name Placement date Placement time Site Days    Closed/Suction Drain Left Back Bulb 19 Fr. 10/10/24  1621  Back  1                            Lab Results: I have reviewed the following results:   Results from last 7 days   Lab Units 10/12/24  0459   WBC Thousand/uL 12.11*   HEMOGLOBIN g/dL 11.1*   HEMATOCRIT % 33.1*   PLATELETS Thousands/uL 155   SEGS PCT % 76*   LYMPHO PCT % 13*   MONO PCT % 10   EOS PCT % 1     Results from last 7 days   Lab Units 10/12/24  0459   SODIUM mmol/L 136   POTASSIUM mmol/L 3.7   CHLORIDE mmol/L 103   CO2 mmol/L 25   BUN mg/dL 14   CREATININE mg/dL 1.02   ANION GAP mmol/L 8   CALCIUM mg/dL 8.9   GLUCOSE RANDOM mg/dL 128         Results from last 7 days   Lab Units 10/11/24  1323   POC GLUCOSE mg/dl 171*               Recent Cultures (last 7 days):         Imaging Results Review: I reviewed radiology reports from this admission including: CT head and xray(s).  Other Study Results Review: No additional pertinent studies reviewed.    Last 24 Hours Medication List:     Current Facility-Administered Medications:     acetaminophen (TYLENOL) tablet 650 mg, Q6H MELODIE    aluminum-magnesium hydroxide-simethicone (MAALOX) oral suspension 30 mL, Q6H PRN    atorvastatin (LIPITOR) tablet 20 mg, Daily    bisacodyl (DULCOLAX) EC tablet 5 mg, Daily PRN    calcium carbonate (TUMS) chewable tablet 1,000 mg, Daily PRN    Cholecalciferol (VITAMIN D3) tablet 1,000 Units, Daily    diltiazem (CARDIZEM CD) 24 hr capsule 120 mg, Daily    docusate sodium (COLACE) capsule 100 mg, BID    doxylamine (UNISOM) tablet 25 mg, HS PRN     heparin (porcine) subcutaneous injection 5,000 Units, Q8H MELODIE    hydrALAZINE (APRESOLINE) injection 10 mg, Q6H PRN    magnesium gluconate (MAGONATE) tablet 250 mg, Daily    methocarbamol (ROBAXIN) tablet 500 mg, Q6H MELODIE    ondansetron (ZOFRAN) injection 4 mg, Q6H PRN    oxyCODONE (ROXICODONE) immediate release tablet 10 mg, Q4H PRN    oxyCODONE (ROXICODONE) IR tablet 5 mg, Q4H PRN    senna (SENOKOT) tablet 8.6 mg, Daily    tamsulosin (FLOMAX) capsule 0.4 mg, Daily With Dinner    Administrative Statements   Today, Patient Was Seen By: DMITRI Tirado      **Please Note: This note may have been constructed using a voice recognition system.**

## 2024-10-12 NOTE — PLAN OF CARE
Problem: PAIN - ADULT  Goal: Verbalizes/displays adequate comfort level or baseline comfort level  Description: Interventions:  - Encourage patient to monitor pain and request assistance  - Assess pain using appropriate pain scale  - Administer analgesics based on type and severity of pain and evaluate response  - Implement non-pharmacological measures as appropriate and evaluate response  - Consider cultural and social influences on pain and pain management  - Notify physician/advanced practitioner if interventions unsuccessful or patient reports new pain  Outcome: Progressing     Problem: INFECTION - ADULT  Goal: Absence or prevention of progression during hospitalization  Description: INTERVENTIONS:  - Assess and monitor for signs and symptoms of infection  - Monitor lab/diagnostic results  - Monitor all insertion sites, i.e. indwelling lines, tubes, and drains  - Monitor endotracheal if appropriate and nasal secretions for changes in amount and color  - [place] appropriate cooling/warming therapies per order  - Administer medications as ordered  - Instruct and encourage patient and family to use good hand hygiene technique  - Identify and instruct in appropriate isolation precautions for identified infection/condition  Outcome: Progressing  Goal: Absence of fever/infection during neutropenic period  Description: INTERVENTIONS:  - Monitor WBC    Outcome: Progressing     Problem: SAFETY ADULT  Goal: Patient will remain free of falls  Description: INTERVENTIONS:  - Educate patient/family on patient safety including physical limitations  - Instruct patient to call for assistance with activity   - Consult OT/PT to assist with strengthening/mobility   - Keep Call bell within reach  - Keep bed low and locked with side rails adjusted as appropriate  - Keep care items and personal belongings within reach  - Initiate and maintain comfort rounds  - Make Fall Risk Sign visible to staff  - Offer Toileting every 2 Hours,  in advance of need  - Initiate/Maintain alarm  - Obtain necessary fall risk management equipment:   - Apply yellow socks and bracelet for high fall risk patients  - Consider moving patient to room near nurses station  Outcome: Progressing  Goal: Maintain or return to baseline ADL function  Description: INTERVENTIONS:  -  Assess patient's ability to carry out ADLs; assess patient's baseline for ADL function and identify physical deficits which impact ability to perform ADLs (bathing, care of mouth/teeth, toileting, grooming, dressing, etc.)  - Assess/evaluate cause of self-care deficits   - Assess range of motion  - Assess patient's mobility; develop plan if impaired  - Assess patient's need for assistive devices and provide as appropriate  - Encourage maximum independence but intervene and supervise when necessary  - Involve family in performance of ADLs  - Assess for home care needs following discharge   - Consider OT consult to assist with ADL evaluation and planning for discharge  - Provide patient education as appropriate  Outcome: Progressing  Goal: Maintains/Returns to pre admission functional level  Description: INTERVENTIONS:  - Perform AM-PAC 6 Click Basic Mobility/ Daily Activity assessment daily.  - Set and communicate daily mobility goal to care team and patient/family/caregiver.   - Collaborate with rehabilitation services on mobility goals if consulted  - Perform Range of Motion 3 times a day.  - Reposition patient every 2 hours.  - Dangle patient 3 times a day  - Stand patient 3 times a day  - Ambulate patient 3 times a day  - Out of bed to chair 3 times a day   - Out of bed for meals 3 times a day  - Out of bed for toileting  - Record patient progress and toleration of activity level   Outcome: Progressing     Problem: DISCHARGE PLANNING  Goal: Discharge to home or other facility with appropriate resources  Description: INTERVENTIONS:  - Identify barriers to discharge w/patient and caregiver  -  Arrange for needed discharge resources and transportation as appropriate  - Identify discharge learning needs (meds, wound care, etc.)  - Arrange for interpretive services to assist at discharge as needed  - Refer to Case Management Department for coordinating discharge planning if the patient needs post-hospital services based on physician/advanced practitioner order or complex needs related to functional status, cognitive ability, or social support system  Outcome: Progressing     Problem: Knowledge Deficit  Goal: Patient/family/caregiver demonstrates understanding of disease process, treatment plan, medications, and discharge instructions  Description: Complete learning assessment and assess knowledge base.  Interventions:  - Provide teaching at level of understanding  - Provide teaching via preferred learning methods  Outcome: Progressing      guarded AAROM Left LE/bilateral upper extremity ROM was WFL (within functional limits)/bilateral lower extremity ROM was WFL (within functional limits)

## 2024-10-12 NOTE — ASSESSMENT & PLAN NOTE
Continue diltiazem 120 mg daily  Hold eliquis for 48 hours post surgery- will defer resuming elequis to surgery

## 2024-10-12 NOTE — PLAN OF CARE
Problem: PAIN - ADULT  Goal: Verbalizes/displays adequate comfort level or baseline comfort level  Description: Interventions:  - Encourage patient to monitor pain and request assistance  - Assess pain using appropriate pain scale  - Administer analgesics based on type and severity of pain and evaluate response  - Implement non-pharmacological measures as appropriate and evaluate response  - Consider cultural and social influences on pain and pain management  - Notify physician/advanced practitioner if interventions unsuccessful or patient reports new pain  Outcome: Progressing     Problem: INFECTION - ADULT  Goal: Absence or prevention of progression during hospitalization  Description: INTERVENTIONS:  - Assess and monitor for signs and symptoms of infection  - Monitor lab/diagnostic results  - Monitor all insertion sites, i.e. indwelling lines, tubes, and drains  - Monitor endotracheal if appropriate and nasal secretions for changes in amount and color  - [place] appropriate cooling/warming therapies per order  - Administer medications as ordered  - Instruct and encourage patient and family to use good hand hygiene technique  - Identify and instruct in appropriate isolation precautions for identified infection/condition  Outcome: Progressing  Goal: Absence of fever/infection during neutropenic period  Description: INTERVENTIONS:  - Monitor WBC    Outcome: Progressing     Problem: SAFETY ADULT  Goal: Patient will remain free of falls  Description: INTERVENTIONS:  - Educate patient/family on patient safety including physical limitations  - Instruct patient to call for assistance with activity   - Consult OT/PT to assist with strengthening/mobility   - Keep Call bell within reach  - Keep bed low and locked with side rails adjusted as appropriate  - Keep care items and personal belongings within reach  - Initiate and maintain comfort rounds  - Make Fall Risk Sign visible to staff  - Offer Toileting every 2 Hours,  in advance of need  - Initiate/Maintain alarm  - Obtain necessary fall risk management equipment:   - Apply yellow socks and bracelet for high fall risk patients  - Consider moving patient to room near nurses station  Outcome: Progressing  Goal: Maintain or return to baseline ADL function  Description: INTERVENTIONS:  -  Assess patient's ability to carry out ADLs; assess patient's baseline for ADL function and identify physical deficits which impact ability to perform ADLs (bathing, care of mouth/teeth, toileting, grooming, dressing, etc.)  - Assess/evaluate cause of self-care deficits   - Assess range of motion  - Assess patient's mobility; develop plan if impaired  - Assess patient's need for assistive devices and provide as appropriate  - Encourage maximum independence but intervene and supervise when necessary  - Involve family in performance of ADLs  - Assess for home care needs following discharge   - Consider OT consult to assist with ADL evaluation and planning for discharge  - Provide patient education as appropriate  Outcome: Progressing  Goal: Maintains/Returns to pre admission functional level  Description: INTERVENTIONS:  - Perform AM-PAC 6 Click Basic Mobility/ Daily Activity assessment daily.  - Set and communicate daily mobility goal to care team and patient/family/caregiver.   - Collaborate with rehabilitation services on mobility goals if consulted  - Perform Range of Motion 3 times a day.  - Reposition patient every 2 hours.  - Dangle patient 3 times a day  - Stand patient 3 times a day  - Ambulate patient 3 times a day  - Out of bed to chair 3 times a day   - Out of bed for meals 3 times a day  - Out of bed for toileting  - Record patient progress and toleration of activity level   Outcome: Progressing     Problem: DISCHARGE PLANNING  Goal: Discharge to home or other facility with appropriate resources  Description: INTERVENTIONS:  - Identify barriers to discharge w/patient and caregiver  -  Arrange for needed discharge resources and transportation as appropriate  - Identify discharge learning needs (meds, wound care, etc.)  - Arrange for interpretive services to assist at discharge as needed  - Refer to Case Management Department for coordinating discharge planning if the patient needs post-hospital services based on physician/advanced practitioner order or complex needs related to functional status, cognitive ability, or social support system  Outcome: Progressing     Problem: Knowledge Deficit  Goal: Patient/family/caregiver demonstrates understanding of disease process, treatment plan, medications, and discharge instructions  Description: Complete learning assessment and assess knowledge base.  Interventions:  - Provide teaching at level of understanding  - Provide teaching via preferred learning methods  Outcome: Progressing     Problem: Prexisting or High Potential for Compromised Skin Integrity  Goal: Skin integrity is maintained or improved  Description: INTERVENTIONS:  - Identify patients at risk for skin breakdown  - Assess and monitor skin integrity  - Assess and monitor nutrition and hydration status  - Monitor labs   - Assess for incontinence   - Turn and reposition patient  - Assist with mobility/ambulation  - Relieve pressure over bony prominences  - Avoid friction and shearing  - Provide appropriate hygiene as needed including keeping skin clean and dry  - Evaluate need for skin moisturizer/barrier cream  - Collaborate with interdisciplinary team   - Patient/family teaching  - Consider wound care consult   Outcome: Progressing

## 2024-10-12 NOTE — ASSESSMENT & PLAN NOTE
Follows with urology as op at [facility]   Planning for outpatient Cystoscopy, holmium enucleation of prostate, possible transurethral resection of prostate   Monitor for urinary retention since mccord was removed today- DTV at 1030- retention protocol

## 2024-10-12 NOTE — ASSESSMENT & PLAN NOTE
Rapid response/stroke alert was called on 10/11 d/t word finding difficulties, symptoms resolved while interacting with neurology  Neurology consult appreciated   Etiology is likely secondary to medication effect likely secondary to narcotics in combination with muscle relaxant   CT/CTA head negative for CVA or bleed  Monitor neuro checks   No further neurology input at this time

## 2024-10-12 NOTE — RESTORATIVE TECHNICIAN NOTE
Restorative Technician Note      Patient Name: Oni Nagy     Restorative Tech Visit Date: 10/12/24  Note Type: Bracing, Initial consult  Patient Position Upon Consult: Supine  Brace Applied: [place] LSO (size XL)  Additional Brace Ordered: No  Patient Position When Brace Applied: Seated  Bracing Recommendations: None  Education Provided: Yes  Patient Position at End of Consult: Supine; All needs within reach; Bed/Chair alarm activated  Nurse Communication: Nurse aware of consult, application of brace    Educational handout reviewed and left at bedside.    Please contact [facility] Restorative tech on Epic Secure Chat  in regards to bracing instruction and/or adjustment.    Annel Lipscomb Restorative Tech

## 2024-10-12 NOTE — ASSESSMENT & PLAN NOTE
POD#2 Navigated L2-5 laminectomy/decompression, L3-5 instrumented fusion, TLIF stable postoperatively  Ortho primary   WBAT BLE  PT/OT  Incentive spirometry  Pain control  DVT ppx: SQH   Lumbar spine xrays pending   Monitor drain output

## 2024-10-12 NOTE — OP NOTE
OPERATIVE REPORT  PATIENT NAME: Oni Nagy    :  1950  MRN: [id number]  Pt Location: [facility] OR ROOM 18    SURGERY DATE: 10/10/2024    Surgeons and Role:     * Jerod Foss MD - Primary     * Traci Jaime PA-C - Assisting    Preop Diagnosis:  Spinal stenosis of lumbar region, unspecified whether neurogenic claudication present [M48.061]  Radiculopathy, lumbar region [M54.16]    Post-Op Diagnosis Codes:     * Spinal stenosis of lumbar region, unspecified whether neurogenic claudication present [M48.061]     * Radiculopathy, lumbar region [M54.16]    Procedure(s):  Arthrodesis, L3-4 PL: 89592  Arthrodesis, L4-5 PL: 30593  Posterior segmental instrumentation/pedicle screw fixation, L3-5: 58230  Laminectomy/facetectomy, L2-3: 17716  Laminectomy/facetectomy, L3-4: 63428  Laminectomy/facetectomy, L4-5: 24119  Bone marrow aspiration: 99786  3D computer-assisted navigation: 84089    Specimen(s):  * No specimens in log *    Estimated Blood Loss:   250 mL    Drains:  Closed/Suction Drain Left Back Bulb 19 Fr. (Active)   Site Description Unable to view 10/11/24 0901   Dressing Status Old drainage 10/11/24 0901   Drainage Appearance Bloody;Clots;Thick 10/11/24 0901   Status To bulb suction 10/11/24 0901   Output (mL) 30 mL 10/11/24 1701   Number of days: 1       Urethral Catheter Latex 16 Fr. (Active)   Reasons to continue Urinary Catheter  Post-operative urological requirements 10/11/24 0901   Goal for Removal Remove POD#1 10/11/24 0901   Site Assessment Clean 10/11/24 0901   Garcia Care Other (Comment) 10/11/24 0900   Collection Container Standard drainage bag 10/11/24 0901   Securement Method Securing device (Describe) 10/11/24 0901   Output (mL) 1800 mL 10/11/24 1701   Number of days: 1       Anesthesia Type:   General    Operative Indications:  Spinal stenosis of lumbar region, unspecified whether neurogenic claudication present [M48.061]  Radiculopathy, lumbar region [M54.16]    74-year-old male  with back pain, radicular leg pain, neurogenic claudication in the setting of lumbar spondylosis with L2-5 spinal stenosis. After failing conservative management, the risks, benefits and alternatives to surgery were discussed and patient elected to proceed with surgical intervention.     Plan for posterior lumbar decompression/laminectomy and posterior lateral lumbar fusion with instrumentation.  Consent previously obtained in outpatient setting.   Prior to surgery in the holding area, I again explained in detail to the patient and the patient's wife the possible risks of surgery which include the risk of nerve injury, persistent, and/or worsening pain, spinal fluid leak, infection, and/or meningitis, excessive bleeding, paralysis, death, blindness, blood vessel injury, need for further surgery, instability, as well as the potential for unforeseen medical and surgical complications.  We also discussed risk of junctional degeneration, bone graft complications, the differences in efficacy between local bone graft, autologous iliac crest bone graft and allograft, the FDA status of the instrumentation and products, the risk of nonhealing and instrumentation failure, as well as chronic pain. We also discussed obtaining bone marrow aspirate to aid in fusion.  I reiterated an understanding, that in general, spine surgery is more predictive of improving extremity discomfort rather than axial spine pain and arresting the progression of spinal cord dysfunction rather than improving it. Oni Nagy had no further questions.    Operative Findings:  Facet and ligamentum hypertrophy, spinal stenosis    Complications:   None    Procedure and Technique:  Patient was identified in the preoperative holding area.  The operative site was appropriately marked.      Patient was taken to the operating room.  Antibiotics were administered.  Sequential compression devices were applied.  After completion anesthesia, neuro monitoring  leads were applied and mccord was inserted.  Patient was placed prone on the Will table. During this time and the entire operation, care was taken to maintain appropriate perfusion pressures.  All bony prominences were properly padded.  Preprocedure fluoroscopic images were obtained in AP and lateral position.  The operative field was then prepped and draped in the normal sterile fashion.       Incision was made in the skin over the intended surgical levels and dissection was carried down through the subcutaneous tissue down to level of deep fascia.  Hemostasis was obtained using Bovie cautery.  The deep fascia was elevated off the posterior elements in a subperiosteal manner.  An intraoperative radiograph was taken to confirm the appropriate spinal level.  A rongeur was used to remove the spinous process of the selected L4 vertebrae.  Dissection was then completed to visualize the L2 spinous process and bilateral lamina.  The lateral border of the pars was identified at appropriate levels.  L2-3 facet joint capsules were preserved, however these were degenerated.  Dissection was carried out lateral to identify the L3, L4 and L5 transverse processes.  The inner transverse membrane was preserved.     Attention was then directed to the placement of implants according to the presurgical plan. Stereotactic computer-assisted navigation (which additionally included but was not limited to intraoperative placement of fiducial markers and image-assisted registration) was utilized to increase precision during placement of instrumentation during surgery while avoiding vital structures.   O-Arm CT image acquisition was completed which confirmed appropriate surgical levels and for L3, L4 and L5 pedicle screws.  First using a navigated bur for a starting hole, followed by navigated drill, the pedicle was drilled.  A ball-tip probe was used to palpate the pedicle and bone was encountered at the base of the tract as well as along  all 4 walls.  The pedicle was tapped using a navigated tap.  We confirmed trajectory of the screws with pedicle markers and fluoroscopy.  7.5x50mm L3, 7.5x45mm L4 and 7.5x40mm L5 screws were then placed under navigated guidance. Neurophysiology monitoring was stable.  All screws were stimulated and within appropriate threshold.      The L2-3, L3-4 and L4-5 supraspinous and interspinous ligaments were removed with the use of a rongeur.  The L2, L3 and L4 spinous processes was removed.  The bone was then handed off to the back table for preparation as autograft.  The L2, L3 and L4 lamina were thinned bilaterally with the use of a rongeur.  A small curved curette was then used to undermine the inferior insertion the ligamentum flavum on the undersurface of the each inferior lamina.  All dural adhesions were freed from the surrounding bone and ligaments.  Laminectomy was performed with the use of Misonix BoneScalpel at L2, L3 and L4 bilateral, preserving an appropriate amount facet joint and lamina.  The posterior elements of L2, L3 and L4 were removed, using a Nome Sweet to free additional dural adhesions.  The bone was then handed off to the back table for preparation.  Residual ligamentum flavum at the L2-3, L3-4 and L4-5 interspace was then removed with the use of a 3-0 and 4-0 Kerrison punch. The posterior arch was preserved at L2, L3 and L4.     The decompression was continued into the subarticular zone laterally at L2-3, L3-4 and L4-5.  Bilateral lateral recess decompression and medial facetectomies were performed with 3-0 and 4-0 Kerrison punch to decompress thoroughly the lateral recess. Care was taken to preserve approximately 50% of the facet.  A Nome tool was used to palpate the neural foramen and was noted to be without resistance.  In addition, a Penfield 4 was used to confirm that the exiting roots could be freely mobilized from the medial border of the pedicles.        A valsalva was performed.  No  dural rents, leaks were attenuations were noted.  No residual sharp bony edges remained.  The wound was extensively irrigated.  Hemostatic agents were applied and there was no active bleeding noted.     The L3, L4 and L5 transverse processes, lateral pars and the lateral wall the facet joint were decorticated with the use of a high-speed sergio.  Local autograft, demineralized bone matrix and I-factor were mixed.  2cc of bone marrow aspirate was obtained through a separate incision from the right iliac crest and was added to the mixture and packed in the inner transverse space at L3-4 and L4-5 bilaterally.         Fluoroscopy was used to confirm appropriate placement of the screws.   Appropriate sized lordotic rods were inserted in the tulip heads and secured.  The set screws were final tightened.  Final fluoroscopic images were obtained.  Orthogonal views confirmed appropriate placement of instrumentation.       Vancomycin powder was applied to the wound.  19Fr Gerardo deep drain was placed in the wound bed.  Wound was closed in a layered fashion including muscle and fascia with 1 Vicryl followed by 2-0 Vicryl and staples skin.  The drain was secured.  Mepilex dressing was applied to the wound.  The sponge and needle count were reported as correct at the end of the case.       The patient was gently flipped supine, emerged from anesthesia, and extubated.  Patient was transferred to the recovery room in stable condition.       A qualified resident physician was not available and a physician assistant was required during the procedure for retraction, tissue handling, dissection and suturing.     I was present for the entire procedure    Patient Disposition:  PACU       SIGNATURE: Jerod Foss MD  DATE: October 11, 2024  TIME: 8:45 PM

## 2024-10-13 ENCOUNTER — APPOINTMENT (INPATIENT)
Dept: RADIOLOGY | Facility: HOSPITAL | Age: 74
DRG: 448 | End: 2024-10-13
Payer: MEDICARE

## 2024-10-13 VITALS
WEIGHT: 201 LBS | DIASTOLIC BLOOD PRESSURE: 74 MMHG | RESPIRATION RATE: 20 BRPM | OXYGEN SATURATION: 97 % | BODY MASS INDEX: 26.64 KG/M2 | HEART RATE: 64 BPM | HEIGHT: 73 IN | SYSTOLIC BLOOD PRESSURE: 120 MMHG | TEMPERATURE: 98.4 F

## 2024-10-13 LAB
ANION GAP SERPL CALCULATED.3IONS-SCNC: 9 MMOL/L (ref 4–13)
ATRIAL RATE: 73 BPM
BASOPHILS # BLD AUTO: 0.04 THOUSANDS/ΜL (ref 0–0.1)
BASOPHILS NFR BLD AUTO: 0 % (ref 0–1)
BUN SERPL-MCNC: 12 MG/DL (ref 5–25)
CALCIUM SERPL-MCNC: 8.8 MG/DL (ref 8.4–10.2)
CHLORIDE SERPL-SCNC: 101 MMOL/L (ref 96–108)
CO2 SERPL-SCNC: 26 MMOL/L (ref 21–32)
CREAT SERPL-MCNC: 0.83 MG/DL (ref 0.6–1.3)
EOSINOPHIL # BLD AUTO: 0.18 THOUSAND/ΜL (ref 0–0.61)
EOSINOPHIL NFR BLD AUTO: 2 % (ref 0–6)
ERYTHROCYTE [DISTWIDTH] IN BLOOD BY AUTOMATED COUNT: 13.3 % (ref 11.6–15.1)
GFR SERPL CREATININE-BSD FRML MDRD: 86 ML/MIN/1.73SQ M
GLUCOSE SERPL-MCNC: 108 MG/DL (ref 65–140)
HCT VFR BLD AUTO: 31.5 % (ref 36.5–49.3)
HGB BLD-MCNC: 10.2 G/DL (ref 12–17)
IMM GRANULOCYTES # BLD AUTO: 0.05 THOUSAND/UL (ref 0–0.2)
IMM GRANULOCYTES NFR BLD AUTO: 1 % (ref 0–2)
LYMPHOCYTES # BLD AUTO: 1.65 THOUSANDS/ΜL (ref 0.6–4.47)
LYMPHOCYTES NFR BLD AUTO: 18 % (ref 14–44)
MCH RBC QN AUTO: 29.5 PG (ref 26.8–34.3)
MCHC RBC AUTO-ENTMCNC: 32.4 G/DL (ref 31.4–37.4)
MCV RBC AUTO: 91 FL (ref 82–98)
MONOCYTES # BLD AUTO: 0.88 THOUSAND/ΜL (ref 0.17–1.22)
MONOCYTES NFR BLD AUTO: 9 % (ref 4–12)
NEUTROPHILS # BLD AUTO: 6.53 THOUSANDS/ΜL (ref 1.85–7.62)
NEUTS SEG NFR BLD AUTO: 70 % (ref 43–75)
NRBC BLD AUTO-RTO: 0 /100 WBCS
P AXIS: 84 DEGREES
PLATELET # BLD AUTO: 150 THOUSANDS/UL (ref 149–390)
PMV BLD AUTO: 11 FL (ref 8.9–12.7)
POTASSIUM SERPL-SCNC: 3.8 MMOL/L (ref 3.5–5.3)
PR INTERVAL: 136 MS
QRS AXIS: -7 DEGREES
QRSD INTERVAL: 108 MS
QT INTERVAL: 450 MS
QTC INTERVAL: 495 MS
RBC # BLD AUTO: 3.46 MILLION/UL (ref 3.88–5.62)
SODIUM SERPL-SCNC: 136 MMOL/L (ref 135–147)
T WAVE AXIS: -29 DEGREES
VENTRICULAR RATE: 73 BPM
WBC # BLD AUTO: 9.33 THOUSAND/UL (ref 4.31–10.16)

## 2024-10-13 PROCEDURE — NC001 PR NO CHARGE: Performed by: ORTHOPAEDIC SURGERY

## 2024-10-13 PROCEDURE — 80048 BASIC METABOLIC PNL TOTAL CA: CPT

## 2024-10-13 PROCEDURE — 97535 SELF CARE MNGMENT TRAINING: CPT

## 2024-10-13 PROCEDURE — 93010 ELECTROCARDIOGRAM REPORT: CPT | Performed by: INTERNAL MEDICINE

## 2024-10-13 PROCEDURE — 97530 THERAPEUTIC ACTIVITIES: CPT

## 2024-10-13 PROCEDURE — 92610 EVALUATE SWALLOWING FUNCTION: CPT

## 2024-10-13 PROCEDURE — 99232 SBSQ HOSP IP/OBS MODERATE 35: CPT | Performed by: NURSE PRACTITIONER

## 2024-10-13 PROCEDURE — 85025 COMPLETE CBC W/AUTO DIFF WBC: CPT

## 2024-10-13 PROCEDURE — 97112 NEUROMUSCULAR REEDUCATION: CPT

## 2024-10-13 RX ORDER — ACETAMINOPHEN 325 MG/1
650 TABLET ORAL EVERY 6 HOURS PRN
Status: DISCONTINUED | OUTPATIENT
Start: 2024-10-13 | End: 2024-10-16 | Stop reason: HOSPADM

## 2024-10-13 RX ORDER — HYDROCODONE BITARTRATE AND ACETAMINOPHEN 5; 325 MG/1; MG/1
2 TABLET ORAL EVERY 6 HOURS PRN
Status: DISCONTINUED | OUTPATIENT
Start: 2024-10-13 | End: 2024-10-16 | Stop reason: HOSPADM

## 2024-10-13 RX ORDER — HYDROCODONE BITARTRATE AND ACETAMINOPHEN 5; 325 MG/1; MG/1
2 TABLET ORAL EVERY 6 HOURS PRN
Status: DISCONTINUED | OUTPATIENT
Start: 2024-10-13 | End: 2024-10-13

## 2024-10-13 RX ORDER — HYDROCODONE BITARTRATE AND ACETAMINOPHEN 5; 325 MG/1; MG/1
1 TABLET ORAL EVERY 6 HOURS PRN
Status: DISCONTINUED | OUTPATIENT
Start: 2024-10-13 | End: 2024-10-16 | Stop reason: HOSPADM

## 2024-10-13 RX ADMIN — METHOCARBAMOL 500 MG: 500 TABLET ORAL at 23:25

## 2024-10-13 RX ADMIN — ATORVASTATIN CALCIUM 20 MG: 20 TABLET, FILM COATED ORAL at 09:16

## 2024-10-13 RX ADMIN — METHOCARBAMOL 500 MG: 500 TABLET ORAL at 17:22

## 2024-10-13 RX ADMIN — HEPARIN SODIUM 5000 UNITS: 5000 INJECTION INTRAVENOUS; SUBCUTANEOUS at 15:07

## 2024-10-13 RX ADMIN — SENNOSIDES 8.6 MG: 8.6 TABLET, FILM COATED ORAL at 09:16

## 2024-10-13 RX ADMIN — HYDROCODONE BITARTRATE AND ACETAMINOPHEN 1 TABLET: 5; 325 TABLET ORAL at 10:37

## 2024-10-13 RX ADMIN — DILTIAZEM HYDROCHLORIDE 120 MG: 120 CAPSULE, EXTENDED RELEASE ORAL at 09:17

## 2024-10-13 RX ADMIN — SIMETHICONE 80 MG: 80 TABLET, CHEWABLE ORAL at 09:18

## 2024-10-13 RX ADMIN — METHOCARBAMOL 500 MG: 500 TABLET ORAL at 12:11

## 2024-10-13 RX ADMIN — ACETAMINOPHEN 650 MG: 325 TABLET ORAL at 05:25

## 2024-10-13 RX ADMIN — HEPARIN SODIUM 5000 UNITS: 5000 INJECTION INTRAVENOUS; SUBCUTANEOUS at 23:26

## 2024-10-13 RX ADMIN — DOCUSATE SODIUM 100 MG: 100 CAPSULE, LIQUID FILLED ORAL at 17:22

## 2024-10-13 RX ADMIN — HYDROCODONE BITARTRATE AND ACETAMINOPHEN 2 TABLET: 5; 325 TABLET ORAL at 17:22

## 2024-10-13 RX ADMIN — TAMSULOSIN HYDROCHLORIDE 0.4 MG: 0.4 CAPSULE ORAL at 17:22

## 2024-10-13 RX ADMIN — SIMETHICONE 80 MG: 80 TABLET, CHEWABLE ORAL at 17:29

## 2024-10-13 RX ADMIN — Medication 1000 UNITS: at 09:16

## 2024-10-13 RX ADMIN — HEPARIN SODIUM 5000 UNITS: 5000 INJECTION INTRAVENOUS; SUBCUTANEOUS at 05:25

## 2024-10-13 RX ADMIN — DOCUSATE SODIUM 100 MG: 100 CAPSULE, LIQUID FILLED ORAL at 09:16

## 2024-10-13 RX ADMIN — METHOCARBAMOL 500 MG: 500 TABLET ORAL at 05:25

## 2024-10-13 NOTE — ASSESSMENT & PLAN NOTE
Rapid response/stroke alert was called on 10/11 d/t word finding difficulties, symptoms resolved while interacting with neurology- RESOLVED  Neurology consult appreciated   Etiology is likely secondary to medication effect likely secondary to narcotics in combination with muscle relaxant   CT/CTA head negative for CVA or bleed  Monitor neuro checks   No further neurology input at this time

## 2024-10-13 NOTE — OCCUPATIONAL THERAPY NOTE
"  Occupational Therapy Treatment Note:      10/13/24 1419   OT Last Visit   OT Visit Date 10/13/24   Note Type   Note Type Treatment   Pain Assessment   Pain Assessment Tool 0-10   Pain Score 7  (pain increases with activity)   Restrictions/Precautions   Braces or Orthoses LSO   Other Precautions Chair Alarm;Bed Alarm;Fall Risk   ADL   Where Assessed Chair   Grooming Assistance 5  Supervision/Setup   LB Dressing Assistance 3  Moderate Assistance   LB Dressing Comments cm in stance, asst for socks and pants. pt used claw reacher to thread pants onto b feet. pt is hopeful wife will assist with socks at d/c   Bed Mobility   Sit to Supine 3  Moderate assistance   Additional items Assist x 2   Transfers   Sit to Stand 3  Moderate assistance   Additional items   (asst x 1 close sba of second)   Stand to Sit 3  Moderate assistance   Additional items   (rw)   Functional Mobility   Functional Mobility 3  Moderate assistance  (rw)   Additional Comments ax2   Cognition   Overall Cognitive Status WFL   Activity Tolerance   Activity Tolerance Patient tolerated treatment well   Assessment   Assessment pt participated in am ot session and was seen focusing on adl tasks while seated in chair. pt was able to use claw reacher to thread pants over legs with min asst. pt requires mod asst for clothing management. pt is limited by \"spasms\" in legs when in stance which limit his ability to perform adls/iadls. pt reports wife can assist as needed   Plan   Treatment Interventions ADL retraining;Functional transfer training;Endurance training;Patient/family training;Equipment evaluation/education;Activityengagement   Goal Expiration Date 10/25/24   OT Treatment Day 1   OT Frequency 2-3x/wk   Discharge Recommendation   Rehab Resource Intensity Level, OT II (Moderate Resource Intensity)   AM-PAC Daily Activity Inpatient   Lower Body Dressing 2   Bathing 2   Toileting 2   Upper Body Dressing 3   Grooming 3   Eating 4   Daily Activity Raw Score " 16   Daily Activity Standardized Score (Calc for Raw Score >=11) 35.96   AM-PAC Applied Cognition Inpatient   Following a Speech/Presentation 4   Understanding Ordinary Conversation 4   Taking Medications 4   Remembering Where Things Are Placed or Put Away 4   Remembering List of 4-5 Errands 4   Taking Care of Complicated Tasks 4   Applied Cognition Raw Score 24   Applied Cognition Standardized Score 62.21

## 2024-10-13 NOTE — PLAN OF CARE
Problem: PHYSICAL THERAPY ADULT  Goal: Performs mobility at highest level of function for planned discharge setting.  See evaluation for individualized goals.  Description: Treatment/Interventions: ADL retraining, Functional transfer training, Endurance training, Bed mobility, Gait training, Spoke to nursing, OT          See flowsheet documentation for full assessment, interventions and recommendations.  Outcome: Progressing  Note: Prognosis: Good  Problem List: Decreased strength, Decreased endurance, Impaired balance, Decreased mobility, Decreased coordination, Orthopedic restrictions, Pain  Assessment: The patient noted significant improvement in comfort with the LSO after utilizing two folded pillow cases to offload his incision within it. He demonstrates good strength throughout, but he is notably limited due to painful spasms with any mobility. This causes him to have knee buckling bilaterally, but he had no loss of balance. With time and pursed-lip breathing techniques he was able to ambulate a few feet. Further ambulation was deferred due to his increasing spasms and pain. He was ultimately returned to bed with all needs within reach.  Barriers to Discharge: Inaccessible home environment, Decreased caregiver support     Rehab Resource Intensity Level, PT: I (Maximum Resource Intensity)    See flowsheet documentation for full assessment.

## 2024-10-13 NOTE — SPEECH THERAPY NOTE
Speech Language/Pathology  Bedside Swallow Evaluation   Today's Date: 10/13/2024     Problem List  Patient Active Problem List   Diagnosis    Abdominal hernia    Benign essential hypertension    Proteinuria    Neurocardiogenic syncope    Insomnia    Hypercholesterolemia    Hematuria, microscopic    Elevated PSA w neg Bx 2014    Coronary atherosclerosis    Paroxysmal atrial fibrillation (HCC)     Localized, primary osteoarthritis of shoulder region    History of urinary retention    Pulmonary nodules    Thoracic aortic aneurysm without rupture (HCC)    Status post aortic valve replacement    S/P mitral valve repair    BPH (benign prostatic hyperplasia)    Inguinal Hernia Right    Frequent PVCs    Lumbar radiculopathy    Spinal stenosis of lumbar region at multiple levels    Garcia catheter in place    Stroke-like symptoms       Past Medical History  Past Medical History:   Diagnosis Date    Anesthesia complication     urinary retention for all of surgeries    Benign essential hypertension 03/23/2011    BPH (benign prostatic hyperplasia) 02/20/2023    Last Assessment & Plan:  Formatting of this note might be different from the original. Doing well.  Cont tamsulosin and saw palmetto.  We did discuss adding finasteride but will have repeat psa prior.  Also discussed indications for holep.  As of now, he's doing great. Last episode of retention was clearly related to anesthesia.  Fu 4-6w with psa    Cardiogenic shock (HCC) 07/05/2022    Chronic kidney disease, stage III (moderate) (HCC)     Coronary atherosclerosis 03/23/2011    Last Assessment & Plan:  There are no symptoms of an anginal syndrome. I provided education regarding the nature of coronary artery disease and our recommendations to reduce his long-term risk of ACS.  I recommended he continue utilizing aspirin and statins to minimize risk.  A lipid profile was requested with the option to use high intensity statins as an alternative to Mevacor in response to per     Enlarged prostate     Epistaxis 01/09/2022    History of aortic valve disease     History of mitral valve disease     History of transfusion     May 2021 - no adverse reaction    Paroxysmal atrial fibrillation (HCC)  02/06/2020    Pulmonary nodules 02/05/2021    PVC's (premature ventricular contractions)     S/P mitral valve repair 06/15/2021    May 2021 at [facility]    Status post aortic valve replacement 05/31/2021    At [facility] May 2021    SVT (supraventricular tachycardia) ([facility])     Syncope, cardiogenic     Thoracic aortic aneurysm without rupture (HCC) 02/05/2021    4.5cm on CT 1/21  Formatting of this note might be different from the original. 4.5cm on CT chest 1/28/21  Last Assessment & Plan:  Formatting of this note might be different from the original. 6/6/21 chest CT showed stable 4.3 cm ascending thoracic aortic aneurysm.    Thrombocytopenia (HCC) 07/05/2022       Past Surgical History  Past Surgical History:   Procedure Laterality Date    CARDIAC ELECTROPHYSIOLOGY STUDY AND ABLATION  07/2024    CARDIAC SURGERY      MV repair, AV replaced and aortic aneurysm repaired    CAUTERIZE INNER NOSE Left 01/09/2022    Procedure: Sphenopalatine artery ligation;  Surgeon: Alfonso Rodriguez MD;  Location: [facility];  Service: ENT    COLONOSCOPY  2007    Dr. Schreiber.  Tubular adenoma descending colon polyp.    COLONOSCOPY  2010    Dr. Schreiber.  Diverticulosis.    COLONOSCOPY  08/2020    Dr. Schreiber.  12 mm descending colon inflammatory polyp, 3 mm benign lymphoid aggregate, diverticulosis.    FACIAL/NECK BIOPSY Left 11/30/2021    Procedure: EXCISION PYOGENIC GRANULOMA; FREE MUCOSAL GRAFT FROM NOSE;  Surgeon: Alfonso Rodriguez MD;  Location: [facility];  Service: ENT    INGUINAL HERNIA REPAIR      LUMBAR FUSION Bilateral 10/10/2024    Procedure: Navigated L2-5 laminectomy/decompression, L3-5 instrumented fusion, TLIF;  Surgeon: Jerod Foss MD;  Location: [facility];  Service: Orthopedics     TONSILLECTOMY         Summary    Pt presents with a grossly functional oropharyngeal swallow from bedside assessment. Pt reports improvement in sensation as well since episode of difficulty swallowing chicken post-op. No overt s/s of aspiration. Discussed pt self selecting softer foods from menu as needed as PO intake/interest in PO has been limited and current diet limits meal options. Pt and family in agreement.     Recommendations:   Diet: regular diet and thin liquids   Meds: whole with liquid   Frequent Oral care: 2x/day  Aspiration precautions and compensatory swallowing strategies: slow rate of feeding, small bites/sips, and alternating bites and sips  Other Recommendations/ considerations:   -Pt to choose softer foods prn       Current Medical Status  Pt is a 74 y.o. male who presented to [facility] for L2-5 laminectomy/decompression and L3-5 fusion. Had stroke-like symptoms 10/11 with word finding difficulties, but symptoms resolved. Pt also c/o difficulty swallowing chicken with globus sensation and bolus getting stuck about 1 day into admission. Diet was changed to soft/thin yesterday 10/12 but pt reports he has been eating crackers and hard snacks without difficulties.     Past medical history:   Please see H&P for details    Special Studies:  CT brain 10/11:  -No loss of gray-white differentiation, acute hemorrhage or mass effect.  -Mild chronic microangiopathic changes.      Social/Education/Vocational Hx:  Pt lives with family    Swallow Information   Current Risks for Dysphagia & Aspiration: recent surgery and recent intubation  Current Symptoms/Concerns:  difficulty swallowing solids  Current Diet: soft/level 3 diet and thin liquids   Baseline Diet: regular diet and thin liquids    Baseline Assessment   Behavior/Cognition: alert  Speech/Language Status: able to participate in conversation and able to follow commands  Patient Positioning: reclined slightly      Swallow Mechanism Exam    Facial: symmetrical  Labial: WFL  Lingual: WFL  Velum:  has bifid uvula   Mandible: adequate ROM  Dentition: adequate  Vocal quality:clear/adequate   Volitional Cough: strong/productive   Respiratory: RA    Consistencies Assessed and Performance   Consistencies Administered: thin liquids, puree, and hard solids    Oral Stage: Pt self fed appropriately with grossly functional oral stage. Adequate retrieval, suspected oral control and mastication. No oral residue.    Pharyngeal Stage: No overt s/s of aspiration, voice strong, on RA without resp concerns.       Esophageal Concerns: none reported      Results Reviewed with: patient, PA, and family   Dysphagia Goals: none at this time      Yani Phelps M.S ([place]). CCC-SLP  Speech-Language Pathologist

## 2024-10-13 NOTE — ASSESSMENT & PLAN NOTE
POD 3  Navigated L2-5 laminectomy/decompression, L3-5 instrumented fusion, TLIF stable postoperatively    WBAT BLE  PT/OT  Incentive spirometry  Pain control  DVT ppx: SQH   Lumbar spine xrays to be completed prior to DC  Monitor drain output  Diet: regular  Monitor for acute blood loss anemia and administer or recommend IVF/prbc as indicated for greater than 2 gram Hgb drop or Hgb < 7  Dispo planning

## 2024-10-13 NOTE — PROGRESS NOTES
Progress Note - Hospitalist   Name: Oni Nagy 74 y.o. male I MRN: [id number]  Unit/Bed#: -01 I Date of Admission: 10/10/2024   Date of Service: 10/13/2024 I Hospital Day: 2    Assessment & Plan  Spinal stenosis of lumbar region at multiple levels  POD#3 Navigated L2-5 laminectomy/decompression, L3-5 instrumented fusion, TLIF stable postoperatively  Ortho primary   WBAT BLE  PT/OT  Incentive spirometry  Pain control  DVT ppx: SQH- will defer to ortho for when AC can be resumed   Lumbar spine xrays pending   Monitor drain output  Benign essential hypertension  Continue diltiazem  Patient no longer on metoprolol  As needed hydralazine for SBP greater than 160  Elevated PSA w neg Bx 2014  Follows with urology as op at [facility]   Planning for outpatient Cystoscopy, holmium enucleation of prostate, possible transurethral resection of prostate   Monitor for urinary retention, s/p mccord removal on 10/12   Paroxysmal atrial fibrillation (HCC)   Continue diltiazem 120 mg daily  Hold eliquis for 48 hours post surgery- will defer resuming elequis to surgery    S/P mitral valve repair  Currently stable follow with cardiology as OP  BPH (benign prostatic hyperplasia)  Continue tamsulosin  Monitor for urinary retention since mccord was removed   Stroke-like symptoms  Rapid response/stroke alert was called on 10/11 d/t word finding difficulties, symptoms resolved while interacting with neurology- RESOLVED  Neurology consult appreciated   Etiology is likely secondary to medication effect likely secondary to narcotics in combination with muscle relaxant   CT/CTA head negative for CVA or bleed  Monitor neuro checks   No further neurology input at this time     VTE Pharmacologic Prophylaxis:   High Risk (Score >/= 5) - Pharmacological DVT Prophylaxis Ordered: heparin. Sequential Compression Devices Ordered.    Mobility:   Basic Mobility Inpatient Raw Score: 13  -[facility] Goal: 4: Move to chair/commode  -[facility] Achieved: 4: Move to  chair/commode  [facility]-HLM Goal NOT achieved. Continue with multidisciplinary rounding and encourage appropriate mobility to improve upon [facility]-HLM goals.    Patient Centered Rounds: I performed bedside rounds with nursing staff today.   Discussions with Specialists or Other Care Team Provider: d/w RN and ortho    Education and Discussions with Family / Patient:  d/w patient .     Current Length of Stay: 2 day(s)  Current Patient Status: Inpatient   Discharge Plan: Anticipate discharge in 24-48 hrs to discharge location to be determined pending rehab evaluations.    Code Status: Prior    Subjective   Pt lying in bed. Reports that the oxy is making him feel nauseated. +flatus no BM yet. Denies any other complaints.     Objective :  Temp:  [98.5 °F (36.9 °C)-99.2 °F (37.3 °C)] 98.5 °F (36.9 °C)  HR:  [52-70] 69  BP: (102-115)/(64-72) 112/70  Resp:  [16-18] 16  SpO2:  [92 %-97 %] 97 %  O2 Device: None (Room air)    Body mass index is 26.52 kg/m².     Input and Output Summary (last 24 hours):     Intake/Output Summary (Last 24 hours) at 10/13/2024 0858  Last data filed at 10/13/2024 0321  Gross per 24 hour   Intake 600 ml   Output 1691 ml   Net -1091 ml       Physical Exam  Constitutional:       General: He is not in acute distress.     Appearance: He is not ill-appearing.   Cardiovascular:      Rate and Rhythm: Normal rate and regular rhythm.      Pulses: Normal pulses.      Heart sounds: Murmur heard.   Pulmonary:      Effort: No respiratory distress.      Breath sounds: Normal breath sounds. No wheezing or rales.   Abdominal:      General: Bowel sounds are normal. There is no distension.      Palpations: Abdomen is soft.      Tenderness: There is no abdominal tenderness.   Musculoskeletal:         General: No swelling or tenderness.   Skin:     General: Skin is warm and dry.      Findings: No erythema or rash.   Neurological:      General: No focal deficit present.      Mental Status: He is alert. Mental status is at  baseline.   Psychiatric:         Attention and Perception: Attention normal.         Mood and Affect: Mood normal.           Lines/Drains:  Lines/Drains/Airways       Active Status       Name Placement date Placement time Site Days    Closed/Suction Drain Left Back Bulb 19 Fr. 10/10/24  1621  Back  2                            Lab Results: I have reviewed the following results:   Results from last 7 days   Lab Units 10/13/24  0442   WBC Thousand/uL 9.33   HEMOGLOBIN g/dL 10.2*   HEMATOCRIT % 31.5*   PLATELETS Thousands/uL 150   SEGS PCT % 70   LYMPHO PCT % 18   MONO PCT % 9   EOS PCT % 2     Results from last 7 days   Lab Units 10/13/24  0442   SODIUM mmol/L 136   POTASSIUM mmol/L 3.8   CHLORIDE mmol/L 101   CO2 mmol/L 26   BUN mg/dL 12   CREATININE mg/dL 0.83   ANION GAP mmol/L 9   CALCIUM mg/dL 8.8   GLUCOSE RANDOM mg/dL 108         Results from last 7 days   Lab Units 10/11/24  1323   POC GLUCOSE mg/dl 171*               Recent Cultures (last 7 days):         Imaging Results Review: I reviewed radiology reports from this admission including: CT head and xray(s).  Other Study Results Review: No additional pertinent studies reviewed.    Last 24 Hours Medication List:     Current Facility-Administered Medications:     acetaminophen (TYLENOL) tablet 650 mg, Q6H MELODIE    aluminum-magnesium hydroxide-simethicone (MAALOX) oral suspension 30 mL, Q6H PRN    atorvastatin (LIPITOR) tablet 20 mg, Daily    bisacodyl (DULCOLAX) EC tablet 5 mg, Daily PRN    calcium carbonate (TUMS) chewable tablet 1,000 mg, Daily PRN    Cholecalciferol (VITAMIN D3) tablet 1,000 Units, Daily    diltiazem (CARDIZEM CD) 24 hr capsule 120 mg, Daily    docusate sodium (COLACE) capsule 100 mg, BID    doxylamine (UNISOM) tablet 25 mg, HS PRN    heparin (porcine) subcutaneous injection 5,000 Units, Q8H MELODIE    hydrALAZINE (APRESOLINE) injection 10 mg, Q6H PRN    magnesium gluconate (MAGONATE) tablet 250 mg, Daily    methocarbamol (ROBAXIN) tablet 500 mg,  Q6H MELODIE    ondansetron (ZOFRAN) injection 4 mg, Q6H PRN    oxyCODONE (ROXICODONE) immediate release tablet 10 mg, Q4H PRN    oxyCODONE (ROXICODONE) IR tablet 5 mg, Q4H PRN    senna (SENOKOT) tablet 8.6 mg, Daily    simethicone (MYLICON) chewable tablet 80 mg, Q6H PRN    tamsulosin (FLOMAX) capsule 0.4 mg, Daily With Dinner    Administrative Statements   Today, Patient Was Seen By: DMITRI Tirado      **Please Note: This note may have been constructed using a voice recognition system.**

## 2024-10-13 NOTE — ASSESSMENT & PLAN NOTE
POD#3 Navigated L2-5 laminectomy/decompression, L3-5 instrumented fusion, TLIF stable postoperatively  Ortho primary   WBAT BLE  PT/OT  Incentive spirometry  Pain control  DVT ppx: SQH- will defer to ortho for when AC can be resumed   Lumbar spine xrays pending   Monitor drain output

## 2024-10-13 NOTE — PROGRESS NOTES
Progress Note - Orthopedics   Name: Oni Nagy 74 y.o. male I MRN: [id number]  Unit/Bed#: -01 I Date of Admission: 10/10/2024   Date of Service: 10/13/2024 I Hospital Day: 2    Assessment & Plan  Spinal stenosis of lumbar region at multiple levels  POD 3  Navigated L2-5 laminectomy/decompression, L3-5 instrumented fusion, TLIF stable postoperatively    WBAT BLE  PT/OT  Incentive spirometry  Pain control  DVT ppx: SQH   Lumbar spine xrays to be completed prior to DC  Monitor drain output  Diet: regular  Monitor for acute blood loss anemia and administer or recommend IVF/prbc as indicated for greater than 2 gram Hgb drop or Hgb < 7    Benign essential hypertension    Elevated PSA w neg Bx 2014    Paroxysmal atrial fibrillation (HCC)     S/P mitral valve repair    BPH (benign prostatic hyperplasia)    Stroke-like symptoms      Please contact the SecureChat role for the Orthopedics service with any questions/concerns.    History of Present Illness   74 y.o. male No acute events, no acute distress. Denies fever/chills, SOB. Pain well controlled.     Objective      Temp:  [98.5 °F (36.9 °C)-99.2 °F (37.3 °C)] 98.5 °F (36.9 °C)  HR:  [52-70] 69  BP: (102-115)/(64-72) 112/70  Resp:  [16-18] 16  SpO2:  [92 %-97 %] 97 %  O2 Device: None (Room air)  O2 Device: None (Room air)          I/O         10/11 0701  10/12 0700 10/12 0701  10/13 0700 10/13 0701  10/14 0700    P.O.  600     I.V. (mL/kg)       IV Piggyback       Total Intake(mL/kg)  600 (6.6)     Urine (mL/kg/hr) 4600 (2.1) 1671 (0.8)     Drains 100 20     Blood       Total Output 4700 1691     Net -4700 -1091                  Lines/Drains/Airways       Active Status       Name Placement date Placement time Site Days    Closed/Suction Drain Left Back Bulb 19 Fr. 10/10/24  1621  Back  2                  Physical Exam   Musculoskeletal: Bilateral Lower Extremity  Dressing with small amount of saturation over the superior half without compromising the integrity  "of the dressing. Inferior half of dressing is dry.   SINA drain in place with 20 cc serosanguenous output, 10 cc overnight  TTP gallo-incisionally  Sensation intact to light touch L3-S1  Bilateral LE:  5/5 hip flexion, 5/5 knee flex, 5/5 knee ext, 5/5 ankle DF, 5/5 ankle PF, 5/5 EHL, 5/5 FHL  Digits warm well perfused with brisk cap refill  No calf swelling or ttp      Lab Results: I have reviewed the following results:  Recent Labs     10/11/24  0507 10/11/24  1340 10/12/24  0459 10/13/24  0442   WBC 12.41*  --  12.11* 9.33   HGB 12.2  --  11.1* 10.2*   HCT 36.5  --  33.1* 31.5*     --  155 150   BUN 12 14 14 12   CREATININE 0.87 1.08 1.02 0.83     Blood Culture:  No results found for: \"BLOODCX\"  Wound Culture: No results found for: \"WOUNDCULT\"      "

## 2024-10-13 NOTE — ASSESSMENT & PLAN NOTE
Follows with urology as op at [facility]   Planning for outpatient Cystoscopy, holmium enucleation of prostate, possible transurethral resection of prostate   Monitor for urinary retention, s/p mccord removal on 10/12

## 2024-10-13 NOTE — PHYSICAL THERAPY NOTE
Physical Therapy Progress Note     10/13/24 1450   PT Last Visit   PT Visit Date 10/13/24   Note Type   Note Type Treatment   Pain Assessment   Pain Assessment Tool 0-10   Pain Score 10 - Worst Possible Pain   Pain Location/Orientation Location: Back   Hospital Pain Intervention(s) Repositioned;Ambulation/increased activity;Emotional support   Restrictions/Precautions   Braces or Orthoses (S)  LSO;Other (Comment)  (Folded two pillow cases to offload the pressure on his incision site.)   Other Precautions Chair Alarm;Bed Alarm;Fall Risk;Pain;Spinal precautions   Subjective   Subjective The patient notes significant spasms of severe pain with any movement. He reported much improvement in the comfort of the LSO with having two folded pillowcases to offload his incision site.   Bed Mobility   Sit to Supine 3  Moderate assistance   Additional items Assist x 2;Increased time required;Verbal cues;LE management   Transfers   Sit to Stand 3  Moderate assistance   Additional items Assist x 1;Increased time required;Verbal cues   Stand to Sit 3  Moderate assistance   Additional items Assist x 1;Increased time required;Verbal cues   Ambulation/Elevation   Gait pattern Excessively slow;Step to;Short stride;Inconsistent yvette;Decreased foot clearance;Antalgic;Improper Weight shift;L Knee Naveed;R Knee Naveed;Poor UE support   Gait Assistance 3  Moderate assist   Additional items Assist x 1;Assist x 2;Tactile cues;Verbal cues   Assistive Device Rolling walker   Distance 4 feet, 2 feet.   Balance   Static Sitting Fair   Dynamic Sitting Fair -   Static Standing Poor +   Dynamic Standing Poor +   Ambulatory Poor   Activity Tolerance   Activity Tolerance Patient limited by pain   Medical Staff Made Aware Dr. Rodriguez   Nurse Made Aware EARNEST Berg.   Exercises   Knee AROM Long Arc Quad Sitting;5 reps;Bilateral;AROM   Assessment   Prognosis Good   Problem List Decreased strength;Decreased endurance;Impaired balance;Decreased  mobility;Decreased coordination;Orthopedic restrictions;Pain   Assessment The patient noted significant improvement in comfort with the LSO after utilizing two folded pillow cases to offload his incision within it. He demonstrates good strength throughout, but he is notably limited due to painful spasms with any mobility. This causes him to have knee buckling bilaterally, but he had no loss of balance. With time and pursed-lip breathing techniques he was able to ambulate a few feet. Further ambulation was deferred due to his increasing spasms and pain. He was ultimately returned to bed with all needs within reach.   Barriers to Discharge Inaccessible home environment;Decreased caregiver support   Goals   Patient Goals To have less pain.   STG Expiration Date 10/25/24   PT Treatment Day 1   Plan   Treatment/Interventions Functional transfer training;LE strengthening/ROM;Therapeutic exercise;Endurance training;Patient/family training;Bed mobility;Gait training;Elevations   Progress Slow progress, decreased activity tolerance   PT Frequency 2-3x/wk   Discharge Recommendation   Rehab Resource Intensity Level, PT I (Maximum Resource Intensity)   Equipment Recommended Walker   Walker Package Recommended Wheeled walker   AM-PAC Basic Mobility Inpatient   Turning in Flat Bed Without Bedrails 2   Lying on Back to Sitting on Edge of Flat Bed Without Bedrails 2   Moving Bed to Chair 2   Standing Up From Chair Using Arms 2   Walk in Room 2   Climb 3-5 Stairs With Railing 1   Basic Mobility Inpatient Raw Score 11   Basic Mobility Standardized Score 30.25   [facility] Highest Level Of Mobility   -[facility] Goal 4: Move to chair/commode   -HLM Achieved 5: Stand (1 or more minutes)         An AM-PAC Basic Mobility raw score less than 16 suggests the patient may benefit from discharge to post-acute rehab services.    Jaswant Horan, PTA

## 2024-10-13 NOTE — PLAN OF CARE
"  Problem: OCCUPATIONAL THERAPY ADULT  Goal: Performs self-care activities at highest level of function for planned discharge setting.  See evaluation for individualized goals.  Description: Treatment Interventions: ADL retraining, Functional transfer training, Endurance training, Patient/family training, Equipment evaluation/education, Compensatory technique education, Activityengagement          See flowsheet documentation for full assessment, interventions and recommendations.   Outcome: Progressing  Note: Limitation: Decreased ADL status, Decreased endurance, Decreased self-care trans, Decreased high-level ADLs  Prognosis: Good, Fair  Assessment: pt participated in am ot session and was seen focusing on adl tasks while seated in chair. pt was able to use claw reacher to thread pants over legs with min asst. pt requires mod asst for clothing management. pt is limited by \"spasms\" in legs when in stance which limit his ability to perform adls/iadls. pt reports wife can assist as needed     Rehab Resource Intensity Level, OT: II (Moderate Resource Intensity)        April A Storm   "

## 2024-10-13 NOTE — ASSESSMENT & PLAN NOTE
POD 3  Navigated L2-5 laminectomy/decompression, L3-5 instrumented fusion, TLIF stable postoperatively    WBAT BLE  PT/OT  Incentive spirometry  Pain control  DVT ppx: SQH   Lumbar spine xrays to be completed prior to DC  Monitor drain output  Diet: regular  Monitor for acute blood loss anemia and administer or recommend IVF/prbc as indicated for greater than 2 gram Hgb drop or Hgb < 7

## 2024-10-13 NOTE — PLAN OF CARE
Problem: PAIN - ADULT  Goal: Verbalizes/displays adequate comfort level or baseline comfort level  Description: Interventions:  - Encourage patient to monitor pain and request assistance  - Assess pain using appropriate pain scale  - Administer analgesics based on type and severity of pain and evaluate response  - Implement non-pharmacological measures as appropriate and evaluate response  - Consider cultural and social influences on pain and pain management  - Notify physician/advanced practitioner if interventions unsuccessful or patient reports new pain  Outcome: Progressing     Problem: INFECTION - ADULT  Goal: Absence or prevention of progression during hospitalization  Description: INTERVENTIONS:  - Assess and monitor for signs and symptoms of infection  - Monitor lab/diagnostic results  - Monitor all insertion sites, i.e. indwelling lines, tubes, and drains  - Monitor endotracheal if appropriate and nasal secretions for changes in amount and color  - [place] appropriate cooling/warming therapies per order  - Administer medications as ordered  - Instruct and encourage patient and family to use good hand hygiene technique  - Identify and instruct in appropriate isolation precautions for identified infection/condition  Outcome: Progressing  Goal: Absence of fever/infection during neutropenic period  Description: INTERVENTIONS:  - Monitor WBC    Outcome: Progressing     Problem: SAFETY ADULT  Goal: Patient will remain free of falls  Description: INTERVENTIONS:  - Educate patient/family on patient safety including physical limitations  - Instruct patient to call for assistance with activity   - Consult OT/PT to assist with strengthening/mobility   - Keep Call bell within reach  - Keep bed low and locked with side rails adjusted as appropriate  - Keep care items and personal belongings within reach  - Initiate and maintain comfort rounds  - Make Fall Risk Sign visible to staff  - Offer Toileting every 2-4  Hours, in advance of need  - Initiate/Maintain bed/chair alarm  - Obtain necessary fall risk management equipment: nonskid footwear   - Apply yellow socks and bracelet for high fall risk patients  - Consider moving patient to room near nurses station  Outcome: Progressing  Goal: Maintain or return to baseline ADL function  Description: INTERVENTIONS:  -  Assess patient's ability to carry out ADLs; assess patient's baseline for ADL function and identify physical deficits which impact ability to perform ADLs (bathing, care of mouth/teeth, toileting, grooming, dressing, etc.)  - Assess/evaluate cause of self-care deficits   - Assess range of motion  - Assess patient's mobility; develop plan if impaired  - Assess patient's need for assistive devices and provide as appropriate  - Encourage maximum independence but intervene and supervise when necessary  - Involve family in performance of ADLs  - Assess for home care needs following discharge   - Consider OT consult to assist with ADL evaluation and planning for discharge  - Provide patient education as appropriate  Outcome: Progressing  Goal: Maintains/Returns to pre admission functional level  Description: INTERVENTIONS:  - Perform AM-PAC 6 Click Basic Mobility/ Daily Activity assessment daily.  - Set and communicate daily mobility goal to care team and patient/family/caregiver.   - Collaborate with rehabilitation services on mobility goals if consulted  - Perform Range of Motion 3 times a day.  - Reposition patient every 2 hours.  - Dangle patient 3 times a day  - Stand patient 3 times a day  - Ambulate patient 3 times a day  - Out of bed to chair 3 times a day   - Out of bed for meals 3 times a day  - Out of bed for toileting  - Record patient progress and toleration of activity level   Outcome: Progressing     Problem: DISCHARGE PLANNING  Goal: Discharge to home or other facility with appropriate resources  Description: INTERVENTIONS:  - Identify barriers to  discharge w/patient and caregiver  - Arrange for needed discharge resources and transportation as appropriate  - Identify discharge learning needs (meds, wound care, etc.)  - Arrange for interpretive services to assist at discharge as needed  - Refer to Case Management Department for coordinating discharge planning if the patient needs post-hospital services based on physician/advanced practitioner order or complex needs related to functional status, cognitive ability, or social support system  Outcome: Progressing     Problem: Knowledge Deficit  Goal: Patient/family/caregiver demonstrates understanding of disease process, treatment plan, medications, and discharge instructions  Description: Complete learning assessment and assess knowledge base.  Interventions:  - Provide teaching at level of understanding  - Provide teaching via preferred learning methods  Outcome: Progressing     Problem: Prexisting or High Potential for Compromised Skin Integrity  Goal: Skin integrity is maintained or improved  Description: INTERVENTIONS:  - Identify patients at risk for skin breakdown  - Assess and monitor skin integrity  - Assess and monitor nutrition and hydration status  - Monitor labs   - Assess for incontinence   - Turn and reposition patient  - Assist with mobility/ambulation  - Relieve pressure over bony prominences  - Avoid friction and shearing  - Provide appropriate hygiene as needed including keeping skin clean and dry  - Evaluate need for skin moisturizer/barrier cream  - Collaborate with interdisciplinary team   - Patient/family teaching  - Consider wound care consult   Outcome: Progressing

## 2024-10-13 NOTE — QUICK NOTE
Pt is unable to come down to x-ray department for lumbar spine series because he is unable to support his trunk on his own per nurse. Nurse stated that she will discuss x-ray order with pt's surgeon tomorrow.

## 2024-10-13 NOTE — PLAN OF CARE
Problem: PAIN - ADULT  Goal: Verbalizes/displays adequate comfort level or baseline comfort level  Description: Interventions:  - Encourage patient to monitor pain and request assistance  - Assess pain using appropriate pain scale  - Administer analgesics based on type and severity of pain and evaluate response  - Implement non-pharmacological measures as appropriate and evaluate response  - Consider cultural and social influences on pain and pain management  - Notify physician/advanced practitioner if interventions unsuccessful or patient reports new pain  Outcome: Progressing     Problem: INFECTION - ADULT  Goal: Absence or prevention of progression during hospitalization  Description: INTERVENTIONS:  - Assess and monitor for signs and symptoms of infection  - Monitor lab/diagnostic results  - Monitor all insertion sites, i.e. indwelling lines, tubes, and drains  - Monitor endotracheal if appropriate and nasal secretions for changes in amount and color  - [place] appropriate cooling/warming therapies per order  - Administer medications as ordered  - Instruct and encourage patient and family to use good hand hygiene technique  - Identify and instruct in appropriate isolation precautions for identified infection/condition  Outcome: Progressing  Goal: Absence of fever/infection during neutropenic period  Description: INTERVENTIONS:  - Monitor WBC    Outcome: Progressing     Problem: SAFETY ADULT  Goal: Patient will remain free of falls  Description: INTERVENTIONS:  - Educate patient/family on patient safety including physical limitations  - Instruct patient to call for assistance with activity   - Consult OT/PT to assist with strengthening/mobility   - Keep Call bell within reach  - Keep bed low and locked with side rails adjusted as appropriate  - Keep care items and personal belongings within reach  - Initiate and maintain comfort rounds  - Make Fall Risk Sign visible to staff  - Offer Toileting every 2-4  Hours, in advance of need  - Initiate/Maintain bed chair alarm  - Obtain necessary fall risk management equipment: non skid footwear  - Apply yellow socks and bracelet for high fall risk patients  - Consider moving patient to room near nurses station  Outcome: Progressing  Goal: Maintain or return to baseline ADL function  Description: INTERVENTIONS:  -  Assess patient's ability to carry out ADLs; assess patient's baseline for ADL function and identify physical deficits which impact ability to perform ADLs (bathing, care of mouth/teeth, toileting, grooming, dressing, etc.)  - Assess/evaluate cause of self-care deficits   - Assess range of motion  - Assess patient's mobility; develop plan if impaired  - Assess patient's need for assistive devices and provide as appropriate  - Encourage maximum independence but intervene and supervise when necessary  - Involve family in performance of ADLs  - Assess for home care needs following discharge   - Consider OT consult to assist with ADL evaluation and planning for discharge  - Provide patient education as appropriate  Outcome: Progressing  Goal: Maintains/Returns to pre admission functional level  Description: INTERVENTIONS:  - Perform AM-PAC 6 Click Basic Mobility/ Daily Activity assessment daily.  - Set and communicate daily mobility goal to care team and patient/family/caregiver.   - Collaborate with rehabilitation services on mobility goals if consulted  - Perform Range of Motion 3 times a day.  - Reposition patient every 3 hours.  - Dangle patient 3 times a day  - Stand patient 3 times a day  - Ambulate patient 3 times a day  - Out of bed to chair 3 times a day   - Out of bed for meals 3 times a day  - Out of bed for toileting  - Record patient progress and toleration of activity level   Outcome: Progressing     Problem: DISCHARGE PLANNING  Goal: Discharge to home or other facility with appropriate resources  Description: INTERVENTIONS:  - Identify barriers to  discharge w/patient and caregiver  - Arrange for needed discharge resources and transportation as appropriate  - Identify discharge learning needs (meds, wound care, etc.)  - Arrange for interpretive services to assist at discharge as needed  - Refer to Case Management Department for coordinating discharge planning if the patient needs post-hospital services based on physician/advanced practitioner order or complex needs related to functional status, cognitive ability, or social support system  Outcome: Progressing     Problem: Knowledge Deficit  Goal: Patient/family/caregiver demonstrates understanding of disease process, treatment plan, medications, and discharge instructions  Description: Complete learning assessment and assess knowledge base.  Interventions:  - Provide teaching at level of understanding  - Provide teaching via preferred learning methods  Outcome: Progressing     Problem: Prexisting or High Potential for Compromised Skin Integrity  Goal: Skin integrity is maintained or improved  Description: INTERVENTIONS:  - Identify patients at risk for skin breakdown  - Assess and monitor skin integrity  - Assess and monitor nutrition and hydration status  - Monitor labs   - Assess for incontinence   - Turn and reposition patient  - Assist with mobility/ambulation  - Relieve pressure over bony prominences  - Avoid friction and shearing  - Provide appropriate hygiene as needed including keeping skin clean and dry  - Evaluate need for skin moisturizer/barrier cream  - Collaborate with interdisciplinary team   - Patient/family teaching  - Consider wound care consult   Outcome: Progressing

## 2024-10-13 NOTE — PROGRESS NOTES
Progress Note - Orthopedics   Name: Oni Nagy 74 y.o. male I MRN: [id number]  Unit/Bed#: -01 I Date of Admission: 10/10/2024   Date of Service: 10/13/2024 I Hospital Day: 2  { ?Quick Links I Problem List I PORCH I Billing Tip:72441}  Assessment & Plan  Spinal stenosis of lumbar region at multiple levels  POD 3  Navigated L2-5 laminectomy/decompression, L3-5 instrumented fusion, TLIF stable postoperatively    WBAT BLE  PT/OT  Incentive spirometry  Pain control  DVT ppx: SQH   Lumbar spine xrays to be completed prior to DC  Monitor drain output  Diet: regular  Monitor for acute blood loss anemia and administer or recommend IVF/prbc as indicated for greater than 2 gram Hgb drop or Hgb < 7  Dispo planning    Benign essential hypertension    Elevated PSA w neg Bx 2014    Paroxysmal atrial fibrillation (HCC)     S/P mitral valve repair    BPH (benign prostatic hyperplasia)    Stroke-like symptoms      Please contact the SecureChat role for the Orthopedics service with any questions/concerns.    History of Present Illness   74 y.o. male No acute events, no acute distress. Denies fever/chills, SOB. Pain well controlled. ***    Objective  {?Quick Links I ICU Summary I Vitals I I/Os I LDAs I Mobility (PT/OT) I Code Status / ACP   ?Quick Links I Active Meds I Pain Meds I Antibiotics I Anticoagulants:93199}    Temp:  [98.5 °F (36.9 °C)-99.2 °F (37.3 °C)] 98.5 °F (36.9 °C)  HR:  [67-70] 69  BP: (111-115)/(69-72) 112/70  Resp:  [16-18] 16  SpO2:  [96 %-97 %] 97 %  O2 Device: None (Room air)  O2 Device: None (Room air)          I/O         10/11 0701  10/12 0700 10/12 0701  10/13 0700 10/13 0701  10/14 0700    P.O.  600     I.V. (mL/kg)       IV Piggyback       Total Intake(mL/kg)  600 (6.6)     Urine (mL/kg/hr) 4600 (2.1) 1671 (0.8) 200 (0.3)    Drains 100 20 20    Blood       Total Output 4700 1691 220    Net -4700 -1091 -220                 Lines/Drains/Airways       Active Status       Name Placement date  "Placement time Site Days    Closed/Suction Drain Left Back Bulb 19 Fr. 10/10/24  1621  Back  2                  Physical Exam   Musculoskeletal: {Laterality:66573} {tgl extremities:80202}  {Post op dressin::\"Dressing C/D/I\"} ***  {tglttp:32654}  {AJK Sensory Exam:13880::\"Sensation intact to light touch cecelia/saph/sp/dp/tib\"}  {tgl motor:38330::\"Motor intact EHL/FHL, ankle DF/PF\"}  {tglvasc:14732::\"2+ DP pulse\"}  {tglcalf:07267::\"No calf swelling or ttp\"}  ***    {?Quick Links I Lab Review I Micro Results I Radiology I Cardiology:78852}  Lab Results: I have reviewed the following results:  Recent Labs     10/11/24  0507 10/11/24  1340 10/12/24  0459 10/13/24  0442   WBC 12.41*  --  12.11* 9.33   HGB 12.2  --  11.1* 10.2*   HCT 36.5  --  33.1* 31.5*     --  155 150   BUN 12 14 14 12   CREATININE 0.87 1.08 1.02 0.83     Blood Culture:  No results found for: \"BLOODCX\"  Wound Culture: No results found for: \"WOUNDCULT\"  {UPDATE TIME AND REFRESH***}    "

## 2024-10-14 ENCOUNTER — APPOINTMENT (INPATIENT)
Dept: RADIOLOGY | Facility: HOSPITAL | Age: 74
DRG: 448 | End: 2024-10-14
Payer: MEDICARE

## 2024-10-14 PROCEDURE — 99024 POSTOP FOLLOW-UP VISIT: CPT | Performed by: ORTHOPAEDIC SURGERY

## 2024-10-14 PROCEDURE — 99232 SBSQ HOSP IP/OBS MODERATE 35: CPT | Performed by: NURSE PRACTITIONER

## 2024-10-14 PROCEDURE — 72100 X-RAY EXAM L-S SPINE 2/3 VWS: CPT

## 2024-10-14 RX ORDER — DEXAMETHASONE SODIUM PHOSPHATE 4 MG/ML
10 INJECTION, SOLUTION INTRA-ARTICULAR; INTRALESIONAL; INTRAMUSCULAR; INTRAVENOUS; SOFT TISSUE ONCE
Status: COMPLETED | OUTPATIENT
Start: 2024-10-14 | End: 2024-10-14

## 2024-10-14 RX ORDER — LACTULOSE 10 G/15ML
30 SOLUTION ORAL ONCE
Status: COMPLETED | OUTPATIENT
Start: 2024-10-14 | End: 2024-10-14

## 2024-10-14 RX ADMIN — METHOCARBAMOL 500 MG: 500 TABLET ORAL at 12:09

## 2024-10-14 RX ADMIN — METHOCARBAMOL 500 MG: 500 TABLET ORAL at 23:01

## 2024-10-14 RX ADMIN — Medication 250 MG: at 21:08

## 2024-10-14 RX ADMIN — DILTIAZEM HYDROCHLORIDE 120 MG: 120 CAPSULE, EXTENDED RELEASE ORAL at 08:20

## 2024-10-14 RX ADMIN — DOCUSATE SODIUM 100 MG: 100 CAPSULE, LIQUID FILLED ORAL at 17:18

## 2024-10-14 RX ADMIN — SENNOSIDES 8.6 MG: 8.6 TABLET, FILM COATED ORAL at 08:20

## 2024-10-14 RX ADMIN — DOCUSATE SODIUM 100 MG: 100 CAPSULE, LIQUID FILLED ORAL at 08:19

## 2024-10-14 RX ADMIN — ATORVASTATIN CALCIUM 20 MG: 20 TABLET, FILM COATED ORAL at 08:19

## 2024-10-14 RX ADMIN — Medication 1000 UNITS: at 08:19

## 2024-10-14 RX ADMIN — TAMSULOSIN HYDROCHLORIDE 0.4 MG: 0.4 CAPSULE ORAL at 17:18

## 2024-10-14 RX ADMIN — METHOCARBAMOL 500 MG: 500 TABLET ORAL at 04:41

## 2024-10-14 RX ADMIN — METHOCARBAMOL 500 MG: 500 TABLET ORAL at 17:18

## 2024-10-14 RX ADMIN — DEXAMETHASONE SODIUM PHOSPHATE 10 MG: 4 INJECTION INTRA-ARTICULAR; INTRALESIONAL; INTRAMUSCULAR; INTRAVENOUS; SOFT TISSUE at 10:45

## 2024-10-14 RX ADMIN — HEPARIN SODIUM 5000 UNITS: 5000 INJECTION INTRAVENOUS; SUBCUTANEOUS at 04:42

## 2024-10-14 RX ADMIN — APIXABAN 5 MG: 5 TABLET, FILM COATED ORAL at 17:18

## 2024-10-14 RX ADMIN — HYDROCODONE BITARTRATE AND ACETAMINOPHEN 2 TABLET: 5; 325 TABLET ORAL at 21:10

## 2024-10-14 RX ADMIN — LACTULOSE 30 G: 20 SOLUTION ORAL at 12:10

## 2024-10-14 RX ADMIN — ACETAMINOPHEN 650 MG: 325 TABLET ORAL at 07:46

## 2024-10-14 RX ADMIN — HYDROCODONE BITARTRATE AND ACETAMINOPHEN 1 TABLET: 5; 325 TABLET ORAL at 04:41

## 2024-10-14 NOTE — PROGRESS NOTES
Progress Note - Hospitalist   Name: Oni Nagy 74 y.o. male I MRN: [id number]  Unit/Bed#: -01 I Date of Admission: 10/10/2024   Date of Service: 10/14/2024 I Hospital Day: 3    Assessment & Plan  Spinal stenosis of lumbar region at multiple levels  POD#3 Navigated L2-5 laminectomy/decompression, L3-5 instrumented fusion, TLIF stable postoperatively  Ortho primary   WBAT BLE  PT/OT  Incentive spirometry  Pain control  Pt reports ortho to provide iv dose steroid for pain relief   Discussed with ortho at bedside   DVT ppx: SQH- Discussed with Ortho discontinued SQH and resumed home dose Eliquis first dose tonight . Just received heparin SQ this am    Lumbar spine xrays completed not yet read   Monitor drain output - ortho at the bedside for drain removal at this time   Benign essential hypertension  Continue diltiazem  Patient no longer on metoprolol  As needed hydralazine for SBP greater than 160  Elevated PSA w neg Bx 2014  Follows with urology as op at [facility]   Planning for outpatient Cystoscopy, holmium enucleation of prostate, possible transurethral resection of prostate   Monitor for urinary retention, s/p mccord removal on 10/12   Paroxysmal atrial fibrillation (HCC)   Continue diltiazem 120 mg daily  Resume Eliquis 10/14 per ortho    S/P mitral valve repair  Currently stable follow with cardiology as OP  BPH (benign prostatic hyperplasia)  Continue tamsulosin  Monitor for urinary retention since mccord was removed   Stroke-like symptoms  Rapid response/stroke alert was called on 10/11 d/t word finding difficulties, symptoms resolved while interacting with neurology- RESOLVED  Neurology consult appreciated   Etiology is likely secondary to medication effect likely secondary to narcotics in combination with muscle relaxant   CT/CTA head negative for CVA or bleed  Monitor neuro checks   No further neurology input at this time     VTE Pharmacologic Prophylaxis: VTE Score: 5 High Risk (Score >/= 5) -  Pharmacological DVT Prophylaxis Ordered: apixaban (Eliquis). Sequential Compression Devices Ordered.    Mobility:   Basic Mobility Inpatient Raw Score: 13  JH-HLM Goal: 4: Move to chair/commode  JH-HLM Achieved: 4: Move to chair/commode  JH-HLM Goal achieved. Continue to encourage appropriate mobility.    Patient Centered Rounds: I performed bedside rounds with nursing staff today.   Discussions with Specialists or Other Care Team Provider: Nursing     Education and Discussions with Family / Patient: Patient declined call to .     Current Length of Stay: 3 day(s)  Current Patient Status: Inpatient   Certification Statement: The patient will continue to require additional inpatient hospital stay due to pending drain removal and need for rehab   Discharge Plan: Anticipate discharge in 24-48 hrs to rehab facility.    Code Status: Prior    Subjective   Pt reports he is feeling a little better but does require rehab. Ortho in at this time with plan to remove drain at this time. Pt reports otherwise doing well     Objective :  Temp:  [98.4 °F (36.9 °C)] 98.4 °F (36.9 °C)  HR:  [64-67] 67  BP: (120)/(73-74) 120/74  Resp:  [16-20] 16  SpO2:  [97 %] 97 %  O2 Device: None (Room air)    Body mass index is 26.52 kg/m².     Input and Output Summary (last 24 hours):     Intake/Output Summary (Last 24 hours) at 10/14/2024 1718  Last data filed at 10/14/2024 1441  Gross per 24 hour   Intake 100 ml   Output 1490 ml   Net -1390 ml       Physical Exam  Constitutional:       General: He is not in acute distress.     Appearance: He is not ill-appearing, toxic-appearing or diaphoretic.   HENT:      Head: Normocephalic and atraumatic.   Eyes:      General:         Right eye: No discharge.         Left eye: No discharge.   Cardiovascular:      Rate and Rhythm: Normal rate.      Heart sounds: No murmur heard.     No friction rub. No gallop.   Pulmonary:      Effort: No respiratory distress.      Breath sounds: No stridor. No  wheezing, rhonchi or rales.   Chest:      Chest wall: No tenderness.   Abdominal:      General: There is no distension.      Palpations: There is no mass.      Tenderness: There is no abdominal tenderness. There is no guarding or rebound.      Hernia: No hernia is present.   Musculoskeletal:         General: Tenderness (posterior spine. dsd alessandra bulb) present. No swelling, deformity or signs of injury.      Right lower leg: No edema.      Left lower leg: No edema.   Skin:     Coloration: Skin is not jaundiced or pale.      Findings: No bruising, erythema, lesion or rash.   Neurological:      Mental Status: He is oriented to person, place, and time.   Psychiatric:         Behavior: Behavior normal.           Lines/Drains:              Lab Results: I have reviewed the following results:   Results from last 7 days   Lab Units 10/13/24  0442   WBC Thousand/uL 9.33   HEMOGLOBIN g/dL 10.2*   HEMATOCRIT % 31.5*   PLATELETS Thousands/uL 150   SEGS PCT % 70   LYMPHO PCT % 18   MONO PCT % 9   EOS PCT % 2     Results from last 7 days   Lab Units 10/13/24  0442   SODIUM mmol/L 136   POTASSIUM mmol/L 3.8   CHLORIDE mmol/L 101   CO2 mmol/L 26   BUN mg/dL 12   CREATININE mg/dL 0.83   ANION GAP mmol/L 9   CALCIUM mg/dL 8.8   GLUCOSE RANDOM mg/dL 108         Results from last 7 days   Lab Units 10/11/24  1323   POC GLUCOSE mg/dl 171*               Recent Cultures (last 7 days):               Last 24 Hours Medication List:     Current Facility-Administered Medications:     acetaminophen (TYLENOL) tablet 650 mg, Q6H PRN    aluminum-magnesium hydroxide-simethicone (MAALOX) oral suspension 30 mL, Q6H PRN    apixaban (ELIQUIS) tablet 5 mg, BID    atorvastatin (LIPITOR) tablet 20 mg, Daily    bisacodyl (DULCOLAX) EC tablet 5 mg, Daily PRN    calcium carbonate (TUMS) chewable tablet 1,000 mg, Daily PRN    Cholecalciferol (VITAMIN D3) tablet 1,000 Units, Daily    diltiazem (CARDIZEM CD) 24 hr capsule 120 mg, Daily    docusate sodium (COLACE)  capsule 100 mg, BID    doxylamine (UNISOM) tablet 25 mg, HS PRN    hydrALAZINE (APRESOLINE) injection 10 mg, Q6H PRN    HYDROcodone-acetaminophen (NORCO) 5-325 mg per tablet 1 tablet, Q6H PRN    HYDROcodone-acetaminophen (NORCO) 5-325 mg per tablet 2 tablet, Q6H PRN    magnesium gluconate (MAGONATE) tablet 250 mg, Daily    methocarbamol (ROBAXIN) tablet 500 mg, Q6H MELODIE    ondansetron (ZOFRAN) injection 4 mg, Q6H PRN    senna (SENOKOT) tablet 8.6 mg, Daily    simethicone (MYLICON) chewable tablet 80 mg, Q6H PRN    tamsulosin (FLOMAX) capsule 0.4 mg, Daily With Dinner    Administrative Statements   Today, Patient Was Seen By: DMITRI Gottlieb      **Please Note: This note may have been constructed using a voice recognition system.**

## 2024-10-14 NOTE — ASSESSMENT & PLAN NOTE
Follows with urology as op at [facility]   Planning for outpatient Cystoscopy, holmium enucleation of prostate, possible transurethral resection of prostate   Monitor for urinary retention, s/p mccord removal on 10/12

## 2024-10-14 NOTE — PLAN OF CARE
Problem: PAIN - ADULT  Goal: Verbalizes/displays adequate comfort level or baseline comfort level  Description: Interventions:  - Encourage patient to monitor pain and request assistance  - Assess pain using appropriate pain scale  - Administer analgesics based on type and severity of pain and evaluate response  - Implement non-pharmacological measures as appropriate and evaluate response  - Consider cultural and social influences on pain and pain management  - Notify physician/advanced practitioner if interventions unsuccessful or patient reports new pain  Outcome: Progressing     Problem: INFECTION - ADULT  Goal: Absence or prevention of progression during hospitalization  Description: INTERVENTIONS:  - Assess and monitor for signs and symptoms of infection  - Monitor lab/diagnostic results  - Monitor all insertion sites, i.e. indwelling lines, tubes, and drains  - Monitor endotracheal if appropriate and nasal secretions for changes in amount and color  - [place] appropriate cooling/warming therapies per order  - Administer medications as ordered  - Instruct and encourage patient and family to use good hand hygiene technique  - Identify and instruct in appropriate isolation precautions for identified infection/condition  Outcome: Progressing  Goal: Absence of fever/infection during neutropenic period  Description: INTERVENTIONS:  - Monitor WBC    Outcome: Progressing     Problem: SAFETY ADULT  Goal: Patient will remain free of falls  Description: INTERVENTIONS:  - Educate patient/family on patient safety including physical limitations  - Instruct patient to call for assistance with activity   - Consult OT/PT to assist with strengthening/mobility   - Keep Call bell within reach  - Keep bed low and locked with side rails adjusted as appropriate  - Keep care items and personal belongings within reach  - Initiate and maintain comfort rounds  - Make Fall Risk Sign visible to staff  - Offer Toileting every 2 Hours,  in advance of need  - Apply yellow socks and bracelet for high fall risk patients  - Consider moving patient to room near nurses station  Outcome: Progressing  Goal: Maintain or return to baseline ADL function  Description: INTERVENTIONS:  -  Assess patient's ability to carry out ADLs; assess patient's baseline for ADL function and identify physical deficits which impact ability to perform ADLs (bathing, care of mouth/teeth, toileting, grooming, dressing, etc.)  - Assess/evaluate cause of self-care deficits   - Assess range of motion  - Assess patient's mobility; develop plan if impaired  - Assess patient's need for assistive devices and provide as appropriate  - Encourage maximum independence but intervene and supervise when necessary  - Involve family in performance of ADLs  - Assess for home care needs following discharge   - Consider OT consult to assist with ADL evaluation and planning for discharge  - Provide patient education as appropriate  Outcome: Progressing  Goal: Maintains/Returns to pre admission functional level  Description: INTERVENTIONS:  - Perform AM-PAC 6 Click Basic Mobility/ Daily Activity assessment daily.  - Set and communicate daily mobility goal to care team and patient/family/caregiver.   - Collaborate with rehabilitation services on mobility goals if consulted  - Perform Range of Motion 3 times a day.  - Reposition patient every 2 hours.  - Dangle patient 3 times a day  - Stand patient 3 times a day  - Ambulate patient 3 times a day  - Out of bed to chair 3 times a day   - Out of bed for meals 3 times a day  - Out of bed for toileting  - Record patient progress and toleration of activity level   Outcome: Progressing     Problem: DISCHARGE PLANNING  Goal: Discharge to home or other facility with appropriate resources  Description: INTERVENTIONS:  - Identify barriers to discharge w/patient and caregiver  - Arrange for needed discharge resources and transportation as appropriate  - Identify  discharge learning needs (meds, wound care, etc.)  - Arrange for interpretive services to assist at discharge as needed  - Refer to Case Management Department for coordinating discharge planning if the patient needs post-hospital services based on physician/advanced practitioner order or complex needs related to functional status, cognitive ability, or social support system  Outcome: Progressing     Problem: Knowledge Deficit  Goal: Patient/family/caregiver demonstrates understanding of disease process, treatment plan, medications, and discharge instructions  Description: Complete learning assessment and assess knowledge base.  Interventions:  - Provide teaching at level of understanding  - Provide teaching via preferred learning methods  Outcome: Progressing     Problem: Prexisting or High Potential for Compromised Skin Integrity  Goal: Skin integrity is maintained or improved  Description: INTERVENTIONS:  - Identify patients at risk for skin breakdown  - Assess and monitor skin integrity  - Assess and monitor nutrition and hydration status  - Monitor labs   - Assess for incontinence   - Turn and reposition patient  - Assist with mobility/ambulation  - Relieve pressure over bony prominences  - Avoid friction and shearing  - Provide appropriate hygiene as needed including keeping skin clean and dry  - Evaluate need for skin moisturizer/barrier cream  - Collaborate with interdisciplinary team   - Patient/family teaching  - Consider wound care consult   Outcome: Progressing

## 2024-10-14 NOTE — CASE MANAGEMENT
Case Management Discharge Planning Note    Patient name Oni Nagy  Location /-01 MRN [id number]  : 1950 Date 10/14/2024       Current Admission Date: 10/10/2024  Current Admission Diagnosis:Spinal stenosis of lumbar region at multiple levels   Patient Active Problem List    Diagnosis Date Noted Date Diagnosed    Stroke-like symptoms 10/11/2024     Garcia catheter in place 2024     Lumbar radiculopathy 2024     Spinal stenosis of lumbar region at multiple levels 2024     Frequent PVCs 2023     Inguinal Hernia Right 2023     BPH (benign prostatic hyperplasia) 2023     S/P mitral valve repair 06/15/2021     Status post aortic valve replacement 2021     Pulmonary nodules 2021     Thoracic aortic aneurysm without rupture (HCC) 2021     Localized, primary osteoarthritis of shoulder region 2020     History of urinary retention 2020     Paroxysmal atrial fibrillation (HCC)  2020     Proteinuria 2017     Hematuria, microscopic 2017     Abdominal hernia 2017     Neurocardiogenic syncope 2016     Insomnia 10/14/2014     Hypercholesterolemia 10/14/2014     Elevated PSA w neg Bx 2014 10/14/2014     Benign essential hypertension 2011     Coronary atherosclerosis 2011       LOS (days): 3  Geometric Mean LOS (GMLOS) (days):   Days to GMLOS:     OBJECTIVE:  Risk of Unplanned Readmission Score: 9.5         Current admission status: Inpatient   Preferred Pharmacy:   [id number] [place] [facility] [place] - [address] [facility]  [address]  [facility] [address]  Phone: [phone] Fax: [phone]    [id number] [place] [facility] [address]  [address] [facility] [address]  Phone: [phone] Fax: [phone]    Primary Care Provider: Jose Roberto Mejia DO    Primary Insurance: MEDICARE  Secondary Insurance: [facility]    DISCHARGE DETAILS:    Discharge planning discussed with::  Patient  Freedom of Choice: Yes  Comments - Freedom of Choice: Discussed FOC  CM contacted family/caregiver?: No- see comments (Declined)  Were Treatment Team discharge recommendations reviewed with patient/caregiver?: Yes  Did patient/caregiver verbalize understanding of patient care needs?: N/A- going to facility  Were patient/caregiver advised of the risks associated with not following Treatment Team discharge recommendations?: Yes       Other Referral/Resources/Interventions Provided:  Referral Comments: Per am rounds: therapy recommending Level 1 resources - IPR. Ortho pulling drain today. Pt still needs post op xrays. Pt Eliquis restarting tonight.  CM met w/pt at bedside to discuss. Explained IPR - pt agreeable and would like referral to  [facility] @ [facility] - lives 2 minutes away from here.  Referral sent in Aidin for review.  Awaiting response.  Per chart review: pt's last BM 10/9 - message sent to bedside nurse to confirm. Awaiting response.

## 2024-10-14 NOTE — ASSESSMENT & PLAN NOTE
POD 5 Navigated L2-5 laminectomy/decompression, L3-5 instrumented fusion, TLIF stable postoperatively    WBAT BLE  PT/OT  Incentive spirometry  Pain control  DVT ppx: eliquis   Lumbar spine xrays to be completed prior to DC  Diet: regular  Monitor for acute blood loss anemia and administer or recommend IVF/prbc as indicated for greater than 2 gram Hgb drop or Hgb < 7  Dispo planning - rehab

## 2024-10-14 NOTE — PROGRESS NOTES
Progress Note - Orthopedics   Name: Oni Nagy 74 y.o. male I MRN: [id number]  Unit/Bed#: -01 I Date of Admission: 10/10/2024   Date of Service: 10/15/2024 I Hospital Day: 4    Assessment & Plan  Spinal stenosis of lumbar region at multiple levels  POD 5 Navigated L2-5 laminectomy/decompression, L3-5 instrumented fusion, TLIF stable postoperatively    WBAT BLE  PT/OT  Incentive spirometry  Pain control  DVT ppx: eliquis   Lumbar spine xrays to be completed prior to DC  Diet: regular  Monitor for acute blood loss anemia and administer or recommend IVF/prbc as indicated for greater than 2 gram Hgb drop or Hgb < 7  Dispo planning - rehab    Benign essential hypertension    Elevated PSA w neg Bx 2014    Paroxysmal atrial fibrillation (HCC)     S/P mitral valve repair    BPH (benign prostatic hyperplasia)    Stroke-like symptoms      Please contact the SecureChat role for the Orthopedics service with any questions/concerns.    History of Present Illness   74 y.o. male No acute events, no acute distress. Denies fever/chills, SOB. Pain well controlled. Patient states he has not yet had a bowel movement since surgery.    Objective      Temp:  [98.1 °F (36.7 °C)] 98.1 °F (36.7 °C)  HR:  [67] 67  BP: (120-125)/(73-78) 125/78  Resp:  [16-18] 18  SpO2:  [97 %] 97 %  O2 Device: None (Room air)          I/O         10/13 0701  10/14 0700 10/14 0701  10/15 0700    P.O. 100 960    Total Intake(mL/kg) 100 (1.1) 960 (10.5)    Urine (mL/kg/hr) 975 (0.4) 1525 (0.7)    Drains 60     Total Output 1035 1525    Net -935 -565          Unmeasured Urine Occurrence 3 x             Physical Exam   Musculoskeletal: Bilateral Lower Extremities  Dressing with small amount of saturation  but intact  TTP gallo-incisionally  Sensation intact to light touch L3-S1  Motor intact L2-S1  Digits warm well perfused with brisk cap refill  No calf swelling or ttp  Drain previously removed      Lab Results: I have reviewed the following  "results:  Recent Labs     10/13/24  0442 10/15/24  0451   WBC 9.33 9.05   HGB 10.2* 10.5*   HCT 31.5* 31.7*    221   BUN 12  --    CREATININE 0.83  --      Blood Culture:  No results found for: \"BLOODCX\"  Wound Culture: No results found for: \"WOUNDCULT\"    "

## 2024-10-14 NOTE — ASSESSMENT & PLAN NOTE
POD#3 Navigated L2-5 laminectomy/decompression, L3-5 instrumented fusion, TLIF stable postoperatively  Ortho primary   WBAT BLE  PT/OT  Incentive spirometry  Pain control  Pt reports ortho to provide iv dose steroid for pain relief   Discussed with ortho at bedside   DVT ppx: SQH- Discussed with Ortho discontinued SQH and resumed home dose Eliquis first dose tonight . Just received heparin SQ this am    Lumbar spine xrays completed not yet read   Monitor drain output - ortho at the bedside for drain removal at this time

## 2024-10-15 PROBLEM — R73.9 HYPERGLYCEMIA: Status: ACTIVE | Noted: 2024-10-15

## 2024-10-15 LAB
ANION GAP SERPL CALCULATED.3IONS-SCNC: 10 MMOL/L (ref 4–13)
BASOPHILS # BLD AUTO: 0.01 THOUSANDS/ΜL (ref 0–0.1)
BASOPHILS NFR BLD AUTO: 0 % (ref 0–1)
BUN SERPL-MCNC: 14 MG/DL (ref 5–25)
CALCIUM SERPL-MCNC: 9.2 MG/DL (ref 8.4–10.2)
CHLORIDE SERPL-SCNC: 102 MMOL/L (ref 96–108)
CO2 SERPL-SCNC: 24 MMOL/L (ref 21–32)
CREAT SERPL-MCNC: 0.72 MG/DL (ref 0.6–1.3)
EOSINOPHIL # BLD AUTO: 0.01 THOUSAND/ΜL (ref 0–0.61)
EOSINOPHIL NFR BLD AUTO: 0 % (ref 0–6)
ERYTHROCYTE [DISTWIDTH] IN BLOOD BY AUTOMATED COUNT: 13.1 % (ref 11.6–15.1)
GFR SERPL CREATININE-BSD FRML MDRD: 91 ML/MIN/1.73SQ M
GLUCOSE SERPL-MCNC: 193 MG/DL (ref 65–140)
HCT VFR BLD AUTO: 31.7 % (ref 36.5–49.3)
HGB BLD-MCNC: 10.5 G/DL (ref 12–17)
IMM GRANULOCYTES # BLD AUTO: 0.05 THOUSAND/UL (ref 0–0.2)
IMM GRANULOCYTES NFR BLD AUTO: 1 % (ref 0–2)
LYMPHOCYTES # BLD AUTO: 0.67 THOUSANDS/ΜL (ref 0.6–4.47)
LYMPHOCYTES NFR BLD AUTO: 7 % (ref 14–44)
MCH RBC QN AUTO: 29.8 PG (ref 26.8–34.3)
MCHC RBC AUTO-ENTMCNC: 33.1 G/DL (ref 31.4–37.4)
MCV RBC AUTO: 90 FL (ref 82–98)
MONOCYTES # BLD AUTO: 0.46 THOUSAND/ΜL (ref 0.17–1.22)
MONOCYTES NFR BLD AUTO: 5 % (ref 4–12)
NEUTROPHILS # BLD AUTO: 7.85 THOUSANDS/ΜL (ref 1.85–7.62)
NEUTS SEG NFR BLD AUTO: 87 % (ref 43–75)
NRBC BLD AUTO-RTO: 0 /100 WBCS
PLATELET # BLD AUTO: 221 THOUSANDS/UL (ref 149–390)
PMV BLD AUTO: 10.5 FL (ref 8.9–12.7)
POTASSIUM SERPL-SCNC: 3.9 MMOL/L (ref 3.5–5.3)
RBC # BLD AUTO: 3.52 MILLION/UL (ref 3.88–5.62)
SODIUM SERPL-SCNC: 136 MMOL/L (ref 135–147)
WBC # BLD AUTO: 9.05 THOUSAND/UL (ref 4.31–10.16)

## 2024-10-15 PROCEDURE — 97116 GAIT TRAINING THERAPY: CPT

## 2024-10-15 PROCEDURE — 99024 POSTOP FOLLOW-UP VISIT: CPT | Performed by: ORTHOPAEDIC SURGERY

## 2024-10-15 PROCEDURE — 99232 SBSQ HOSP IP/OBS MODERATE 35: CPT | Performed by: INTERNAL MEDICINE

## 2024-10-15 PROCEDURE — 85025 COMPLETE CBC W/AUTO DIFF WBC: CPT

## 2024-10-15 PROCEDURE — 97530 THERAPEUTIC ACTIVITIES: CPT

## 2024-10-15 PROCEDURE — 80048 BASIC METABOLIC PNL TOTAL CA: CPT

## 2024-10-15 PROCEDURE — 97535 SELF CARE MNGMENT TRAINING: CPT

## 2024-10-15 RX ORDER — POLYETHYLENE GLYCOL 3350 17 G/17G
17 POWDER, FOR SOLUTION ORAL DAILY
Status: DISCONTINUED | OUTPATIENT
Start: 2024-10-15 | End: 2024-10-16 | Stop reason: HOSPADM

## 2024-10-15 RX ADMIN — SENNOSIDES 8.6 MG: 8.6 TABLET, FILM COATED ORAL at 08:25

## 2024-10-15 RX ADMIN — APIXABAN 5 MG: 5 TABLET, FILM COATED ORAL at 08:25

## 2024-10-15 RX ADMIN — DOCUSATE SODIUM 100 MG: 100 CAPSULE, LIQUID FILLED ORAL at 08:25

## 2024-10-15 RX ADMIN — ATORVASTATIN CALCIUM 20 MG: 20 TABLET, FILM COATED ORAL at 08:25

## 2024-10-15 RX ADMIN — METHOCARBAMOL 500 MG: 500 TABLET ORAL at 17:08

## 2024-10-15 RX ADMIN — Medication 250 MG: at 22:00

## 2024-10-15 RX ADMIN — METHOCARBAMOL 500 MG: 500 TABLET ORAL at 22:35

## 2024-10-15 RX ADMIN — DOCUSATE SODIUM 100 MG: 100 CAPSULE, LIQUID FILLED ORAL at 17:09

## 2024-10-15 RX ADMIN — DILTIAZEM HYDROCHLORIDE 120 MG: 120 CAPSULE, EXTENDED RELEASE ORAL at 08:26

## 2024-10-15 RX ADMIN — POLYETHYLENE GLYCOL 3350 17 G: 17 POWDER, FOR SOLUTION ORAL at 11:23

## 2024-10-15 RX ADMIN — TAMSULOSIN HYDROCHLORIDE 0.4 MG: 0.4 CAPSULE ORAL at 17:08

## 2024-10-15 RX ADMIN — HYDROCODONE BITARTRATE AND ACETAMINOPHEN 2 TABLET: 5; 325 TABLET ORAL at 22:22

## 2024-10-15 RX ADMIN — Medication 1000 UNITS: at 08:25

## 2024-10-15 RX ADMIN — APIXABAN 5 MG: 5 TABLET, FILM COATED ORAL at 17:08

## 2024-10-15 RX ADMIN — BISACODYL 5 MG: 5 TABLET, COATED ORAL at 08:25

## 2024-10-15 RX ADMIN — METHOCARBAMOL 500 MG: 500 TABLET ORAL at 05:13

## 2024-10-15 RX ADMIN — METHOCARBAMOL 500 MG: 500 TABLET ORAL at 11:23

## 2024-10-15 NOTE — ARC ADMISSION
[facility]  met with patient and spouse at bedside. Reviewed ARC program, acute rehab criteria and approval process, medicare days, as well as ARC locations and preferences. Patient's preferred [facility]C location is [facility] and second choice is  [facility].  [facility] Rehab folder left with patient and all questions answered. Patient was made aware that ARC Reviewer will keep their  updated regarding referral status.

## 2024-10-15 NOTE — ASSESSMENT & PLAN NOTE
Follows with urology as op at [facility]   Planning for outpatient Cystoscopy, holmium enucleation of prostate, possible transurethral resection of prostate   Monitor for urinary retention, s/p mccord removal on 10/12

## 2024-10-15 NOTE — PHYSICAL THERAPY NOTE
Physical Therapy Progress Note     10/15/24 1010   PT Last Visit   PT Visit Date 10/15/24   Note Type   Note Type Treatment   Pain Assessment   Pain Assessment Tool 0-10   Pain Score 4   Pain Location/Orientation Location: Back;Orientation: Lower   Restrictions/Precautions   Braces or Orthoses LSO   Other Precautions Pain;Spinal precautions;Fall Risk   Subjective   Subjective Pt encountered supine in bed, pleasant and motivated to participate in PT.  Reports feeling better today with much less pain.  States pain is generally localized to incision now without radiation or spasms during session.   Bed Mobility   Supine to Sit 4  Minimal assistance   Additional items Assist x 1;Increased time required;HOB elevated;Bedrails   Transfers   Sit to Stand 4  Minimal assistance   Additional items Assist x 1;Armrests;Increased time required;Verbal cues   Stand to Sit 4  Minimal assistance   Additional items Assist x 1;Armrests;Increased time required;Verbal cues   Toilet transfer 4  Minimal assistance   Additional items Assist x 1;Armrests;Increased time required;Verbal cues;Raised toilet seat   Ambulation/Elevation   Gait pattern Excessively slow;Short stride;Foward flexed;Inconsistent yvette;Shuffling;Decreased foot clearance;Ataxia;Improper Weight shift;Narrow CASEY   Gait Assistance 4  Minimal assist   Additional items Assist x 1   Assistive Device Rolling walker   Distance 60', 50', 15'   Balance   Static Sitting Fair   Static Standing Poor +   Ambulatory Poor +   Activity Tolerance   Activity Tolerance Patient tolerated treatment well;Patient limited by fatigue;Patient limited by pain   Nurse Made Aware yes   Assessment   Prognosis Good   Problem List Decreased strength;Decreased endurance;Impaired balance;Decreased mobility;Decreased coordination;Orthopedic restrictions;Pain   Assessment Pt demonstrated miproved functional mobility & endurance today with considerably less pain compared to prior sessions.  He continues to  take excessive time to perform all tasks & does demonstrate mild LE instability/ataxia during gait, which will hopefully continue to improve in upcoming days now that he is demonstrating ability to participate in OOB tasks more effectively.  he was able to walk up to household distances without LOB or need for seated rest, but does fatigue by the time he return to his room.  he successfully toileted with use of toilet riser, then returned to [facility] where he remained post session.  Discussed d/c planning with pt during session, and at this time, PT recommendations remain appropriate, but he may progress to d/c home with first floor set up pending LOS & ongoing progress in upcoming days.  PT POC remains appropriate to meet pt's current functional needs.   Goals   Patient Goals to kep getting better   STG Expiration Date 10/25/24   PT Treatment Day 2   Plan   Treatment/Interventions Functional transfer training;LE strengthening/ROM;Elevations;Therapeutic exercise;Endurance training;Patient/family training;Equipment eval/education;Bed mobility;Gait training   Progress Progressing toward goals   PT Frequency 2-3x/wk   Discharge Recommendation   Rehab Resource Intensity Level, PT I (Maximum Resource Intensity)   AM-PAC Basic Mobility Inpatient   Turning in Flat Bed Without Bedrails 3   Lying on Back to Sitting on Edge of Flat Bed Without Bedrails 3   Moving Bed to Chair 3   Standing Up From Chair Using Arms 3   Walk in Room 3   Climb 3-5 Stairs With Railing 2   Basic Mobility Inpatient Raw Score 17   Basic Mobility Standardized Score 39.67   [facility] Highest Level Of Mobility   JH-HLM Goal 5: Stand one or more mins   JH-HLM Achieved 7: Walk 25 feet or more       Osmel Paniagua PTA    An [facility] Basic Mobility Raw Score less than 17 suggests pt would benefit from post acute rehab.  Please also refer to the recommendation of the Physical Therapist for safe discharge planning.

## 2024-10-15 NOTE — PROGRESS NOTES
Progress Note - Hospitalist   Name: Oni Nagy 74 y.o. male I MRN: [id number]  Unit/Bed#: -01 I Date of Admission: 10/10/2024   Date of Service: 10/15/2024 I Hospital Day: 4    Assessment & Plan  Spinal stenosis of lumbar region at multiple levels  History of back pain, left radicular leg pain and left gluteal pain, outpatient imaging is notable for lumbar spondylosis, L3-4 and L4-5 spinal stenosis.  Admitted to Ortho team.   Status post navigated L2-5 laminectomy/decompression, L3-5 instrumented fusion on 10/10  [facility] consulted for medical management  WBAT BLE  Eliquis resumed 10/14  Pain controlled on current regimen   Add miralax to current bowel regimen  DC planning per primary team  Benign essential hypertension  Continue diltiazem  Patient no longer on metoprolol  As needed hydralazine for SBP greater than 160  Elevated PSA w neg Bx 2014  Follows with urology as op at [facility]   Planning for outpatient Cystoscopy, holmium enucleation of prostate, possible transurethral resection of prostate   Monitor for urinary retention, s/p mccord removal on 10/12   Paroxysmal atrial fibrillation (HCC)   Continue diltiazem 120 mg daily  Resumed Eliquis 10/14 per ortho    S/P mitral valve repair  Currently stable follow with cardiology as OP  BPH (benign prostatic hyperplasia)  Continue tamsulosin  Monitor for urinary retention since mccord was removed   Stroke-like symptoms  Rapid response/stroke alert was called on 10/11 d/t word finding difficulties, symptoms resolved while interacting with neurology  Neurology consult appreciated   Etiology is likely secondary to medication effect likely secondary to narcotics in combination with muscle relaxant   CT/CTA head negative for CVA or bleed  Monitor neuro checks   No further neurology input at this time   Hyperglycemia  Intermittently noted throughout stay. Last A1C 2021 with prediabetes, A1C 5.8  As patient being dc, would recommend OP A1C and further f/u with  PCP  Lifestyle changes recommended     VTE Pharmacologic Prophylaxis: VTE Score: 5 Moderate Risk (Score 3-4) - Pharmacological DVT Prophylaxis Ordered: apixaban (Eliquis).    Mobility:   Basic Mobility Inpatient Raw Score: 13  JH-HLM Goal: 4: Move to chair/commode  JH-HLM Achieved: 4: Move to chair/commode  JH-HLM Goal achieved. Continue to encourage appropriate mobility.    Patient Centered Rounds: I performed bedside rounds with nursing staff today.   Discussions with Specialists or Other Care Team Provider: appreciate primary team     Education and Discussions with Family / Patient:  updates per primary .     Current Length of Stay: 4 day(s)  Current Patient Status: Inpatient   Certification Statement: The patient will continue to require additional inpatient hospital stay due to per primary team   Discharge Plan: [facility] is following this patient on consult. They are medically stable for discharge when deemed appropriate by primary service.    Code Status: Prior    Subjective   Doing okay today. Feeling a lot better overall in terms of pain control. Passing gas but no BM. No nausea reported and tolerating diet.     Objective :  Temp:  [97.8 °F (36.6 °C)-98.1 °F (36.7 °C)] 97.8 °F (36.6 °C)  HR:  [67] 67  BP: (120-127)/(74-78) 127/78  Resp:  [16-18] 16  SpO2:  [97 %] 97 %    Body mass index is 26.52 kg/m².     Input and Output Summary (last 24 hours):     Intake/Output Summary (Last 24 hours) at 10/15/2024 1032  Last data filed at 10/15/2024 0828  Gross per 24 hour   Intake 960 ml   Output 1325 ml   Net -365 ml       Physical Exam  Vitals and nursing note reviewed.   Constitutional:       General: He is not in acute distress.     Comments: On RA    Cardiovascular:      Rate and Rhythm: Normal rate.   Abdominal:      Comments: Brace in place    Neurological:      Mental Status: He is alert and oriented to person, place, and time.   Psychiatric:         Mood and Affect: Mood normal.            Lines/Drains:              Lab Results: I have reviewed the following results:   Results from last 7 days   Lab Units 10/15/24  0451   WBC Thousand/uL 9.05   HEMOGLOBIN g/dL 10.5*   HEMATOCRIT % 31.7*   PLATELETS Thousands/uL 221   SEGS PCT % 87*   LYMPHO PCT % 7*   MONO PCT % 5   EOS PCT % 0     Results from last 7 days   Lab Units 10/15/24  0451   SODIUM mmol/L 136   POTASSIUM mmol/L 3.9   CHLORIDE mmol/L 102   CO2 mmol/L 24   BUN mg/dL 14   CREATININE mg/dL 0.72   ANION GAP mmol/L 10   CALCIUM mg/dL 9.2   GLUCOSE RANDOM mg/dL 193*         Results from last 7 days   Lab Units 10/11/24  1323   POC GLUCOSE mg/dl 171*               Recent Cultures (last 7 days):         Imaging Results Review: No pertinent imaging studies reviewed.  Other Study Results Review: No additional pertinent studies reviewed.    Last 24 Hours Medication List:     Current Facility-Administered Medications:     acetaminophen (TYLENOL) tablet 650 mg, Q6H PRN    aluminum-magnesium hydroxide-simethicone (MAALOX) oral suspension 30 mL, Q6H PRN    apixaban (ELIQUIS) tablet 5 mg, BID    atorvastatin (LIPITOR) tablet 20 mg, Daily    bisacodyl (DULCOLAX) EC tablet 5 mg, Daily PRN    calcium carbonate (TUMS) chewable tablet 1,000 mg, Daily PRN    Cholecalciferol (VITAMIN D3) tablet 1,000 Units, Daily    diltiazem (CARDIZEM CD) 24 hr capsule 120 mg, Daily    docusate sodium (COLACE) capsule 100 mg, BID    doxylamine (UNISOM) tablet 25 mg, HS PRN    hydrALAZINE (APRESOLINE) injection 10 mg, Q6H PRN    HYDROcodone-acetaminophen (NORCO) 5-325 mg per tablet 1 tablet, Q6H PRN    HYDROcodone-acetaminophen (NORCO) 5-325 mg per tablet 2 tablet, Q6H PRN    magnesium gluconate (MAGONATE) tablet 250 mg, Daily    methocarbamol (ROBAXIN) tablet 500 mg, Q6H MELODIE    ondansetron (ZOFRAN) injection 4 mg, Q6H PRN    senna (SENOKOT) tablet 8.6 mg, Daily    simethicone (MYLICON) chewable tablet 80 mg, Q6H PRN    tamsulosin (FLOMAX) capsule 0.4 mg, Daily With  Dinner    Administrative Statements   Today, Patient Was Seen By: Sandy Atkinson PA-C      **Please Note: This note may have been constructed using a voice recognition system.**

## 2024-10-15 NOTE — ASSESSMENT & PLAN NOTE
Currently stable follow with cardiology as OP   [FreeTextEntry1] : 32 year old female with low back pain.  She has no radicular pain and is neurologically intact.  Her MRI Lspine did not show any abnormalities.  Her lumbar radiographs did not show any dynamic instability.  She has not been to physical therapy.  We provided her with a referral for physical therapy.  We also provided her with a prescription for diclofenac.  She knows not to take any other NSAIDs with diclofenac and discontinue it if GI symptoms occur.  All her questions were answered.  She will call to make a followup appointment. She knows to call if she has any questions or concerns or if her symptoms acutely worsen.

## 2024-10-15 NOTE — PLAN OF CARE
Problem: PHYSICAL THERAPY ADULT  Goal: Performs mobility at highest level of function for planned discharge setting.  See evaluation for individualized goals.  Description: Treatment/Interventions: ADL retraining, Functional transfer training, Endurance training, Bed mobility, Gait training, Spoke to nursing, OT          See flowsheet documentation for full assessment, interventions and recommendations.  Outcome: Progressing  Note: Prognosis: Good  Problem List: Decreased strength, Decreased endurance, Impaired balance, Decreased mobility, Decreased coordination, Orthopedic restrictions, Pain  Assessment: Pt demonstrated miproved functional mobility & endurance today with considerably less pain compared to prior sessions.  He continues to take excessive time to perform all tasks & does demonstrate mild LE instability/ataxia during gait, which will hopefully continue to improve in upcoming days now that he is demonstrating ability to participate in OOB tasks more effectively.  he was able to walk up to household distances without LOB or need for seated rest, but does fatigue by the time he return to his room.  he successfully toileted with use of toilet riser, then returned to recliner where he remained post session.  Discussed d/c planning with pt during session, and at this time, PT recommendations remain appropriate, but he may progress to d/c home with first floor set up pending LOS & ongoing progress in upcoming days.  PT POC remains appropriate to meet pt's current functional needs.  Barriers to Discharge: Inaccessible home environment, Decreased caregiver support     Rehab Resource Intensity Level, PT: I (Maximum Resource Intensity)    See flowsheet documentation for full assessment.

## 2024-10-15 NOTE — ARC ADMISSION
Patient has been pre-approved for  Acute Rehab pending medical stability, confirmation of home support, bed availability, continued functional need 2 therapy disciplines within medicare guidelines.     Thank you,   Barbara ISRAEL Admissions

## 2024-10-15 NOTE — ASSESSMENT & PLAN NOTE
Intermittently noted throughout stay. Last A1C 2021 with prediabetes, A1C 5.8  As patient being dc, would recommend OP A1C and further f/u with PCP  Lifestyle changes recommended

## 2024-10-15 NOTE — PROGRESS NOTES
"Progress Note - Orthopedics   Name: Oni Nagy 74 y.o. male I MRN: [id number]  Unit/Bed#: -01 I Date of Admission: 10/10/2024   Date of Service: 10/15/2024 I Hospital Day: 4    Assessment & Plan  Spinal stenosis of lumbar region at multiple levels  POD 6 Navigated L2-5 laminectomy/decompression, L3-5 instrumented fusion, TLIF stable postoperatively    WBAT BLE  PT/OT  Incentive spirometry  Pain control  DVT ppx: eliquis   Diet: regular  Monitor for acute blood loss anemia and administer or recommend IVF/prbc as indicated for greater than 2 gram Hgb drop or Hgb < 7  Dispo planning - rehab    Benign essential hypertension    Elevated PSA w neg Bx 2014    Paroxysmal atrial fibrillation (HCC)     S/P mitral valve repair    BPH (benign prostatic hyperplasia)    Stroke-like symptoms    Hyperglycemia      Please contact the SecureChat role for the Orthopedics service with any questions/concerns.    History of Present Illness   74 y.o. male No acute events, no acute distress. Denies fever/chills, SOB. Pain well controlled. Patient states he was out walking in the hallway and had a bowel movement yesterday.    Objective      Temp:  [97.8 °F (36.6 °C)-98.1 °F (36.7 °C)] 97.8 °F (36.6 °C)  HR:  [67] 67  BP: (120-127)/(74-78) 127/78  Resp:  [16-18] 16  SpO2:  [97 %] 97 %  O2 Device: None (Room air)          I/O last 24 hours:  In: 960 [P.O.:960]  Out: 1900 [Urine:1900]    Physical Exam   Musculoskeletal: Bilateral Lower Extremity  Dressing C/D/I with some strikethrough  TTP gallo-incisionally  Sensation intact to light touch L3-S1  Motor intact L2-S1  2+ DP pulse  No calf swelling or ttp      Lab Results: I have reviewed the following results:  Recent Labs     10/13/24  0442 10/15/24  0451   WBC 9.33 9.05   HGB 10.2* 10.5*   HCT 31.5* 31.7*    221   BUN 12 14   CREATININE 0.83 0.72     Blood Culture:  No results found for: \"BLOODCX\"  Wound Culture: No results found for: \"WOUNDCULT\"      "

## 2024-10-15 NOTE — CASE MANAGEMENT
Case Management Discharge Planning Note    Patient name Oni Nagy  Location /-01 MRN [id number]  : 1950 Date 10/15/2024       Current Admission Date: 10/10/2024  Current Admission Diagnosis:Spinal stenosis of lumbar region at multiple levels   Patient Active Problem List    Diagnosis Date Noted Date Diagnosed    Hyperglycemia 10/15/2024     Stroke-like symptoms 10/11/2024     Garcia catheter in place 2024     Lumbar radiculopathy 2024     Spinal stenosis of lumbar region at multiple levels 2024     Frequent PVCs 2023     Inguinal Hernia Right 2023     BPH (benign prostatic hyperplasia) 2023     S/P mitral valve repair 06/15/2021     Status post aortic valve replacement 2021     Pulmonary nodules 2021     Thoracic aortic aneurysm without rupture (HCC) 2021     Localized, primary osteoarthritis of shoulder region 2020     History of urinary retention 2020     Paroxysmal atrial fibrillation (HCC)  2020     Proteinuria 2017     Hematuria, microscopic 2017     Abdominal hernia 2017     Neurocardiogenic syncope 2016     Insomnia 10/14/2014     Hypercholesterolemia 10/14/2014     Elevated PSA w neg Bx 2014 10/14/2014     Benign essential hypertension 2011     Coronary atherosclerosis 2011       LOS (days): 4  Geometric Mean LOS (GMLOS) (days):   Days to GMLOS:     OBJECTIVE:  Risk of Unplanned Readmission Score: 9.9         Current admission status: Inpatient   Preferred Pharmacy:   [id number] [place] [facility] [place] - [address]  [address]  [facility] [address]  Phone: [phone] Fax: [phone]    [id number] [address]  [address] [address]  Phone: [phone] Fax: [phone]    Primary Care Provider: Jose Roberto Mejia DO    Primary Insurance: MEDICARE  Secondary Insurance: [facility]    DISCHARGE DETAILS:                                 Requested Home Health Care         Is the patient interested in HHC at discharge?: No    DME Referral Provided  Referral made for DME?: No    Other Referral/Resources/Interventions Provided:  Interventions: Acute Rehab  Referral Comments: CM spoke with pt and wife, there is no response from  acute rehab. Discussed other options with them and they chose 1. [facility] and 2. [facility], referrals sent as requested.    Would you like to participate in our [facility] service program?  : No - Declined    Treatment Team Recommendation: Acute Rehab  Discharge Destination Plan:: Acute Rehab                                         Additional Comments: CM spoke with family and pt regarding acute rehab, no response from  acute rehab. Other choices are 1. [facility] and 2. [facility]. Referrals sent

## 2024-10-15 NOTE — PLAN OF CARE
Problem: OCCUPATIONAL THERAPY ADULT  Goal: Performs self-care activities at highest level of function for planned discharge setting.  See evaluation for individualized goals.  Description: Treatment Interventions: ADL retraining, Functional transfer training, Endurance training, Patient/family training, Equipment evaluation/education, Compensatory technique education, Activityengagement          See flowsheet documentation for full assessment, interventions and recommendations.   Outcome: Progressing  Note: Limitation: Decreased ADL status, Decreased endurance, Decreased self-care trans, Decreased high-level ADLs  Prognosis: Good, Fair  Assessment: Pt seen for Occupational Therapy session with focus on activity tolerance, functional transfers/mob, sitting balance and tolerance and standing tolerance and balance for pt engagement in UB/LB self-care tasks. Pt cleared by RN/ Ayesha for pt participated in OT session. Pt presented sitting out of bed to bedside chair supine/HOB raised pt awake/alert and agreeable to participate in therapy following pt identifiers confirmed. Pt reported therapy goal to walk.  In comparison to pt previous therapy session pt was able to demonstrate improved use of long handle adaptive equipment/ claw reacher to don underwear after OT seup and instructions. However pt  continues to requires asst for clothing management. Pt able to tolerated participation in LB self-care tasks without complaints of significant pain or spasms throughout session. Pt remains appropriate for II (Moderate Resources) .Pt set up to bedside chair post session, chair alarm activated and all needs within pt reach     Rehab Resource Intensity Level, OT: II (Moderate Resource Intensity)

## 2024-10-15 NOTE — OCCUPATIONAL THERAPY NOTE
"  Occupational Therapy Progress Note     Patient Name: Oni Nagy  Today's Date: 10/15/2024  Problem List  Principal Problem:    Spinal stenosis of lumbar region at multiple levels  Active Problems:    Benign essential hypertension    Elevated PSA w neg Bx 2014    Paroxysmal atrial fibrillation (HCC)     S/P mitral valve repair    BPH (benign prostatic hyperplasia)    Stroke-like symptoms    Hyperglycemia         Occupational Therapy Treatment Note     10/15/24 1129   OT Last Visit   OT Visit Date 10/15/24   Note Type   Note Type Treatment   Pain Assessment   Pain Assessment Tool 0-10   Pain Score 2   Restrictions/Precautions   Weight Bearing Precautions Per Order No   Braces or Orthoses LSO   Other Precautions Pain;Spinal precautions;Fall Risk   Lifestyle   Autonomy I adls and mobility - i iadls - wife assists PRN   Reciprocal Relationships supportive family   Service to Others retired   Intrinsic Gratification mostly sedentary PTA 2* pain   ADL   Where Assessed Chair   Grooming Assistance 5  Supervision/Setup   UB Dressing Assistance 5  Supervision/Setup   LB Dressing Assistance 4  Minimal Assistance   LB Dressing Deficit Thread RLE into underwear;Thread LLE into underwear;Pull up over hips;Increased time to complete   LB Dressing Comments use of Long Handle Reacher   Toileting Assistance  3  Moderate Assistance   Toileting Deficit Clothing management up;Clothing management down;Perineal hygiene   Bed Mobility   Sit to Supine 3  Moderate assistance   Additional items Assist x 1;LE management   Transfers   Sit to Stand 4  Minimal assistance   Additional items Assist x 1   Stand to Sit 4  Minimal assistance   Additional items Assist x 1   Toilet transfer 4  Minimal assistance   Additional items Assist x 1   Functional Mobility   Functional Mobility 4  Minimal assistance   Additional items Rolling walker   Subjective   Subjective pt stated \"this was the first time I was out of bed for a while\"   Cognition "   Overall Cognitive Status WFL   Arousal/Participation Alert;Cooperative   Attention Attends with cues to redirect   Orientation Level Oriented X4   Memory Decreased recall of precautions   Following Commands Follows one step commands without difficulty   Activity Tolerance   Activity Tolerance Patient tolerated treatment well   Assessment   Assessment Pt seen for Occupational Therapy session with focus on activity tolerance, functional transfers/mob, sitting balance and tolerance and standing tolerance and balance for pt engagement in UB/LB self-care tasks. Pt cleared by RN/ Ayesha for pt participated in OT session. Pt presented sitting out of bed to bedside chair supine/HOB raised pt awake/alert and agreeable to participate in therapy following pt identifiers confirmed. Pt reported therapy goal to walk.  In comparison to pt previous therapy session pt was able to demonstrate improved use of long handle adaptive equipment/ claw reacher to don underwear after OT seup and instructions. However pt  continues to requires asst for clothing management. Pt able to tolerated participation in LB self-care tasks without complaints of significant pain or spasms throughout session. Pt remains appropriate for II (Moderate Resources) .Pt set up to bedside chair post session, chair alarm activated and all needs within pt reach   Plan   Treatment Interventions ADL retraining;Functional transfer training;Patient/family training;Endurance training;Compensatory technique education   Goal Expiration Date 10/25/24   OT Treatment Day 2   OT Frequency 2-3x/wk   Discharge Recommendation   Rehab Resource Intensity Level, OT II (Moderate Resource Intensity)   AM-PAC Daily Activity Inpatient   Lower Body Dressing 2   Bathing 2   Toileting 2   Upper Body Dressing 3   Grooming 3   Eating 4   Daily Activity Raw Score 16   Daily Activity Standardized Score (Calc for Raw Score >=11) 35.96   AM-PAC Applied Cognition Inpatient   Following a  Speech/Presentation 4   Understanding Ordinary Conversation 4   Taking Medications 4   Remembering Where Things Are Placed or Put Away 4   Remembering List of 4-5 Errands 4   Taking Care of Complicated Tasks 4   Applied Cognition Raw Score 24   Applied Cognition Standardized Score 62.21         Candelaria MANSFIELD/COLBY

## 2024-10-15 NOTE — DISCHARGE SUMMARY
Discharge Summary - Orthopedics   Name: Oni Nagy 74 y.o. male I MRN: [id number]  Unit/Bed#: -01 I Date of Admission: 10/10/2024   Date of Service: 10/15/2024 I Hospital Day: 4    Admission Date: 10/10/2024 1108  Discharge Date: 10/15/24  Admitting Diagnosis: Spinal stenosis of lumbar region, unspecified whether neurogenic claudication present [M48.061]  Radiculopathy, lumbar region [M54.16]  Discharge Diagnosis: Spinal stenosis of lumbar region, unspecified whether neurogenic claudication present [M48.061]   Radiculopathy, lumbar region [M54.16]   Medical Problems       Resolved Problems  Date Reviewed: 10/13/2024   None         HPI: 74 y.o. male with a history of lumbar spinal stenosis who has been seen by Dr. Foss in clinic.  Pt has failed previous non operative therapies and was scheduled for Navigated L2-5 laminectomy/decompression, L3-5 instrumented fusion, TLIF (Bilateral: Spine Lumbar).  Prior to surgery the risks and benefits of surgery were explained and informed consent was obtained.    Procedures Performed: Navigated L2-5 laminectomy/decompression, L3-5 instrumented fusion, TLIF (Bilateral: Spine Lumbar)      Summary of Hospital Course: Pt was taken to the OR on 10/10/24.  Surgery went without complications and pt was discharged to the PACU in a stable condition and was transferred to the floor. Pt experienced word finding difficulties with no other neurological deficits. A Stroke Alert was called. Follow up evaluations and imaging demonstrated no acute changes and patient quickly returned to baseline. No neurological follow needed, per Neurology. Symptoms possibly caused by analgesics. On discharge date pt was cleared by PT and the medicine team and determined to be safe for discharge.  Daily discussion was had with the patient, nursing staff, orthopaedic team, and family members if present.  All questions were answered to the patients satisifaction.    Significant Findings, Care,  Treatment and Services Provided: see procedure above    Complications: none    Condition at Discharge: good       Discharge instructions/Information to patient and family:   See After Visit Summary (AVS) for information provided to patient and family.      Provisions for Follow-Up Care:  See after visit summary for information related to follow-up care and any pertinent home health orders.      PCP: Jose Roberto Mejia DO    Dis[facility]    Planned Readmission: No     Discharge Medications:  See after visit summary for reconciled discharge medications provided to patient and family.      Discharge Statement:  I have spent a total time of 60 minutes in caring for this patient on the day of the visit/encounter. >30 minutes of time was spent on: Patient and family education, Counseling / Coordination of care, Documenting in the medical record, Reviewing / ordering tests, medicine, procedures  , and Communicating with other healthcare professionals .

## 2024-10-15 NOTE — [facility] ADMISSION
[facility] admissions team received referral on patient's case for possible [facility] placement. [facility] admissions team will continue to review and follow patient's progress and correspond with PT/OT therapies / [facility] physician and case management until a determination of acceptance to [facility] is made.    Thank you,  Barbara Lake   [facility] Admissions

## 2024-10-15 NOTE — ASSESSMENT & PLAN NOTE
POD 6 Navigated L2-5 laminectomy/decompression, L3-5 instrumented fusion, TLIF stable postoperatively    WBAT BLE  PT/OT  Incentive spirometry  Pain control  DVT ppx: eliquis   Diet: regular  Monitor for acute blood loss anemia and administer or recommend IVF/prbc as indicated for greater than 2 gram Hgb drop or Hgb < 7  Dispo planning - rehab

## 2024-10-15 NOTE — ASSESSMENT & PLAN NOTE
History of back pain, left radicular leg pain and left gluteal pain, outpatient imaging is notable for lumbar spondylosis, L3-4 and L4-5 spinal stenosis.  Admitted to Ortho team.   Status post navigated L2-5 laminectomy/decompression, L3-5 instrumented fusion on 10/10  SLIM consulted for medical management  WBAT BLE  Eliquis resumed 10/14  Pain controlled on current regimen   Add miralax to current bowel regimen  DC planning per primary team

## 2024-10-16 VITALS
SYSTOLIC BLOOD PRESSURE: 130 MMHG | HEIGHT: 73 IN | DIASTOLIC BLOOD PRESSURE: 74 MMHG | TEMPERATURE: 98 F | HEART RATE: 63 BPM | BODY MASS INDEX: 26.64 KG/M2 | RESPIRATION RATE: 17 BRPM | WEIGHT: 201 LBS | OXYGEN SATURATION: 96 %

## 2024-10-16 PROCEDURE — NC001 PR NO CHARGE: Performed by: ORTHOPAEDIC SURGERY

## 2024-10-16 RX ADMIN — SENNOSIDES 8.6 MG: 8.6 TABLET, FILM COATED ORAL at 10:00

## 2024-10-16 RX ADMIN — ATORVASTATIN CALCIUM 20 MG: 20 TABLET, FILM COATED ORAL at 10:00

## 2024-10-16 RX ADMIN — DILTIAZEM HYDROCHLORIDE 120 MG: 120 CAPSULE, EXTENDED RELEASE ORAL at 10:00

## 2024-10-16 RX ADMIN — HYDROCODONE BITARTRATE AND ACETAMINOPHEN 2 TABLET: 5; 325 TABLET ORAL at 11:37

## 2024-10-16 RX ADMIN — Medication 1000 UNITS: at 10:00

## 2024-10-16 RX ADMIN — METHOCARBAMOL 500 MG: 500 TABLET ORAL at 11:36

## 2024-10-16 RX ADMIN — METHOCARBAMOL 500 MG: 500 TABLET ORAL at 05:55

## 2024-10-16 RX ADMIN — APIXABAN 5 MG: 5 TABLET, FILM COATED ORAL at 10:00

## 2024-10-16 RX ADMIN — DOCUSATE SODIUM 100 MG: 100 CAPSULE, LIQUID FILLED ORAL at 10:00

## 2024-10-16 NOTE — NURSING NOTE
[facility], RN was placed on hold, then phone began to ring again.  Phone rang for 2 minutes. RN will attempt to call again in 15 minutes.  Writer can be reached at [phone]

## 2024-10-16 NOTE — PLAN OF CARE
Problem: PAIN - ADULT  Goal: Verbalizes/displays adequate comfort level or baseline comfort level  Description: Interventions:  - Encourage patient to monitor pain and request assistance  - Assess pain using appropriate pain scale  - Administer analgesics based on type and severity of pain and evaluate response  - Implement non-pharmacological measures as appropriate and evaluate response  - Consider cultural and social influences on pain and pain management  - Notify physician/advanced practitioner if interventions unsuccessful or patient reports new pain  Outcome: Progressing     Problem: INFECTION - ADULT  Goal: Absence or prevention of progression during hospitalization  Description: INTERVENTIONS:  - Assess and monitor for signs and symptoms of infection  - Monitor lab/diagnostic results  - Monitor all insertion sites, i.e. indwelling lines, tubes, and drains  - Monitor endotracheal if appropriate and nasal secretions for changes in amount and color  - Arnold appropriate cooling/warming therapies per order  - Administer medications as ordered  - Instruct and encourage patient and family to use good hand hygiene technique  - Identify and instruct in appropriate isolation precautions for identified infection/condition  Outcome: Progressing  Goal: Absence of fever/infection during neutropenic period  Description: INTERVENTIONS:  - Monitor WBC    Outcome: Progressing     Problem: SAFETY ADULT  Goal: Patient will remain free of falls  Description: INTERVENTIONS:  - Educate patient/family on patient safety including physical limitations  - Instruct patient to call for assistance with activity   - Consult OT/PT to assist with strengthening/mobility   - Keep Call bell within reach  - Keep bed low and locked with side rails adjusted as appropriate  - Keep care items and personal belongings within reach  - Initiate and maintain comfort rounds  - Make Fall Risk Sign visible to staff  - Offer Toileting every 2-4  Hours, in advance of need  - Initiate/Maintain bed/chair alarm  - Obtain necessary fall risk management equipment: nonskid footwear   - Apply yellow socks and bracelet for high fall risk patients  - Consider moving patient to room near nurses station  Outcome: Progressing  Goal: Maintain or return to baseline ADL function  Description: INTERVENTIONS:  -  Assess patient's ability to carry out ADLs; assess patient's baseline for ADL function and identify physical deficits which impact ability to perform ADLs (bathing, care of mouth/teeth, toileting, grooming, dressing, etc.)  - Assess/evaluate cause of self-care deficits   - Assess range of motion  - Assess patient's mobility; develop plan if impaired  - Assess patient's need for assistive devices and provide as appropriate  - Encourage maximum independence but intervene and supervise when necessary  - Involve family in performance of ADLs  - Assess for home care needs following discharge   - Consider OT consult to assist with ADL evaluation and planning for discharge  - Provide patient education as appropriate  Outcome: Progressing  Goal: Maintains/Returns to pre admission functional level  Description: INTERVENTIONS:  - Perform AM-PAC 6 Click Basic Mobility/ Daily Activity assessment daily.  - Set and communicate daily mobility goal to care team and patient/family/caregiver.   - Collaborate with rehabilitation services on mobility goals if consulted  - Perform Range of Motion 3 times a day.  - Reposition patient every 2 hours.  - Dangle patient 3 times a day  - Stand patient 3 times a day  - Ambulate patient 3 times a day  - Out of bed to chair 3 times a day   - Out of bed for meals 3 times a day  - Out of bed for toileting  - Record patient progress and toleration of activity level   Outcome: Progressing     Problem: DISCHARGE PLANNING  Goal: Discharge to home or other facility with appropriate resources  Description: INTERVENTIONS:  - Identify barriers to  discharge w/patient and caregiver  - Arrange for needed discharge resources and transportation as appropriate  - Identify discharge learning needs (meds, wound care, etc.)  - Arrange for interpretive services to assist at discharge as needed  - Refer to Case Management Department for coordinating discharge planning if the patient needs post-hospital services based on physician/advanced practitioner order or complex needs related to functional status, cognitive ability, or social support system  Outcome: Progressing     Problem: Knowledge Deficit  Goal: Patient/family/caregiver demonstrates understanding of disease process, treatment plan, medications, and discharge instructions  Description: Complete learning assessment and assess knowledge base.  Interventions:  - Provide teaching at level of understanding  - Provide teaching via preferred learning methods  Outcome: Progressing     Problem: Prexisting or High Potential for Compromised Skin Integrity  Goal: Skin integrity is maintained or improved  Description: INTERVENTIONS:  - Identify patients at risk for skin breakdown  - Assess and monitor skin integrity  - Assess and monitor nutrition and hydration status  - Monitor labs   - Assess for incontinence   - Turn and reposition patient  - Assist with mobility/ambulation  - Relieve pressure over bony prominences  - Avoid friction and shearing  - Provide appropriate hygiene as needed including keeping skin clean and dry  - Evaluate need for skin moisturizer/barrier cream  - Collaborate with interdisciplinary team   - Patient/family teaching  - Consider wound care consult   Outcome: Progressing

## 2024-10-16 NOTE — PLAN OF CARE
Problem: PAIN - ADULT  Goal: Verbalizes/displays adequate comfort level or baseline comfort level  Description: Interventions:  - Encourage patient to monitor pain and request assistance  - Assess pain using appropriate pain scale  - Administer analgesics based on type and severity of pain and evaluate response  - Implement non-pharmacological measures as appropriate and evaluate response  - Consider cultural and social influences on pain and pain management  - Notify physician/advanced practitioner if interventions unsuccessful or patient reports new pain  10/16/2024 1116 by Morena Casey RN  Outcome: Adequate for Discharge  10/16/2024 0727 by Morena Casey RN  Outcome: Progressing     Problem: INFECTION - ADULT  Goal: Absence or prevention of progression during hospitalization  Description: INTERVENTIONS:  - Assess and monitor for signs and symptoms of infection  - Monitor lab/diagnostic results  - Monitor all insertion sites, i.e. indwelling lines, tubes, and drains  - Monitor endotracheal if appropriate and nasal secretions for changes in amount and color  - Melvin appropriate cooling/warming therapies per order  - Administer medications as ordered  - Instruct and encourage patient and family to use good hand hygiene technique  - Identify and instruct in appropriate isolation precautions for identified infection/condition  10/16/2024 1116 by Morena Casey RN  Outcome: Adequate for Discharge  10/16/2024 0727 by Morena Casey RN  Outcome: Progressing  Goal: Absence of fever/infection during neutropenic period  Description: INTERVENTIONS:  - Monitor WBC    10/16/2024 1116 by Morena Casey RN  Outcome: Adequate for Discharge  10/16/2024 0727 by Morena Casey RN  Outcome: Progressing     Problem: SAFETY ADULT  Goal: Patient will remain free of falls  Description: INTERVENTIONS:  - Educate patient/family on patient safety including physical limitations  - Instruct patient to call for assistance with  activity   - Consult OT/PT to assist with strengthening/mobility   - Keep Call bell within reach  - Keep bed low and locked with side rails adjusted as appropriate  - Keep care items and personal belongings within reach  - Initiate and maintain comfort rounds  - Make Fall Risk Sign visible to staff  - Offer Toileting every 2-4 Hours, in advance of need  - Initiate/Maintain bed/chair alarm  - Obtain necessary fall risk management equipment: nonskid footwear  - Apply yellow socks and bracelet for high fall risk patients  - Consider moving patient to room near nurses station  10/16/2024 1116 by Morena Casey RN  Outcome: Adequate for Discharge  10/16/2024 0727 by Morena Casey RN  Outcome: Progressing  Goal: Maintain or return to baseline ADL function  Description: INTERVENTIONS:  -  Assess patient's ability to carry out ADLs; assess patient's baseline for ADL function and identify physical deficits which impact ability to perform ADLs (bathing, care of mouth/teeth, toileting, grooming, dressing, etc.)  - Assess/evaluate cause of self-care deficits   - Assess range of motion  - Assess patient's mobility; develop plan if impaired  - Assess patient's need for assistive devices and provide as appropriate  - Encourage maximum independence but intervene and supervise when necessary  - Involve family in performance of ADLs  - Assess for home care needs following discharge   - Consider OT consult to assist with ADL evaluation and planning for discharge  - Provide patient education as appropriate  10/16/2024 1116 by Morena Casey RN  Outcome: Adequate for Discharge  10/16/2024 0727 by Morena Casey RN  Outcome: Progressing  Goal: Maintains/Returns to pre admission functional level  Description: INTERVENTIONS:  - Perform AM-PAC 6 Click Basic Mobility/ Daily Activity assessment daily.  - Set and communicate daily mobility goal to care team and patient/family/caregiver.   - Collaborate with rehabilitation services on mobility  goals if consulted  - Perform Range of Motion 3 times a day.  - Reposition patient every 2 hours.  - Dangle patient 3 times a day  - Stand patient 3 times a day  - Ambulate patient 3 times a day  - Out of bed to chair 3 times a day   - Out of bed for meals 3 times a day  - Out of bed for toileting  - Record patient progress and toleration of activity level   10/16/2024 1116 by Morena Casey RN  Outcome: Adequate for Discharge  10/16/2024 0727 by Morena Casey RN  Outcome: Progressing     Problem: DISCHARGE PLANNING  Goal: Discharge to home or other facility with appropriate resources  Description: INTERVENTIONS:  - Identify barriers to discharge w/patient and caregiver  - Arrange for needed discharge resources and transportation as appropriate  - Identify discharge learning needs (meds, wound care, etc.)  - Arrange for interpretive services to assist at discharge as needed  - Refer to Case Management Department for coordinating discharge planning if the patient needs post-hospital services based on physician/advanced practitioner order or complex needs related to functional status, cognitive ability, or social support system  10/16/2024 1116 by Morena Casey RN  Outcome: Adequate for Discharge  10/16/2024 0727 by Morena Casey RN  Outcome: Progressing     Problem: Prexisting or High Potential for Compromised Skin Integrity  Goal: Skin integrity is maintained or improved  Description: INTERVENTIONS:  - Identify patients at risk for skin breakdown  - Assess and monitor skin integrity  - Assess and monitor nutrition and hydration status  - Monitor labs   - Assess for incontinence   - Turn and reposition patient  - Assist with mobility/ambulation  - Relieve pressure over bony prominences  - Avoid friction and shearing  - Provide appropriate hygiene as needed including keeping skin clean and dry  - Evaluate need for skin moisturizer/barrier cream  - Collaborate with interdisciplinary team   - Patient/family  teaching  - Consider wound care consult   10/16/2024 1116 by Morena Casey RN  Outcome: Adequate for Discharge  10/16/2024 0727 by Morena Casey RN  Outcome: Progressing     Problem: Knowledge Deficit  Goal: Patient/family/caregiver demonstrates understanding of disease process, treatment plan, medications, and discharge instructions  Description: Complete learning assessment and assess knowledge base.  Interventions:  - Provide teaching at level of understanding  - Provide teaching via preferred learning methods  10/16/2024 1116 by Morena Casey RN  Outcome: Adequate for Discharge  10/16/2024 0727 by Morena Casey RN  Outcome: Progressing

## 2024-10-16 NOTE — PROGRESS NOTES
Patient:    MRN:  [id number]    Aidin Request ID:  [id number]    Level of care reserved:  Inpatient Rehab Facility    Partner Reserved:  [place] [id number] [phone]    Clinical needs requested:    Geography searched:  20 miles around [id number]    Start of Service:    Request sent:  1:10pm EDT on 10/15/2024 by Sherron Canales    Partner reserved:  8:40am EDT on 10/16/2024 by Anjali Matrin    Choice list shared:  1:50pm EDT on 10/15/2024 by Sherron Canales

## 2024-10-16 NOTE — CASE MANAGEMENT
Case Management Discharge Planning Note    Patient name Oni Nagy  Location /-01 MRN [id number]  : 1950 Date 10/16/2024       Current Admission Date: 10/10/2024  Current Admission Diagnosis:Spinal stenosis of lumbar region at multiple levels   Patient Active Problem List    Diagnosis Date Noted Date Diagnosed    Hyperglycemia 10/15/2024     Stroke-like symptoms 10/11/2024     Garcia catheter in place 2024     Lumbar radiculopathy 2024     Spinal stenosis of lumbar region at multiple levels 2024     Frequent PVCs 2023     Inguinal Hernia Right 2023     BPH (benign prostatic hyperplasia) 2023     S/P mitral valve repair 06/15/2021     Status post aortic valve replacement 2021     Pulmonary nodules 2021     Thoracic aortic aneurysm without rupture (HCC) 2021     Localized, primary osteoarthritis of shoulder region 2020     History of urinary retention 2020     Paroxysmal atrial fibrillation (HCC)  2020     Proteinuria 2017     Hematuria, microscopic 2017     Abdominal hernia 2017     Neurocardiogenic syncope 2016     Insomnia 10/14/2014     Hypercholesterolemia 10/14/2014     Elevated PSA w neg Bx 2014 10/14/2014     Benign essential hypertension 2011     Coronary atherosclerosis 2011       LOS (days): 5  Geometric Mean LOS (GMLOS) (days):   Days to GMLOS:     OBJECTIVE:  Risk of Unplanned Readmission Score: 9.91         Current admission status: Inpatient   Preferred Pharmacy:   [id number] [place] [facility] - [address]  [address]  [facility] [address]  Phone: [phone] Fax: [phone]    [id number]  [place] [place] [address] [facility]  [address] [facility] [address]  Phone: [phone] Fax: [phone]    Primary Care Provider: Jose Roberto Mejia DO    Primary Insurance: MEDICARE  Secondary Insurance: AARP    DISCHARGE  DETAILS:    Discharge planning discussed with:: Patient  Freedom of Choice: Yes  Comments - Freedom of Choice: Discussed FOC           Other Referral/Resources/Interventions Provided:  Referral Comments: Notifed by [facility] auth obtained.  Per am rounds: pt clear for dc today.  Message sent in Aidin to GS notifying of same.  Bed available today.  Transport arranged via [employer].  Notified provider, bedside nurse and facility of transport time. Met w/pt at bedside notified of dc today. Pt verbalized understanding & in agreement w/dc & accepting facility.         Transport at Discharge : [employer] van     Number/Name of Dispatcher: LEATHA [phone]  Transported by (Company and Unit #): [employer]  ETA of Transport (Date): 10/16/24  ETA of Transport (Time): 1330              IMM Given (Date):: 10/16/24  IMM Given to:: Patient     IMM reviewed with patient, patient agrees with discharge determination.     Accepting Facility Name, City & State : [facility]  Receiving Facility/Agency Phone Number: [phone]  Facility/Agency Fax Number: [phone]

## 2024-10-17 ENCOUNTER — HOSPITAL ENCOUNTER (INPATIENT)
Facility: HOSPITAL | Age: 74
LOS: 2 days | Discharge: HOME | DRG: 391 | End: 2024-10-19
Attending: EMERGENCY MEDICINE | Admitting: INTERNAL MEDICINE
Payer: MEDICARE

## 2024-10-17 ENCOUNTER — APPOINTMENT (EMERGENCY)
Dept: RADIOLOGY | Facility: HOSPITAL | Age: 74
DRG: 391 | End: 2024-10-17
Payer: MEDICARE

## 2024-10-17 DIAGNOSIS — K52.9 COLITIS: ICD-10-CM

## 2024-10-17 DIAGNOSIS — M48.061 SPINAL STENOSIS OF LUMBAR REGION AT MULTIPLE LEVELS: ICD-10-CM

## 2024-10-17 DIAGNOSIS — G93.40 ACUTE ENCEPHALOPATHY: Primary | ICD-10-CM

## 2024-10-17 DIAGNOSIS — I48.0 PAROXYSMAL ATRIAL FIBRILLATION (HCC): ICD-10-CM

## 2024-10-17 PROBLEM — K59.00 CONSTIPATION: Status: ACTIVE | Noted: 2024-10-17

## 2024-10-17 PROBLEM — R65.10 SIRS (SYSTEMIC INFLAMMATORY RESPONSE SYNDROME) (HCC): Status: ACTIVE | Noted: 2024-10-17

## 2024-10-17 PROBLEM — R33.9 URINARY RETENTION: Status: ACTIVE | Noted: 2024-10-17

## 2024-10-17 LAB
ALBUMIN SERPL BCG-MCNC: 4.1 G/DL (ref 3.5–5)
ALP SERPL-CCNC: 85 U/L (ref 34–104)
ALT SERPL W P-5'-P-CCNC: 27 U/L (ref 7–52)
ANION GAP SERPL CALCULATED.3IONS-SCNC: 14 MMOL/L (ref 4–13)
APTT PPP: 33 SECONDS (ref 23–34)
ARTERIAL PATENCY WRIST A: YES
AST SERPL W P-5'-P-CCNC: 20 U/L (ref 13–39)
BACTERIA UR QL AUTO: NORMAL /HPF
BASE EX.OXY STD BLDV CALC-SCNC: 68.3 % (ref 60–80)
BASE EXCESS BLDV CALC-SCNC: -3.8 MMOL/L
BASOPHILS # BLD AUTO: 0.05 THOUSANDS/ΜL (ref 0–0.1)
BASOPHILS NFR BLD AUTO: 0 % (ref 0–1)
BILIRUB SERPL-MCNC: 1.27 MG/DL (ref 0.2–1)
BILIRUB UR QL STRIP: NEGATIVE
BUN SERPL-MCNC: 21 MG/DL (ref 5–25)
CALCIUM SERPL-MCNC: 9.7 MG/DL (ref 8.4–10.2)
CHLORIDE SERPL-SCNC: 98 MMOL/L (ref 96–108)
CLARITY UR: CLEAR
CO2 SERPL-SCNC: 24 MMOL/L (ref 21–32)
COLOR UR: ABNORMAL
CREAT SERPL-MCNC: 0.94 MG/DL (ref 0.6–1.3)
EOSINOPHIL # BLD AUTO: 0.02 THOUSAND/ΜL (ref 0–0.61)
EOSINOPHIL NFR BLD AUTO: 0 % (ref 0–6)
ERYTHROCYTE [DISTWIDTH] IN BLOOD BY AUTOMATED COUNT: 13.4 % (ref 11.6–15.1)
GFR SERPL CREATININE-BSD FRML MDRD: 79 ML/MIN/1.73SQ M
GLUCOSE SERPL-MCNC: 186 MG/DL (ref 65–140)
GLUCOSE UR STRIP-MCNC: NEGATIVE MG/DL
HCO3 BLDV-SCNC: 19.6 MMOL/L (ref 24–30)
HCT VFR BLD AUTO: 37.4 % (ref 36.5–49.3)
HGB BLD-MCNC: 12.4 G/DL (ref 12–17)
HGB UR QL STRIP.AUTO: NEGATIVE
IMM GRANULOCYTES # BLD AUTO: 0.25 THOUSAND/UL (ref 0–0.2)
IMM GRANULOCYTES NFR BLD AUTO: 2 % (ref 0–2)
INR PPP: 1.25 (ref 0.85–1.19)
KETONES UR STRIP-MCNC: ABNORMAL MG/DL
LACTATE SERPL-SCNC: 1.3 MMOL/L (ref 0.5–2)
LACTATE SERPL-SCNC: 3.6 MMOL/L (ref 0.5–2)
LEUKOCYTE ESTERASE UR QL STRIP: NEGATIVE
LYMPHOCYTES # BLD AUTO: 0.83 THOUSANDS/ΜL (ref 0.6–4.47)
LYMPHOCYTES NFR BLD AUTO: 5 % (ref 14–44)
MCH RBC QN AUTO: 30.1 PG (ref 26.8–34.3)
MCHC RBC AUTO-ENTMCNC: 33.2 G/DL (ref 31.4–37.4)
MCV RBC AUTO: 91 FL (ref 82–98)
MONOCYTES # BLD AUTO: 0.61 THOUSAND/ΜL (ref 0.17–1.22)
MONOCYTES NFR BLD AUTO: 4 % (ref 4–12)
NEUTROPHILS # BLD AUTO: 13.73 THOUSANDS/ΜL (ref 1.85–7.62)
NEUTS SEG NFR BLD AUTO: 89 % (ref 43–75)
NITRITE UR QL STRIP: NEGATIVE
NON-SQ EPI CELLS URNS QL MICRO: NORMAL /HPF
NRBC BLD AUTO-RTO: 0 /100 WBCS
O2 CT BLDV-SCNC: 11 ML/DL
PCO2 BLDV: 30.3 MM HG (ref 42–50)
PH BLDV: 7.43 [PH] (ref 7.3–7.4)
PH UR STRIP.AUTO: 8 [PH]
PLATELET # BLD AUTO: 365 THOUSANDS/UL (ref 149–390)
PMV BLD AUTO: 10 FL (ref 8.9–12.7)
PO2 BLDV: 37.7 MM HG (ref 35–45)
POTASSIUM SERPL-SCNC: 3.5 MMOL/L (ref 3.5–5.3)
PROCALCITONIN SERPL-MCNC: 0.06 NG/ML
PROT SERPL-MCNC: 7.4 G/DL (ref 6.4–8.4)
PROT UR STRIP-MCNC: ABNORMAL MG/DL
PROTHROMBIN TIME: 16 SECONDS (ref 12.3–15)
RBC # BLD AUTO: 4.12 MILLION/UL (ref 3.88–5.62)
RBC #/AREA URNS AUTO: NORMAL /HPF
SODIUM SERPL-SCNC: 136 MMOL/L (ref 135–147)
SP GR UR STRIP.AUTO: 1.01 (ref 1–1.03)
TSH SERPL DL<=0.05 MIU/L-ACNC: 2.04 UIU/ML (ref 0.45–4.5)
UROBILINOGEN UR STRIP-ACNC: 2 MG/DL
WBC # BLD AUTO: 15.49 THOUSAND/UL (ref 4.31–10.16)
WBC #/AREA URNS AUTO: NORMAL /HPF

## 2024-10-17 PROCEDURE — 71260 CT THORAX DX C+: CPT

## 2024-10-17 PROCEDURE — 99291 CRITICAL CARE FIRST HOUR: CPT | Performed by: EMERGENCY MEDICINE

## 2024-10-17 PROCEDURE — 85610 PROTHROMBIN TIME: CPT

## 2024-10-17 PROCEDURE — 85025 COMPLETE CBC W/AUTO DIFF WBC: CPT

## 2024-10-17 PROCEDURE — 70450 CT HEAD/BRAIN W/O DYE: CPT

## 2024-10-17 PROCEDURE — 83605 ASSAY OF LACTIC ACID: CPT

## 2024-10-17 PROCEDURE — 85730 THROMBOPLASTIN TIME PARTIAL: CPT

## 2024-10-17 PROCEDURE — 96365 THER/PROPH/DIAG IV INF INIT: CPT

## 2024-10-17 PROCEDURE — 80053 COMPREHEN METABOLIC PANEL: CPT

## 2024-10-17 PROCEDURE — 87040 BLOOD CULTURE FOR BACTERIA: CPT

## 2024-10-17 PROCEDURE — 99223 1ST HOSP IP/OBS HIGH 75: CPT | Performed by: INTERNAL MEDICINE

## 2024-10-17 PROCEDURE — 96375 TX/PRO/DX INJ NEW DRUG ADDON: CPT

## 2024-10-17 PROCEDURE — 74177 CT ABD & PELVIS W/CONTRAST: CPT

## 2024-10-17 PROCEDURE — 96361 HYDRATE IV INFUSION ADD-ON: CPT

## 2024-10-17 PROCEDURE — 36415 COLL VENOUS BLD VENIPUNCTURE: CPT

## 2024-10-17 PROCEDURE — 81001 URINALYSIS AUTO W/SCOPE: CPT

## 2024-10-17 PROCEDURE — 84443 ASSAY THYROID STIM HORMONE: CPT

## 2024-10-17 PROCEDURE — 99284 EMERGENCY DEPT VISIT MOD MDM: CPT

## 2024-10-17 PROCEDURE — 84145 PROCALCITONIN (PCT): CPT

## 2024-10-17 PROCEDURE — 82805 BLOOD GASES W/O2 SATURATION: CPT

## 2024-10-17 RX ORDER — TAMSULOSIN HYDROCHLORIDE 0.4 MG/1
0.4 CAPSULE ORAL
Status: DISCONTINUED | OUTPATIENT
Start: 2024-10-18 | End: 2024-10-19 | Stop reason: HOSPADM

## 2024-10-17 RX ORDER — DILTIAZEM HYDROCHLORIDE 120 MG/1
120 CAPSULE, COATED, EXTENDED RELEASE ORAL DAILY
Status: DISCONTINUED | OUTPATIENT
Start: 2024-10-18 | End: 2024-10-19 | Stop reason: HOSPADM

## 2024-10-17 RX ORDER — DOCUSATE SODIUM 100 MG/1
100 CAPSULE, LIQUID FILLED ORAL 2 TIMES DAILY
Status: DISCONTINUED | OUTPATIENT
Start: 2024-10-18 | End: 2024-10-17

## 2024-10-17 RX ORDER — POLYETHYLENE GLYCOL 3350 17 G/17G
17 POWDER, FOR SOLUTION ORAL 2 TIMES DAILY
Status: DISCONTINUED | OUTPATIENT
Start: 2024-10-18 | End: 2024-10-17

## 2024-10-17 RX ORDER — ACETAMINOPHEN 325 MG/1
650 TABLET ORAL EVERY 6 HOURS PRN
Status: DISCONTINUED | OUTPATIENT
Start: 2024-10-17 | End: 2024-10-19 | Stop reason: HOSPADM

## 2024-10-17 RX ORDER — ONDANSETRON 2 MG/ML
4 INJECTION INTRAMUSCULAR; INTRAVENOUS EVERY 6 HOURS PRN
Status: DISCONTINUED | OUTPATIENT
Start: 2024-10-17 | End: 2024-10-19 | Stop reason: HOSPADM

## 2024-10-17 RX ORDER — SENNOSIDES 8.6 MG
1 TABLET ORAL
Status: DISCONTINUED | OUTPATIENT
Start: 2024-10-18 | End: 2024-10-17

## 2024-10-17 RX ORDER — BISACODYL 10 MG
10 SUPPOSITORY, RECTAL RECTAL DAILY PRN
Status: DISCONTINUED | OUTPATIENT
Start: 2024-10-17 | End: 2024-10-19 | Stop reason: HOSPADM

## 2024-10-17 RX ORDER — ATORVASTATIN CALCIUM 20 MG/1
20 TABLET, FILM COATED ORAL DAILY
Status: DISCONTINUED | OUTPATIENT
Start: 2024-10-18 | End: 2024-10-19 | Stop reason: HOSPADM

## 2024-10-17 RX ORDER — METHOCARBAMOL 500 MG/1
500 TABLET, FILM COATED ORAL 3 TIMES DAILY PRN
Status: DISCONTINUED | OUTPATIENT
Start: 2024-10-17 | End: 2024-10-19 | Stop reason: HOSPADM

## 2024-10-17 RX ORDER — KETAMINE HCL IN NACL, ISO-OSM 100MG/10ML
0.5 SYRINGE (ML) INJECTION ONCE
Status: DISCONTINUED | OUTPATIENT
Start: 2024-10-17 | End: 2024-10-17

## 2024-10-17 RX ORDER — KETAMINE HCL IN NACL, ISO-OSM 100MG/10ML
0.5 SYRINGE (ML) INJECTION ONCE
Status: COMPLETED | OUTPATIENT
Start: 2024-10-17 | End: 2024-10-17

## 2024-10-17 RX ORDER — SODIUM CHLORIDE, SODIUM GLUCONATE, SODIUM ACETATE, POTASSIUM CHLORIDE, MAGNESIUM CHLORIDE, SODIUM PHOSPHATE, DIBASIC, AND POTASSIUM PHOSPHATE .53; .5; .37; .037; .03; .012; .00082 G/100ML; G/100ML; G/100ML; G/100ML; G/100ML; G/100ML; G/100ML
100 INJECTION, SOLUTION INTRAVENOUS CONTINUOUS
Status: DISCONTINUED | OUTPATIENT
Start: 2024-10-18 | End: 2024-10-18

## 2024-10-17 RX ADMIN — Medication 1000 MG: at 19:56

## 2024-10-17 RX ADMIN — SODIUM CHLORIDE 1000 ML: 0.9 INJECTION, SOLUTION INTRAVENOUS at 19:56

## 2024-10-17 RX ADMIN — CEFTRIAXONE SODIUM 1000 MG: 10 INJECTION, POWDER, FOR SOLUTION INTRAVENOUS at 21:57

## 2024-10-17 RX ADMIN — IOHEXOL 100 ML: 350 INJECTION, SOLUTION INTRAVENOUS at 20:01

## 2024-10-17 RX ADMIN — Medication 46 MG: at 19:51

## 2024-10-17 NOTE — ED PROVIDER NOTES
Time reflects when diagnosis was documented in both MDM as applicable and the Disposition within this note       Time User Action Codes Description Comment    10/17/2024 11:41 PM NoxubeeAmber Jackson Add [G93.40] Acute encephalopathy     10/17/2024 11:42 PM Amber Menchaca Add [K52.9] Colitis     10/18/2024  5:37 PM Zayda العلي Add [M48.061] Spinal stenosis of lumbar region at multiple levels           ED Disposition       ED Disposition   Admit    Condition   Stable    Date/Time   Thu Oct 17, 2024 11:02 PM    Comment   Case was discussed with Dr. Hines and the patient's admission status was agreed to be Admission Status: inpatient status to the service of Dr. Hines .               Assessment & Plan       Medical Decision Making  Patient is a 74-year-old male who presents today from [facility] for altered mental status.  On initial examination patient is in no acute distress vitals are stable.  He is alert and oriented x 0, intermittently agitated and lying with his eyes closed.  He does not participate in history review of systems, he repeatedly says I have to go I have to go.  Differential diagnosis includes but is not limited to intracranial abnormality, infection, intra-abdominal process, electrolyte abnormalities, uremia, trauma.  Will do full sepsis workup including CBC, CMP, lactate, Pro-Torey, UA, blood cultures.  Will order CT head, chest, abdomen and pelvis.    Plan to speak with patient's wife when she arrives.    On chart review patient was admitted from 10/10/2024 - 10/16/2024 for L2-5 laminectomy/decompression, L3-5 instrumented fusion.  He was initially a stroke alert in the PACU due to word finding difficulties but CT imaging was unremarkable for any acute changes and patient quickly returned to baseline.  Patient was cleared by physical therapy and discharged to acute rehab facility.    On reevaluation patient is resting more comfortably in bed since having urine drained.   Workup remarkable for leukocytosis of 15, UA negative, lactic acid 3.6.  Patient given fluid bolus repeat lactic acid 1.3.  CT of chest abdomen pelvis unremarkable for any acute abnormalities.  At this time no distinct cause for patient's acute altered mental status.  Patient admitted to [facility] for acute encephalopathy and further workup.  Spoke with patient's wife at bedside she is understanding.    Amount and/or Complexity of Data Reviewed  Labs: ordered. Decision-making details documented in ED Course.  Radiology: ordered and independent interpretation performed. Decision-making details documented in ED Course.    Risk  Prescription drug management.  Decision regarding hospitalization.        ED Course as of 10/19/24 0502   Thu Oct 17, 2024   1834 WBC(!): 15.49   1843 Leukocytes, UA: Negative   1843 Nitrite, UA: Negative   1848 Spoke with wife Sherron at bedside.  Patient's baseline mental status is alert and oriented x 4 and is able to ambulate and take care of himself at home.  He was at [facility] this morning and participated fully in physical therapy.  Around noon the patient became altered.   1848 Garcia placed and 1.1 L of yellow urine drained.   1854 LACTIC ACID(!): 3.6   1902 Sepsis alert initiated.  Sepsis initial assessment completed.   1910 Procalcitonin: 0.06   1910 TSH 3RD GENERATON: 2.035   2013 Patient agitated at CT scanner, given ketamine, patient tolerated CT scan.   2055 Spoke directly with Dr. Lovell with radiology, I was concerned about pneumocephalus but Dr. Lovell stated he believes that it is motion artifact and most likely not pneumocephalus.   2120 Concern for encephalomeningitis, unable to perform LP given recent spinal surgery.   2156 LACTIC ACID: 1.3   2215 Dr. العلي: left kidney possible nephro. Possible colitis, rectal stool. Post surgical hard to tell because of artifact.    2226 CT chest abdomen pelvis w contrast  -Mosaic attenuation on respiratory degraded pulmonary  evaluation. No definite consolidation.     -Question striated right nephrogram which could represent pyelonephritis versus artifact from streak. Correlate with urine analysis.     -Prominent gaseous distended stomach. Fluid-filled distal sigmoid colon with bowel wall thickening and enhancement, suggestive of colitis. Prominent rectal stool burden.     -Sequelae of L3-L5 posterior fusion with extensive streak artifact limiting evaluation of adjacent postsurgical soft tissues.     -Stable ascending aortic aneurysm measuring up to 45 mm. Recommend follow-up chest CT in 1 year to document stability.     -Prostamegaly. Correlate with PSA. Additional chronic findings as above including stable 8 mm right upper lobe pulmonary nodule.       Medications   acetaminophen (TYLENOL) tablet 650 mg (650 mg Oral Given 10/18/24 1728)   atorvastatin (LIPITOR) tablet 20 mg (20 mg Oral Given 10/18/24 0938)   Cholecalciferol (VITAMIN D3) tablet 2,000 Units (2,000 Units Oral Given 10/18/24 0938)   diltiazem (CARDIZEM CD) 24 hr capsule 120 mg (120 mg Oral Not Given 10/18/24 1432)   apixaban (ELIQUIS) tablet 5 mg (0 mg Oral Hold 10/18/24 1729)   methocarbamol (ROBAXIN) tablet 500 mg (has no administration in time range)   tamsulosin (FLOMAX) capsule 0.4 mg (0.4 mg Oral Given 10/18/24 1645)   bisacodyl (DULCOLAX) rectal suppository 10 mg (has no administration in time range)   ondansetron (ZOFRAN) injection 4 mg (has no administration in time range)   ceftriaxone (ROCEPHIN) 1 g/50 mL in dextrose IVPB (0 mg Intravenous Stopped 10/17/24 2122)   sodium chloride 0.9 % bolus 1,000 mL (0 mL Intravenous Stopped 10/17/24 2158)   Ketamine HCl 46 mg (46 mg Intravenous Given 10/17/24 1951)   iohexol (OMNIPAQUE) 350 MG/ML injection (SINGLE-DOSE) 100 mL (100 mL Intravenous Given 10/17/24 2001)   ceftriaxone (ROCEPHIN) 1 g/50 mL in dextrose IVPB (0 mg Intravenous Stopped 10/17/24 2248)       ED Risk Strat Scores                                                History of Present Illness       Chief Complaint   Patient presents with    Medical Problem     Came from [facility] after spinal stenosis surgery on the 10th. Hadn't pooped in last week, confused, A/O x0       Past Medical History:   Diagnosis Date    Anesthesia complication     urinary retention for all of surgeries    Benign essential hypertension 03/23/2011    BPH (benign prostatic hyperplasia) 02/20/2023    Last Assessment & Plan:  Formatting of this note might be different from the original. Doing well.  Cont tamsulosin and saw palmetto.  We did discuss adding finasteride but will have repeat psa prior.  Also discussed indications for holep.  As of now, he's doing great. Last episode of retention was clearly related to anesthesia.  Fu 4-6w with psa    Cardiogenic shock (Former[facility]) 07/05/2022    Chronic kidney disease, stage III (moderate) (Formerly [facility])     Coronary atherosclerosis 03/23/2011    Last Assessment & Plan:  There are no symptoms of an anginal syndrome. I provided education regarding the nature of coronary artery disease and our recommendations to reduce his long-term risk of ACS.  I recommended he continue utilizing aspirin and statins to minimize risk.  A lipid profile was requested with the option to use high intensity statins as an alternative to Mevacor in response to per    Enlarged prostate     Epistaxis 01/09/2022    History of aortic valve disease     History of mitral valve disease     History of transfusion     May 2021 - no adverse reaction    Paroxysmal atrial fibrillation (HCC)  02/06/2020    Pulmonary nodules 02/05/2021    PVC's (premature ventricular contractions)     S/P mitral valve repair 06/15/2021    May 2021 at [facility]    Status post aortic valve replacement 05/31/2021    At [facility] May 2021    SVT (supraventricular tachycardia) (Former[facility])     Syncope, cardiogenic     Thoracic aortic aneurysm without rupture ([facility]) 02/05/2021    4.5cm on CT 1/21  Formatting of this note  might be different from the original. 4.5cm on CT chest 1/28/21  Last Assessment & Plan:  Formatting of this note might be different from the original. 6/6/21 chest CT showed stable 4.3 cm ascending thoracic aortic aneurysm.    Thrombocytopenia (HCC) 07/05/2022      Past Surgical History:   Procedure Laterality Date    CARDIAC ELECTROPHYSIOLOGY STUDY AND ABLATION  07/2024    CARDIAC SURGERY      MV repair, AV replaced and aortic aneurysm repaired    CAUTERIZE INNER NOSE Left 01/09/2022    Procedure: Sphenopalatine artery ligation;  Surgeon: Alfonso Rodriguez MD;  Location: [facility];  Service: ENT    COLONOSCOPY  2007    Dr. Schreiber.  Tubular adenoma descending colon polyp.    COLONOSCOPY  2010    Dr. Schreiber.  Diverticulosis.    COLONOSCOPY  08/2020    Dr. Schreiber.  12 mm descending colon inflammatory polyp, 3 mm benign lymphoid aggregate, diverticulosis.    FACIAL/NECK BIOPSY Left 11/30/2021    Procedure: EXCISION PYOGENIC GRANULOMA; FREE MUCOSAL GRAFT FROM NOSE;  Surgeon: Alfonso Rodriguez MD;  Location: [facility];  Service: ENT    INGUINAL HERNIA REPAIR      LUMBAR FUSION Bilateral 10/10/2024    Procedure: Navigated L2-5 laminectomy/decompression, L3-5 instrumented fusion, TLIF;  Surgeon: Jerod Foss MD;  Location: [facility];  Service: Orthopedics    TONSILLECTOMY        Family History   Problem Relation Age of Onset    Alzheimer's disease Mother 80    Esophageal cancer Mother     Diabetes Father     Diabetes Sister     Kidney failure Sister     Stroke Sister     Coronary artery disease Sister         MI    Breast cancer Sister     Diabetes Maternal Grandmother     Coronary artery disease Maternal Uncle         massive MI - age 49 and 50      Social History     Tobacco Use    Smoking status: Former     Types: Cigarettes     Passive exposure: Never    Smokeless tobacco: Former   Vaping Use    Vaping status: Former   Substance Use Topics    Alcohol use: Not Currently    Drug use: Never       E-Cigarette/Vaping    E-Cigarette Use Former User       E-Cigarette/Vaping Substances    Nicotine No     THC No     CBD No     Flavoring No     Other No     Unknown No       I have reviewed and agree with the history as documented.     Patient is a 74-year-old male with past medical history of paroxysmal atrial fibrillation, hypertension, hyperlipidemia, CAD, thoracic aortic aneurysm and recent spinal stenosis surgery who presents today for evaluation of altered mental status.  Patient presented from [facility] for altered mental status.  Patient is alert and oriented x 0.  History obtained via nursing staff and EMS.  Patient has not had a bowel movement since 10/10 when he had his spinal stenosis surgery.  Patient unable to answer review of systems or historical questions due to altered mental status.  He repeatedly says I have to go and have to go.  Per EMS patient's wife is on her way to the emergency room.          Review of Systems   Unable to perform ROS: Mental status change           Objective       ED Triage Vitals   Temperature Pulse Blood Pressure Respirations SpO2 Patient Position - Orthostatic VS   10/17/24 1728 10/17/24 1728 10/17/24 1728 10/17/24 1728 10/17/24 1728 10/17/24 2004   98.2 °F (36.8 °C) 92 169/88 (!) 28 99 % Lying      Temp Source Heart Rate Source BP Location FiO2 (%) Pain Score    10/17/24 1728 10/17/24 1728 10/17/24 1728 -- 10/18/24 1728    Oral Monitor Right arm  5      Vitals      Date and Time Temp Pulse SpO2 Resp BP Pain Score FACES Pain Rating User   10/18/24 2223 99 °F (37.2 °C) 75 97 % 18 109/75 -- -- DII   10/1950 -- -- -- -- -- 3 -- RKG   10/18/24 1838 -- -- -- -- -- 2 -- AC   10/18/24 1812 98.4 °F (36.9 °C) 77 98 % -- 121/80 -- -- DII   10/18/24 1728 -- -- -- -- -- 5 -- AK   10/18/24 0630 -- 82 98 % 19 131/82 -- -- RG   10/18/24 0300 -- 98 95 % 24 150/104 -- -- RG   10/17/24 2330 -- 102 92 % 24 134/80 -- -- RG   10/17/24 2315 -- 102 92 % 20 133/75 --  --    10/17/24 2130 -- 102 98 % 20 141/86 -- --    10/17/24 2115 -- 106 98 % 24 146/81 -- --    10/17/24 2015 -- 98 98 % 24 149/69 -- --    10/17/24 2004 -- 98 94 % 24 168/95 -- --    10/17/24 1728 98.2 °F (36.8 °C) 92 99 % 28 169/88 -- -- JK            Physical Exam  Vitals and nursing note reviewed.   Constitutional:       General: He is not in acute distress.     Appearance: Normal appearance. He is obese. He is not ill-appearing.      Comments: Alert and oriented x 0.  Laying with eyes closed and intermittently agitated.  Repeatedly saying I have to go I have to go.  Unable to answer questions appropriately.   HENT:      Head: Normocephalic and atraumatic.      Nose: Nose normal.      Mouth/Throat:      Mouth: Mucous membranes are moist.      Pharynx: Oropharynx is clear.   Eyes:      Pupils: Pupils are equal, round, and reactive to light.      Comments: Pupils 2 mm bilaterally and reactive   Cardiovascular:      Rate and Rhythm: Normal rate and regular rhythm.      Heart sounds: Normal heart sounds.   Pulmonary:      Effort: Pulmonary effort is normal. No respiratory distress.      Breath sounds: Normal breath sounds.   Abdominal:      General: There is distension.      Palpations: Abdomen is rigid.      Tenderness: There is generalized abdominal tenderness.   Genitourinary:     Penis: Normal.    Musculoskeletal:      Right lower leg: No edema.      Left lower leg: No edema.      Comments: Normal range of motion of bilateral upper and lower extremities.  No external signs of trauma.  2 small areas of ecchymosis over right AC.   Skin:     General: Skin is warm and dry.      Capillary Refill: Capillary refill takes less than 2 seconds.   Neurological:      Mental Status: He is disoriented.         Results Reviewed       Procedure Component Value Units Date/Time    Blood culture #1 [[id number]] Collected: 10/17/24 1808    Lab Status: Preliminary result Specimen: Blood from Arm, Left Updated: 10/18/24 2101      Blood Culture No Growth at 24 hrs.    Blood culture #2 [[id number]] Collected: 10/17/24 1808    Lab Status: Preliminary result Specimen: Blood from Arm, Right Updated: 10/18/24 2101     Blood Culture No Growth at 24 hrs.    Ammonia [[id number]]  (Normal) Collected: 10/18/24 0943    Lab Status: Final result Specimen: Blood from Arm, Left Updated: 10/18/24 1007     Ammonia 29 umol/L     Basic metabolic panel [[id number]]  (Abnormal) Collected: 10/18/24 0501    Lab Status: Final result Specimen: Blood from Arm, Left Updated: 10/18/24 0546     Sodium 135 mmol/L      Potassium 4.5 mmol/L      Chloride 100 mmol/L      CO2 25 mmol/L      ANION GAP 10 mmol/L      BUN 20 mg/dL      Creatinine 1.01 mg/dL      Glucose 142 mg/dL      Calcium 9.2 mg/dL      eGFR 72 ml/min/1.73sq m     Narrative:      National Kidney Disease Foundation guidelines for Chronic Kidney Disease (CKD):     Stage 1 with normal or high GFR (GFR > 90 mL/min/1.73 square meters)    Stage 2 Mild CKD (GFR = 60-89 mL/min/1.73 square meters)    Stage 3A Moderate CKD (GFR = 45-59 mL/min/1.73 square meters)    Stage 3B Moderate CKD (GFR = 30-44 mL/min/1.73 square meters)    Stage 4 Severe CKD (GFR = 15-29 mL/min/1.73 square meters)    Stage 5 End Stage CKD (GFR <15 mL/min/1.73 square meters)  Note: GFR calculation is accurate only with a steady state creatinine    Procalcitonin, Next Day AM Collection [[id number]]  (Abnormal) Collected: 10/18/24 0501    Lab Status: Final result Specimen: Blood from Arm, Left Updated: 10/18/24 0541     Procalcitonin 0.40 ng/ml     CBC and Platelet [[id number]]  (Abnormal) Collected: 10/18/24 0501    Lab Status: Final result Specimen: Blood from Arm, Left Updated: 10/18/24 0517     WBC 18.61 Thousand/uL      RBC 3.88 Million/uL      Hemoglobin 11.8 g/dL      Hematocrit 36.2 %      MCV 93 fL      MCH 30.4 pg      MCHC 32.6 g/dL      RDW 13.9 %      Platelets 364 Thousands/uL      MPV 9.5 fL     Lactic acid 2 Hours [[id number]]   (Normal) Collected: 10/17/24 2127    Lab Status: Final result Specimen: Blood from Arm, Left Updated: 10/17/24 2155     LACTIC ACID 1.3 mmol/L     Narrative:      Result may be elevated if tourniquet was used during collection.    Blood gas, venous [[id number]]  (Abnormal) Collected: 10/17/24 2014    Lab Status: Final result Specimen: Blood from Arm, Left Updated: 10/17/24 2022     pH, Trung 7.428     pCO2, Trung 30.3 mm Hg      pO2, Trung 37.7 mm Hg      HCO3, Trung 19.6 mmol/L      Base Excess, Trung -3.8 mmol/L      O2 Content, Trung 11.0 ml/dL      O2 HGB, VENOUS 68.3 %      NIKO TEST Yes    Procalcitonin [[id number]]  (Normal) Collected: 10/17/24 1808    Lab Status: Final result Specimen: Blood from Arm, Left Updated: 10/17/24 1910     Procalcitonin 0.06 ng/ml     TSH, 3rd generation with Free T4 reflex [[id number]]  (Normal) Collected: 10/17/24 1808    Lab Status: Final result Specimen: Blood from Arm, Left Updated: 10/17/24 1910     TSH 3RD GENERATON 2.035 uIU/mL     Comprehensive metabolic panel [[id number]]  (Abnormal) Collected: 10/17/24 1808    Lab Status: Final result Specimen: Blood from Arm, Left Updated: 10/17/24 1858     Sodium 136 mmol/L      Potassium 3.5 mmol/L      Chloride 98 mmol/L      CO2 24 mmol/L      ANION GAP 14 mmol/L      BUN 21 mg/dL      Creatinine 0.94 mg/dL      Glucose 186 mg/dL      Calcium 9.7 mg/dL      AST 20 U/L      ALT 27 U/L      Alkaline Phosphatase 85 U/L      Total Protein 7.4 g/dL      Albumin 4.1 g/dL      Total Bilirubin 1.27 mg/dL      eGFR 79 ml/min/1.73sq m     Narrative:      National Kidney Disease Foundation guidelines for Chronic Kidney Disease (CKD):     Stage 1 with normal or high GFR (GFR > 90 mL/min/1.73 square meters)    Stage 2 Mild CKD (GFR = 60-89 mL/min/1.73 square meters)    Stage 3A Moderate CKD (GFR = 45-59 mL/min/1.73 square meters)    Stage 3B Moderate CKD (GFR = 30-44 mL/min/1.73 square meters)    Stage 4 Severe CKD (GFR = 15-29 mL/min/1.73 square meters)     Stage 5 End Stage CKD (GFR <15 mL/min/1.73 square meters)  Note: GFR calculation is accurate only with a steady state creatinine    Lactic acid [[id number]]  (Abnormal) Collected: 10/17/24 1808    Lab Status: Final result Specimen: Blood from Arm, Left Updated: 10/17/24 1853     LACTIC ACID 3.6 mmol/L     Narrative:      Result may be elevated if tourniquet was used during collection.    Protime-INR [[id number]]  (Abnormal) Collected: 10/17/24 1808    Lab Status: Final result Specimen: Blood from Arm, Left Updated: 10/17/24 1849     Protime 16.0 seconds      INR 1.25    Narrative:      INR Therapeutic Range    Indication                                             INR Range      Atrial Fibrillation                                               2.0-3.0  Hypercoagulable State                                    2.0.2.3  Left Ventricular Asist Device                            2.0-3.0  Mechanical Heart Valve                                  -    Aortic(with afib, MI, embolism, HF, LA enlargement,    and/or coagulopathy)                                     2.0-3.0 (2.5-3.5)     Mitral                                                             2.5-3.5  Prosthetic/Bioprosthetic Heart Valve               2.0-3.0  Venous thromboembolism (VTE: VT, PE        2.0-3.0    APTT [[id number]]  (Normal) Collected: 10/17/24 1808    Lab Status: Final result Specimen: Blood from Arm, Left Updated: 10/17/24 1849     PTT 33 seconds     Urine Microscopic [[id number]]  (Normal) Collected: 10/17/24 1828    Lab Status: Final result Specimen: Urine, Indwelling Garcia Catheter Updated: 10/17/24 1845     RBC, UA 1-2 /hpf      WBC, UA 1-2 /hpf      Epithelial Cells None Seen /hpf      Bacteria, UA None Seen /hpf     UA w Reflex to Microscopic w Reflex to Culture [[id number]]  (Abnormal) Collected: 10/17/24 1828    Lab Status: Final result Specimen: Urine, Indwelling Garcia Catheter Updated: 10/17/24 1842     Color, UA Light Yellow     Clarity, UA Clear      Specific Gravity, UA 1.012     pH, UA 8.0     Leukocytes, UA Negative     Nitrite, UA Negative     Protein, UA Trace mg/dl      Glucose, UA Negative mg/dl      Ketones, UA 10 (1+) mg/dl      Urobilinogen, UA 2.0 mg/dl      Bilirubin, UA Negative     Occult Blood, UA Negative    CBC and differential [[id number]]  (Abnormal) Collected: 10/17/24 1808    Lab Status: Final result Specimen: Blood from Arm, Left Updated: 10/17/24 1833     WBC 15.49 Thousand/uL      RBC 4.12 Million/uL      Hemoglobin 12.4 g/dL      Hematocrit 37.4 %      MCV 91 fL      MCH 30.1 pg      MCHC 33.2 g/dL      RDW 13.4 %      MPV 10.0 fL      Platelets 365 Thousands/uL      nRBC 0 /100 WBCs      Segmented % 89 %      Immature Grans % 2 %      Lymphocytes % 5 %      Monocytes % 4 %      Eosinophils Relative 0 %      Basophils Relative 0 %      Absolute Neutrophils 13.73 Thousands/µL      Absolute Immature Grans 0.25 Thousand/uL      Absolute Lymphocytes 0.83 Thousands/µL      Absolute Monocytes 0.61 Thousand/µL      Eosinophils Absolute 0.02 Thousand/µL      Basophils Absolute 0.05 Thousands/µL             CT head without contrast   ED Interpretation by Amber Menchaca DO (10/17 2027)   Pneumocephalus      Final Interpretation by Bryan Lovell MD (10/17 2043)      No acute intracranial abnormality.      Small volume layering secretions within the left maxillary sinus. Correlate for sinusitis                  Workstation performed: [id number]         CT chest abdomen pelvis w contrast   Final Interpretation by Asad العلي MD (10/17 2217)      -Mosaic attenuation on respiratory degraded pulmonary evaluation. No definite consolidation.      -Question striated right nephrogram which could represent pyelonephritis versus artifact from streak. Correlate with urine analysis.      -Prominent gaseous distended stomach. Fluid-filled distal sigmoid colon with bowel wall thickening and enhancement, suggestive of colitis. Prominent rectal stool  burden.      -Sequelae of L3-L5 posterior fusion with extensive streak artifact limiting evaluation of adjacent postsurgical soft tissues.      -Stable ascending aortic aneurysm measuring up to 45 mm. Recommend follow-up chest CT in 1 year to document stability.      -Prostamegaly. Correlate with PSA. Additional chronic findings as above including stable 8 mm right upper lobe pulmonary nodule.      I personally communicated the findings via telephone with Amber Menchaca at 10:15 pm on 10/17/2024         Workstation performed: [id number]             Procedures    ED Medication and Procedure Management   Prior to Admission Medications   Prescriptions Last Dose Informant Patient Reported? Taking?   Cholecalciferol (Vitamin D3) 50 MCG (2000 UT) capsule   Yes No   Sig: Take 2,000 Units by mouth daily   Eliquis 5 MG   Yes No   Sig: Take 5 mg by mouth 2 (two) times a day Instructed to hold starting 10/8. Pt states 10/7 will be last dose per sx   Magnesium 250 MG TABS  Self Yes No   Sig: Take 1 tablet by mouth daily     Saw Palmetto Serenoa repens, (SAW PALMETTO PO)  Self Yes No   Sig: Take 1 capsule by mouth daily 250 mg   acetaminophen (TYLENOL) 650 mg CR tablet   No No   Sig: Take 1 tablet (650 mg total) by mouth every 8 (eight) hours as needed for mild pain   atorvastatin (LIPITOR) 20 mg tablet   Yes No   Sig: Take 20 mg by mouth daily   diltiazem (DILACOR XR) 120 MG 24 hr capsule   Yes No   Sig: Take 120 mg by mouth daily   lovastatin (MEVACOR) 40 MG tablet  Self Yes No   Sig: Take 1 tablet by mouth daily at bedtime   methocarbamol (ROBAXIN) 500 mg tablet   No No   Sig: Take 1 tablet (500 mg total) by mouth 3 (three) times a day as needed for muscle spasms   oxyCODONE (Roxicodone) 5 immediate release tablet   No No   Sig: Take 1 tablet (5 mg total) by mouth every 4 (four) hours as needed for moderate pain for up to 10 days Max Daily Amount: 30 mg   tamsulosin (FLOMAX) 0.4 mg  Self Yes No   Sig: TAKE 1 CAPSULE BY  MOUTH EVERY DAY AT NIGHT      Facility-Administered Medications: None     Current Discharge Medication List        CONTINUE these medications which have NOT CHANGED    Details   acetaminophen (TYLENOL) 650 mg CR tablet Take 1 tablet (650 mg total) by mouth every 8 (eight) hours as needed for mild pain  Qty: 30 tablet, Refills: 0    Associated Diagnoses: S/P lumbar fusion      atorvastatin (LIPITOR) 20 mg tablet Take 20 mg by mouth daily      Cholecalciferol (Vitamin D3) 50 MCG (2000 UT) capsule Take 2,000 Units by mouth daily      diltiazem (DILACOR XR) 120 MG 24 hr capsule Take 120 mg by mouth daily      Eliquis 5 MG Take 5 mg by mouth 2 (two) times a day Instructed to hold starting 10/8. Pt states 10/7 will be last dose per sx      lovastatin (MEVACOR) 40 MG tablet Take 1 tablet by mouth daily at bedtime      Magnesium 250 MG TABS Take 1 tablet by mouth daily        methocarbamol (ROBAXIN) 500 mg tablet Take 1 tablet (500 mg total) by mouth 3 (three) times a day as needed for muscle spasms  Qty: 30 tablet, Refills: 0    Associated Diagnoses: S/P lumbar fusion      oxyCODONE (Roxicodone) 5 immediate release tablet Take 1 tablet (5 mg total) by mouth every 4 (four) hours as needed for moderate pain for up to 10 days Max Daily Amount: 30 mg  Qty: 30 tablet, Refills: 0    Associated Diagnoses: S/P lumbar fusion      Saw Palmetto, Serenoa repens, (SAW PALMETTO PO) Take 1 capsule by mouth daily 250 mg      tamsulosin (FLOMAX) 0.4 mg TAKE 1 CAPSULE BY MOUTH EVERY DAY AT NIGHT           No discharge procedures on file.  ED SEPSIS DOCUMENTATION   Time reflects when diagnosis was documented in both MDM as applicable and the Disposition within this note       Time User Action Codes Description Comment    10/17/2024 11:41 PM CrawfordAmber Jackson Add [G93.40] Acute encephalopathy     10/17/2024 11:42 PM CrawfordAmber Jackson Add [K52.9] Colitis     10/18/2024  5:37 PM Zayda العلي Add [M48.061] Spinal stenosis of lumbar region  "at multiple levels            Initial Sepsis Screening       Row Name 10/17/24 1900                Is the patient's history suggestive of a new or worsening infection? Yes (Proceed)  -MO        Suspected source of infection suspect infection, source unknown  -MO        Indicate SIRS criteria Tachycardia > 90 bpm;Tachypnea > 20 resp per min;Leukocytosis (WBC > 58433 IJL) OR Leukopenia (WBC <4000 IJL) OR Bandemia (WBC >10% bands)  -MO        Are two or more of the above signs & symptoms of infection both present and new to the patient? Yes (Proceed)  -MO        Assess for evidence of organ dysfunction: Are any of the below criteria present within 6 hours of suspected infection and SIRS criteria that are NOT considered to be chronic conditions? Lactate > 2.0  -MO        Date of presentation of severe sepsis 10/17/24  -MO        Time of presentation of severe sepsis 1901  -MO        Sepsis Note: Click \"NEXT\" below (NOT \"close\") to generate sepsis note based on above information. YES (proceed by clicking \"NEXT\")  -MO                  User Key  (r) = Recorded By, (t) = Taken By, (c) = Cosigned By      Initials Name Provider Type    DO Resident Amber Crum DO  10/19/24 [id number]    "

## 2024-10-17 NOTE — ANESTHESIA POSTPROCEDURE EVALUATION
Post-Op Assessment Note    Last Filed PACU Vitals:  Vitals Value Taken Time   Temp 97.2 °F (36.2 °C) 10/10/24 1807   Pulse 95 10/10/24 1818   /99 10/10/24 1815   Resp 26 10/10/24 1818   SpO2 93 % 10/10/24 1818   Vitals shown include unfiled device data.    Modified Tabby:  No data recorded

## 2024-10-17 NOTE — ED ATTENDING ATTESTATION
10/17/2024  IJabari MD, saw and evaluated the patient. I have discussed the patient with the resident/non-physician practitioner and agree with the resident's/non-physician practitioner's findings, Plan of Care, and MDM as documented in the resident's/non-physician practitioner's note, except where noted. All available labs and Radiology studies were reviewed.  I was present for key portions of any procedure(s) performed by the resident/non-physician practitioner and I was immediately available to provide assistance.       At this point I agree with the current assessment done in the Emergency Department.  I have conducted an independent evaluation of this patient a history and physical is as follows:    ED Course  ED Course as of 10/17/24 1956   Thu Oct 17, 2024   1740 Per resident h&p 73 YO M presents for AMS from [facility]; s/p spinal surgery 1 week ago O: Ox 0; abd diffusely tender; lungs CTA RRR bruising over RUE I/P Acute encephalopathy; CTH, CTCAP; CBC CMP lipase; UA VBG; ammonia; TSH. LA; pro-calcitonin BCx2; UA   Emergency Department Note- Oni Nagy 74 y.o. male MRN: [id number]    Unit/Bed#: ED 17 Encounter: [id number]    Oni Nagy is a 74 y.o. male who presents with   Chief Complaint   Patient presents with    Medical Problem     [facility] on the 10th. Hadn't pooped in last week, confused, A/O x0         History of Present Illness   HPI:  Oni Nagy is a 74 y.o. male who presents for evaluation of:  Acute encephalopathy.  He was discharged yesterday after admission for spinal stenosis surgery on October 10.  He is unable to provide any sort of history or review of systems secondary to acute delirium.    Review of Systems   Unable to perform ROS: Mental status change       Historical Information   Past Medical History:   Diagnosis Date    Anesthesia complication     urinary retention for all of surgeries    Benign  essential hypertension 03/23/2011    BPH (benign prostatic hyperplasia) 02/20/2023    Last Assessment & Plan:  Formatting of this note might be different from the original. Doing well.  Cont tamsulosin and saw palmetto.  We did discuss adding finasteride but will have repeat psa prior.  Also discussed indications for holep.  As of now, he's doing great. Last episode of retention was clearly related to anesthesia.  Fu 4-6w with psa    Cardiogenic shock ([facility]) 07/05/2022    Chronic kidney disease, stage III (moderate) ([facility])     Coronary atherosclerosis 03/23/2011    Last Assessment & Plan:  There are no symptoms of an anginal syndrome. I provided education regarding the nature of coronary artery disease and our recommendations to reduce his long-term risk of ACS.  I recommended he continue utilizing aspirin and statins to minimize risk.  A lipid profile was requested with the option to use high intensity statins as an alternative to Mevacor in response to per    Enlarged prostate     Epistaxis 01/09/2022    History of aortic valve disease     History of mitral valve disease     History of transfusion     May 2021 - no adverse reaction    Paroxysmal atrial fibrillation ([facility])  02/06/2020    Pulmonary nodules 02/05/2021    PVC's (premature ventricular contractions)     S/P mitral valve repair 06/15/2021    May 2021 at [facility]    Status post aortic valve replacement 05/31/2021    At [facility] May 2021    SVT (supraventricular tachycardia) ([facility])     Syncope, cardiogenic     Thoracic aortic aneurysm without rupture ([facility]) 02/05/2021    4.5cm on CT 1/21  Formatting of this note might be different from the original. 4.5cm on CT chest 1/28/21  Last Assessment & Plan:  Formatting of this note might be different from the original. 6/6/21 chest CT showed stable 4.3 cm ascending thoracic aortic aneurysm.    Thrombocytopenia ([facility]) 07/05/2022     Past Surgical History:   Procedure Laterality Date    CARDIAC ELECTROPHYSIOLOGY  STUDY AND ABLATION  07/2024    CARDIAC SURGERY      MV repair, AV replaced and aortic aneurysm repaired    CAUTERIZE INNER NOSE Left 01/09/2022    Procedure: Sphenopalatine artery ligation;  Surgeon: Alfonso Rodriguez MD;  Location: [facility];  Service: ENT    COLONOSCOPY  2007    Dr. Schreiber.  Tubular adenoma descending colon polyp.    COLONOSCOPY  2010    Dr. Schreiber.  Diverticulosis.    COLONOSCOPY  08/2020    Dr. Schreiber.  12 mm descending colon inflammatory polyp, 3 mm benign lymphoid aggregate, diverticulosis.    FACIAL/NECK BIOPSY Left 11/30/2021    Procedure: EXCISION PYOGENIC GRANULOMA; FREE MUCOSAL GRAFT FROM NOSE;  Surgeon: Alfonso Rodriguez MD;  Location: [facility];  Service: ENT    INGUINAL HERNIA REPAIR      LUMBAR FUSION Bilateral 10/10/2024    Procedure: Navigated L2-5 laminectomy/decompression, L3-5 instrumented fusion, TLIF;  Surgeon: Jerod Foss MD;  Location: [facility];  Service: Orthopedics    TONSILLECTOMY       Social History   Social History     Substance and Sexual Activity   Alcohol Use Not Currently     Social History     Substance and Sexual Activity   Drug Use Never     Social History     Tobacco Use   Smoking Status Never   Smokeless Tobacco Never     Family History:   Family History   Problem Relation Age of Onset    Alzheimer's disease Mother 80    Esophageal cancer Mother     Diabetes Father     Diabetes Sister     Kidney failure Sister     Stroke Sister     Coronary artery disease Sister         MI    Breast cancer Sister     Diabetes Maternal Grandmother     Coronary artery disease Maternal Uncle         massive MI - age 49 and 50       Meds/Allergies   PTA meds:   Prior to Admission Medications   Prescriptions Last Dose Informant Patient Reported? Taking?   Cholecalciferol (Vitamin D3) 50 MCG (2000 UT) capsule   Yes No   Sig: Take 2,000 Units by mouth daily   Eliquis 5 MG   Yes No   Sig: Take 5 mg by mouth 2 (two) times a day Instructed to hold starting 10/8. Pt  states 10/7 will be last dose per sx   Magnesium 250 MG TABS  Self Yes No   Sig: Take 1 tablet by mouth daily     Saw Palmetto, Serenoa repens, (SAW PALMETTO PO)  Self Yes No   Sig: Take 1 capsule by mouth daily 250 mg   acetaminophen (TYLENOL) 650 mg CR tablet   No No   Sig: Take 1 tablet (650 mg total) by mouth every 8 (eight) hours as needed for mild pain   atorvastatin (LIPITOR) 20 mg tablet   Yes No   Sig: Take 20 mg by mouth daily   diltiazem (DILACOR XR) 120 MG 24 hr capsule   Yes No   Sig: Take 120 mg by mouth daily   lovastatin (MEVACOR) 40 MG tablet  Self Yes No   Sig: Take 1 tablet by mouth daily at bedtime   methocarbamol (ROBAXIN) 500 mg tablet   No No   Sig: Take 1 tablet (500 mg total) by mouth 3 (three) times a day as needed for muscle spasms   oxyCODONE (Roxicodone) 5 immediate release tablet   No No   Sig: Take 1 tablet (5 mg total) by mouth every 4 (four) hours as needed for moderate pain for up to 10 days Max Daily Amount: 30 mg   tamsulosin (FLOMAX) 0.4 mg  Self Yes No   Sig: TAKE 1 CAPSULE BY MOUTH EVERY DAY AT NIGHT      Facility-Administered Medications: None     Allergies   Allergen Reactions    Oxycodone Nausea Only       Objective   First Vitals:   Blood Pressure: 169/88 (10/17/24 1728)  Pulse: 92 (10/17/24 1728)  Temperature: 98.2 °F (36.8 °C) (10/17/24 1728)  Temp Source: Oral (10/17/24 1728)  Respirations: (!) 28 (10/17/24 1728)  SpO2: 99 % (10/17/24 1728)    Current Vitals:   Blood Pressure: 169/88 (10/17/24 1728)  Pulse: 92 (10/17/24 1728)  Temperature: 98.2 °F (36.8 °C) (10/17/24 1728)  Temp Source: Oral (10/17/24 1728)  Respirations: (!) 28 (10/17/24 1728)  SpO2: 99 % (10/17/24 1728)      Intake/Output Summary (Last 24 hours) at 10/17/2024 1956  Last data filed at 10/17/2024 1831  Gross per 24 hour   Intake --   Output 1700 ml   Net -1700 ml       Invasive Devices       Peripheral Intravenous Line  Duration             Peripheral IV 10/17/24 Left Antecubital <1 day    Peripheral IV  10/17/24 Left;Ventral (anterior) Forearm <1 day              Drain  Duration             Urethral Catheter Double-lumen 16 Fr. <1 day                    Physical Exam  Vitals and nursing note reviewed.   Constitutional:       General: He is not in acute distress.     Appearance: He is well-developed and normal weight.   HENT:      Head: Normocephalic and atraumatic.      Right Ear: External ear normal.      Left Ear: External ear normal.      Nose: Nose normal.      Mouth/Throat:      Pharynx: No oropharyngeal exudate.   Eyes:      Conjunctiva/sclera: Conjunctivae normal.      Pupils: Pupils are equal, round, and reactive to light.   Cardiovascular:      Rate and Rhythm: Normal rate and regular rhythm.   Pulmonary:      Effort: Pulmonary effort is normal. No respiratory distress.   Abdominal:      General: Abdomen is flat. There is no distension.      Palpations: Abdomen is soft.   Musculoskeletal:         General: No deformity. Normal range of motion.      Cervical back: Normal range of motion and neck supple.   Skin:     General: Skin is warm and dry.      Capillary Refill: Capillary refill takes less than 2 seconds.   Neurological:      Mental Status: He is disoriented.      Coordination: Coordination abnormal.      Gait: Gait abnormal.   Psychiatric:      Comments: Thought content, judgment, and decision-making capacity are impaired secondary to acute delirium.           Medical Decision Makin.  Acute encephalopathy: CBC rule out leukocytosis and anemia; complete metabolic profile rule out lecture light disturbance, uremia, and disorders of glucose homeostasis; TSH rule out hypothyroidism; urinalysis rule out UTI; CT scan head rule out intracranial injury; CT scan chest abdomen and pelvis rule out bowel obstruction and pneumonia.    Recent Results (from the past 36 hour(s))   CBC and differential    Collection Time: 10/17/24  6:08 PM   Result Value Ref Range    WBC 15.49 (H) 4.31 - 10.16 Thousand/uL    RBC  4.12 3.88 - 5.62 Million/uL    Hemoglobin 12.4 12.0 - 17.0 g/dL    Hematocrit 37.4 36.5 - 49.3 %    MCV 91 82 - 98 fL    MCH 30.1 26.8 - 34.3 pg    MCHC 33.2 31.4 - 37.4 g/dL    RDW 13.4 11.6 - 15.1 %    MPV 10.0 8.9 - 12.7 fL    Platelets 365 149 - 390 Thousands/uL    nRBC 0 /100 WBCs    Segmented % 89 (H) 43 - 75 %    Immature Grans % 2 0 - 2 %    Lymphocytes % 5 (L) 14 - 44 %    Monocytes % 4 4 - 12 %    Eosinophils Relative 0 0 - 6 %    Basophils Relative 0 0 - 1 %    Absolute Neutrophils 13.73 (H) 1.85 - 7.62 Thousands/µL    Absolute Immature Grans 0.25 (H) 0.00 - 0.20 Thousand/uL    Absolute Lymphocytes 0.83 0.60 - 4.47 Thousands/µL    Absolute Monocytes 0.61 0.17 - 1.22 Thousand/µL    Eosinophils Absolute 0.02 0.00 - 0.61 Thousand/µL    Basophils Absolute 0.05 0.00 - 0.10 Thousands/µL   Comprehensive metabolic panel    Collection Time: 10/17/24  6:08 PM   Result Value Ref Range    Sodium 136 135 - 147 mmol/L    Potassium 3.5 3.5 - 5.3 mmol/L    Chloride 98 96 - 108 mmol/L    CO2 24 21 - 32 mmol/L    ANION GAP 14 (H) 4 - 13 mmol/L    BUN 21 5 - 25 mg/dL    Creatinine 0.94 0.60 - 1.30 mg/dL    Glucose 186 (H) 65 - 140 mg/dL    Calcium 9.7 8.4 - 10.2 mg/dL    AST 20 13 - 39 U/L    ALT 27 7 - 52 U/L    Alkaline Phosphatase 85 34 - 104 U/L    Total Protein 7.4 6.4 - 8.4 g/dL    Albumin 4.1 3.5 - 5.0 g/dL    Total Bilirubin 1.27 (H) 0.20 - 1.00 mg/dL    eGFR 79 ml/min/1.73sq m   Lactic acid    Collection Time: 10/17/24  6:08 PM   Result Value Ref Range    LACTIC ACID 3.6 (H) 0.5 - 2.0 mmol/L   Procalcitonin    Collection Time: 10/17/24  6:08 PM   Result Value Ref Range    Procalcitonin 0.06 <=0.25 ng/ml   Protime-INR    Collection Time: 10/17/24  6:08 PM   Result Value Ref Range    Protime 16.0 (H) 12.3 - 15.0 seconds    INR 1.25 (H) 0.85 - 1.19   APTT    Collection Time: 10/17/24  6:08 PM   Result Value Ref Range    PTT 33 23 - 34 seconds   TSH, 3rd generation with Free T4 reflex    Collection Time: 10/17/24  6:08  "PM   Result Value Ref Range    TSH 3RD GENERATON 2.035 0.450 - 4.500 uIU/mL   UA w Reflex to Microscopic w Reflex to Culture    Collection Time: 10/17/24  6:28 PM    Specimen: Urine, Indwelling Garcia Catheter   Result Value Ref Range    Color, UA Light Yellow     Clarity, UA Clear     Specific Gravity, UA 1.012 1.003 - 1.030    pH, UA 8.0 4.5, 5.0, 5.5, 6.0, 6.5, 7.0, 7.5, 8.0    Leukocytes, UA Negative Negative    Nitrite, UA Negative Negative    Protein, UA Trace (A) Negative mg/dl    Glucose, UA Negative Negative mg/dl    Ketones, UA 10 (1+) (A) Negative mg/dl    Urobilinogen, UA 2.0 (A) <2.0 mg/dl mg/dl    Bilirubin, UA Negative Negative    Occult Blood, UA Negative Negative   Urine Microscopic    Collection Time: 10/17/24  6:28 PM   Result Value Ref Range    RBC, UA 1-2 None Seen, 1-2 /hpf    WBC, UA 1-2 None Seen, 1-2 /hpf    Epithelial Cells None Seen None Seen, Occasional /hpf    Bacteria, UA None Seen None Seen, Occasional /hpf     CT head without contrast    (Results Pending)   CT chest abdomen pelvis w contrast    (Results Pending)         Portions of the record may have been created with voice recognition software. Occasional wrong word or \"sound a like\" substitutions may have occurred due to the inherent limitations of voice recognition software.  Read the chart carefully and recognize, using context, where substitutions have occurred.          Critical Care Time  CriticalCare Time    Date/Time: 10/17/2024 8:12 PM    Performed by: Jabari Young MD  Authorized by: Jabari Young MD    Critical care provider statement:     Critical care time (minutes):  32    Critical care time was exclusive of:  Separately billable procedures and treating other patients and teaching time    Critical care was necessary to treat or prevent imminent or life-threatening deterioration of the following conditions:  CNS failure or compromise and sepsis    Critical care was time spent personally by me on the following " activities:  Obtaining history from patient or surrogate, development of treatment plan with patient or surrogate, discussions with consultants, evaluation of patient's response to treatment, examination of patient, ordering and performing treatments and interventions, ordering and review of laboratory studies, ordering and review of radiographic studies, re-evaluation of patient's condition and review of old charts    I assumed direction of critical care for this patient from another provider in my specialty: no    Comments:      74-year-old presents for acute encephalopathy; blood cultures, urine culture, calcitonin, lactic acid to evaluate for possible bacteremia; ceftriaxone IV administered to treat severe infection and altered mentation; care discussed with admitting [facility] internal medicine team.

## 2024-10-17 NOTE — SEPSIS NOTE
"  Sepsis Note   Oni Nagy 74 y.o. male MRN: [id number]  Unit/Bed#: ED 17 Encounter: [id number]       Initial Sepsis Screening       Row Name 10/17/24 1900                Is the patient's history suggestive of a new or worsening infection? Yes (Proceed)  -MO        Suspected source of infection suspect infection, source unknown  -MO        Indicate SIRS criteria Tachycardia > 90 bpm;Tachypnea > 20 resp per min;Leukocytosis (WBC > 45022 IJL) OR Leukopenia (WBC <4000 IJL) OR Bandemia (WBC >10% bands)  -MO        Are two or more of the above signs & symptoms of infection both present and new to the patient? Yes (Proceed)  -MO        Assess for evidence of organ dysfunction: Are any of the below criteria present within 6 hours of suspected infection and SIRS criteria that are NOT considered to be chronic conditions? Lactate > 2.0  -MO        Date of presentation of severe sepsis 10/17/24  -MO        Time of presentation of severe sepsis 1901  -MO        Sepsis Note: Click \"NEXT\" below (NOT \"close\") to generate sepsis note based on above information. YES (proceed by clicking \"NEXT\")  -MO                  User Key  (r) = Recorded By, (t) = Taken By, (c) = Cosigned By      Initials Name Provider Type    DO Ghassan Crum                        There is no height or weight on file to calculate BMI.  Wt Readings from Last 1 Encounters:   10/12/24 91.2 kg (201 lb)        Ideal body weight: 79.9 kg (176 lb 2.4 oz)  Adjusted ideal body weight: 84.4 kg (186 lb 1.4 oz)    "

## 2024-10-18 ENCOUNTER — TELEPHONE (OUTPATIENT)
Age: 74
End: 2024-10-18

## 2024-10-18 LAB
AMMONIA PLAS-SCNC: 29 UMOL/L (ref 18–72)
ANION GAP SERPL CALCULATED.3IONS-SCNC: 10 MMOL/L (ref 4–13)
BUN SERPL-MCNC: 20 MG/DL (ref 5–25)
CALCIUM SERPL-MCNC: 9.2 MG/DL (ref 8.4–10.2)
CHLORIDE SERPL-SCNC: 100 MMOL/L (ref 96–108)
CO2 SERPL-SCNC: 25 MMOL/L (ref 21–32)
CREAT SERPL-MCNC: 1.01 MG/DL (ref 0.6–1.3)
ERYTHROCYTE [DISTWIDTH] IN BLOOD BY AUTOMATED COUNT: 13.9 % (ref 11.6–15.1)
GFR SERPL CREATININE-BSD FRML MDRD: 72 ML/MIN/1.73SQ M
GLUCOSE SERPL-MCNC: 142 MG/DL (ref 65–140)
HCT VFR BLD AUTO: 36.2 % (ref 36.5–49.3)
HGB BLD-MCNC: 11.8 G/DL (ref 12–17)
MCH RBC QN AUTO: 30.4 PG (ref 26.8–34.3)
MCHC RBC AUTO-ENTMCNC: 32.6 G/DL (ref 31.4–37.4)
MCV RBC AUTO: 93 FL (ref 82–98)
PLATELET # BLD AUTO: 364 THOUSANDS/UL (ref 149–390)
PMV BLD AUTO: 9.5 FL (ref 8.9–12.7)
POTASSIUM SERPL-SCNC: 4.5 MMOL/L (ref 3.5–5.3)
PROCALCITONIN SERPL-MCNC: 0.4 NG/ML
RBC # BLD AUTO: 3.88 MILLION/UL (ref 3.88–5.62)
SODIUM SERPL-SCNC: 135 MMOL/L (ref 135–147)
WBC # BLD AUTO: 18.61 THOUSAND/UL (ref 4.31–10.16)

## 2024-10-18 PROCEDURE — 99233 SBSQ HOSP IP/OBS HIGH 50: CPT

## 2024-10-18 PROCEDURE — 99024 POSTOP FOLLOW-UP VISIT: CPT | Performed by: ORTHOPAEDIC SURGERY

## 2024-10-18 PROCEDURE — 84145 PROCALCITONIN (PCT): CPT | Performed by: INTERNAL MEDICINE

## 2024-10-18 PROCEDURE — 82140 ASSAY OF AMMONIA: CPT

## 2024-10-18 PROCEDURE — 87081 CULTURE SCREEN ONLY: CPT | Performed by: STUDENT IN AN ORGANIZED HEALTH CARE EDUCATION/TRAINING PROGRAM

## 2024-10-18 PROCEDURE — 85027 COMPLETE CBC AUTOMATED: CPT | Performed by: INTERNAL MEDICINE

## 2024-10-18 PROCEDURE — 36415 COLL VENOUS BLD VENIPUNCTURE: CPT | Performed by: INTERNAL MEDICINE

## 2024-10-18 PROCEDURE — 80048 BASIC METABOLIC PNL TOTAL CA: CPT | Performed by: INTERNAL MEDICINE

## 2024-10-18 RX ADMIN — APIXABAN 5 MG: 5 TABLET, FILM COATED ORAL at 09:38

## 2024-10-18 RX ADMIN — ATORVASTATIN CALCIUM 20 MG: 20 TABLET, FILM COATED ORAL at 09:38

## 2024-10-18 RX ADMIN — TAMSULOSIN HYDROCHLORIDE 0.4 MG: 0.4 CAPSULE ORAL at 16:45

## 2024-10-18 RX ADMIN — ACETAMINOPHEN 650 MG: 325 TABLET, FILM COATED ORAL at 17:28

## 2024-10-18 RX ADMIN — APIXABAN 5 MG: 5 TABLET, FILM COATED ORAL at 16:45

## 2024-10-18 RX ADMIN — SODIUM CHLORIDE, SODIUM GLUCONATE, SODIUM ACETATE, POTASSIUM CHLORIDE, MAGNESIUM CHLORIDE, SODIUM PHOSPHATE, DIBASIC, AND POTASSIUM PHOSPHATE 100 ML/HR: .53; .5; .37; .037; .03; .012; .00082 INJECTION, SOLUTION INTRAVENOUS at 00:30

## 2024-10-18 RX ADMIN — Medication 2000 UNITS: at 09:38

## 2024-10-18 NOTE — TELEPHONE ENCOUNTER
Caller: Sherron mcgee wife    Doctor: haylie    Reason for call: patient is admitted in the hospital and would like to know if Dr can visit the patient in the ED    Call back#: [phone]

## 2024-10-18 NOTE — QUICK NOTE
Notified by nursing staff patient had one bloody BM- formed stool with mucous/blood, suspect this is related to multiple episodes of BM/ hemorrhoids. Continue to monitor stool output for further episodes and Hgb. If hematochezia persists, maybe benefit from colitis work up given concern for infection.

## 2024-10-18 NOTE — H&P
"H&P - Hospitalist   Name: Oni Nagy 74 y.o. male I MRN: [id number]  Unit/Bed#: ED 17 I Date of Admission: 10/17/2024   Date of Service: 10/18/2024 I Hospital Day: 1     Assessment & Plan  Acute encephalopathy  Presenting from [facility] with marked altered mental status, reportedly AAOx0 and agitated.  Per wife patient normally AO x 4 and very oriented situations  There was initial concern for possible infectious etiology however no infectious etiology identified of yet, see discussion under \"SIRS\"  CT head negative for acute intra pathology.  Remainder of labs otherwise without marked abnormality to explain symptomatology  Patient noted with marked urinary retention and reported constipation, now s/p Garcia catheter placement draining at least 1.3 L urine and patient now having bowel movements, per nursing reportedly with some improvement in mental status following this and no further agitation.  Continue to monitor mental status closely  SIRS (systemic inflammatory response syndrome) (HCC)  Tachycardia/tachypnea/leukocytosis present on admission, additionally with lactic acidosis resolved s/p IV fluids  No definitive infectious source at this time: CT chest/abdomen/pelvis without definitive pulmonary consolidation, questionable striated right nephrogram possibly representing pyelonephritis versus artifact from streak however UA not consistent UTI, possibility of colitis however patient apparently did not have bowel movement over past 1 week now with diarrhea after placement of Garcia catheter for retention.  Surgical site appearing well-healing  Noninfectious etiologies include agitation possibly from urinary retention and prior constipation  Received ceftriaxone during ED evaluation, follow-up results of blood cultures x 2 determine need for further antibiotics  Trend procalcitonin, WBC, temperature curve, hemodynamics  Urinary retention  Patient noted with marked urinary retention during initial " assessment for which Garcia catheter was placed draining reported 1.3 L urine,?  If this is contributing to symptomatology  UA not consistent with UTI  Maintain Garcia catheter for now  Constipation  Wife reported that patient did not have a good bowel movement over the past 1 week reportedly requiring stool softeners/laxatives at acute rehab.  Per nursing here patient now having significant number of bowel movements  CT abdomen/pelvis with gas distended stomach and fluid-filled distal sigmoid colon without wall thickening/enhancement suggestive of colitis and prominent rectal stool burden  Monitor for any recurrence of the constipation.  Can consider further workup of the colitis if diarrhea does not pillo  Paroxysmal atrial fibrillation (HCC)   Currently in sinus rhythm  Continue diltiazem 120 mg daily  Continue anticoagulation with Eliquis  BPH (benign prostatic hyperplasia)  Now s/p Garcia catheter due to marked urinary retention, maintain Garcia for now  Continue Flomax  Spinal stenosis of lumbar region at multiple levels  Following with orthopedic surgery spinal surgery, s/p navigated L2-5 laminectomy/decompression and L3-5 instrumented fusion 10/10/2024  Surgical site wound appears well-healing, likely outpatient follow-up with surgeon of record however if patient with ongoing infectious concerns may need to involve while inpatient      VTE Prophylaxis: Apixaban (Eliquis)  / sequential compression device   Code Status: Level 1 - Full Code   POLST: POLST form is not discussed and not completed at this time.  Discussion with family: Updated wife outside patient's room    Anticipated Length of Stay:  Patient will be admitted on an Inpatient basis with an anticipated length of stay of greater than 2 midnights.   Justification for Hospital Stay: Please see detailed plans noted above.    Chief Complaint:     Altered mental status and agitation  History of Present Illness:  Please note history is obtained from record  review of available records from EMR and available records provide from acute rehab, discussion with EM physician, and discussion with patient's wife as patient remains encephalopathic and is poor historian.    Oni Nagy is a 74 y.o. male presenting from [facility] with altered mental status.  He was recently admitted here under the orthopedic surgery service 10/10/2024 - 10/15/2024 with spinal stenosis of the lumbar region at multiple levels now s/p navigated L2-5 laminectomy/decompression and L3-5 intubated fusion complicated by altered mental status following this seen by neurology and felt less likely to represent stroke/TIA and related to represent confusion from anesthesia with encephalopathy improving without specific intervention ultimately discharged to acute rehab.  He otherwise has a past medical history significant for BPH and has required Garcia catheters previously including at the prior hospitalization for urinary retention, atrial fibrillation anticoagulant on Eliquis, s/p prior MVR.     Wife states that he was in his normal state of health until the morning of the day of presentation when he began to complain of abdominal pain without reported fever/chills, nausea/vomiting/diarrhea, worsening back pain or surgical wound, or new focal weakness.  He subsequently became markedly confused and agitated which prompted the presentation here.  Wife reports the patient has not had a good bowel movement over the past 1 week, additionally there was question of urinary retention.  On ED arrival he was noted tachycardic/tachypneic for which septic workup was initiated, additionally noted markedly agitated requiring ketamine for sedation.  A bladder scan was performed reportedly revealing marked urinary retention which Garcia catheter was placed draining reported 1.3 L yellow urine, with labs revealing leukocytosis and a lactic acidosis with lactic acidosis resolved following IV fluids however with  UA not consistent with UTI and CT chest/abdomen/pelvis revealing no definitive pulmonary consolidation, delayed right nephrogram possibly related to pyelonephritis versus artifact, and evidence of descending sigmoid colitis additionally with stool ball noted in place.  He empirically received ceftriaxone and is admitted given the encephalopathy.  Currently he is lying in bed in no acute distress sleeping on my arrival but awakens readily to voice, states he feels well but somewhat confused to situation, primarily stating he feels better if he lies on his right side.  Denies any other complaints at this time.    Review of Systems:  Please note limited due to patient's acute encephalopathy  Constitutional:  Denies fever or chills   Eyes:  Denies change in visual acuity   HENT:  Denies nasal congestion or sore throat   Respiratory:  Denies cough or shortness of breath   Cardiovascular:  Denies chest pain or edema   GI:  Denies nausea, vomiting, bloody stools or diarrhea but abdominal pain and constipation reported  :  Denies dysuria   Musculoskeletal:  Denies back pain or joint pain   Integument:  Denies rash   Neurologic:  Denies headache, focal weakness or sensory changes   Endocrine:  Denies polyuria or polydipsia   Lymphatic:  Denies swollen glands   Psychiatric:  Denies depression or anxiety but confusion reported    Past Medical and Surgical History:   Past Medical History:   Diagnosis Date    Anesthesia complication     urinary retention for all of surgeries    Benign essential hypertension 03/23/2011    BPH (benign prostatic hyperplasia) 02/20/2023    Last Assessment & Plan:  Formatting of this note might be different from the original. Doing well.  Cont tamsulosin and saw palmetto.  We did discuss adding finasteride but will have repeat psa prior.  Also discussed indications for holep.  As of now, he's doing great. Last episode of retention was clearly related to anesthesia.  Fu 4-6w with psa    Cardiogenic  shock (HCC) 07/05/2022    Chronic kidney disease, stage III (moderate) ([facility])     Coronary atherosclerosis 03/23/2011    Last Assessment & Plan:  There are no symptoms of an anginal syndrome. I provided education regarding the nature of coronary artery disease and our recommendations to reduce his long-term risk of ACS.  I recommended he continue utilizing aspirin and statins to minimize risk.  A lipid profile was requested with the option to use high intensity statins as an alternative to Mevacor in response to per    Enlarged prostate     Epistaxis 01/09/2022    History of aortic valve disease     History of mitral valve disease     History of transfusion     May 2021 - no adverse reaction    Paroxysmal atrial fibrillation ([facility])  02/06/2020    Pulmonary nodules 02/05/2021    PVC's (premature ventricular contractions)     S/P mitral valve repair 06/15/2021    May 2021 at [facility]    Status post aortic valve replacement 05/31/2021    At [facility] May 2021    SVT (supraventricular tachycardia) ([facility])     Syncope, cardiogenic     Thoracic aortic aneurysm without rupture ([facility]) 02/05/2021    4.5cm on CT 1/21  Formatting of this note might be different from the original. 4.5cm on CT chest 1/28/21  Last Assessment & Plan:  Formatting of this note might be different from the original. 6/6/21 chest CT showed stable 4.3 cm ascending thoracic aortic aneurysm.    Thrombocytopenia ([facility]) 07/05/2022     Past Surgical History:   Procedure Laterality Date    CARDIAC ELECTROPHYSIOLOGY STUDY AND ABLATION  07/2024    CARDIAC SURGERY      MV repair, AV replaced and aortic aneurysm repaired    CAUTERIZE INNER NOSE Left 01/09/2022    Procedure: Sphenopalatine artery ligation;  Surgeon: Alfonso Rodriguez MD;  Location: [facility];  Service: ENT    COLONOSCOPY  2007    Dr. Schreiber.  Tubular adenoma descending colon polyp.    COLONOSCOPY  2010    Dr. Schreiber.  Diverticulosis.    COLONOSCOPY  08/2020    Dr. Schreiber.  12 mm  descending colon inflammatory polyp, 3 mm benign lymphoid aggregate, diverticulosis.    FACIAL/NECK BIOPSY Left 11/30/2021    Procedure: EXCISION PYOGENIC GRANULOMA; FREE MUCOSAL GRAFT FROM NOSE;  Surgeon: Alfonso Rodriguez MD;  Location: [facility];  Service: ENT    INGUINAL HERNIA REPAIR      LUMBAR FUSION Bilateral 10/10/2024    Procedure: Navigated L2-5 laminectomy/decompression, L3-5 instrumented fusion, TLIF;  Surgeon: Jerod Foss MD;  Location: [facility];  Service: Orthopedics    TONSILLECTOMY         Meds/Allergies:  Current Outpatient Medications   Medication Instructions    acetaminophen (TYLENOL) 650 mg, Oral, Every 8 hours PRN    atorvastatin (LIPITOR) 20 mg, Oral, Daily    diltiazem (DILACOR XR) 120 mg, Oral, Daily    Eliquis 5 mg, Oral, 2 times daily, Instructed to hold starting 10/8. Pt states 10/7 will be last dose per sx    lovastatin (MEVACOR) 40 MG tablet 1 tablet, Oral, Daily at bedtime    Magnesium 250 MG TABS 1 tablet, Oral, Daily    methocarbamol (ROBAXIN) 500 mg, Oral, 3 times daily PRN    oxyCODONE (ROXICODONE) 5 mg, Oral, Every 4 hours PRN    Saw Palmetto, Serenoa repens, (SAW PALMETTO PO) 1 capsule, Oral, Daily, 250 mg     tamsulosin (FLOMAX) 0.4 mg TAKE 1 CAPSULE BY MOUTH EVERY DAY AT NIGHT    Vitamin D3 2,000 Units, Oral, Daily         Allergies:   Allergies   Allergen Reactions    Oxycodone Nausea Only     History:  Marital Status: /Civil Union     Substance Use History:   Social History     Substance and Sexual Activity   Alcohol Use Not Currently     Social History     Tobacco Use   Smoking Status Never   Smokeless Tobacco Never     Social History     Substance and Sexual Activity   Drug Use Never       Family History:  Family History   Problem Relation Age of Onset    Alzheimer's disease Mother 80    Esophageal cancer Mother     Diabetes Father     Diabetes Sister     Kidney failure Sister     Stroke Sister     Coronary artery disease Sister         MI    Breast  cancer Sister     Diabetes Maternal Grandmother     Coronary artery disease Maternal Uncle         massive MI - age 49 and 50       Physical Exam:     Vitals:   Blood Pressure: 134/80 (10/17/24 2330)  Pulse: 102 (10/17/24 2330)  Temperature: 98.2 °F (36.8 °C) (10/17/24 1728)  Temp Source: Oral (10/17/24 1728)  Respirations: (!) 24 (10/17/24 2330)  SpO2: 92 % (10/17/24 2330)    Constitutional:  Well developed, well nourished, no acute distress, non-toxic appearance   Eyes:  PERRL, conjunctiva normal   HENT:  Atraumatic, external ears normal, nose normal, oropharynx moist, no pharyngeal exudates. Neck- normal range of motion, no tenderness, supple   Respiratory:  No respiratory distress, normal breath sounds, no rales, no wheezing   Cardiovascular:  Normal rate, normal rhythm, no murmurs, no gallops, no rubs   GI:  Soft, mildly distended, normal bowel sounds, nontender, no organomegaly, no mass, no rebound, no guarding   :  No costovertebral angle tenderness, Garcia catheter in place draining clear yellow urine  Musculoskeletal:  No edema, no tenderness, no deformities. Back- no tenderness  Integument:  Well hydrated, no rash, bar surgical wound well-healing, C/D/I  Lymphatic:  No lymphadenopathy noted   Neurologic: Sleeping on my arrival but awakens to voice, communicative, CN 2-12 normal, normal motor function, normal sensory function, no focal deficits noted   Psychiatric:  Speech and behavior appropriate however quite confused to situation, does not believe he is in the hospital and only aware that he is here due to abdominal pain      Lab Results: I have reviewed the below labs    Results from last 7 days   Lab Units 10/17/24  1808   WBC Thousand/uL 15.49*   HEMOGLOBIN g/dL 12.4   HEMATOCRIT % 37.4   PLATELETS Thousands/uL 365   SEGS PCT % 89*   LYMPHO PCT % 5*   MONO PCT % 4   EOS PCT % 0     Results from last 7 days   Lab Units 10/17/24  1808   SODIUM mmol/L 136   POTASSIUM mmol/L 3.5   CHLORIDE mmol/L 98    CO2 mmol/L 24   BUN mg/dL 21   CREATININE mg/dL 0.94   ANION GAP mmol/L 14*   CALCIUM mg/dL 9.7   ALBUMIN g/dL 4.1   TOTAL BILIRUBIN mg/dL 1.27*   ALK PHOS U/L 85   ALT U/L 27   AST U/L 20   GLUCOSE RANDOM mg/dL 186*     Results from last 7 days   Lab Units 10/17/24  1808   INR  1.25*     Results from last 7 days   Lab Units 10/11/24  1323   POC GLUCOSE mg/dl 171*         Results from last 7 days   Lab Units 10/17/24  2127 10/17/24  1808   LACTIC ACID mmol/L 1.3 3.6*   PROCALCITONIN ng/ml  --  0.06       Imaging: Results Review Statement: I reviewed radiology reports from this admission including: CT chest, CT abdomen/pelvis, and CT head.    CT chest abdomen pelvis w contrast    Result Date: 10/17/2024  Narrative: CT CHEST, ABDOMEN AND PELVIS WITH IV CONTRAST INDICATION: AMS. COMPARISON: CT chest from 11/12/2023, CT abdomen pelvis from 2/21/2023 TECHNIQUE: CT examination of the chest, abdomen and pelvis was performed. Multiplanar 2D reformatted images were created from the source data. This examination, like all CT scans performed in the [facility], was performed utilizing techniques to minimize radiation dose exposure, including the use of iterative reconstruction and automated exposure control. Radiation dose length product (DLP) for this visit: 1080.33 mGy-cm IV Contrast: 100 mL of iohexol (OMNIPAQUE) Enteric Contrast: Not administered. FINDINGS: CHEST LUNGS: Respiratory degraded examination demonstrates no definite consolidation. Mosaic attenuation and dependent atelectasis. Stable 8 mm right upper lobe pulmonary nodule (6:93). No tracheal or endobronchial lesion. PLEURA: Unremarkable. HEART/GREAT VESSELS: Stable cardiomegaly. Coronary artery calcifications. Status post aortic and mitral valve repair. Stable dilatation of the ascending aorta measuring up to 45 mm. MEDIASTINUM AND DIANELYS: Unremarkable. CHEST WALL AND LOWER NECK: Unremarkable. ABDOMEN LIVER/BILIARY TREE: Unremarkable. GALLBLADDER:  No calcified gallstones. No pericholecystic inflammatory change. SPLEEN: Unremarkable within limitations of streak artifact. PANCREAS: Unremarkable. ADRENAL GLANDS: Unremarkable. KIDNEYS/URETERS: Question striated left nephrogram versus artifact from streak.. 2 mm nonobstructing left lower pole nephrolithiasis. Bilateral pelviectasis however no hydronephrosis. Redemonstrated multiple renal cysts. Subcentimeter hypoattenuating renal lesion(s), too small to characterize but statistically likely benign, which do not warrant follow-up (Radiology June 2019). STOMACH AND BOWEL: Prominent gaseous distended stomach and proximal duodenum. No dilated or thickened small bowel loops. Scattered colonic diverticula without evidence of diverticulitis. Fluid-filled distal sigmoid colon with mild bowel wall thickening and enhancement. Prominent rectal stool burden. APPENDIX: No findings to suggest appendicitis. ABDOMINOPELVIC CAVITY: No ascites. No pneumoperitoneum. No lymphadenopathy. VESSELS: Atherosclerotic calcifications of the abdominal aorta and its branch vessels. PELVIS REPRODUCTIVE ORGANS: Enlarged prostate. URINARY BLADDER: Bladder is decompressed with Garcia catheter in place. Bladder diverticulum extending into the right inguinal hernia. ABDOMINAL WALL/INGUINAL REGIONS: Small to moderate-sized fat-containing right inguinal hernia, similar to prior. Mild body wall edema. Postsurgical changes within the lower lumbar soft tissues from recent surgery. BONES: No acute fracture or suspicious osseous lesion. Status post L3-L5 posterior fusion with associated laminectomies. Extensive streak artifact limits evaluation of adjacent soft tissues including postsurgical changes within this region.     Impression: -Mosaic attenuation on respiratory degraded pulmonary evaluation. No definite consolidation. -Question striated right nephrogram which could represent pyelonephritis versus artifact from streak. Correlate with urine analysis.  -Prominent gaseous distended stomach. Fluid-filled distal sigmoid colon with bowel wall thickening and enhancement, suggestive of colitis. Prominent rectal stool burden. -Sequelae of L3-L5 posterior fusion with extensive streak artifact limiting evaluation of adjacent postsurgical soft tissues. -Stable ascending aortic aneurysm measuring up to 45 mm. Recommend follow-up chest CT in 1 year to document stability. -Prostamegaly. Correlate with PSA. Additional chronic findings as above including stable 8 mm right upper lobe pulmonary nodule. I personally communicated the findings via telephone with Amber Menchaca at 10:15 pm on 10/17/2024 Workstation performed: [id number]     CT head without contrast    Result Date: 10/17/2024  Narrative: CT BRAIN - WITHOUT CONTRAST INDICATION:   AMS. COMPARISON: CT head without contrast/CTA head and neck 10/11/2024 TECHNIQUE:  CT examination of the brain was performed.  Multiplanar 2D reformatted images were created from the source data. Radiation dose length product (DLP) for this visit:  899.39 mGy-cm .  This examination, like all CT scans performed in the [facility], was performed utilizing techniques to minimize radiation dose exposure, including the use of iterative  reconstruction and automated exposure control. IMAGE QUALITY:  Diagnostic. FINDINGS: PARENCHYMA: Decreased attenuation is noted in periventricular and subcortical white matter demonstrating an appearance that is statistically most likely to represent mild microangiopathic change; this appearance is similar when compared to most recent prior examination. No CT signs of acute infarction.  No intracranial mass, mass effect or midline shift.  No acute parenchymal hemorrhage. VENTRICLES AND EXTRA-AXIAL SPACES:  Normal for the patient's age. VISUALIZED ORBITS: Normal visualized orbits. PARANASAL SINUSES: Small mount of layering fluid within the left maxillary sinus. Mild mucosal thickening of the ethmoid  air cells CALVARIUM AND EXTRACRANIAL SOFT TISSUES: Normal.     Impression: No acute intracranial abnormality. Small volume layering secretions within the left maxillary sinus. Correlate for sinusitis Workstation performed: [id number]     XR spine lumbar 2 or 3 views injury    Result Date: 10/15/2024  Narrative: XR SPINE LUMBAR 2 OR 3 VIEWS INJURY INDICATION: s/p lumabr fusion surgery. COMPARISON: 10/10/2024 FINDINGS: No acute fracture. Intact pedicles. Five non-rib-bearing lumbar vertebral bodies. There are pedicle screws and rods at the L3, L4 and L5 levels. Laminectomy defects at these levels. Mild levoscoliosis. Mild degenerative changes. Unremarkable soft tissues. Metallic staples posteriorly.     Impression: No acute osseous abnormality. Postoperative change. Workstation performed: [id number]     CT stroke alert brain    Result Date: 10/11/2024  Narrative: CT BRAIN - STROKE ALERT PROTOCOL INDICATION:   Stroke Alert. COMPARISON: Same day CTA head and neck TECHNIQUE:  CT examination of the brain was performed.  In addition to axial images, coronal reformatted images were created and submitted for interpretation. Radiation dose length product (DLP) for this visit: .  This examination, like all CT scans performed in the [facility], was performed utilizing techniques to minimize radiation dose exposure, including the use of iterative reconstruction  and automated exposure control. IMAGE QUALITY:  Diagnostic. FINDINGS: PARENCHYMA: Decreased attenuation is noted in periventricular and subcortical white matter demonstrating an appearance that is statistically most likely to represent mild microangiopathic change. No CT signs of acute infarction.  No intracranial mass, mass effect or midline shift.  No acute parenchymal hemorrhage. Atherosclerotic calcification of the carotid siphons and intradural vertebral arteries. VENTRICLES AND EXTRA-AXIAL SPACES:  Normal for the patient's age. VISUALIZED ORBITS:  Normal visualized orbits. PARANASAL SINUSES: Mild mucosal thickening in the paranasal sinuses. CALVARIUM AND EXTRACRANIAL SOFT TISSUES:   Normal.     Impression: -No loss of gray-white differentiation, acute hemorrhage or mass effect. -Mild chronic microangiopathic changes. Findings were directly discussed with Pedro Hubbard at 2:20 pm on 10/11/2024. Workstation performed: [id number]     CTA stroke alert (head/neck)    Result Date: 10/11/2024  Narrative: CTA NECK AND BRAIN WITH AND WITHOUT CONTRAST INDICATION: Stroke Alert COMPARISON:   CT of the chest from 12/12/2023 and priors. TECHNIQUE:  Post contrast imaging was performed after administration of iodinated contrast through the neck and brain. Post contrast axial 0.625 mm images timed to opacify the arterial system.  3D rendering was performed on an independent workstation.   MIP reconstructions performed. Coronal and sagittal reconstructions were performed of the non contrast portion of the brain. Radiation dose length product (DLP) for this visit:  583.52 mGy-cm .  This examination, like all CT scans performed in the [facility], was performed utilizing techniques to minimize radiation dose exposure, including the use of iterative  reconstruction and automated exposure control. IV Contrast:  75 mL of iohexol (OMNIPAQUE) IMAGE QUALITY:   Venous contamination limits evaluation. FINDINGS: CTA NECK ARCH AND GREAT VESSELS: Visualized arch and great vessels are normal. VERTEBRAL ARTERIES: Patent extracranial segments. RIGHT CAROTID: Mild atheromatous plaque at the carotid bifurcation. No stenosis.    No dissection. LEFT CAROTID: Mild atheromatous plaque at the carotid bifurcation. No stenosis.    No dissection. NASCET criteria was used to determine the degree of internal carotid artery diameter stenosis. CTA BRAIN: INTERNAL CAROTID ARTERIES: Scattered atherosclerotic calcifications of the cavernous and supraclinoid ICA. ANTERIOR CEREBRAL ARTERY  CIRCULATION: Hypoplastic versus aplastic right A1 segment. Otherwise no stenosis or occlusion. MIDDLE CEREBRAL ARTERY CIRCULATION:  No stenosis or occlusion. DISTAL VERTEBRAL ARTERIES:  No stenosis or occlusion. Normal right PICA origin. Left PICA origin is not visualized however appears reconstituted distally. BASILAR ARTERY:  No stenosis or occlusion. POSTERIOR CEREBRAL ARTERIES: No stenosis or occlusion. VENOUS STRUCTURES:  Normal. NON VASCULAR ANATOMY BONY STRUCTURES:  No acute osseous abnormality. Spondylosis of the cervical spine. SOFT TISSUES OF THE NECK: Focal fat within the right parotid gland. THORACIC INLET: 8 mm right upper lobe pulmonary nodule (2:3), unchanged compared to 22/14/2022     Impression: -No large vessel occlusion, high-grade stenosis, dissection of the [place], carotid or vertebral arteries. -Left PICA origin is not visualized however likely reconstituted distally. -Stable 8 mm right upper lobe pulmonary nodule dating back to 2022. Findings were directly discussed with Pedro Hubbard at 2:20 pm on 10/11/2024. Workstation performed: [id number]     XR spine lumbar 2 or 3 views injury    Result Date: 10/10/2024  Narrative: O-ARM -lumbar INDICATION: M48.061: Spinal stenosis, lumbar region without neurogenic claudication.  Procedure guidance. COMPARISON: MRI of the lumbar spine from 7/9/2024, lumbar spine radiographs from 4/17/2024 TECHNIQUE: FLUOROSCOPY TIME:   67 seconds 3-D DOSE: Radiation dose length product (DLP) for this visit: 659.23. TOTAL IMAGES: 1228 FINDINGS: Fluoroscopic guidance provided for surgical procedure. On image time stamped 1:13 pm there is a linear radiopaque foreign body overlies the soft tissues posterior to the L4-L5 disc base. Osseous and soft tissue detail limited by technique. Atherosclerotic calcifications of the abdominal aorta.     Impression: Fluoroscopic guidance provided for surgical procedure.  Please refer to the separate procedure notes for  additional details. Workstation performed: [id number]     Holter monitor    Impression: 14 Day Ambulatory ECG Monitor interpretation:  ZIO XT Time Monitored: 3 days 18 hours 1.   Underlying rhythm is normal sinus rhythm.   2.   Appropriate chronotropic response and heart rate variability noted.  Average heart rate is 56 beats per minute (range: 41 - 158 bpm).   3.   Rare PAC's (< 1%) and PVC's (3%).   4.   No atrial fibrillation or atrial flutter noted.   5.   2 brief pSVT noted., longest was 9 beats 6.   1 episode of NSVT noted.  5 beats 7.   No pauses were noted.   Symptoms correlated with: no symptoms Results communicated to patient. - DMITRI Cox         ** Please Note: Dragon 360 Dictation voice to text software was used in the creation of this document. **      ** Please Note: This note has been constructed using a voice recognition system. **

## 2024-10-18 NOTE — ASSESSMENT & PLAN NOTE
Wife reported that patient did not have a good bowel movement over the past 1 week reportedly requiring stool softeners/laxatives at acute rehab.  Per nursing here patient now having significant number of bowel movements  Patient now reports that he has not had a bowel movement in 9 days  CT abdomen/pelvis with gas distended stomach and fluid-filled distal sigmoid colon without wall thickening/enhancement suggestive of colitis and prominent rectal stool burden  Continues to have multiple bowel movements, nonbloody, nonmucoid  May need colitis workup if bowel movements persist

## 2024-10-18 NOTE — ASSESSMENT & PLAN NOTE
Now s/p Garcia catheter due to marked urinary retention, voiding trial DC Garcia  Continue Flomax  Urinary retention protocol

## 2024-10-18 NOTE — ASSESSMENT & PLAN NOTE
"Presenting from [facility] rehab with marked altered mental status, reportedly AAOx0 and agitated.  Per wife patient normally AO x 4 and very oriented situations  There was initial concern for possible infectious etiology however no infectious etiology identified of yet, see discussion under \"SIRS\"  CT head negative for acute intra pathology.  Remainder of labs otherwise without marked abnormality to explain symptomatology  Patient noted with marked urinary retention and reported constipation, now s/p Garcia catheter placement draining at least 1.3 L urine and patient now having bowel movements, per nursing reportedly with some improvement in mental status following this and no further agitation.  Continue to monitor mental status closely  "

## 2024-10-18 NOTE — PROGRESS NOTES
"Progress Note - Hospitalist   Name: Oni Nagy 74 y.o. male I MRN: [id number]  Unit/Bed#: ED 17 I Date of Admission: 10/17/2024   Date of Service: 10/18/2024 I Hospital Day: 1    Assessment & Plan  Acute encephalopathy  [facility] with marked altered mental status, reportedly AAOx0 and agitated.  Baseline per wife is A & O x 4  There was initial concern for possible infectious etiology however no infectious etiology identified of yet, see discussion under \"SIRS\"  CT head negative for acute intra pathology.  Remainder of labs otherwise without marked abnormality to explain symptomatology  Patient noted with marked urinary retention and reported constipation, now s/p Garcia catheter placement draining at least 1.3 L urine and patient now having bowel movements, per nursing reportedly with some improvement in mental status following this and no further agitation.  Mental status now back to baseline after resolution of urinary retention and constipation, suspect this is the etiology however, WBC and procalcitonin have now increased significantly.  Concern for possible infection--we will monitor labs x 24 hours for downtrend  Encourage sleep-wake cycle, as needed melatonin for insomnia  SIRS (systemic inflammatory response syndrome) (HCC)  Tachycardia/tachypnea/leukocytosis present on admission, additionally with lactic acidosis resolved s/p IV fluids  No definitive infectious source at this time: CT chest/abdomen/pelvis without definitive pulmonary consolidation, questionable striated right nephrogram possibly representing pyelonephritis versus artifact from streak however UA not consistent UTI, possibility of colitis however patient apparently did not have bowel movement over past 1 week now with diarrhea after placement of Garcia catheter for retention.  Surgical site appearing well-healing  Noninfectious etiologies include agitation possibly from urinary retention and prior " constipation  Received ceftriaxone during ED evaluation, follow-up results of blood cultures x 2 determine need for further antibiotics  Trend procalcitonin, WBC, temperature curve, hemodynamics  Urinary retention  Patient noted with marked urinary retention during initial assessment for which Garcia catheter was placed draining reported 1.3 L urine,?  If this is contributing to symptomatology  UA not consistent with UTI  Resolution of altered mental status after Garcia catheter, will attempt voiding trial today  Urinary retention protocol  Constipation  Wife reported that patient did not have a good bowel movement over the past 1 week reportedly requiring stool softeners/laxatives at acute rehab.  Per nursing here patient now having significant number of bowel movements  Patient now reports that he has not had a bowel movement in 9 days  CT abdomen/pelvis with gas distended stomach and fluid-filled distal sigmoid colon without wall thickening/enhancement suggestive of colitis and prominent rectal stool burden  Continues to have multiple bowel movements, nonbloody, nonmucoid  May need colitis workup if bowel movements persist  Paroxysmal atrial fibrillation (HCC)   Currently in sinus rhythm  Continue diltiazem 120 mg daily  Continue anticoagulation with Eliquis  BPH (benign prostatic hyperplasia)  Now s/p Garcia catheter due to marked urinary retention, voiding trial DC Garcia  Continue Flomax  Urinary retention protocol  Spinal stenosis of lumbar region at multiple levels  Following with orthopedic surgery spinal surgery, s/p navigated L2-5 laminectomy/decompression and L3-5 instrumented fusion 10/10/2024  Surgical site wound appears well-healing, likely outpatient follow-up with surgeon of record however if patient with ongoing infectious concerns may need to involve while inpatient    VTE Pharmacologic Prophylaxis: VTE Score: 6 High Risk (Score >/= 5) - Pharmacological DVT Prophylaxis Ordered: apixaban (Eliquis).  Sequential Compression Devices Ordered.    Mobility:      JH-HLM Goal NOT achieved. Continue with multidisciplinary rounding and encourage appropriate mobility to improve upon JH-HLM goals.    Patient Centered Rounds: I performed bedside rounds with nursing staff today.   Discussions with Specialists or Other Care Team Provider: None    Education and Discussions with Family / Patient: Updated  (wife) via phone.    Current Length of Stay: 1 day(s)  Current Patient Status: Inpatient   Certification Statement: The patient will continue to require additional inpatient hospital stay due to suspicion for infectious etiology of encephalopathy  Discharge Plan: Anticipate discharge tomorrow to rehab facility.    Code Status: Level 1 - Full Code    Subjective   Patient doing well on exam today, is back at his baseline mood normal, memory normal, very oriented and engaging in conversation.  He denies any fevers, chills, runny nose, sore throat, chest pain, shortness of breath, abdominal pain, flank pain.  He does not remember his acute episode of encephalopathy.    Objective :  Temp:  [98.2 °F (36.8 °C)] 98.2 °F (36.8 °C)  HR:  [] 82  BP: (131-169)/() 131/82  Resp:  [19-28] 19  SpO2:  [92 %-99 %] 98 %  O2 Device: None (Room air)    There is no height or weight on file to calculate BMI.     Input and Output Summary (last 24 hours):     Intake/Output Summary (Last 24 hours) at 10/18/2024 1210  Last data filed at 10/18/2024 0634  Gross per 24 hour   Intake 1100 ml   Output 3000 ml   Net -1900 ml       Physical Exam  Vitals reviewed.   Constitutional:       General: He is not in acute distress.     Appearance: Normal appearance. He is not toxic-appearing.   HENT:      Mouth/Throat:      Mouth: Mucous membranes are moist.   Cardiovascular:      Rate and Rhythm: Normal rate and regular rhythm.      Heart sounds: Normal heart sounds.   Pulmonary:      Effort: Pulmonary effort is normal.      Breath sounds:  Normal breath sounds. No wheezing or rales.   Abdominal:      General: Bowel sounds are normal. There is no distension.      Palpations: Abdomen is soft.      Tenderness: There is no abdominal tenderness.   Musculoskeletal:      Right lower leg: No edema.      Left lower leg: No edema.   Skin:     General: Skin is warm and dry.   Neurological:      General: No focal deficit present.      Mental Status: He is alert and oriented to person, place, and time.      Motor: No weakness.   Psychiatric:         Mood and Affect: Mood normal.         Thought Content: Thought content normal.         Judgment: Judgment normal.         Lines/Drains:  Lines/Drains/Airways       Active Status       Name Placement date Placement time Site Days    Urethral Catheter Double-lumen 16 Fr. 10/17/24  1820  Double-lumen  less than 1                  Urinary Catheter:  Goal for removal: No longer needed. Will place order to discontinue                 Lab Results: I have reviewed the following results:   Results from last 7 days   Lab Units 10/18/24  0501 10/17/24  1808   WBC Thousand/uL 18.61* 15.49*   HEMOGLOBIN g/dL 11.8* 12.4   HEMATOCRIT % 36.2* 37.4   PLATELETS Thousands/uL 364 365   SEGS PCT %  --  89*   LYMPHO PCT %  --  5*   MONO PCT %  --  4   EOS PCT %  --  0     Results from last 7 days   Lab Units 10/18/24  0501 10/17/24  1808   SODIUM mmol/L 135 136   POTASSIUM mmol/L 4.5 3.5   CHLORIDE mmol/L 100 98   CO2 mmol/L 25 24   BUN mg/dL 20 21   CREATININE mg/dL 1.01 0.94   ANION GAP mmol/L 10 14*   CALCIUM mg/dL 9.2 9.7   ALBUMIN g/dL  --  4.1   TOTAL BILIRUBIN mg/dL  --  1.27*   ALK PHOS U/L  --  85   ALT U/L  --  27   AST U/L  --  20   GLUCOSE RANDOM mg/dL 142* 186*     Results from last 7 days   Lab Units 10/17/24  1808   INR  1.25*     Results from last 7 days   Lab Units 10/11/24  1323   POC GLUCOSE mg/dl 171*         Results from last 7 days   Lab Units 10/18/24  0501 10/17/24  2127 10/17/24  1808   LACTIC ACID mmol/L  --  1.3  3.6*   PROCALCITONIN ng/ml 0.40*  --  0.06       Recent Cultures (last 7 days):   Results from last 7 days   Lab Units 10/17/24  1808   BLOOD CULTURE  Received in Microbiology Lab. Culture in Progress.  Received in Microbiology Lab. Culture in Progress.       Imaging Results Review: I reviewed radiology reports from this admission including: CT abdomen/pelvis and CT head.      Last 24 Hours Medication List:     Current Facility-Administered Medications:     acetaminophen (TYLENOL) tablet 650 mg, Q6H PRN    apixaban (ELIQUIS) tablet 5 mg, BID    atorvastatin (LIPITOR) tablet 20 mg, Daily    bisacodyl (DULCOLAX) rectal suppository 10 mg, Daily PRN    Cholecalciferol (VITAMIN D3) tablet 2,000 Units, Daily    diltiazem (CARDIZEM CD) 24 hr capsule 120 mg, Daily    methocarbamol (ROBAXIN) tablet 500 mg, TID PRN    multi-electrolyte (PLASMALYTE-A/ISOLYTE-S PH 7.4) IV solution, Continuous, Last Rate: 100 mL/hr (10/18/24 0030)    ondansetron (ZOFRAN) injection 4 mg, Q6H PRN    tamsulosin (FLOMAX) capsule 0.4 mg, Daily With Dinner    Administrative Statements   Today, Patient Was Seen By: Jo Ann Ramirez PA-C      **Please Note: This note may have been constructed using a voice recognition system.**

## 2024-10-18 NOTE — ASSESSMENT & PLAN NOTE
Wife reported that patient did not have a good bowel movement over the past 1 week reportedly requiring stool softeners/laxatives at acute rehab.  Per nursing here patient now having significant number of bowel movements  CT abdomen/pelvis with gas distended stomach and fluid-filled distal sigmoid colon without wall thickening/enhancement suggestive of colitis and prominent rectal stool burden  Monitor for any recurrence of the constipation.  Can consider further workup of the colitis if diarrhea does not pillo

## 2024-10-18 NOTE — ASSESSMENT & PLAN NOTE
"Presenting from [facility] with marked altered mental status, reportedly AAOx0 and agitated.  Baseline per wife is A & O x 4  There was initial concern for possible infectious etiology however no infectious etiology identified of yet, see discussion under \"SIRS\"  CT head negative for acute intra pathology.  Remainder of labs otherwise without marked abnormality to explain symptomatology  Patient noted with marked urinary retention and reported constipation, now s/p Garcia catheter placement draining at least 1.3 L urine and patient now having bowel movements, per nursing reportedly with some improvement in mental status following this and no further agitation.  Mental status now back to baseline after resolution of urinary retention and constipation, suspect this is the etiology however, WBC and procalcitonin have now increased significantly.  Concern for possible infection--we will monitor labs x 24 hours for downtrend  Encourage sleep-wake cycle, as needed melatonin for insomnia  "

## 2024-10-18 NOTE — ASSESSMENT & PLAN NOTE
Patient noted with marked urinary retention during initial assessment for which Garcia catheter was placed draining reported 1.3 L urine,?  If this is contributing to symptomatology  UA not consistent with UTI  Resolution of altered mental status after Garcia catheter, will attempt voiding trial today  Urinary retention protocol

## 2024-10-18 NOTE — CONSULTS
Consultation - Orthopedics   Name: Oni Nagy 74 y.o. male I MRN: [id number]  Unit/Bed#: ED 17 I Date of Admission: 10/17/2024   Date of Service: 10/18/2024 I Hospital Day: 1   Inpatient consult to Orthopedic Surgery  Consult performed by: Harshad Cannon MD  Consult ordered by: Zayda العلي DO        Physician Requesting Evaluation: Zayda العلي DO   Reason for Evaluation / Principal Problem: Continuity of care      Assessment & Plan  Acute encephalopathy    Paroxysmal atrial fibrillation (HCC)     BPH (benign prostatic hyperplasia)    Spinal stenosis of lumbar region at multiple levels  74 y.o.male POD 8 L2-5 decompression L3-5 TLIF. Patient doing well postoperatively.    WBAT B/L LE with 5 lb weight lifting restriction. Brace for comfort when out of bed.  PT/OT  Pain control  DVT ppx: eliquis  Medical management per primary team  Dispo: Ortho will follow    Urinary retention    SIRS (systemic inflammatory response syndrome) (HCC)    Constipation    Please contact the SecureChat role for the Orthopedics service with any questions/concerns.    History of Present Illness   HPI: Oni Nagy is a 74 y.o. male 8 days status post L2-5 decompression with L3-5 fusion.  We are consulted for continuity of care.  Patient states with regards to his surgery he has been doing well overall.  Pain is improving. He was discharged to [facility] rehab after surgery. He is admitted to medicine following an episode of marked altered mental status earlier today. He acknowledges some memory difficulties from earlier today, but says he's doing well now. He states he has some difficulty with urinary retention and constipation.      Review of Systems  I have reviewed the patient's available PMH, PSH, Social History, Family History, Meds, and Allergies  Historical Information   Past Medical History:   Diagnosis Date    Anesthesia complication     urinary retention for all of surgeries    Benign essential  hypertension 03/23/2011    BPH (benign prostatic hyperplasia) 02/20/2023    Last Assessment & Plan:  Formatting of this note might be different from the original. Doing well.  Cont tamsulosin and saw palmetto.  We did discuss adding finasteride but will have repeat psa prior.  Also discussed indications for holep.  As of now, he's doing great. Last episode of retention was clearly related to anesthesia.  Fu 4-6w with psa    Cardiogenic shock ([facility]) 07/05/2022    Chronic kidney disease, stage III (moderate) ([facility])     Coronary atherosclerosis 03/23/2011    Last Assessment & Plan:  There are no symptoms of an anginal syndrome. I provided education regarding the nature of coronary artery disease and our recommendations to reduce his long-term risk of ACS.  I recommended he continue utilizing aspirin and statins to minimize risk.  A lipid profile was requested with the option to use high intensity statins as an alternative to Mevacor in response to per    Enlarged prostate     Epistaxis 01/09/2022    History of aortic valve disease     History of mitral valve disease     History of transfusion     May 2021 - no adverse reaction    Paroxysmal atrial fibrillation ([facility])  02/06/2020    Pulmonary nodules 02/05/2021    PVC's (premature ventricular contractions)     S/P mitral valve repair 06/15/2021    May 2021 at [facility]    Status post aortic valve replacement 05/31/2021    At [facility] May 2021    SVT (supraventricular tachycardia) ([facility])     Syncope, cardiogenic     Thoracic aortic aneurysm without rupture ([facility]) 02/05/2021    4.5cm on CT 1/21  Formatting of this note might be different from the original. 4.5cm on CT chest 1/28/21  Last Assessment & Plan:  Formatting of this note might be different from the original. 6/6/21 chest CT showed stable 4.3 cm ascending thoracic aortic aneurysm.    Thrombocytopenia ([facility]) 07/05/2022     Past Surgical History:   Procedure Laterality Date    CARDIAC ELECTROPHYSIOLOGY STUDY AND  ABLATION  07/2024    CARDIAC SURGERY      MV repair, AV replaced and aortic aneurysm repaired    CAUTERIZE INNER NOSE Left 01/09/2022    Procedure: Sphenopalatine artery ligation;  Surgeon: Alfonso Rodriguez MD;  Location: [facility];  Service: ENT    COLONOSCOPY  2007    Dr. Schreiber.  Tubular adenoma descending colon polyp.    COLONOSCOPY  2010    Dr. Schreiber.  Diverticulosis.    COLONOSCOPY  08/2020    Dr. Schreiber.  12 mm descending colon inflammatory polyp, 3 mm benign lymphoid aggregate, diverticulosis.    FACIAL/NECK BIOPSY Left 11/30/2021    Procedure: EXCISION PYOGENIC GRANULOMA; FREE MUCOSAL GRAFT FROM NOSE;  Surgeon: Alfonso Rodriguez MD;  Location: [facility];  Service: ENT    INGUINAL HERNIA REPAIR      LUMBAR FUSION Bilateral 10/10/2024    Procedure: Navigated L2-5 laminectomy/decompression, L3-5 instrumented fusion, TLIF;  Surgeon: Jerod Foss MD;  Location: [facility];  Service: Orthopedics    TONSILLECTOMY       Social History     Tobacco Use    Smoking status: Never    Smokeless tobacco: Never   Vaping Use    Vaping status: Never Used   Substance and Sexual Activity    Alcohol use: Not Currently    Drug use: Never    Sexual activity: Not on file     E-Cigarette/Vaping    E-Cigarette Use Never User      E-Cigarette/Vaping Substances    Nicotine No     THC No     CBD No     Flavoring No     Other No     Unknown No        Social History     Tobacco Use    Smoking status: Never    Smokeless tobacco: Never   Vaping Use    Vaping status: Never Used   Substance and Sexual Activity    Alcohol use: Not Currently    Drug use: Never    Sexual activity: Not on file       Current Facility-Administered Medications:     acetaminophen (TYLENOL) tablet 650 mg, Q6H PRN    apixaban (ELIQUIS) tablet 5 mg, BID    atorvastatin (LIPITOR) tablet 20 mg, Daily    bisacodyl (DULCOLAX) rectal suppository 10 mg, Daily PRN    Cholecalciferol (VITAMIN D3) tablet 2,000 Units, Daily    diltiazem (CARDIZEM CD) 24 hr  capsule 120 mg, Daily    methocarbamol (ROBAXIN) tablet 500 mg, TID PRN    ondansetron (ZOFRAN) injection 4 mg, Q6H PRN    tamsulosin (FLOMAX) capsule 0.4 mg, Daily With Dinner  Prior to Admission Medications   Prescriptions Last Dose Informant Patient Reported? Taking?   Cholecalciferol (Vitamin D3) 50 MCG (2000 UT) capsule   Yes No   Sig: Take 2,000 Units by mouth daily   Eliquis 5 MG   Yes No   Sig: Take 5 mg by mouth 2 (two) times a day Instructed to hold starting 10/8. Pt states 10/7 will be last dose per sx   Magnesium 250 MG TABS  Self Yes No   Sig: Take 1 tablet by mouth daily     Saw Palmetto, Serenoa repens, (SAW PALMETTO PO)  Self Yes No   Sig: Take 1 capsule by mouth daily 250 mg   acetaminophen (TYLENOL) 650 mg CR tablet   No No   Sig: Take 1 tablet (650 mg total) by mouth every 8 (eight) hours as needed for mild pain   atorvastatin (LIPITOR) 20 mg tablet   Yes No   Sig: Take 20 mg by mouth daily   diltiazem (DILACOR XR) 120 MG 24 hr capsule   Yes No   Sig: Take 120 mg by mouth daily   lovastatin (MEVACOR) 40 MG tablet  Self Yes No   Sig: Take 1 tablet by mouth daily at bedtime   methocarbamol (ROBAXIN) 500 mg tablet   No No   Sig: Take 1 tablet (500 mg total) by mouth 3 (three) times a day as needed for muscle spasms   oxyCODONE (Roxicodone) 5 immediate release tablet   No No   Sig: Take 1 tablet (5 mg total) by mouth every 4 (four) hours as needed for moderate pain for up to 10 days Max Daily Amount: 30 mg   tamsulosin (FLOMAX) 0.4 mg  Self Yes No   Sig: TAKE 1 CAPSULE BY MOUTH EVERY DAY AT NIGHT      Facility-Administered Medications: None     Oxycodone    Objective      HR:  [] 82  BP: (131-168)/() 131/82  Resp:  [19-24] 19  SpO2:  [92 %-98 %] 98 %  O2 Device: None (Room air)  O2 Device: None (Room air)          I/O         10/16 0701  10/17 0700 10/17 0701  10/18 0700 10/18 0701  10/19 0700    I.V.   800    IV Piggyback  1100     Total Intake  1100 800    Urine  3000     Total Output   "3000     Net  -1900 +800                   Physical Exam   Ortho Exam   Musculoskeletal: Bilateral Lower Extremity  Surgical dressings are clean, dry, intact without drainage or saturation.  Tenderness around site of surgery  Sensation intact to light touch L3-S1  Motor bilaterally:  5/5 hip flexion, 5/5 knee flex, 5/5 knee ext, 5/5 ankle DF, 5/5 ankle PF, 5/5 EHL, 5/5 FHL  Digits warm well perfused with brisk cap refill        Imaging:  No new imaging obtained. Previous XR demonstrates hardware in good position without signs of loosening, subsidence, or hardware failure.        Lab Results: I have reviewed the following results:   Recent Labs     10/17/24  1808 10/18/24  0501   WBC 15.49* 18.61*   HGB 12.4 11.8*   HCT 37.4 36.2*    364   BUN 21 20   CREATININE 0.94 1.01   PTT 33  --    INR 1.25*  --      Blood Culture:   Lab Results   Component Value Date    BLOODCX Received in Microbiology Lab. Culture in Progress. 10/17/2024    BLOODCX Received in Microbiology Lab. Culture in Progress. 10/17/2024     Wound Culture: No results found for: \"WOUNDCULT\"    "

## 2024-10-18 NOTE — ASSESSMENT & PLAN NOTE
Following with orthopedic surgery spinal surgery, s/p navigated L2-5 laminectomy/decompression and L3-5 instrumented fusion 10/10/2024  Surgical site wound appears well-healing, likely outpatient follow-up with surgeon of record however if patient with ongoing infectious concerns may need to involve while inpatient

## 2024-10-18 NOTE — ASSESSMENT & PLAN NOTE
Patient noted with marked urinary retention during initial assessment for which Garcia catheter was placed draining reported 1.3 L urine,?  If this is contributing to symptomatology  UA not consistent with UTI  Maintain Garcia catheter for now

## 2024-10-18 NOTE — ASSESSMENT & PLAN NOTE
Tachycardia/tachypnea/leukocytosis present on admission, additionally with lactic acidosis resolved s/p IV fluids  No definitive infectious source at this time: CT chest/abdomen/pelvis without definitive pulmonary consolidation, questionable striated right nephrogram possibly representing pyelonephritis versus artifact from streak however UA not consistent UTI, possibility of colitis however patient apparently did not have bowel movement over past 1 week now with diarrhea after placement of Garcia catheter for retention.  Surgical site appearing well-healing  Noninfectious etiologies include agitation possibly from urinary retention and prior constipation  Received ceftriaxone during ED evaluation, follow-up results of blood cultures x 2 determine need for further antibiotics  Trend procalcitonin, WBC, temperature curve, hemodynamics

## 2024-10-18 NOTE — ASSESSMENT & PLAN NOTE
74 y.o.male POD 8 L2-5 decompression L3-5 TLIF. Patient doing well postoperatively.    WBAT B/L LE with 5 lb weight lifting restriction. Brace for comfort when out of bed.  PT/OT  Pain control  DVT ppx: eliquis  Medical management per primary team  Dispo: Ortho will follow

## 2024-10-19 VITALS
RESPIRATION RATE: 17 BRPM | HEART RATE: 72 BPM | TEMPERATURE: 98 F | WEIGHT: 201 LBS | SYSTOLIC BLOOD PRESSURE: 108 MMHG | HEIGHT: 72 IN | BODY MASS INDEX: 27.22 KG/M2 | OXYGEN SATURATION: 97 % | DIASTOLIC BLOOD PRESSURE: 74 MMHG

## 2024-10-19 PROBLEM — G93.40 ACUTE ENCEPHALOPATHY: Status: RESOLVED | Noted: 2024-10-17 | Resolved: 2024-10-19

## 2024-10-19 PROBLEM — G93.41 ACUTE METABOLIC ENCEPHALOPATHY: Status: RESOLVED | Noted: 2024-10-17 | Resolved: 2024-10-19

## 2024-10-19 PROBLEM — G93.41 ACUTE METABOLIC ENCEPHALOPATHY: Status: ACTIVE | Noted: 2024-10-17

## 2024-10-19 LAB
BASOPHILS # BLD AUTO: 0.05 THOUSANDS/ΜL (ref 0–0.1)
BASOPHILS NFR BLD AUTO: 1 % (ref 0–1)
DME PARACHUTE DELIVERY DATE REQUESTED: NORMAL
DME PARACHUTE ITEM DESCRIPTION: NORMAL
DME PARACHUTE ORDER STATUS: NORMAL
DME PARACHUTE SUPPLIER NAME: NORMAL
DME PARACHUTE SUPPLIER PHONE: NORMAL
EOSINOPHIL # BLD AUTO: 0.28 THOUSAND/ΜL (ref 0–0.61)
EOSINOPHIL NFR BLD AUTO: 3 % (ref 0–6)
ERYTHROCYTE [DISTWIDTH] IN BLOOD BY AUTOMATED COUNT: 13.9 % (ref 11.6–15.1)
HCT VFR BLD AUTO: 34.1 % (ref 36.5–49.3)
HGB BLD-MCNC: 11.1 G/DL (ref 12–17)
IMM GRANULOCYTES # BLD AUTO: 0.08 THOUSAND/UL (ref 0–0.2)
IMM GRANULOCYTES NFR BLD AUTO: 1 % (ref 0–2)
LYMPHOCYTES # BLD AUTO: 1.52 THOUSANDS/ΜL (ref 0.6–4.47)
LYMPHOCYTES NFR BLD AUTO: 16 % (ref 14–44)
MCH RBC QN AUTO: 29.9 PG (ref 26.8–34.3)
MCHC RBC AUTO-ENTMCNC: 32.6 G/DL (ref 31.4–37.4)
MCV RBC AUTO: 92 FL (ref 82–98)
MONOCYTES # BLD AUTO: 0.8 THOUSAND/ΜL (ref 0.17–1.22)
MONOCYTES NFR BLD AUTO: 8 % (ref 4–12)
MRSA NOSE QL CULT: NORMAL
NEUTROPHILS # BLD AUTO: 7.05 THOUSANDS/ΜL (ref 1.85–7.62)
NEUTS SEG NFR BLD AUTO: 71 % (ref 43–75)
NRBC BLD AUTO-RTO: 0 /100 WBCS
PLATELET # BLD AUTO: 359 THOUSANDS/UL (ref 149–390)
PMV BLD AUTO: 9.3 FL (ref 8.9–12.7)
PROCALCITONIN SERPL-MCNC: 0.1 NG/ML
RBC # BLD AUTO: 3.71 MILLION/UL (ref 3.88–5.62)
WBC # BLD AUTO: 9.78 THOUSAND/UL (ref 4.31–10.16)

## 2024-10-19 PROCEDURE — NC001 PR NO CHARGE: Performed by: ORTHOPAEDIC SURGERY

## 2024-10-19 PROCEDURE — 84145 PROCALCITONIN (PCT): CPT | Performed by: PHYSICIAN ASSISTANT

## 2024-10-19 PROCEDURE — 99239 HOSP IP/OBS DSCHRG MGMT >30: CPT | Performed by: PHYSICIAN ASSISTANT

## 2024-10-19 PROCEDURE — 97162 PT EVAL MOD COMPLEX 30 MIN: CPT

## 2024-10-19 PROCEDURE — 97166 OT EVAL MOD COMPLEX 45 MIN: CPT

## 2024-10-19 PROCEDURE — 85025 COMPLETE CBC W/AUTO DIFF WBC: CPT | Performed by: PHYSICIAN ASSISTANT

## 2024-10-19 RX ORDER — APIXABAN 5 MG/1
5 TABLET, FILM COATED ORAL 2 TIMES DAILY
Start: 2024-10-19

## 2024-10-19 RX ADMIN — APIXABAN 5 MG: 5 TABLET, FILM COATED ORAL at 08:30

## 2024-10-19 RX ADMIN — ATORVASTATIN CALCIUM 20 MG: 20 TABLET, FILM COATED ORAL at 08:30

## 2024-10-19 RX ADMIN — Medication 2000 UNITS: at 08:30

## 2024-10-19 NOTE — ASSESSMENT & PLAN NOTE
Tachycardia/tachypnea/leukocytosis present on admission, additionally with lactic acidosis resolved s/p IV fluids  No definitive infectious source at this time: CT chest/abdomen/pelvis without definitive pulmonary consolidation, questionable striated right nephrogram possibly representing pyelonephritis versus artifact from streak however UA not consistent UTI, possibility of colitis however patient apparently did not have bowel movement over past 1 week now with diarrhea after placement of Garcia catheter for retention.  Surgical site appearing well-healing  Noninfectious etiologies include agitation possibly from urinary retention and prior constipation  BC x 2 negative at 24 hrs  Monitoring off all antibiotics   Trend procalcitonin, WBC, temperature curve, hemodynamics. Had trended up, repeat labs with -----------------

## 2024-10-19 NOTE — CASE MANAGEMENT
Case Management Discharge Planning Note    Patient name Oni Nagy  Location [facility] 8[facility]-01 MRN [id number]  : 1950 Date 10/19/2024       Current Admission Date: 10/17/2024  Current Admission Diagnosis:Acute encephalopathy   Patient Active Problem List    Diagnosis Date Noted Date Diagnosed    Acute encephalopathy 10/17/2024     Urinary retention 10/17/2024     SIRS (systemic inflammatory response syndrome) (HCC) 10/17/2024     Constipation 10/17/2024     Hyperglycemia 10/15/2024     Stroke-like symptoms 10/11/2024     Garcia catheter in place 2024     Lumbar radiculopathy 2024     Spinal stenosis of lumbar region at multiple levels 2024     Frequent PVCs 2023     Inguinal Hernia Right 2023     BPH (benign prostatic hyperplasia) 2023     S/P mitral valve repair 06/15/2021     Status post aortic valve replacement 2021     Pulmonary nodules 2021     Thoracic aortic aneurysm without rupture (HCC) 2021     Localized, primary osteoarthritis of shoulder region 2020     History of urinary retention 2020     Paroxysmal atrial fibrillation (HCC)  2020     Proteinuria 2017     Hematuria, microscopic 2017     Abdominal hernia 2017     Neurocardiogenic syncope 2016     Insomnia 10/14/2014     Hypercholesterolemia 10/14/2014     Elevated PSA w neg Bx 2014 10/14/2014     Benign essential hypertension 2011     Coronary atherosclerosis 2011       LOS (days): 2  Geometric Mean LOS (GMLOS) (days): 2.5  Days to GMLOS:0.9     OBJECTIVE:  Risk of Unplanned Readmission Score: 11.11         Current admission status: Inpatient   Preferred Pharmacy:   [id number] [place] [facility] [place] - [address]  [address]  [facility] [address]  Phone: [phone] Fax: [phone]    [id number] [place] [facility] [address]  [address] [facility] [address]  Phone: [phone] Fax:  [phone]    Primary Care Provider: Jose Roberto Mejia DO    Primary Insurance: MEDICARE  Secondary Insurance: AARP    DISCHARGE DETAILS:    Discharge planning discussed with:: Patient   DME Referral Provided  Referral made for DME?: Yes  DME referral completed for the following items:: Walker  DME Supplier Name:: [employer]    Other Referral/Resources/Interventions Provided:  Referral Comments: Pt will now be discharging home with OP PT/OT.  He will need a walker.  Ordered in [place] and will be delivered to bedside prior to DC.

## 2024-10-19 NOTE — PLAN OF CARE
Problem: NEUROSENSORY - ADULT  Goal: Achieves stable or improved neurological status  Description: INTERVENTIONS  - Monitor and report changes in neurological status  - Monitor vital signs such as temperature, blood pressure, glucose, and any other labs ordered   - Initiate measures to prevent increased intracranial pressure  - Monitor for seizure activity and implement precautions if appropriate      Outcome: Progressing     Problem: SKIN/TISSUE INTEGRITY - ADULT  Goal: Incision(s), wounds(s) or drain site(s) healing without S/S of infection  Description: INTERVENTIONS  - Assess and document dressing, incision, wound bed, drain sites and surrounding tissue  - Provide patient and family education  - Perform skin care every shift  Outcome: Progressing     Problem: MUSCULOSKELETAL - ADULT  Goal: Maintain or return mobility to safest level of function  Description: INTERVENTIONS:  - Assess patient's ability to carry out ADLs; assess patient's baseline for ADL function and identify physical deficits which impact ability to perform ADLs (bathing, care of mouth/teeth, toileting, grooming, dressing, etc.)  - Assess/evaluate cause of self-care deficits   - Assess range of motion  - Assess patient's mobility  - Assess patient's need for assistive devices and provide as appropriate  - Encourage maximum independence but intervene and supervise when necessary  - Involve family in performance of ADLs  - Assess for home care needs following discharge   - Consider OT consult to assist with ADL evaluation and planning for discharge  - Provide patient education as appropriate  Outcome: Progressing

## 2024-10-19 NOTE — PHYSICAL THERAPY NOTE
Physical Therapy Evaluation     Patient's Name: Oni Nagy    Admitting Diagnosis  Colitis [K52.9]  Spinal stenosis of lumbar region at multiple levels [M48.061]  Acute encephalopathy [G93.40]  Illness, unspecified [R69]    Problem List  Patient Active Problem List   Diagnosis    Abdominal hernia    Benign essential hypertension    Proteinuria    Neurocardiogenic syncope    Insomnia    Hypercholesterolemia    Hematuria, microscopic    Elevated PSA w neg Bx 2014    Coronary atherosclerosis    Paroxysmal atrial fibrillation (HCC)     Localized, primary osteoarthritis of shoulder region    History of urinary retention    Pulmonary nodules    Thoracic aortic aneurysm without rupture (HCC)    Status post aortic valve replacement    S/P mitral valve repair    BPH (benign prostatic hyperplasia)    Inguinal Hernia Right    Frequent PVCs    Lumbar radiculopathy    Spinal stenosis of lumbar region at multiple levels    Garcia catheter in place    Stroke-like symptoms    Hyperglycemia    Acute encephalopathy    Urinary retention    SIRS (systemic inflammatory response syndrome) (HCC)    Constipation       Past Medical History  Past Medical History:   Diagnosis Date    Anesthesia complication     urinary retention for all of surgeries    Benign essential hypertension 03/23/2011    BPH (benign prostatic hyperplasia) 02/20/2023    Last Assessment & Plan:  Formatting of this note might be different from the original. Doing well.  Cont tamsulosin and saw palmetto.  We did discuss adding finasteride but will have repeat psa prior.  Also discussed indications for holep.  As of now, he's doing great. Last episode of retention was clearly related to anesthesia.  Fu 4-6w with psa    Cardiogenic shock (HCC) 07/05/2022    Chronic kidney disease, stage III (moderate) ([facility])     Coronary atherosclerosis 03/23/2011    Last Assessment & Plan:  There are no symptoms of an anginal syndrome. I provided education regarding the nature of  coronary artery disease and our recommendations to reduce his long-term risk of ACS.  I recommended he continue utilizing aspirin and statins to minimize risk.  A lipid profile was requested with the option to use high intensity statins as an alternative to Mevacor in response to per    Enlarged prostate     Epistaxis 01/09/2022    History of aortic valve disease     History of mitral valve disease     History of transfusion     May 2021 - no adverse reaction    Paroxysmal atrial fibrillation (HCC)  02/06/2020    Pulmonary nodules 02/05/2021    PVC's (premature ventricular contractions)     S/P mitral valve repair 06/15/2021    May 2021 at [facility]    Status post aortic valve replacement 05/31/2021    At [facility] May 2021    SVT (supraventricular tachycardia) ([facility])     Syncope, cardiogenic     Thoracic aortic aneurysm without rupture ([facility]) 02/05/2021    4.5cm on CT 1/21  Formatting of this note might be different from the original. 4.5cm on CT chest 1/28/21  Last Assessment & Plan:  Formatting of this note might be different from the original. 6/6/21 chest CT showed stable 4.3 cm ascending thoracic aortic aneurysm.    Thrombocytopenia (HCC) 07/05/2022       Past Surgical History  Past Surgical History:   Procedure Laterality Date    CARDIAC ELECTROPHYSIOLOGY STUDY AND ABLATION  07/2024    CARDIAC SURGERY      MV repair, AV replaced and aortic aneurysm repaired    CAUTERIZE INNER NOSE Left 01/09/2022    Procedure: Sphenopalatine artery ligation;  Surgeon: Alfonso Rodriguez MD;  [facility];  Service: ENT    COLONOSCOPY  2007    Dr. Schreiber.  Tubular adenoma descending colon polyp.    COLONOSCOPY  2010    Dr. Schreiber.  Diverticulosis.    COLONOSCOPY  08/2020    Dr. Schreiber.  12 mm descending colon inflammatory polyp, 3 mm benign lymphoid aggregate, diverticulosis.    FACIAL/NECK BIOPSY Left 11/30/2021    Procedure: EXCISION PYOGENIC GRANULOMA; FREE MUCOSAL GRAFT FROM NOSE;  Surgeon: Alfonso Reyna  MD Michael;  [facility];  Service: ENT    INGUINAL HERNIA REPAIR      LUMBAR FUSION Bilateral 10/10/2024    Procedure: Navigated L2-5 laminectomy/decompression, L3-5 instrumented fusion, TLIF;  Surgeon: Jerod Foss MD;  Location:  MAIN OR;  Service: Orthopedics    TONSILLECTOMY          10/19/24 1319   PT Last Visit   PT Visit Date 10/19/24   Note Type   Note type Evaluation   Pain Assessment   Pain Assessment Tool 0-10   Pain Score 3   [facility]   Hospital Pain Intervention(s) Repositioned;Ambulation/increased activity;Emotional support   Restrictions/Precautions   Weight Bearing Precautions Per Order No   Other Precautions Fall Risk;Pain;Spinal precautions  (5lb weightlifting restriction)   Home Living   Type of Home House   Home Layout Two level;Performs ADLs on one level;Able to live on main level with bedroom/bathroom;Stairs to enter with rails  (3 ROZINA)   Bathroom Shower/Tub Tub/shower unit   Bathroom Toilet Raised   Bathroom Equipment Grab bars in shower;Shower chair   Home Equipment Walker;Cane  (reports it is a standard walker)   Prior Function   Level of [place] Independent with ADLs;Independent with functional mobility;Independent with IADLS   Lives With Spouse  (spouse reports she is home 24/7 and able to assist as needed)   Receives Help From Family   IADLs Independent with driving;Independent with meal prep;Independent with medication management   Falls in the last 6 months 0   General   Additional Pertinent History POD 8 L2-5 decompression L3-5 TLIF. WBAT BLEs, 5# weightlifting restriction. LSO for comfort when OOB per most recent ortho note   Family/Caregiver Present Yes  (spouse)   Cognition   Arousal/Participation Alert   Orientation Level Oriented X4   Following Commands Follows one step commands without difficulty   Comments pleasant and cooperative   RLE Assessment   RLE Assessment WFL   LLE Assessment   LLE Assessment WFL   Bed Mobility    Supine to Sit Unable to assess   Sit to Supine Unable to assess   Additional Comments pt left sitting OOB in recliner with all needs within reach   Transfers   Sit to Stand 6  Modified independent   Additional items Armrests   Stand to Sit 6  Modified independent   Additional items Armrests   Additional Comments RW   Ambulation/Elevation   Gait pattern Short stride;Foward flexed   Gait Assistance 5  Supervision   Assistive Device Rolling walker   Distance 2 x 50'   Stair Management Assistance 5  Supervision   Additional items Verbal cues   Stair Management Technique One rail L;Step to pattern;Foreward   Number of Stairs 4   Balance   Static Sitting Fair +   Dynamic Sitting Fair +   Static Standing Fair   Dynamic Standing Fair   Ambulatory Fair  (RW)   Activity Tolerance   Activity Tolerance Patient tolerated treatment well   Medical Staff Made Aware OT homero; co-session completed this date 2* increased medical complexity and multiple co-morbidities   Nurse Made Aware RN cleared   Assessment   Prognosis Good   Problem List Pain   Assessment Pt seen for moderate complexity PT evaluation. Pt with active PT eval/treat and up and OOB as tolerted orders. Pt is a 74 y.o. male who was admitted to [facility] on 10/17 with acute encephalopathy. Pt recently admitted for spinal stenosis undergoing L2-5 decompression, L3-5 TLIF. Per most recent orthopedic note, WBAT b/l Les with 5# lifting restriction and Lso brace for comfort OOB. Pt's active problems include: urinary retention, SIRS, constipation.Pt  has a past medical history of Anesthesia complication, Benign essential hypertension (03/23/2011), BPH (benign prostatic hyperplasia) (02/20/2023), Cardiogenic shock (HCC) (07/05/2022), Chronic kidney disease, stage III (moderate) (HCC), Coronary atherosclerosis (03/23/2011), Enlarged prostate, Epistaxis (01/09/2022), History of aortic valve disease, History of mitral valve disease, History of transfusion, Paroxysmal  atrial fibrillation (HCC)  (02/06/2020), Pulmonary nodules (02/05/2021), PVC's (premature ventricular contractions), S/P mitral valve repair (06/15/2021), Status post aortic valve replacement (05/31/2021), SVT (supraventricular tachycardia) (HCC), Syncope, cardiogenic, Thoracic aortic aneurysm without rupture (HCC) (02/05/2021), and Thrombocytopenia (HCC) (07/05/2022). Pt resides with spouse in 2  with 3 ROZINA and was independent prior to hospital admission. Currently upon evaluation pt performing funational transfers at mod I level and ambulation at S level with RW. Pt was left sitting OOB in recliner at the end of PT session with all needs within reach. Pt with no questions or concerns regarding d/c home; appears to be functioning at/ near baseline mobility levels. Pt with no further acute inpatient PT needs at this time- please re-consult if needed. PT to discharge pt and recommends level III resource intensity. Encourage pt to ambulate at least 3-4x/day with restorative/ nursing staff while remaining in hospital. The patient's AM-PAC Basic Mobility Inpatient Short Form Raw Score is 19. A Raw score of greater than 16 suggests the patient may benefit from discharge to home. Please also refer to the recommendation of the Physical Therapist for safe discharge planning.   Goals   Patient Goals to go home   Plan   PT Frequency   (eval only- d/c PT)   Discharge Recommendation   Rehab Resource Intensity Level, PT III (Minimum Resource Intensity)   Equipment Recommended Walker   Walker Package Recommended Wheeled walker   AM-PAC Basic Mobility Inpatient   Turning in Flat Bed Without Bedrails 3   Lying on Back to Sitting on Edge of Flat Bed Without Bedrails 3   Moving Bed to Chair 3   Standing Up From Chair Using Arms 4   Walk in Room 3   Climb 3-5 Stairs With Railing 3   Basic Mobility Inpatient Raw Score 19   Basic Mobility Standardized Score 42.48   [facility] Level Of Mobility   [facility] Goal 6: Walk 10 steps  or more   -HLM Achieved 7: Walk 25 feet or more   Modified Trion Scale   Modified Jaylyn Scale 3           Sandy Aguilar, PT DPT

## 2024-10-19 NOTE — ASSESSMENT & PLAN NOTE
"Presenting from [facility] rehab with marked altered mental status, reportedly AAOx0 and agitated.  Baseline per wife is A & O x 4  There was initial concern for possible infectious etiology however no infectious etiology identified, see discussion under \"SIRS\"  CT head negative for acute intra pathology.  Remainder of labs otherwise without marked abnormality to explain symptomatology  Patient noted with marked urinary retention and reported constipation, now s/p Garcia catheter placement draining at least 1.3 L urine and patient now having bowel movements  Mental status now back to baseline after resolution of urinary retention and constipation, suspect this is the etiology  WBC and procalcitonin have now increased with unclear significance.  Infectious work up to date is negative, though consideration for possible colitis as etiology.    He is not having any GI sx.  WBC/procal have normalized without intervention.  Low suspicion for infectious process at this time.  Stable for d/c   Encourage sleep-wake cycle, as needed melatonin for insomnia  Suspected multifactorial from urinary retention, constipation, lack of sleep, delirium.  Resolved.   "

## 2024-10-19 NOTE — ASSESSMENT & PLAN NOTE
"[facility] mental status, reportedly AAOx0 and agitated.  Baseline per wife is A & O x 4  There was initial concern for possible infectious etiology however no infectious etiology identified, see discussion under \"SIRS\"  CT head negative for acute intra pathology.  Remainder of labs otherwise without marked abnormality to explain symptomatology  Patient noted with marked urinary retention and reported constipation, now s/p Garcia catheter placement draining at least 1.3 L urine and patient now having bowel movements  Mental status now back to baseline after resolution of urinary retention and constipation, suspect this is the etiology  WBC and procalcitonin have now increased with unclear significance.  Infectious work up to date is negative, though consideration for possible colitis as etiology.  Monitor stool output and follow labs (pending today)   Encourage sleep-wake cycle, as needed melatonin for insomnia  "

## 2024-10-19 NOTE — OCCUPATIONAL THERAPY NOTE
Occupational Therapy Evaluation     Patient Name: Oni Nagy  Today's Date: 10/19/2024  Problem List  Active Problems:    Paroxysmal atrial fibrillation (HCC)     BPH (benign prostatic hyperplasia)    Spinal stenosis of lumbar region at multiple levels    Urinary retention    SIRS (systemic inflammatory response syndrome) (HCC)    Constipation    Past Medical History  Past Medical History:   Diagnosis Date    Acute encephalopathy 10/17/2024    Acute metabolic encephalopathy 10/17/2024    Anesthesia complication     urinary retention for all of surgeries    Benign essential hypertension 03/23/2011    BPH (benign prostatic hyperplasia) 02/20/2023    Last Assessment & Plan:  Formatting of this note might be different from the original. Doing well.  Cont tamsulosin and saw palmetto.  We did discuss adding finasteride but will have repeat psa prior.  Also discussed indications for holep.  As of now, he's doing great. Last episode of retention was clearly related to anesthesia.  Fu 4-6w with psa    Cardiogenic shock (HCC) 07/05/2022    Chronic kidney disease, stage III (moderate) ([facility])     Coronary atherosclerosis 03/23/2011    Last Assessment & Plan:  There are no symptoms of an anginal syndrome. I provided education regarding the nature of coronary artery disease and our recommendations to reduce his long-term risk of ACS.  I recommended he continue utilizing aspirin and statins to minimize risk.  A lipid profile was requested with the option to use high intensity statins as an alternative to Mevacor in response to per    Enlarged prostate     Epistaxis 01/09/2022    History of aortic valve disease     History of mitral valve disease     History of transfusion     May 2021 - no adverse reaction    Paroxysmal atrial fibrillation (HCC)  02/06/2020    Pulmonary nodules 02/05/2021    PVC's (premature ventricular contractions)     S/P mitral valve repair 06/15/2021    May 2021 at [facility]    Status post  aortic valve replacement 05/31/2021    At [facility] May 2021    SVT (supraventricular tachycardia) (HCC)     Syncope, cardiogenic     Thoracic aortic aneurysm without rupture (HCC) 02/05/2021    4.5cm on CT 1/21  Formatting of this note might be different from the original. 4.5cm on CT chest 1/28/21  Last Assessment & Plan:  Formatting of this note might be different from the original. 6/6/21 chest CT showed stable 4.3 cm ascending thoracic aortic aneurysm.    Thrombocytopenia (HCC) 07/05/2022     Past Surgical History  Past Surgical History:   Procedure Laterality Date    CARDIAC ELECTROPHYSIOLOGY STUDY AND ABLATION  07/2024    CARDIAC SURGERY      MV repair, AV replaced and aortic aneurysm repaired    CAUTERIZE INNER NOSE Left 01/09/2022    Procedure: Sphenopalatine artery ligation;  Surgeon: Alfonso Rodriguez MD;  [facility];  Service: ENT    COLONOSCOPY  2007    Dr. Schreiber.  Tubular adenoma descending colon polyp.    COLONOSCOPY  2010    Dr. Schreiber.  Diverticulosis.    COLONOSCOPY  08/2020    Dr. Schreiber.  12 mm descending colon inflammatory polyp, 3 mm benign lymphoid aggregate, diverticulosis.    FACIAL/NECK BIOPSY Left 11/30/2021    Procedure: EXCISION PYOGENIC GRANULOMA; FREE MUCOSAL GRAFT FROM NOSE;  Surgeon: Alfonso Rodriguez MD;  [facility];  Service: ENT    INGUINAL HERNIA REPAIR      LUMBAR FUSION Bilateral 10/10/2024    Procedure: Navigated L2-5 laminectomy/decompression, L3-5 instrumented fusion, TLIF;  Surgeon: Jerod Foss MD;  [facility];  Service: Orthopedics    TONSILLECTOMY          10/19/24 1318   OT Last Visit   OT Visit Date 10/19/24   Note Type   Note type Evaluation   Pain Assessment   Pain Assessment Tool 0-10   Pain Score 3   Pain Location/Orientation Location: [place]   Effect of Pain on Daily Activities limits comfort and activity tolerance   Patient's Stated Pain Goal No pain   Hospital Pain Intervention(s)  Repositioned;Ambulation/increased activity;Emotional support   Restrictions/Precautions   Weight Bearing Precautions Per Order No   Other Precautions Fall Risk;Pain;Spinal precautions  (5lb weightlifting restriction)   Home Living   Type of Home House   Home Layout Two level;Performs ADLs on one level;Able to live on main level with bedroom/bathroom;Stairs to enter with rails  (3 ROZINA)   Bathroom Shower/Tub Tub/shower unit   Bathroom Toilet Raised   Bathroom Equipment Grab bars in shower;Shower chair   Home Equipment Walker;Cane   Prior Function   Level of [place] Independent with ADLs;Independent with functional mobility;Independent with IADLS   Lives With Spouse   Receives Help From Family   IADLs Independent with driving;Independent with meal prep;Independent with medication management   Falls in the last 6 months 0   Vocational Full time employment   Lifestyle   Autonomy Pt reports I w/ ADLs, IADLs, and fxnl mobility w/o AD at baseline; + driving.   Reciprocal Relationships Pt lives w/ his spouse. She is home all the time.   Service to Others Pt is a  and .   Intrinsic Gratification Pt enjoys photography and painting.   General   Family/Caregiver Present Yes  (spouse present)   ADL   Eating Assistance 7  Independent   Grooming Assistance 7  Independent   UB Bathing Assistance 6  Modified Independent   LB Bathing Assistance 5  Supervision/Setup   UB Dressing Assistance 6  Modified independent   LB Dressing Assistance 5  Supervision/Setup   Toileting Assistance  5  Supervision/Setup   Bed Mobility   Supine to Sit Unable to assess   Sit to Supine Unable to assess   Additional Comments Pt in bathroom upon therapist's arrival and seated OOB in chair at end of OT evaluation.   Transfers   Sit to Stand 6  Modified independent   Additional items Armrests   Stand to Sit 6  Modified independent   Additional items Armrests   Additional Comments completed w/ RW for support   Functional Mobility    Functional Mobility 5  Supervision   Additional Comments Pt ambulated household distance w/ S usign RW for support.   Additional items Rolling walker   Balance   Static Sitting Fair +   Dynamic Sitting Fair +   Static Standing Fair   Dynamic Standing Fair   Ambulatory Fair   Activity Tolerance   Activity Tolerance Patient tolerated treatment well   Medical Staff Made Aware PT Sandy, due to pt's medical complexity and multiple comorbidities   Nurse Made Aware RN clearance prior to session   RUE Assessment   RUE Assessment WFL   LUE Assessment   LUE Assessment WFL   Hand Function   Gross Motor Coordination Functional   Fine Motor Coordination Functional   Cognition   Arousal/Participation Alert;Responsive;Cooperative   Attention Within functional limits   Orientation Level Oriented X4   Memory Within functional limits  (Pt able to recall social hx.)   Following Commands Follows one step commands without difficulty   Comments Pt identified by name and . Pt was pleasant, cooperative, and willing to participate in OT evaluation.   Assessment   Assessment Pt is a 74 y.o. male seen for OT evaluation s/p admission to [facility] on 10/17/2024. Pt diagnosed with Acute metabolic encephalopathy. Pt has a significant PMH impacting occupational performance including: Acute encephalopathy, Acute metabolic encephalopathy, Anesthesia complication, Benign essential hypertension, BPH (benign prostatic hyperplasia), Cardiogenic shock ([facility]), Chronic kidney disease, stage III (moderate) ([facility]), Coronary atherosclerosis, Enlarged prostate, Epistaxis, History of aortic valve disease, History of mitral valve disease, History of transfusion, Paroxysmal atrial fibrillation ([facility]) , Pulmonary nodules, PVC's (premature ventricular contractions), S/P mitral valve repair, Status post aortic valve replacement, SVT (supraventricular tachycardia) ([facility]), Syncope, cardiogenic, Thoracic aortic aneurysm without rupture ([facility]), and  Thrombocytopenia (HCC). Pt with active OT evaluation and treatment orders and activity orders. PTA, pt living with his spouse in a 2 SH w/ 3 ROZINA and FFSU available. Pt reports I w/ ADLs, IADLs, and fxnl mobility w/o AD at baseline; + driving. Pt agreeable and willing to participate in OT evaluation. During evaluation, pt was I for eating and grooming, mod I for UB ADLs, and S for LB ADLs and toileting. Pt was also mod I for transfers and S for fxnl mobility w/ RW. Pt performing ADLs at/near baseline level of independence and reports having good social support upon return home. No further acute OT needs identified at this time. Recommend continued active ADL participation and mobilization with hospital staff while in the hospital to increase pt’s endurance and strength upon D/C. From OT standpoint, recommend D/C to home with family support when medically cleared. D/C pt from OT caseload at this time.   Goals   Patient Goals to go home   Plan   OT Frequency Eval only   Discharge Recommendation   Rehab Resource Intensity Level, OT No post-acute rehabilitation needs   AM-PAC Daily Activity Inpatient   Lower Body Dressing 3   Bathing 3   Toileting 3   Upper Body Dressing 4   Grooming 4   Eating 4   Daily Activity Raw Score 21   Daily Activity Standardized Score (Calc for Raw Score >=11) 44.27   AM-PAC Applied Cognition Inpatient   Following a Speech/Presentation 4   Understanding Ordinary Conversation 4   Taking Medications 4   Remembering Where Things Are Placed or Put Away 4   Remembering List of 4-5 Errands 3   Taking Care of Complicated Tasks 3   Applied Cognition Raw Score 22   Applied Cognition Standardized Score 47.83         The patient's raw score on the AM-PAC Daily Activity Inpatient Short Form is 21. A raw score of greater than or equal to 19 suggests the patient may benefit from discharge to home. Please refer to the recommendation of the Occupational Therapist for safe discharge planning.    Pau  Luiza, MSOT, OTR/L

## 2024-10-19 NOTE — ASSESSMENT & PLAN NOTE
Tachycardia/tachypnea/leukocytosis present on admission, additionally with lactic acidosis resolved s/p IV fluids  No definitive infectious source at this time: CT chest/abdomen/pelvis without definitive pulmonary consolidation, questionable striated right nephrogram possibly representing pyelonephritis versus artifact from streak however UA not consistent UTI, possibility of colitis however patient apparently did not have bowel movement over past 1 week now with diarrhea after placement of Garcia catheter for retention.  Surgical site appearing well-healing  Noninfectious etiologies include agitation possibly from urinary retention and prior constipation  BC x 2 negative at 24 hrs  Monitoring off all antibiotics   Trend procalcitonin, WBC, temperature curve, hemodynamics. Had trended up, have now normalized on repeat without intervention.

## 2024-10-19 NOTE — DISCHARGE SUMMARY
"Discharge Summary - Hospitalist   Name: Oni Nagy 74 y.o. male I MRN: [id number]  Unit/Bed#: [facility] 809-01 I Date of Admission: 10/17/2024   Date of Service: 10/19/2024 I Hospital Day: 2     Assessment & Plan  Acute metabolic encephalopathy (Resolved: 10/19/2024)  [facility] with marked altered mental status, reportedly AAOx0 and agitated.  Baseline per wife is A & O x 4  There was initial concern for possible infectious etiology however no infectious etiology identified, see discussion under \"SIRS\"  CT head negative for acute intra pathology.  Remainder of labs otherwise without marked abnormality to explain symptomatology  Patient noted with marked urinary retention and reported constipation, now s/p Garcia catheter placement draining at least 1.3 L urine and patient now having bowel movements  Mental status now back to baseline after resolution of urinary retention and constipation, suspect this is the etiology  WBC and procalcitonin have now increased with unclear significance.  Infectious work up to date is negative, though consideration for possible colitis as etiology.    He is not having any GI sx.  WBC/procal have normalized without intervention.  Low suspicion for infectious process at this time.  Stable for d/c   Encourage sleep-wake cycle, as needed melatonin for insomnia  Suspected multifactorial from urinary retention, constipation, lack of sleep, delirium.  Resolved.   SIRS (systemic inflammatory response syndrome) (HCC)  Tachycardia/tachypnea/leukocytosis present on admission, additionally with lactic acidosis resolved s/p IV fluids  No definitive infectious source at this time: CT chest/abdomen/pelvis without definitive pulmonary consolidation, questionable striated right nephrogram possibly representing pyelonephritis versus artifact from streak however UA not consistent UTI, possibility of colitis however patient apparently did not have bowel movement over past 1 week " now with diarrhea after placement of Garcia catheter for retention.  Surgical site appearing well-healing  Noninfectious etiologies include agitation possibly from urinary retention and prior constipation  BC x 2 negative at 24 hrs  Monitoring off all antibiotics   Trend procalcitonin, WBC, temperature curve, hemodynamics. Had trended up, have now normalized on repeat without intervention.   Urinary retention  Patient noted with marked urinary retention during initial assessment for which Garcia catheter was placed draining reported 1.3 L urine,?  If this is contributing to symptomatology  UA not consistent with UTI  Resolution of altered mental status after Garcia catheter, pt is voiding on his own   Constipation  Wife reported that patient did not have a good bowel movement over the past 1 week reportedly requiring stool softeners/laxatives at acute rehab.  Per nursing here patient now having significant number of bowel movements  Patient now reports that he has not had a bowel movement in 9 days  CT abdomen/pelvis with gas distended stomach and fluid-filled distal sigmoid colon without wall thickening/enhancement suggestive of colitis and prominent rectal stool burden  Resolved.    Paroxysmal atrial fibrillation (HCC)   Currently in sinus rhythm  Continue diltiazem 120 mg daily  Continue anticoagulation with Eliquis  BPH (benign prostatic hyperplasia)  Now s/p Garcia catheter due to marked urinary retention, voiding trial DC Garcia  Continue Flomax  Urinary retention protocol  Spinal stenosis of lumbar region at multiple levels  Following with orthopedic surgery spinal surgery, s/p navigated L2-5 laminectomy/decompression and L3-5 instrumented fusion 10/10/2024  Surgical site wound appears well-healing, orthopedics evaluated, no concern for infection at surgical site at this time      Medical Problems       Resolved Problems  Date Reviewed: 10/15/2024            Resolved    * (Principal) Acute encephalopathy  10/19/2024     Resolved by  Nikky Steinberg PA-C        Discharging Physician / Practitioner: Nikky Steinberg PA-C  PCP: Jose Roberto Mejia DO  Admission Date:   Admission Orders (From admission, onward)       Ordered        10/17/24 2303  INPATIENT ADMISSION  Once                          Discharge Date: 10/19/24    Consultations During Hospital Stay:  None    Procedures Performed:   None    Significant Findings / Test Results:   CT head: no acute intracranial abnormality   Urinary retention >1.3 L out with mccord placement   CT c/a/p: no definite lung consolidation, ?pyelo vs artifact from streak, distended stomach, fluid filled istal sigmoid colon with bowel wall thickening and enhancement suggestive of colitis.  Prominent rectal stool burden.    Negative UA  Leukocytosis and elevated procalcitonin, unclear significance, have now resolved without intervention    Incidental Findings:   None     Test Results Pending at Discharge (will require follow up):   None     Outpatient Tests Requested:  None    Complications:  None    Reason for Admission: encephalopathy    Hospital Course:   Oni Nagy is a 74 y.o. male patient who originally presented to the hospital on 10/17/2024 due to acute encephalopathy, patient sent from rehab with confusion/agitation, which is not his baseline.  CT head negative.  He was noted to be constipated and to have urinary retention, over 1.3 L out with mccord placement.  Reportedly his mental status improved with mccord placement/draining of bladder.  Infectious work up pursued due to leukocytosis and elevated procal and other than possibly self limiting gastroenteritis infectious work up was negative.    Leukocytosis and elevated procalcitonin resolved without intervention.    Suspect altered mental status multifactorial in the setting of urinary retention, constipation, medication side effect, delirium, lack of sleep as patient notes he has only slept 2 to 3 hours each night since his  hospitalization and stay in rehab over the last approximate 2 weeks.    Patient reports [facility].  Patient reports he feels well enough to go home.  He was seen by PT/OT who recommend outpatient PT and that he have a rolling walker.  Patient is medically stable for discharge to home.    Please see above list of diagnoses and related plan for additional information.     Condition at Discharge: good    Discharge Day Visit / Exam:   Subjective:  No complaints, feels well, denies abd pain/n/v/d.  Last BM was yesterday.  Eating ok.  Voiding ok.  Getting around ok.  Eager to go home   Vitals: Blood Pressure: 108/74 (10/19/24 0812)  Pulse: 72 (10/19/24 0812)  Temperature: 98 °F (36.7 °C) (10/19/24 0812)  Temp Source: Oral (10/17/24 1728)  Respirations: 17 (10/19/24 0812)  Height: 6' (182.9 cm) (10/18/24 1833)  Weight - Scale: 91.2 kg (201 lb) (10/18/24 1833)  SpO2: 97 % (10/19/24 0812)  Physical Exam  Vitals reviewed.   Constitutional:       General: He is not in acute distress.     Appearance: He is not toxic-appearing.   HENT:      Head: Normocephalic and atraumatic.   Eyes:      Extraocular Movements: Extraocular movements intact.   Cardiovascular:      Rate and Rhythm: Normal rate and regular rhythm.   Pulmonary:      Effort: Pulmonary effort is normal. No respiratory distress.   Abdominal:      General: Bowel sounds are normal. There is no distension.      Palpations: Abdomen is soft.      Tenderness: There is no abdominal tenderness.   Musculoskeletal:         General: Normal range of motion.   Neurological:      General: No focal deficit present.      Mental Status: He is alert and oriented to person, place, and time.   Psychiatric:         Mood and Affect: Mood normal.         Behavior: Behavior normal.         Thought Content: Thought content normal.         Discussion with Family: Updated  (wife) at bedside.    Discharge  instructions/Information to patient and family:   See after visit summary for information provided to patient and family.      Provisions for Follow-Up Care:  See after visit summary for information related to follow-up care and any pertinent home health orders.      Mobility at time of Discharge:   Basic Mobility Inpatient Raw Score: 19  JH-HLM Goal: 6: Walk 10 steps or more  JH-HLM Achieved: 7: Walk 25 feet or more  HLM Goal achieved. Continue to encourage appropriate mobility.     Disposition:   Home    Planned Readmission: no    Discharge Medications:  See after visit summary for reconciled discharge medications provided to patient and/or family.      Administrative Statements   Discharge Statement:  I have spent a total time of 35 minutes in caring for this patient on the day of the visit/encounter. >30 minutes of time was spent on: Diagnostic results, Risks and benefits of tx options, Instructions for management, Patient and family education, Impressions, Counseling / Coordination of care, Documenting in the medical record, Reviewing / ordering tests, medicine, procedures  , and Communicating with other healthcare professionals .    **Please Note: This note may have been constructed using a voice recognition system**

## 2024-10-19 NOTE — ASSESSMENT & PLAN NOTE
Patient noted with marked urinary retention during initial assessment for which Garcia catheter was placed draining reported 1.3 L urine,?  If this is contributing to symptomatology  UA not consistent with UTI  Resolution of altered mental status after Garcia catheter, will attempt voiding trial today -----------------------------  Urinary retention protocol

## 2024-10-19 NOTE — DISCHARGE INSTR - AVS FIRST PAGE
We suspect your confusion was related to multiple things including severe urinary retention, constipation, lack of sleep, delirium.  Infectious work up returned unremarkable.  Follow up with primary care and your surgeon on discharge

## 2024-10-19 NOTE — ASSESSMENT & PLAN NOTE
Wife reported that patient did not have a good bowel movement over the past 1 week reportedly requiring stool softeners/laxatives at acute rehab.  Per nursing here patient now having significant number of bowel movements  Patient now reports that he has not had a bowel movement in 9 days  CT abdomen/pelvis with gas distended stomach and fluid-filled distal sigmoid colon without wall thickening/enhancement suggestive of colitis and prominent rectal stool burden  Resolved.

## 2024-10-19 NOTE — PROGRESS NOTES
"Progress Note - Orthopedics   Name: Oni Nagy 74 y.o. male I MRN: [id number]  [facility] I Date of Admission: 10/17/2024   Date of Service: 10/19/2024 I Hospital Day: 2     Assessment & Plan  Spinal stenosis of lumbar region at multiple levels  74 y.o.male POD 8 L2-5 decompression L3-5 TLIF. Patient doing well postoperatively.    WBAT B/L LE with 5 lb weight lifting restriction. Brace for comfort when out of bed.  PT/OT  Pain control  DVT ppx: eliquis  Medical management per primary team  Dispo: Ortho will follow          Subjective   74 y.o.male POD 9 L2-5 decompression with L3-5 fusion. No acute events, no new complaints. Pain well controlled. Denies fevers, chills, CP, SOB, N/V, numbness or tingling. Patient reports no issues with urination or bowel movements. Patient states he has been doing well since his surgery and his AMS symptoms seem to have resolved.    Objective :  Temp:  [98.4 °F (36.9 °C)-99 °F (37.2 °C)] 99 °F (37.2 °C)  HR:  [75-82] 75  BP: (109-131)/(75-82) 109/75  Resp:  [18-19] 18  SpO2:  [97 %-98 %] 97 %  O2 Device: None (Room air)    Physical Exam  Musculoskeletal: Bilateral lower extremity  Surgical dressing clean, dry, intact, no drainage or saturation  Tenderness around site of surgery  Sensation tact light touch through L3-S1 distributions  Bilateral motor 5/5 in all distributions including hip flexion, knee flexion, knee extension, ankle dorsiflexion, ankle plantarflexion, EHL, FHL  Digits warm well-perfused, brisk capillary refill      Lab Results: I have reviewed the following results:  Recent Labs     10/17/24  1808 10/18/24  0501   WBC 15.49* 18.61*   HGB 12.4 11.8*   HCT 37.4 36.2*    364   BUN 21 20   CREATININE 0.94 1.01   PTT 33  --    INR 1.25*  --      Blood Culture:    Lab Results   Component Value Date    BLOODCX No Growth at 24 hrs. 10/17/2024    BLOODCX No Growth at 24 hrs. 10/17/2024     Wound Culture: No results found for: \"WOUNDCULT\"  "

## 2024-10-19 NOTE — ASSESSMENT & PLAN NOTE
Now s/p Garcia catheter due to marked urinary retention, voiding trial DC Garcia -------------------------------  Continue Flomax  Urinary retention protocol

## 2024-10-19 NOTE — WOUND OSTOMY CARE
Consult Note - Wound   Oni Nagy 74 y.o. male MRN: [id number]  Unit/Bed#: [facility] 809-01 Encounter: [id number]        Assessment :   Patient admitted to [facility] due to acute encephalopathy. History of CKD, BPH, paroxysmal a-fib. Wound care consulted for buttock wound. Patient is agreeable to assessment, alert and oriented x4, continent of bowel and bladder, assist of 1 to turn for assessment, is an assist with care.     1. Pressure injury mid sacrum, stage 2-POA- Wound is oval/linear in shape, partial thickness, 100% non-blanchable pink tissue, with scant amount of serous drainage noted. Robyn-wound is dry, intact, blanchable.     2. Midline spine, distal- Healing surgical site with staples present, old drainage noted to dressing. Orthopedic surgery following along to assess wound site.     3. Bilateral elbows, hips, buttock, and heels- skin is dry, intact, blanchable.     Educated patient on importance of frequent offloading of pressure via turning, repositioning, and weight-shifting. Verbalized understanding of plan of care.    No induration, fluctuance, odor, warmth, redness, or purulence noted to the above noted wound. New dressing applied. Patient tolerated well, reports mild pain to the wound. See flow sheets for more detailed assessment findings. Will follow along.    Skin care Plan:  1-Cleanse sacro-buttocks with soap and water. Apply Silicone bordered foam over sacral wound. Chato with T for treatment. Change every three days and PRN.   2-Turn/reposition q2h for pressure re-distribution on skin .  3-Elevate heels to offload pressure  4-Moisturize skin daily with skin nourishing cream  5-Ehob cushion in chair when out of bed.  6-Hydraguard to bilateral heels BID and PRN.   7-Lower back surgical site- Cleanse with NSS, pat dry. Apply Mepilex silver surgical dressing over wound. Change every 3 days or sooner if soiled or displaced.      Wound 10/10/24 Back Bilateral (Active)   Wound Image   10/19/24 5385    Wound Description Dry;Intact;Other (Comment) 10/19/24 0948   Robyn-wound Assessment Dry;Intact 10/19/24 0948   Wound Site Closure Staples 10/19/24 0948   Drainage Amount Other (Comment) 10/19/24 0948       Wound 10/18/24 Pressure Injury Sacrum Mid (Active)   Wound Image   10/19/24 0946   Wound Description Non-blanchable erythema;Pink 10/19/24 0946   Pressure Injury Stage 2 10/19/24 0946   Robyn-wound Assessment Dry;Intact 10/19/24 0946   Wound Length (cm) 1 cm 10/19/24 0946   Wound Width (cm) 0.5 cm 10/19/24 0946   Wound Depth (cm) 0.1 cm 10/19/24 0946   Wound Surface Area (cm^2) 0.5 cm^2 10/19/24 0946   Wound Volume (cm^3) 0.05 cm^3 10/19/24 0946   Calculated Wound Volume (cm^3) 0.05 cm^3 10/19/24 0946   Drainage Amount Scant 10/19/24 0946   Drainage Description Serous 10/19/24 0946   Non-staged Wound Description Partial thickness 10/19/24 0946   Treatments Cleansed;Site care 10/19/24 0946   Dressing Foam, Silicon (eg. Allevyn, etc) 10/19/24 0946   Wound packed? No 10/19/24 0946   Dressing Changed Changed 10/19/24 0946   Patient Tolerance Tolerated well 10/19/24 0946   Dressing Status Clean;Dry;Intact 10/19/24 0946       Contact through [facility] Secure Chat with any questions  Wound Care will continue to follow while inpatient    Jaymie LAKEN RN CWON  Wound and Ostomy care

## 2024-10-19 NOTE — ASSESSMENT & PLAN NOTE
Following with orthopedic surgery spinal surgery, s/p navigated L2-5 laminectomy/decompression and L3-5 instrumented fusion 10/10/2024  Surgical site wound appears well-healing, orthopedics evaluated, no concern for infection at surgical site at this time

## 2024-10-19 NOTE — ASSESSMENT & PLAN NOTE
Patient noted with marked urinary retention during initial assessment for which Garcia catheter was placed draining reported 1.3 L urine,?  If this is contributing to symptomatology  UA not consistent with UTI  Resolution of altered mental status after Garcia catheter, pt is voiding on his own

## 2024-10-19 NOTE — ASSESSMENT & PLAN NOTE
Wife reported that patient did not have a good bowel movement over the past 1 week reportedly requiring stool softeners/laxatives at acute rehab.  Per nursing here patient now having significant number of bowel movements  Patient now reports that he has not had a bowel movement in 9 days  CT abdomen/pelvis with gas distended stomach and fluid-filled distal sigmoid colon without wall thickening/enhancement suggestive of colitis and prominent rectal stool burden  ----------------------------------------------

## 2024-10-21 ENCOUNTER — TRANSITIONAL CARE MANAGEMENT (OUTPATIENT)
Dept: FAMILY MEDICINE CLINIC | Facility: CLINIC | Age: 74
End: 2024-10-21

## 2024-10-21 ENCOUNTER — TELEPHONE (OUTPATIENT)
Dept: OBGYN CLINIC | Facility: MEDICAL CENTER | Age: 74
End: 2024-10-21

## 2024-10-21 DIAGNOSIS — M48.061 SPINAL STENOSIS OF LUMBAR REGION, UNSPECIFIED WHETHER NEUROGENIC CLAUDICATION PRESENT: Primary | ICD-10-CM

## 2024-10-21 DIAGNOSIS — M54.16 RADICULOPATHY, LUMBAR REGION: ICD-10-CM

## 2024-10-21 LAB
DME PARACHUTE DELIVERY DATE ACTUAL: NORMAL
DME PARACHUTE DELIVERY DATE REQUESTED: NORMAL
DME PARACHUTE ITEM DESCRIPTION: NORMAL
DME PARACHUTE ORDER STATUS: NORMAL
DME PARACHUTE SUPPLIER NAME: NORMAL
DME PARACHUTE SUPPLIER PHONE: NORMAL

## 2024-10-21 RX ORDER — HYDROCODONE BITARTRATE AND ACETAMINOPHEN 5; 325 MG/1; MG/1
1 TABLET ORAL EVERY 6 HOURS PRN
Qty: 30 TABLET | Refills: 0 | Status: SHIPPED | OUTPATIENT
Start: 2024-10-21

## 2024-10-21 NOTE — TELEPHONE ENCOUNTER
Caller:  Sherron    Doctor: Dr Foss     Reason for call: The patient is home now and they never picked up his refills on pain medication due to the patient being admitted for some time. Sherron is calling and asking if something for pain may be called him for her . He has more pain when sitting getting up and down.    He did have severe nausea with :   oxyCODONE (Roxicodone)   He does not want that.   His pain level is an 8.    [facility] #[id number] - [place] - [address]  [address] [address]  Phone: [phone]  Fax: [phone]  RAE #: [id number]       Call back#: [phone]   Thank you

## 2024-10-22 LAB
BACTERIA BLD CULT: NORMAL
BACTERIA BLD CULT: NORMAL

## 2024-10-28 ENCOUNTER — OFFICE VISIT (OUTPATIENT)
Dept: OBGYN CLINIC | Facility: HOSPITAL | Age: 74
End: 2024-10-28

## 2024-10-28 ENCOUNTER — HOSPITAL ENCOUNTER (OUTPATIENT)
Dept: RADIOLOGY | Facility: HOSPITAL | Age: 74
Discharge: HOME | End: 2024-10-28
Attending: ORTHOPAEDIC SURGERY
Payer: MEDICARE

## 2024-10-28 VITALS
DIASTOLIC BLOOD PRESSURE: 83 MMHG | HEIGHT: 72 IN | SYSTOLIC BLOOD PRESSURE: 122 MMHG | WEIGHT: 201.06 LBS | BODY MASS INDEX: 27.23 KG/M2 | HEART RATE: 84 BPM

## 2024-10-28 DIAGNOSIS — Z98.1 S/P SPINAL FUSION: Primary | ICD-10-CM

## 2024-10-28 DIAGNOSIS — Z98.1 S/P SPINAL FUSION: ICD-10-CM

## 2024-10-28 PROCEDURE — 72100 X-RAY EXAM L-S SPINE 2/3 VWS: CPT

## 2024-10-28 PROCEDURE — 99024 POSTOP FOLLOW-UP VISIT: CPT | Performed by: ORTHOPAEDIC SURGERY

## 2024-10-28 NOTE — PROGRESS NOTES
POST OP NOTE     Oni Nagy  here today s/p L2-5 laminectomy/decompression, L3-5 instrumented fusion        Surgery date: 10/10/24    Subjective: Preoperative symptoms were low back pain. Today, he reports Pain with sitting 4/10, walking 2/10. Patient admits to compliance with activity restrictions.  Denies any fevers, chills, and night sweats.  No cough, no shortness of breath.  No chest pain, no palpitations.  No dysphagia. Post surgery was having trouble getting out of bed. Was admitted at [facility] in ED on 10/17/24 for altered mental state, after a PT appointment. Had urinary retention and constipation. Today he reports no issues with urination or constipation. Has been compliant with wearing brace and moving every hour for 5-10 minutes.    Post-Op Medications:  Taking extra strength tylenol  Has stopped narcotics     Objective:  /83   Pulse 84   Ht 6' (1.829 m)   Wt 91.2 kg (201 lb 1 oz)   BMI 27.27 kg/m²     Strength: 5/5 bilateral   Sensation: intact   Gait: ambulating with walker     incision healing extremely well. Staples intact.  No signs of erythema, discharge, or purulence.  There is no appreciable effusion.    Calf: soft, non tender, no swelling or erythema    Imaging: I have reviewed and independently visualized the imaging studies (10/28/24)  myself and my interpretation is the following: Instrumentation in place and in good alignment.    Assessment: s/p lumbar fusion, improving     Plan:  Staples were removed.     Patient was instructed to do the following   -Able to walk as much as tolerated.  -Continue with post op lumbar fusion ROM restrictions, no lifting >5-10lbs  -Take pain medication as prescribed if needed.    -Follow up in 1 month.  Oni Nagy was instructed to call the office with any concerns or questions.

## 2024-10-30 ENCOUNTER — TELEPHONE (OUTPATIENT)
Dept: FAMILY MEDICINE CLINIC | Facility: CLINIC | Age: 74
End: 2024-10-30

## 2024-10-30 NOTE — TELEPHONE ENCOUNTER
Received fax through [email or web address] 10-28-24 / 2:28 PM from [facility].  Pt has appt 11-4-24 at 1:00 PM with Dr. Mejia.  Forms was placed in Dr. Mejia's folder and put on his desk for his return to the office 10-31-24.

## 2024-11-04 ENCOUNTER — OFFICE VISIT (OUTPATIENT)
Dept: FAMILY MEDICINE CLINIC | Facility: CLINIC | Age: 74
End: 2024-11-04
Payer: MEDICARE

## 2024-11-04 VITALS
WEIGHT: 203.1 LBS | DIASTOLIC BLOOD PRESSURE: 76 MMHG | BODY MASS INDEX: 27.55 KG/M2 | SYSTOLIC BLOOD PRESSURE: 128 MMHG | HEART RATE: 80 BPM | OXYGEN SATURATION: 98 %

## 2024-11-04 DIAGNOSIS — Z98.1 STATUS POST LUMBAR SPINAL FUSION: ICD-10-CM

## 2024-11-04 DIAGNOSIS — R29.90 STROKE-LIKE SYMPTOMS: Primary | ICD-10-CM

## 2024-11-04 DIAGNOSIS — R33.9 URINARY RETENTION: ICD-10-CM

## 2024-11-04 DIAGNOSIS — K40.90 UNILATERAL INGUINAL HERNIA WITHOUT OBSTRUCTION OR GANGRENE, RECURRENCE NOT SPECIFIED: ICD-10-CM

## 2024-11-04 PROCEDURE — 99495 TRANSJ CARE MGMT MOD F2F 14D: CPT | Performed by: FAMILY MEDICINE

## 2024-11-04 NOTE — PROGRESS NOTES
"Ambulatory Visit  Name: Oni Nagy      : 1950      MRN: [id number]  Encounter Provider: Jose Roberto Mejia DO  Encounter Date: 2024   Encounter department: [facility]    Assessment & Plan  Stroke-like symptoms -resolved         Urinary retention         Status post lumbar spinal fusion (10/10/24)         Inguinal hernia right > left      Patient Instructions     Here in the office he initially had only very small amount darkened, yellow, clear urine, has not been hydrating much today due to fear of urinary instruction with pain.  Fortunately catheter is starting to drain, noticeable difference during visit here today.  He will increase hydration at home and follow-up with urology.  He is hoping to move up his surgical procedure for prostate, he does appear medically stable/medically optimized for procedure, he will be seeing cardiology again with clearance from them in 2 days.  He has held Eliquis for 2 to 3 days preop in the past.    Also has constipation along with right greater than left inguinal hernia.  Did have bowel movement in office, no blood, \"fairly normal-looking\"-continue with stool softener, fiber, use MiraLAX if needed.  Last colonoscopy in May 2023, redo 5 years was advised..  He requests surgical opinion regarding hernia, his wife has seen Dr. Grace.    Reviewed hospital stay  through , had mental status changes, CT of head with no acute finding, mental status back to baseline.            Lumbar incision is healing well, continues to do well other than urinary issues after fusion.    Orders:    Ambulatory Referral to General Surgery; Future       History of Present Illness     Status post L3-5 lumbar fusion October 10, subsequent urinary retention, urinary retention with issues with Garcia catheter has been ongoing problem, he had another emergency room visit yesterday as his Garcia was not draining, had overflow incontinence-saw urology " earlier today, Garcia was replaced-has noted minimal output.    Has had some constipation. Has had no issues with increased palpitations, chest pain, increased shortness of breath, fevers.  Blood work at ER yesterday showed hemoglobin 12.9, creatinine 0.94, glucose has been stable.  He is using Eliquis along with other medication, has had no blood in urine, no blood in stool.  Does note suprapubic pressure/pain.    Low back is healing well      TCM Call       Date and time call was made  10/21/2024  1:16 PM    Hospital care reviewed  Records reviewed    Patient was hospitialized at  [facility]    Date of Admission  10/17/24    Date of discharge  10/19/24    Diagnosis  Acute metabolic encephalopathy    Disposition  Home    Were the patients medications reviewed and updated  Yes    Current Symptoms  None          TCM Call       Post hospital issues  None    Scheduled for follow up?  Yes  11.4.24 Dr Mejia    Did you obtain your prescribed medications  Yes    Do you need help managing your prescriptions or medications  No    Is transportation to your appointment needed  No    I have advised the patient to call PCP with any new or worsening symptoms  Felipe PALUMBO              Review of Systems   Constitutional:         See HPI   Respiratory:  Negative for shortness of breath.    Cardiovascular:  Negative for chest pain and leg swelling.   Gastrointestinal:  Negative for nausea and vomiting.     Past Surgical History:   Procedure Laterality Date    CARDIAC ELECTROPHYSIOLOGY STUDY AND ABLATION  07/2024    CARDIAC SURGERY      MV repair, AV replaced and aortic aneurysm repaired    CAUTERIZE INNER NOSE Left 01/09/2022    Procedure: Sphenopalatine artery ligation;  Surgeon: Alfonso Rodriguez MD;  Location: [facility];  Service: ENT    COLONOSCOPY  2007    Dr. Schreiber.  Tubular adenoma descending colon polyp.    COLONOSCOPY  2010    Dr. Schreiber.  Diverticulosis.    COLONOSCOPY  08/2020    Dr. Schreiber.  12 mm descending  colon inflammatory polyp, 3 mm benign lymphoid aggregate, diverticulosis.    FACIAL/NECK BIOPSY Left 11/30/2021    Procedure: EXCISION PYOGENIC GRANULOMA; FREE MUCOSAL GRAFT FROM NOSE;  Surgeon: Alfonso Rodriguez MD;  Location: [facility];  Service: ENT    INGUINAL HERNIA REPAIR      LUMBAR FUSION Bilateral 10/10/2024    Procedure: Navigated L2-5 laminectomy/decompression, L3-5 instrumented fusion, TLIF;  Surgeon: Jerod Foss MD;  Location:  MAIN OR;  Service: Orthopedics    TONSILLECTOMY        Current Outpatient Medications on File Prior to Visit   Medication Sig Dispense Refill    acetaminophen (TYLENOL) 650 mg CR tablet Take 1 tablet (650 mg total) by mouth every 8 (eight) hours as needed for mild pain 30 tablet 0    atorvastatin (LIPITOR) 20 mg tablet Take 20 mg by mouth daily      Cholecalciferol (Vitamin D3) 50 MCG (2000 UT) capsule Take 2,000 Units by mouth daily      diltiazem (DILACOR XR) 120 MG 24 hr capsule Take 120 mg by mouth daily      Eliquis 5 MG Take 1 tablet (5 mg total) by mouth 2 (two) times a day      Magnesium 250 MG TABS Take 1 tablet by mouth daily        Saw Palmetto, Serenoa repens, (SAW PALMETTO PO) Take 1 capsule by mouth daily 250 mg      tamsulosin (FLOMAX) 0.4 mg TAKE 1 CAPSULE BY MOUTH EVERY DAY AT NIGHT      [DISCONTINUED] HYDROcodone-acetaminophen (Norco) 5-325 mg per tablet Take 1 tablet by mouth every 6 (six) hours as needed for pain Max Daily Amount: 4 tablets 30 tablet 0    [DISCONTINUED] lovastatin (MEVACOR) 40 MG tablet Take 1 tablet by mouth daily at bedtime      [DISCONTINUED] methocarbamol (ROBAXIN) 500 mg tablet Take 1 tablet (500 mg total) by mouth 3 (three) times a day as needed for muscle spasms 30 tablet 0     No current facility-administered medications on file prior to visit.          Objective     /76   Pulse 80   Wt 92.1 kg (203 lb 1.6 oz)   SpO2 98%   BMI 27.55 kg/m²     Physical Exam  Constitutional:       Comments: He is alert, oriented.  " Has Garcia catheter, small amount of dark yellow clear urine started to drain during our visit.  He did have tenderness suprapubic without guarding.    No scleral icterus, heart was regular rate and rhythm, lungs were clear and equal bilaterally.  He had no ankle edema.  Neurologically appears to be at baseline           Answers submitted by the patient for this visit:  Medicare Annual Wellness Visit (Submitted on 10/30/2024)  How would you rate your overall health?: fair  Compared to last year, how is your physical health?: slightly worse  In general, how satisfied are you with your life?: very satisfied  Compared to last year, how is your eyesight?: slightly worse  Compared to last year, how is your hearing?: same  Compared to last year, how is your emotional/mental health?: same  How often is anger a problem for you?: never, rarely  How often do you feel unusually tired/fatigued?: sometimes  In the past 7 days, how much pain have you experienced?: a lot  If you answered \"some\" or \"a lot\", please rate the severity of your pain on a scale of 1 to 10 (1 being the least severe pain and 10 being the most intense pain).: 4/10  In the past 6 months, have you lost or gained 10 pounds without trying?: No  One or more falls in the last year: No  Do you have trouble with the stairs inside or outside your home?: No  Does your home have working smoke alarms?: Yes  Does your home have a carbon monoxide monitor?: Yes  Which safety hazards (if any) have you experienced in your home? Please select all that apply.: none  How would you describe your current diet? Please select all that apply.: Regular  In addition to prescription medications, are you taking any over-the-counter supplements?: Yes  If yes, what supplements are you taking?: Saw Palmetto Magnesium  Can you manage your medications?: Yes  Are you currently taking any opioid medications?: No  Can you walk and transfer into and out of your bed and chair?: Yes  Can you " dress and groom yourself?: Yes  Can you bathe or shower yourself?: Yes  Can you feed yourself?: Yes  Can you do your laundry/ housekeeping?: Yes  Can you manage your money, pay your bills, and track your expenses?: Yes  Can you make your own meals?: Yes  Can you do your own shopping?: Yes  Within the last 12 months, have you had any hospitalizations or Emergency Department visits?: Yes  If yes, how many times have you been hospitalized within the past year?: more than 4  Do you have a living will?: Yes  Do you have a Durable POA (Power of ) for healthcare decisions?: Yes  Do you have an Advanced Directive for end of life decisions?: Yes  How often have you used an illegal drug (including marijuana) or a prescription medication for non-medical reasons in the past year?: never  What is the typical number of drinks you consume in a day?: 0  What is the typical number of drinks you consume in a week?: 0  How often did you have a drink containing alcohol in the past year?: never  How many drinks did you have on a typical day  when you were drinking in the past year?: 0  How often did you have 6 or more drinks on one occasion in the past year?: never

## 2024-11-04 NOTE — PATIENT INSTRUCTIONS
"  Here in the office he initially had only very small amount darkened, yellow, clear urine, has not been hydrating much today due to fear of urinary instruction with pain.  Fortunately catheter is starting to drain, noticeable difference during visit here today.  He will increase hydration at home and follow-up with urology.  He is hoping to move up his surgical procedure for prostate, he does appear medically stable/medically optimized for procedure, he will be seeing cardiology again with clearance from them in 2 days.  He has held Eliquis for 2 to 3 days preop in the past.    Also has constipation along with right greater than left inguinal hernia.  Did have bowel movement in office, no blood, \"fairly normal-looking\"-continue with stool softener, fiber, use MiraLAX if needed.  Last colonoscopy in May 2023, redo 5 years was advised..  He requests surgical opinion regarding hernia, his wife has seen Dr. Grace.    Reviewed hospital stay October 17 through 19, had mental status changes, CT of head with no acute finding, mental status back to baseline.            Lumbar incision is healing well, continues to do well other than urinary issues after fusion.  "

## 2024-11-04 NOTE — ASSESSMENT & PLAN NOTE
"    Patient Instructions     Here in the office he initially had only very small amount darkened, yellow, clear urine, has not been hydrating much today due to fear of urinary instruction with pain.  Fortunately catheter is starting to drain, noticeable difference during visit here today.  He will increase hydration at home and follow-up with urology.  He is hoping to move up his surgical procedure for prostate, he does appear medically stable/medically optimized for procedure, he will be seeing cardiology again with clearance from them in 2 days.  He has held Eliquis for 2 to 3 days preop in the past.    Also has constipation along with right greater than left inguinal hernia.  Did have bowel movement in office, no blood, \"fairly normal-looking\"-continue with stool softener, fiber, use MiraLAX if needed.  Last colonoscopy in May 2023, redo 5 years was advised..  He requests surgical opinion regarding hernia, his wife has seen Dr. Grace.    Reviewed hospital stay October 17 through 19, had mental status changes, CT of head with no acute finding, mental status back to baseline.            Lumbar incision is healing well, continues to do well other than urinary issues after fusion.    Orders:    Ambulatory Referral to General Surgery; Future    "

## 2024-11-05 NOTE — TELEPHONE ENCOUNTER
Dr. Mejia completed pre-op form for [facility].  It was scanned into pt's chart and faxed to [phone] along with Dr. Mejia's notes from the 11-4-24 OV.

## 2024-11-14 ENCOUNTER — TELEPHONE (OUTPATIENT)
Dept: OBGYN CLINIC | Facility: HOSPITAL | Age: 74
End: 2024-11-14

## 2024-11-14 NOTE — TELEPHONE ENCOUNTER
LM for pt to call back to reschedule due to Dr. Foss being ooo on 11/26. Provided call back number. When pt calls back please reschedule to 11/25 if this works for them.

## 2024-12-04 ENCOUNTER — OFFICE VISIT (OUTPATIENT)
Dept: OBGYN CLINIC | Facility: HOSPITAL | Age: 74
End: 2024-12-04

## 2024-12-04 ENCOUNTER — HOSPITAL ENCOUNTER (OUTPATIENT)
Dept: RADIOLOGY | Facility: HOSPITAL | Age: 74
Discharge: HOME | End: 2024-12-04
Payer: MEDICARE

## 2024-12-04 ENCOUNTER — CONSULT (OUTPATIENT)
Dept: SURGERY | Facility: CLINIC | Age: 74
End: 2024-12-04
Payer: MEDICARE

## 2024-12-04 VITALS
HEART RATE: 52 BPM | WEIGHT: 206 LBS | HEIGHT: 72 IN | TEMPERATURE: 97.4 F | OXYGEN SATURATION: 97 % | DIASTOLIC BLOOD PRESSURE: 76 MMHG | SYSTOLIC BLOOD PRESSURE: 118 MMHG | BODY MASS INDEX: 27.9 KG/M2

## 2024-12-04 VITALS — BODY MASS INDEX: 27.89 KG/M2 | HEIGHT: 72 IN | WEIGHT: 205.91 LBS

## 2024-12-04 DIAGNOSIS — K40.30 INCARCERATED RIGHT INGUINAL HERNIA: Primary | ICD-10-CM

## 2024-12-04 DIAGNOSIS — Z98.1 S/P SPINAL FUSION: Primary | ICD-10-CM

## 2024-12-04 DIAGNOSIS — Z98.1 S/P SPINAL FUSION: ICD-10-CM

## 2024-12-04 DIAGNOSIS — K40.90 UNILATERAL INGUINAL HERNIA WITHOUT OBSTRUCTION OR GANGRENE, RECURRENCE NOT SPECIFIED: ICD-10-CM

## 2024-12-04 PROCEDURE — 99024 POSTOP FOLLOW-UP VISIT: CPT

## 2024-12-04 PROCEDURE — 72100 X-RAY EXAM L-S SPINE 2/3 VWS: CPT

## 2024-12-04 PROCEDURE — 99203 OFFICE O/P NEW LOW 30 MIN: CPT | Performed by: SPECIALIST

## 2024-12-04 RX ORDER — ZINC GLUCONATE 50 MG
50 TABLET ORAL DAILY
COMMUNITY

## 2024-12-04 NOTE — PROGRESS NOTES
POST OP NOTE     Oni Nagy  here today s/p L2-5 laminectomy/decompression, L3-5 instrumented fusion        Surgery date: 10/10/24    Subjective: Preoperative symptoms were low back and left lower extremity pain. Today, he reports complete resolution of left lower extremity symptoms. Does report some back soreness at times that is not debilitating. He is overall happy with his post-operative recovery so far. He has returned to work as a . Continues to ambulate as much as tolerated. Patient admits to compliance with activity restrictions. Denies any fevers, chills, and night sweats.  No cough, no shortness of breath.  No chest pain, no palpitations.  No dysphagia.       Post-Op Medications:  No longer taking any pain medications    Objective:  Ht 6' (1.829 m)   Wt 93.4 kg (205 lb 14.6 oz)   BMI 27.93 kg/m²     Strength: 5/5 bilateral   Sensation: intact   Gait: ambulating independently    Posterior lumbar incision healing extremely well. No signs of erythema, discharge, or purulence.  There is no appreciable effusion.    Calf: soft, non tender, no swelling or erythema    Imaging: I have reviewed and independently visualized the imaging studies (12/04/24)  myself and my interpretation is the following: Instrumentation in place and in good alignment.    Assessment: s/p lumbar fusion on 10/10/2024. Overall doing very well.    Plan:    Patient was instructed to do the following   -Able to walk as much as tolerated. Continue to limit vigorous turning/twisting/bending and heavy lifting. Did recommend referral to physical therapy, however patient declined at this time due to overall feeling much more functional and he has upcoming hernia repair scheduled for 12/19/2024 and TURP procedure scheduled for 1/8/2025.  -Take pain medication as prescribed if needed. Did not require any refills at today's visit.   -Follow up in 6 weeks for 3 month post-op visit.  Oni Nagy was instructed to call the  office with any concerns or questions.

## 2024-12-04 NOTE — LETTER
December 10, 2024     Jose Roberto Mejia DO  [address]  [address]  [facility] [address]    Patient: Oni Nagy   YOB: 1950   Date of Visit: 12/4/2024       Dear Jorge    Thank you for referring Oni Nagy to me for evaluation. Below are the relevant portions of my H&P.    If you have questions, please do not hesitate to call me. I look forward to following Oni along with you.         Sincerely,    King Nino Grace MD        CC: DO Nino Garcia MD  12/10/2024  5:01 PM  Signed  Chief Complaint: Incarcerated right inguinal hernia      History of Present Illness: Patient is a 74-year-old white male whose wife is a previous patient of ours and who referred him to our office for evaluation.  The patient has a right inguinal hernia.  He has a long history of urinary retention.  Every time he has anesthesia (general anesthesia (she develops urinary retention.  He had an ablation in July got retention.  He needed a catheter which was eventually removed.  In October he had back surgery once again he went into retention and needed a Garcia catheter.  He was rehabilitating in [facility] when he developed strokelike symptoms and was admitted to [facility] with acute encephalopathy.  Improved and was subsequently discharged.    He presents the office today with a painful right inguinal hernia.  He says that when he urinates he can press on the hernia and he gets more urine.  This is indirect evidence that he has his bladder partially incarcerated in his hernia.    He is scheduled to undergo cystoscopy with holmium laser early in January of this year and would like to have the hernia repaired prior to this.  He is currently on Eliquis and needs to be cleared by cardiology.      Past Medical History:   Past Medical History:   Diagnosis Date   • Acute encephalopathy 10/17/2024   • Acute metabolic encephalopathy 10/17/2024   • Anesthesia complication     urinary retention for  all of surgeries   • Benign essential hypertension 03/23/2011   • BPH (benign prostatic hyperplasia) 02/20/2023    Last Assessment & Plan:  Formatting of this note might be different from the original. Doing well.  Cont tamsulosin and saw palmetto.  We did discuss adding finasteride but will have repeat psa prior.  Also discussed indications for holep.  As of now, he's doing great. Last episode of retention was clearly related to anesthesia.  Fu 4-6w with psa   • Cardiogenic shock ([facility]) 07/05/2022   • Chronic kidney disease, stage III (moderate) ([facility])    • Coronary atherosclerosis 03/23/2011    Last Assessment & Plan:  There are no symptoms of an anginal syndrome. I provided education regarding the nature of coronary artery disease and our recommendations to reduce his long-term risk of ACS.  I recommended he continue utilizing aspirin and statins to minimize risk.  A lipid profile was requested with the option to use high intensity statins as an alternative to Mevacor in response to per   • Enlarged prostate    • Epistaxis 01/09/2022   • History of aortic valve disease    • History of mitral valve disease    • History of transfusion     May 2021 - no adverse reaction   • Paroxysmal atrial fibrillation ([facility])  02/06/2020   • Pulmonary nodules 02/05/2021   • PVC's (premature ventricular contractions)    • S/P mitral valve repair 06/15/2021    May 2021 at [facility]   • Status post aortic valve replacement 05/31/2021    At [facility] May 2021   • SVT (supraventricular tachycardia) ([facility])    • Syncope, cardiogenic    • Thoracic aortic aneurysm without rupture ([facility]) 02/05/2021    4.5cm on CT 1/21  Formatting of this note might be different from the original. 4.5cm on CT chest 1/28/21  Last Assessment & Plan:  Formatting of this note might be different from the original. 6/6/21 chest CT showed stable 4.3 cm ascending thoracic aortic aneurysm.   • Thrombocytopenia ([facility]) 07/05/2022         Past Surgical History:     Past Surgical History:   Procedure Laterality Date   • CARDIAC ELECTROPHYSIOLOGY STUDY AND ABLATION  07/2024   • CARDIAC SURGERY      MV repair, AV replaced and aortic aneurysm repaired   • CAUTERIZE INNER NOSE Left 01/09/2022    Procedure: Sphenopalatine artery ligation;  Surgeon: Alfonso Rodriguez MD;  Location: [facility];  Service: ENT   • COLONOSCOPY  2007    Dr. Schreiber.  Tubular adenoma descending colon polyp.   • COLONOSCOPY  2010    Dr. Schreiber.  Diverticulosis.   • COLONOSCOPY  08/2020    Dr. Schreiber.  12 mm descending colon inflammatory polyp, 3 mm benign lymphoid aggregate, diverticulosis.   • FACIAL/NECK BIOPSY Left 11/30/2021    Procedure: EXCISION PYOGENIC GRANULOMA; FREE MUCOSAL GRAFT FROM NOSE;  Surgeon: Alfonso Rodriguez MD;  Location: [facility];  Service: ENT   • INGUINAL HERNIA REPAIR     • LUMBAR FUSION Bilateral 10/10/2024    Procedure: Navigated L2-5 laminectomy/decompression, L3-5 instrumented fusion, TLIF;  Surgeon: Jerod Foss MD;  Location: [facility];  Service: Orthopedics   • TONSILLECTOMY           Allergies:    Allergies   Allergen Reactions   • Oxycodone Nausea Only         Medications:    Current Outpatient Medications:   •  acetaminophen (TYLENOL) 650 mg CR tablet, Take 1 tablet (650 mg total) by mouth every 8 (eight) hours as needed for mild pain, Disp: 30 tablet, Rfl: 0  •  atorvastatin (LIPITOR) 20 mg tablet, Take 20 mg by mouth daily, Disp: , Rfl:   •  Cholecalciferol (Vitamin D3) 50 MCG (2000 UT) capsule, Take 2,000 Units by mouth daily, Disp: , Rfl:   •  diltiazem (DILACOR XR) 120 MG 24 hr capsule, Take 120 mg by mouth daily, Disp: , Rfl:   •  Eliquis 5 MG, Take 1 tablet (5 mg total) by mouth 2 (two) times a day, Disp: , Rfl:   •  Magnesium 250 MG TABS, Take 1 tablet by mouth daily  , Disp: , Rfl:   •  Saw Palmetto, Serenoa repens, (SAW PALMETTO PO), Take 1 capsule by mouth daily 250 mg, Disp: , Rfl:   •  tamsulosin (FLOMAX) 0.4 mg, TAKE 1 CAPSULE BY MOUTH  EVERY DAY AT NIGHT, Disp: , Rfl:   •  zinc gluconate 50 mg tablet, Take 50 mg by mouth daily, Disp: , Rfl:       Social History:  Social History    Social History     Substance and Sexual Activity   Alcohol Use Not Currently     Social History     Substance and Sexual Activity   Drug Use Never     Social History     Tobacco Use   Smoking Status Former   • Types: Cigarettes   • Passive exposure: Never   Smokeless Tobacco Former         Family History:    Family History   Problem Relation Age of Onset   • Alzheimer's disease Mother 80   • Esophageal cancer Mother    • Diabetes Father    • Diabetes Sister    • Kidney failure Sister    • Stroke Sister    • Coronary artery disease Sister         MI   • Breast cancer Sister    • Diabetes Maternal Grandmother    • Coronary artery disease Maternal Uncle         massive MI - age 49 and 50         Review of Systems:    Chronic urinary issues as per the HPI.  Right groin pain as per the HPI.'s chest pain or shortness of breath.  Otherwise essentially negative    Vitals:  Vitals:    12/04/24 1156   BP: 118/76   Pulse: (!) 52   Temp: (!) 97.4 °F (36.3 °C)   SpO2: 97%       Physical Exam:  Middle-age white male 6 feet tall 206 pounds.  He is awake alert no distress.    Vital signs as above    Skin warm dry  Head normocephalic and atraumatic  Eyes PERRLA EOM intact  Ears and nose within normal limits  Throat gag reflex intact  Neck no masses thyromegaly lymphadenopathy palpable.  Back scar in the lumbar over the lower back.  No CVA or spinal tenderness  Lungs clear to A&P  Cor median sternotomy scar is noted.  Regular rate and rhythm no murmurs or carotid bruits  Abdomen soft flat.  Nontender.  Scar in the left groin.  No evidence of recurrent hernia.  Left groin shows an obvious hernia partially reducible slightly tender to palpation.  He has no other abdominal masses or hernias noted.  Extremities negative CCE  Neurologically ANO x 3 cranials 2-12 intact  Lymphatics no  lymphadenopathy palpable.        Lab Results: I have personally reviewed pertinent reports. See below.  Imaging: I have personally reviewed pertinent reports.   EKG, Pathology, and Other Studies: I have personally reviewed pertinent reports.     No visits with results within 1 Day(s) from this visit.   Latest known visit with results is:   Admission on 10/17/2024, Discharged on 10/19/2024   Component Date Value   • WBC 10/17/2024 15.49 (H)    • RBC 10/17/2024 4.12    • Hemoglobin 10/17/2024 12.4    • Hematocrit 10/17/2024 37.4    • MCV 10/17/2024 91    • MCH 10/17/2024 30.1    • MCHC 10/17/2024 33.2    • RDW 10/17/2024 13.4    • MPV 10/17/2024 10.0    • Platelets 10/17/2024 365    • nRBC 10/17/2024 0    • Segmented % 10/17/2024 89 (H)    • Immature Grans % 10/17/2024 2    • Lymphocytes % 10/17/2024 5 (L)    • Monocytes % 10/17/2024 4    • Eosinophils Relative 10/17/2024 0    • Basophils Relative 10/17/2024 0    • Absolute Neutrophils 10/17/2024 13.73 (H)    • Absolute Immature Grans 10/17/2024 0.25 (H)    • Absolute Lymphocytes 10/17/2024 0.83    • Absolute Monocytes 10/17/2024 0.61    • Eosinophils Absolute 10/17/2024 0.02    • Basophils Absolute 10/17/2024 0.05    • Sodium 10/17/2024 136    • Potassium 10/17/2024 3.5    • Chloride 10/17/2024 98    • CO2 10/17/2024 24    • ANION GAP 10/17/2024 14 (H)    • BUN 10/17/2024 21    • Creatinine 10/17/2024 0.94    • Glucose 10/17/2024 186 (H)    • Calcium 10/17/2024 9.7    • AST 10/17/2024 20    • ALT 10/17/2024 27    • Alkaline Phosphatase 10/17/2024 85    • Total Protein 10/17/2024 7.4    • Albumin 10/17/2024 4.1    • Total Bilirubin 10/17/2024 1.27 (H)    • eGFR 10/17/2024 79    • LACTIC ACID 10/17/2024 3.6 (H)    • Procalcitonin 10/17/2024 0.06    • Protime 10/17/2024 16.0 (H)    • INR 10/17/2024 1.25 (H)    • PTT 10/17/2024 33    • Blood Culture 10/17/2024 No Growth After 5 Days.    • Blood Culture 10/17/2024 No Growth After 5 Days.    • Color, UA 10/17/2024 Light  Yellow    • Clarity, UA 10/17/2024 Clear    • Specific Gravity, UA 10/17/2024 1.012    • pH, UA 10/17/2024 8.0    • Leukocytes, UA 10/17/2024 Negative    • Nitrite, UA 10/17/2024 Negative    • Protein, UA 10/17/2024 Trace (A)    • Glucose, UA 10/17/2024 Negative    • Ketones, UA 10/17/2024 10 (1+) (A)    • Urobilinogen, UA 10/17/2024 2.0 (A)    • Bilirubin, UA 10/17/2024 Negative    • Occult Blood, UA 10/17/2024 Negative    • TSH 3RD GENERATON 10/17/2024 2.035    • Ammonia 10/18/2024 29    • pH, Trung 10/17/2024 7.428 (H)    • pCO2, Trung 10/17/2024 30.3 (L)    • pO2, Trung 10/17/2024 37.7    • HCO3, Trung 10/17/2024 19.6 (L)    • Base Excess, Trung 10/17/2024 -3.8    • O2 Content, Trung 10/17/2024 11.0    • O2 HGB, VENOUS 10/17/2024 68.3    • NIKO TEST 10/17/2024 Yes    • RBC, UA 10/17/2024 1-2    • WBC, UA 10/17/2024 1-2    • Epithelial Cells 10/17/2024 None Seen    • Bacteria, UA 10/17/2024 None Seen    • LACTIC ACID 10/17/2024 1.3    • Procalcitonin 10/18/2024 0.40 (H)    • Sodium 10/18/2024 135    • Potassium 10/18/2024 4.5    • Chloride 10/18/2024 100    • CO2 10/18/2024 25    • ANION GAP 10/18/2024 10    • BUN 10/18/2024 20    • Creatinine 10/18/2024 1.01    • Glucose 10/18/2024 142 (H)    • Calcium 10/18/2024 9.2    • eGFR 10/18/2024 72    • WBC 10/18/2024 18.61 (H)    • RBC 10/18/2024 3.88    • Hemoglobin 10/18/2024 11.8 (L)    • Hematocrit 10/18/2024 36.2 (L)    • MCV 10/18/2024 93    • MCH 10/18/2024 30.4    • MCHC 10/18/2024 32.6    • RDW 10/18/2024 13.9    • Platelets 10/18/2024 364    • MPV 10/18/2024 9.5    • MRSA Culture Only 10/18/2024 No Methicillin Resistant Staphlyococcus aureus (MRSA) isolated    • WBC 10/19/2024 9.78    • RBC 10/19/2024 3.71 (L)    • Hemoglobin 10/19/2024 11.1 (L)    • Hematocrit 10/19/2024 34.1 (L)    • MCV 10/19/2024 92    • MCH 10/19/2024 29.9    • MCHC 10/19/2024 32.6    • RDW 10/19/2024 13.9    • MPV 10/19/2024 9.3    • Platelets 10/19/2024 359    • nRBC 10/19/2024 0    • Segmented %  10/19/2024 71    • Immature Grans % 10/19/2024 1    • Lymphocytes % 10/19/2024 16    • Monocytes % 10/19/2024 8    • Eosinophils Relative 10/19/2024 3    • Basophils Relative 10/19/2024 1    • Absolute Neutrophils 10/19/2024 7.05    • Absolute Immature Grans 10/19/2024 0.08    • Absolute Lymphocytes 10/19/2024 1.52    • Absolute Monocytes 10/19/2024 0.80    • Eosinophils Absolute 10/19/2024 0.28    • Basophils Absolute 10/19/2024 0.05    • Procalcitonin 10/19/2024 0.10    • Supplier Name 10/19/2024 [employer]    • Supplier Phone Number 10/19/2024 [phone]    • Order Status 10/19/2024 Delivery Successful    • Delivery Request Date 10/19/2024 10/19/2024    • Date Delivered  10/19/2024 10/19/2024    • Item Description 10/19/2024 Leo Walker, Adult          Impression:  Right inguinal hernia with evidence of incarcerated bladder.  He would like to have this repaired prior to undergoing his prostatic surgery in the new year.    Plan:  At this point we will have him see his cardiologist for cardiac clearance prior to undergoing right inguinal hernia repair under local anesthesia with sedation.  Of course we will hold his Eliquis 24 to 48 hours prior to this pending cardiology recommendations.

## 2024-12-10 NOTE — H&P
Chief Complaint: Incarcerated right inguinal hernia      History of Present Illness: Patient is a 74-year-old white male whose wife is a previous patient of ours and who referred him to our office for evaluation.  The patient has a right inguinal hernia.  He has a long history of urinary retention.  Every time he has anesthesia (general anesthesia (she develops urinary retention.  He had an ablation in July got retention.  He needed a catheter which was eventually removed.  In October he had back surgery once again he went into retention and needed a Garcia catheter.  He was rehabilitating in [facility] when he developed strokelike symptoms and was admitted to [facility] with acute encephalopathy.  Improved and was subsequently discharged.    He presents the office today with a painful right inguinal hernia.  He says that when he urinates he can press on the hernia and he gets more urine.  This is indirect evidence that he has his bladder partially incarcerated in his hernia.    He is scheduled to undergo cystoscopy with holmium laser early in January of this year and would like to have the hernia repaired prior to this.  He is currently on Eliquis and needs to be cleared by cardiology.      Past Medical History:   Past Medical History:   Diagnosis Date    Acute encephalopathy 10/17/2024    Acute metabolic encephalopathy 10/17/2024    Anesthesia complication     urinary retention for all of surgeries    Benign essential hypertension 03/23/2011    BPH (benign prostatic hyperplasia) 02/20/2023    Last Assessment & Plan:  Formatting of this note might be different from the original. Doing well.  Cont tamsulosin and saw palmetto.  We did discuss adding finasteride but will have repeat psa prior.  Also discussed indications for holep.  As of now, he's doing great. Last episode of retention was clearly related to anesthesia.  Fu 4-6w with psa    Cardiogenic shock (HCC) 07/05/2022    Chronic kidney disease, stage III  (moderate) (HCC)     Coronary atherosclerosis 03/23/2011    Last Assessment & Plan:  There are no symptoms of an anginal syndrome. I provided education regarding the nature of coronary artery disease and our recommendations to reduce his long-term risk of ACS.  I recommended he continue utilizing aspirin and statins to minimize risk.  A lipid profile was requested with the option to use high intensity statins as an alternative to Mevacor in response to per    Enlarged prostate     Epistaxis 01/09/2022    History of aortic valve disease     History of mitral valve disease     History of transfusion     May 2021 - no adverse reaction    Paroxysmal atrial fibrillation (HCC)  02/06/2020    Pulmonary nodules 02/05/2021    PVC's (premature ventricular contractions)     S/P mitral valve repair 06/15/2021    May 2021 at [facility]    Status post aortic valve replacement 05/31/2021    At [facility] May 2021    SVT (supraventricular tachycardia) ([facility])     Syncope, cardiogenic     Thoracic aortic aneurysm without rupture (HCC) 02/05/2021    4.5cm on CT 1/21  Formatting of this note might be different from the original. 4.5cm on CT chest 1/28/21  Last Assessment & Plan:  Formatting of this note might be different from the original. 6/6/21 chest CT showed stable 4.3 cm ascending thoracic aortic aneurysm.    Thrombocytopenia (HCC) 07/05/2022         Past Surgical History:    Past Surgical History:   Procedure Laterality Date    CARDIAC ELECTROPHYSIOLOGY STUDY AND ABLATION  07/2024    CARDIAC SURGERY      MV repair, AV replaced and aortic aneurysm repaired    CAUTERIZE INNER NOSE Left 01/09/2022    Procedure: Sphenopalatine artery ligation;  Surgeon: Alfonso Rodriguez MD;  Location: [facility];  Service: ENT    COLONOSCOPY  2007    Dr. Schreiber.  Tubular adenoma descending colon polyp.    COLONOSCOPY  2010    Dr. Schreiber.  Diverticulosis.    COLONOSCOPY  08/2020    Dr. Schreiber.  12 mm descending colon inflammatory polyp, 3  mm benign lymphoid aggregate, diverticulosis.    FACIAL/NECK BIOPSY Left 11/30/2021    Procedure: EXCISION PYOGENIC GRANULOMA; FREE MUCOSAL GRAFT FROM NOSE;  Surgeon: Alfonso Rodriguez MD;  Location: [facility];  Service: ENT    INGUINAL HERNIA REPAIR      LUMBAR FUSION Bilateral 10/10/2024    Procedure: Navigated L2-5 laminectomy/decompression, L3-5 instrumented fusion, TLIF;  Surgeon: Jerod Foss MD;  Location: [facility];  Service: Orthopedics    TONSILLECTOMY           Allergies:    Allergies   Allergen Reactions    Oxycodone Nausea Only         Medications:    Current Outpatient Medications:     acetaminophen (TYLENOL) 650 mg CR tablet, Take 1 tablet (650 mg total) by mouth every 8 (eight) hours as needed for mild pain, Disp: 30 tablet, Rfl: 0    atorvastatin (LIPITOR) 20 mg tablet, Take 20 mg by mouth daily, Disp: , Rfl:     Cholecalciferol (Vitamin D3) 50 MCG (2000 UT) capsule, Take 2,000 Units by mouth daily, Disp: , Rfl:     diltiazem (DILACOR XR) 120 MG 24 hr capsule, Take 120 mg by mouth daily, Disp: , Rfl:     Eliquis 5 MG, Take 1 tablet (5 mg total) by mouth 2 (two) times a day, Disp: , Rfl:     Magnesium 250 MG TABS, Take 1 tablet by mouth daily  , Disp: , Rfl:     Saw Palmetto, Serenoa repens, (SAW PALMETTO PO), Take 1 capsule by mouth daily 250 mg, Disp: , Rfl:     tamsulosin (FLOMAX) 0.4 mg, TAKE 1 CAPSULE BY MOUTH EVERY DAY AT NIGHT, Disp: , Rfl:     zinc gluconate 50 mg tablet, Take 50 mg by mouth daily, Disp: , Rfl:       Social History:  Social History     Social History     Substance and Sexual Activity   Alcohol Use Not Currently     Social History     Substance and Sexual Activity   Drug Use Never     Social History     Tobacco Use   Smoking Status Former    Types: Cigarettes    Passive exposure: Never   Smokeless Tobacco Former         Family History:    Family History   Problem Relation Age of Onset    Alzheimer's disease Mother 80    Esophageal cancer Mother     Diabetes Father      Diabetes Sister     Kidney failure Sister     Stroke Sister     Coronary artery disease Sister         MI    Breast cancer Sister     Diabetes Maternal Grandmother     Coronary artery disease Maternal Uncle         massive MI - age 49 and 50         Review of Systems:    Chronic urinary issues as per the HPI.  Right groin pain as per the HPI.'s chest pain or shortness of breath.  Otherwise essentially negative    Vitals:  Vitals:    12/04/24 1156   BP: 118/76   Pulse: (!) 52   Temp: (!) 97.4 °F (36.3 °C)   SpO2: 97%       Physical Exam:  Middle-age white male 6 feet tall 206 pounds.  He is awake alert no distress.    Vital signs as above    Skin warm dry  Head normocephalic and atraumatic  Eyes PERRLA EOM intact  Ears and nose within normal limits  Throat gag reflex intact  Neck no masses thyromegaly lymphadenopathy palpable.  Back scar in the lumbar over the lower back.  No CVA or spinal tenderness  Lungs clear to A&P  Cor median sternotomy scar is noted.  Regular rate and rhythm no murmurs or carotid bruits  Abdomen soft flat.  Nontender.  Scar in the left groin.  No evidence of recurrent hernia.  Left groin shows an obvious hernia partially reducible slightly tender to palpation.  He has no other abdominal masses or hernias noted.  Extremities negative CCE  Neurologically ANO x 3 cranials 2-12 intact  Lymphatics no lymphadenopathy palpable.        Lab Results: I have personally reviewed pertinent reports. See below.  Imaging: I have personally reviewed pertinent reports.   EKG, Pathology, and Other Studies: I have personally reviewed pertinent reports.     No visits with results within 1 Day(s) from this visit.   Latest known visit with results is:   Admission on 10/17/2024, Discharged on 10/19/2024   Component Date Value    WBC 10/17/2024 15.49 (H)     RBC 10/17/2024 4.12     Hemoglobin 10/17/2024 12.4     Hematocrit 10/17/2024 37.4     MCV 10/17/2024 91     MCH 10/17/2024 30.1     MCHC 10/17/2024 33.2      RDW 10/17/2024 13.4     MPV 10/17/2024 10.0     Platelets 10/17/2024 365     nRBC 10/17/2024 0     Segmented % 10/17/2024 89 (H)     Immature Grans % 10/17/2024 2     Lymphocytes % 10/17/2024 5 (L)     Monocytes % 10/17/2024 4     Eosinophils Relative 10/17/2024 0     Basophils Relative 10/17/2024 0     Absolute Neutrophils 10/17/2024 13.73 (H)     Absolute Immature Grans 10/17/2024 0.25 (H)     Absolute Lymphocytes 10/17/2024 0.83     Absolute Monocytes 10/17/2024 0.61     Eosinophils Absolute 10/17/2024 0.02     Basophils Absolute 10/17/2024 0.05     Sodium 10/17/2024 136     Potassium 10/17/2024 3.5     Chloride 10/17/2024 98     CO2 10/17/2024 24     ANION GAP 10/17/2024 14 (H)     BUN 10/17/2024 21     Creatinine 10/17/2024 0.94     Glucose 10/17/2024 186 (H)     Calcium 10/17/2024 9.7     AST 10/17/2024 20     ALT 10/17/2024 27     Alkaline Phosphatase 10/17/2024 85     Total Protein 10/17/2024 7.4     Albumin 10/17/2024 4.1     Total Bilirubin 10/17/2024 1.27 (H)     eGFR 10/17/2024 79     LACTIC ACID 10/17/2024 3.6 (H)     Procalcitonin 10/17/2024 0.06     Protime 10/17/2024 16.0 (H)     INR 10/17/2024 1.25 (H)     PTT 10/17/2024 33     Blood Culture 10/17/2024 No Growth After 5 Days.     Blood Culture 10/17/2024 No Growth After 5 Days.     Color, UA 10/17/2024 Light Yellow     Clarity, UA 10/17/2024 Clear     Specific Gravity, UA 10/17/2024 1.012     pH, UA 10/17/2024 8.0     Leukocytes, UA 10/17/2024 Negative     Nitrite, UA 10/17/2024 Negative     Protein, UA 10/17/2024 Trace (A)     Glucose, UA 10/17/2024 Negative     Ketones, UA 10/17/2024 10 (1+) (A)     Urobilinogen, UA 10/17/2024 2.0 (A)     Bilirubin, UA 10/17/2024 Negative     Occult Blood, UA 10/17/2024 Negative     TSH 3RD GENERATON 10/17/2024 2.035     Ammonia 10/18/2024 29     pH, Trung 10/17/2024 7.428 (H)     pCO2, Trung 10/17/2024 30.3 (L)     pO2, Trung 10/17/2024 37.7     HCO3, Trung 10/17/2024 19.6 (L)     Base Excess, Trung 10/17/2024 -3.8     O2  Content, Trung 10/17/2024 11.0     O2 HGB, VENOUS 10/17/2024 68.3     NIKO TEST 10/17/2024 Yes     RBC, UA 10/17/2024 1-2     WBC, UA 10/17/2024 1-2     Epithelial Cells 10/17/2024 None Seen     Bacteria, UA 10/17/2024 None Seen     LACTIC ACID 10/17/2024 1.3     Procalcitonin 10/18/2024 0.40 (H)     Sodium 10/18/2024 135     Potassium 10/18/2024 4.5     Chloride 10/18/2024 100     CO2 10/18/2024 25     ANION GAP 10/18/2024 10     BUN 10/18/2024 20     Creatinine 10/18/2024 1.01     Glucose 10/18/2024 142 (H)     Calcium 10/18/2024 9.2     eGFR 10/18/2024 72     WBC 10/18/2024 18.61 (H)     RBC 10/18/2024 3.88     Hemoglobin 10/18/2024 11.8 (L)     Hematocrit 10/18/2024 36.2 (L)     MCV 10/18/2024 93     MCH 10/18/2024 30.4     MCHC 10/18/2024 32.6     RDW 10/18/2024 13.9     Platelets 10/18/2024 364     MPV 10/18/2024 9.5     MRSA Culture Only 10/18/2024 No Methicillin Resistant Staphlyococcus aureus (MRSA) isolated     WBC 10/19/2024 9.78     RBC 10/19/2024 3.71 (L)     Hemoglobin 10/19/2024 11.1 (L)     Hematocrit 10/19/2024 34.1 (L)     MCV 10/19/2024 92     MCH 10/19/2024 29.9     MCHC 10/19/2024 32.6     RDW 10/19/2024 13.9     MPV 10/19/2024 9.3     Platelets 10/19/2024 359     nRBC 10/19/2024 0     Segmented % 10/19/2024 71     Immature Grans % 10/19/2024 1     Lymphocytes % 10/19/2024 16     Monocytes % 10/19/2024 8     Eosinophils Relative 10/19/2024 3     Basophils Relative 10/19/2024 1     Absolute Neutrophils 10/19/2024 7.05     Absolute Immature Grans 10/19/2024 0.08     Absolute Lymphocytes 10/19/2024 1.52     Absolute Monocytes 10/19/2024 0.80     Eosinophils Absolute 10/19/2024 0.28     Basophils Absolute 10/19/2024 0.05     Procalcitonin 10/19/2024 0.10     Supplier Name 10/19/2024 [employer]     Supplier Phone Number 10/19/2024 [phone]     Order Status 10/19/2024 Delivery Successful     Delivery Request Date 10/19/2024 10/19/2024     Date Delivered  10/19/2024  10/19/2024     Item Description 10/19/2024 Wheeled Walker, Adult          Impression:  Right inguinal hernia with evidence of incarcerated bladder.  He would like to have this repaired prior to undergoing his prostatic surgery in the new year.    Plan:  At this point we will have him see his cardiologist for cardiac clearance prior to undergoing right inguinal hernia repair under local anesthesia with sedation.  Of course we will hold his Eliquis 24 to 48 hours prior to this pending cardiology recommendations.

## 2024-12-10 NOTE — PRE-PROCEDURE INSTRUCTIONS
Pre-Surgery Instructions:   Medication Instructions    atorvastatin (LIPITOR) 20 mg tablet Take night before surgery    Cholecalciferol (Vitamin D3) 50 MCG (2000 UT) capsule Take day of surgery.    diltiazem (DILACOR XR) 120 MG 24 hr capsule Take night before surgery    Eliquis 5 MG Instructions provided by MD    Magnesium 250 MG TABS Stop taking 7 days prior to surgery.    Saw Palmetto, Serenoa repens, (SAW PALMETTO PO) Stop taking 7 days prior to surgery.    tamsulosin (FLOMAX) 0.4 mg Take night before surgery    zinc gluconate 50 mg tablet Stop taking 7 days prior to surgery.    Medication instructions for day surgery reviewed. Please use only a sip of water to take your instructed medications. Avoid all over the counter vitamins, supplements and NSAIDS for one week prior to surgery per anesthesia guidelines. Tylenol is ok to take as needed.     You will receive a call one business day prior to surgery with an arrival time and hospital directions. If your surgery is scheduled on a Monday, the hospital will be calling you on the Friday prior to your surgery. If you have not heard from anyone by 8pm, please call the hospital supervisor through the hospital  at [phone]. ([place] [phone] or [place] [phone]).    Do not eat or drink anything after midnight the night before your surgery, including candy, mints, lifesavers, or chewing gum. Do not drink alcohol 24hrs before your surgery. Try not to smoke at least 24hrs before your surgery.       Follow the pre surgery showering instructions as listed in the “My Surgical Experience Booklet” or otherwise provided by your surgeon's office. Do not use a blade to shave the surgical area 1 week before surgery. It is okay to use a clean electric clippers up to 24 hours before surgery. Do not apply any lotions, creams, including makeup, cologne, deodorant, or perfumes after showering on the day of your surgery. Do not use dry shampoo, hair spray, hair  gel, or any type of hair products.     No contact lenses, eye make-up, or artificial eyelashes. Remove nail polish, including gel polish, and any artificial, gel, or acrylic nails if possible. Remove all jewelry including rings and body piercing jewelry.     Wear causal clothing that is easy to take on and off. Consider your type of surgery.    Keep any valuables, jewelry, piercings at home. Please bring any specially ordered equipment (sling, braces) if indicated.    Arrange for a responsible person to drive you to and from the hospital on the day of your surgery. Please confirm the visitor policy for the day of your procedure when you receive your phone call with an arrival time.     Call the surgeon's office with any new illnesses, exposures, or additional questions prior to surgery.    Please reference your “My Surgical Experience Booklet” for additional information to prepare for your upcoming surgery.

## 2024-12-10 NOTE — H&P (VIEW-ONLY)
Chief Complaint: Incarcerated right inguinal hernia      History of Present Illness: Patient is a 74-year-old white male whose wife is a previous patient of ours and who referred him to our office for evaluation.  The patient has a right inguinal hernia.  He has a long history of urinary retention.  Every time he has anesthesia (general anesthesia (she develops urinary retention.  He had an ablation in July got retention.  He needed a catheter which was eventually removed.  In October he had back surgery once again he went into retention and needed a Garcia catheter.  He was rehabilitating in [facility] when he developed strokelike symptoms and was admitted to [facility] with acute encephalopathy.  Improved and was subsequently discharged.    He presents the office today with a painful right inguinal hernia.  He says that when he urinates he can press on the hernia and he gets more urine.  This is indirect evidence that he has his bladder partially incarcerated in his hernia.    He is scheduled to undergo cystoscopy with holmium laser early in January of this year and would like to have the hernia repaired prior to this.  He is currently on Eliquis and needs to be cleared by cardiology.      Past Medical History:   Past Medical History:   Diagnosis Date    Acute encephalopathy 10/17/2024    Acute metabolic encephalopathy 10/17/2024    Anesthesia complication     urinary retention for all of surgeries    Benign essential hypertension 03/23/2011    BPH (benign prostatic hyperplasia) 02/20/2023    Last Assessment & Plan:  Formatting of this note might be different from the original. Doing well.  Cont tamsulosin and saw palmetto.  We did discuss adding finasteride but will have repeat psa prior.  Also discussed indications for holep.  As of now, he's doing great. Last episode of retention was clearly related to anesthesia.  Fu 4-6w with psa    Cardiogenic shock (HCC) 07/05/2022    Chronic kidney disease, stage III  (moderate) (HCC)     Coronary atherosclerosis 03/23/2011    Last Assessment & Plan:  There are no symptoms of an anginal syndrome. I provided education regarding the nature of coronary artery disease and our recommendations to reduce his long-term risk of ACS.  I recommended he continue utilizing aspirin and statins to minimize risk.  A lipid profile was requested with the option to use high intensity statins as an alternative to Mevacor in response to per    Enlarged prostate     Epistaxis 01/09/2022    History of aortic valve disease     History of mitral valve disease     History of transfusion     May 2021 - no adverse reaction    Paroxysmal atrial fibrillation (HCC)  02/06/2020    Pulmonary nodules 02/05/2021    PVC's (premature ventricular contractions)     S/P mitral valve repair 06/15/2021    May 2021 at [facility]    Status post aortic valve replacement 05/31/2021    At [facility] May 2021    SVT (supraventricular tachycardia) ([facility]     Syncope, cardiogenic     Thoracic aortic aneurysm without rupture (HCC) 02/05/2021    4.5cm on CT 1/21  Formatting of this note might be different from the original. 4.5cm on CT chest 1/28/21  Last Assessment & Plan:  Formatting of this note might be different from the original. 6/6/21 chest CT showed stable 4.3 cm ascending thoracic aortic aneurysm.    Thrombocytopenia (HCC) 07/05/2022         Past Surgical History:    Past Surgical History:   Procedure Laterality Date    CARDIAC ELECTROPHYSIOLOGY STUDY AND ABLATION  07/2024    CARDIAC SURGERY      MV repair, AV replaced and aortic aneurysm repaired    CAUTERIZE INNER NOSE Left 01/09/2022    Procedure: Sphenopalatine artery ligation;  Surgeon: Alfonso Rodriguez MD;  Location: [facility];  Service: ENT    COLONOSCOPY  2007    Dr. Schreiber.  Tubular adenoma descending colon polyp.    COLONOSCOPY  2010    Dr. Schreiber.  Diverticulosis.    COLONOSCOPY  08/2020    Dr. Schreiber.  12 mm descending colon inflammatory polyp, 3  mm benign lymphoid aggregate, diverticulosis.    FACIAL/NECK BIOPSY Left 11/30/2021    Procedure: EXCISION PYOGENIC GRANULOMA; FREE MUCOSAL GRAFT FROM NOSE;  Surgeon: Alfonso Rodriguez MD;  Location: [facility];  Service: ENT    INGUINAL HERNIA REPAIR      LUMBAR FUSION Bilateral 10/10/2024    Procedure: Navigated L2-5 laminectomy/decompression, L3-5 instrumented fusion, TLIF;  Surgeon: Jerod Foss MD;  Location: [facility];  Service: Orthopedics    TONSILLECTOMY           Allergies:    Allergies   Allergen Reactions    Oxycodone Nausea Only         Medications:    Current Outpatient Medications:     acetaminophen (TYLENOL) 650 mg CR tablet, Take 1 tablet (650 mg total) by mouth every 8 (eight) hours as needed for mild pain, Disp: 30 tablet, Rfl: 0    atorvastatin (LIPITOR) 20 mg tablet, Take 20 mg by mouth daily, Disp: , Rfl:     Cholecalciferol (Vitamin D3) 50 MCG (2000 UT) capsule, Take 2,000 Units by mouth daily, Disp: , Rfl:     diltiazem (DILACOR XR) 120 MG 24 hr capsule, Take 120 mg by mouth daily, Disp: , Rfl:     Eliquis 5 MG, Take 1 tablet (5 mg total) by mouth 2 (two) times a day, Disp: , Rfl:     Magnesium 250 MG TABS, Take 1 tablet by mouth daily  , Disp: , Rfl:     Saw Palmetto, Serenoa repens, (SAW PALMETTO PO), Take 1 capsule by mouth daily 250 mg, Disp: , Rfl:     tamsulosin (FLOMAX) 0.4 mg, TAKE 1 CAPSULE BY MOUTH EVERY DAY AT NIGHT, Disp: , Rfl:     zinc gluconate 50 mg tablet, Take 50 mg by mouth daily, Disp: , Rfl:       Social History:  Social History     Social History     Substance and Sexual Activity   Alcohol Use Not Currently     Social History     Substance and Sexual Activity   Drug Use Never     Social History     Tobacco Use   Smoking Status Former    Types: Cigarettes    Passive exposure: Never   Smokeless Tobacco Former         Family History:    Family History   Problem Relation Age of Onset    Alzheimer's disease Mother 80    Esophageal cancer Mother     Diabetes Father      Diabetes Sister     Kidney failure Sister     Stroke Sister     Coronary artery disease Sister         MI    Breast cancer Sister     Diabetes Maternal Grandmother     Coronary artery disease Maternal Uncle         massive MI - age 49 and 50         Review of Systems:    Chronic urinary issues as per the HPI.  Right groin pain as per the HPI.'s chest pain or shortness of breath.  Otherwise essentially negative    Vitals:  Vitals:    12/04/24 1156   BP: 118/76   Pulse: (!) 52   Temp: (!) 97.4 °F (36.3 °C)   SpO2: 97%       Physical Exam:  Middle-age white male 6 feet tall 206 pounds.  He is awake alert no distress.    Vital signs as above    Skin warm dry  Head normocephalic and atraumatic  Eyes PERRLA EOM intact  Ears and nose within normal limits  Throat gag reflex intact  Neck no masses thyromegaly lymphadenopathy palpable.  Back scar in the lumbar over the lower back.  No CVA or spinal tenderness  Lungs clear to A&P  Cor median sternotomy scar is noted.  Regular rate and rhythm no murmurs or carotid bruits  Abdomen soft flat.  Nontender.  Scar in the left groin.  No evidence of recurrent hernia.  Left groin shows an obvious hernia partially reducible slightly tender to palpation.  He has no other abdominal masses or hernias noted.  Extremities negative CCE  Neurologically ANO x 3 cranials 2-12 intact  Lymphatics no lymphadenopathy palpable.        Lab Results: I have personally reviewed pertinent reports. See below.  Imaging: I have personally reviewed pertinent reports.   EKG, Pathology, and Other Studies: I have personally reviewed pertinent reports.     No visits with results within 1 Day(s) from this visit.   Latest known visit with results is:   Admission on 10/17/2024, Discharged on 10/19/2024   Component Date Value    WBC 10/17/2024 15.49 (H)     RBC 10/17/2024 4.12     Hemoglobin 10/17/2024 12.4     Hematocrit 10/17/2024 37.4     MCV 10/17/2024 91     MCH 10/17/2024 30.1     MCHC 10/17/2024 33.2      RDW 10/17/2024 13.4     MPV 10/17/2024 10.0     Platelets 10/17/2024 365     nRBC 10/17/2024 0     Segmented % 10/17/2024 89 (H)     Immature Grans % 10/17/2024 2     Lymphocytes % 10/17/2024 5 (L)     Monocytes % 10/17/2024 4     Eosinophils Relative 10/17/2024 0     Basophils Relative 10/17/2024 0     Absolute Neutrophils 10/17/2024 13.73 (H)     Absolute Immature Grans 10/17/2024 0.25 (H)     Absolute Lymphocytes 10/17/2024 0.83     Absolute Monocytes 10/17/2024 0.61     Eosinophils Absolute 10/17/2024 0.02     Basophils Absolute 10/17/2024 0.05     Sodium 10/17/2024 136     Potassium 10/17/2024 3.5     Chloride 10/17/2024 98     CO2 10/17/2024 24     ANION GAP 10/17/2024 14 (H)     BUN 10/17/2024 21     Creatinine 10/17/2024 0.94     Glucose 10/17/2024 186 (H)     Calcium 10/17/2024 9.7     AST 10/17/2024 20     ALT 10/17/2024 27     Alkaline Phosphatase 10/17/2024 85     Total Protein 10/17/2024 7.4     Albumin 10/17/2024 4.1     Total Bilirubin 10/17/2024 1.27 (H)     eGFR 10/17/2024 79     LACTIC ACID 10/17/2024 3.6 (H)     Procalcitonin 10/17/2024 0.06     Protime 10/17/2024 16.0 (H)     INR 10/17/2024 1.25 (H)     PTT 10/17/2024 33     Blood Culture 10/17/2024 No Growth After 5 Days.     Blood Culture 10/17/2024 No Growth After 5 Days.     Color, UA 10/17/2024 Light Yellow     Clarity, UA 10/17/2024 Clear     Specific Gravity, UA 10/17/2024 1.012     pH, UA 10/17/2024 8.0     Leukocytes, UA 10/17/2024 Negative     Nitrite, UA 10/17/2024 Negative     Protein, UA 10/17/2024 Trace (A)     Glucose, UA 10/17/2024 Negative     Ketones, UA 10/17/2024 10 (1+) (A)     Urobilinogen, UA 10/17/2024 2.0 (A)     Bilirubin, UA 10/17/2024 Negative     Occult Blood, UA 10/17/2024 Negative     TSH 3RD GENERATON 10/17/2024 2.035     Ammonia 10/18/2024 29     pH, Trung 10/17/2024 7.428 (H)     pCO2, Trung 10/17/2024 30.3 (L)     pO2, Trung 10/17/2024 37.7     HCO3, Trung 10/17/2024 19.6 (L)     Base Excess, Trung 10/17/2024 -3.8     O2  Content, Trung 10/17/2024 11.0     O2 HGB, VENOUS 10/17/2024 68.3     INKO TEST 10/17/2024 Yes     RBC, UA 10/17/2024 1-2     WBC, UA 10/17/2024 1-2     Epithelial Cells 10/17/2024 None Seen     Bacteria, UA 10/17/2024 None Seen     LACTIC ACID 10/17/2024 1.3     Procalcitonin 10/18/2024 0.40 (H)     Sodium 10/18/2024 135     Potassium 10/18/2024 4.5     Chloride 10/18/2024 100     CO2 10/18/2024 25     ANION GAP 10/18/2024 10     BUN 10/18/2024 20     Creatinine 10/18/2024 1.01     Glucose 10/18/2024 142 (H)     Calcium 10/18/2024 9.2     eGFR 10/18/2024 72     WBC 10/18/2024 18.61 (H)     RBC 10/18/2024 3.88     Hemoglobin 10/18/2024 11.8 (L)     Hematocrit 10/18/2024 36.2 (L)     MCV 10/18/2024 93     MCH 10/18/2024 30.4     MCHC 10/18/2024 32.6     RDW 10/18/2024 13.9     Platelets 10/18/2024 364     MPV 10/18/2024 9.5     MRSA Culture Only 10/18/2024 No Methicillin Resistant Staphlyococcus aureus (MRSA) isolated     WBC 10/19/2024 9.78     RBC 10/19/2024 3.71 (L)     Hemoglobin 10/19/2024 11.1 (L)     Hematocrit 10/19/2024 34.1 (L)     MCV 10/19/2024 92     MCH 10/19/2024 29.9     MCHC 10/19/2024 32.6     RDW 10/19/2024 13.9     MPV 10/19/2024 9.3     Platelets 10/19/2024 359     nRBC 10/19/2024 0     Segmented % 10/19/2024 71     Immature Grans % 10/19/2024 1     Lymphocytes % 10/19/2024 16     Monocytes % 10/19/2024 8     Eosinophils Relative 10/19/2024 3     Basophils Relative 10/19/2024 1     Absolute Neutrophils 10/19/2024 7.05     Absolute Immature Grans 10/19/2024 0.08     Absolute Lymphocytes 10/19/2024 1.52     Absolute Monocytes 10/19/2024 0.80     Eosinophils Absolute 10/19/2024 0.28     Basophils Absolute 10/19/2024 0.05     Procalcitonin 10/19/2024 0.10     Supplier Name 10/19/2024 [employer]     Supplier Phone Number 10/19/2024 [phone]     Order Status 10/19/2024 Delivery Successful     Delivery Request Date 10/19/2024 10/19/2024     Date Delivered  10/19/2024  10/19/2024     Item Description 10/19/2024 Wheeled Walker, Adult          Impression:  Right inguinal hernia with evidence of incarcerated bladder.  He would like to have this repaired prior to undergoing his prostatic surgery in the new year.    Plan:  At this point we will have him see his cardiologist for cardiac clearance prior to undergoing right inguinal hernia repair under local anesthesia with sedation.  Of course we will hold his Eliquis 24 to 48 hours prior to this pending cardiology recommendations.

## 2024-12-11 ENCOUNTER — TELEPHONE (OUTPATIENT)
Dept: UROLOGY | Facility: CLINIC | Age: 74
End: 2024-12-11

## 2024-12-11 ENCOUNTER — CONSULT (OUTPATIENT)
Dept: FAMILY MEDICINE CLINIC | Facility: CLINIC | Age: 74
End: 2024-12-11
Payer: MEDICARE

## 2024-12-11 VITALS
HEART RATE: 67 BPM | BODY MASS INDEX: 28.69 KG/M2 | OXYGEN SATURATION: 97 % | SYSTOLIC BLOOD PRESSURE: 126 MMHG | TEMPERATURE: 97.8 F | RESPIRATION RATE: 20 BRPM | DIASTOLIC BLOOD PRESSURE: 72 MMHG | HEIGHT: 72 IN | WEIGHT: 211.8 LBS

## 2024-12-11 DIAGNOSIS — Z87.898 HISTORY OF URINARY RETENTION: ICD-10-CM

## 2024-12-11 DIAGNOSIS — I48.0 PAROXYSMAL ATRIAL FIBRILLATION (HCC): ICD-10-CM

## 2024-12-11 DIAGNOSIS — Z01.818 PRE-OP EXAMINATION: Primary | ICD-10-CM

## 2024-12-11 PROCEDURE — 99214 OFFICE O/P EST MOD 30 MIN: CPT | Performed by: FAMILY MEDICINE

## 2024-12-11 PROCEDURE — 93000 ELECTROCARDIOGRAM COMPLETE: CPT | Performed by: FAMILY MEDICINE

## 2024-12-11 RX ORDER — CEFAZOLIN SODIUM 2 G/50ML
2000 SOLUTION INTRAVENOUS ONCE
Status: CANCELLED | OUTPATIENT
Start: 2024-12-19 | End: 2024-12-11

## 2024-12-11 NOTE — ASSESSMENT & PLAN NOTE
Occurring after anethesia, monitor post op       
  Remains on eliquis       
Unknown if ever smoked

## 2024-12-13 NOTE — PATIENT INSTRUCTIONS
Rib Fracture   WHAT YOU NEED TO KNOW:   A rib fracture is a crack or break in a rib bone  Rib fractures usually heal within 6 weeks  You should be able to return to your usual activities before that time  DISCHARGE INSTRUCTIONS:   Call your local emergency number (911 in the [address]) if:   · You have trouble breathing  · You have new or increased pain  Return to the emergency department if:   · Your pain does not get better, even after treatment  · You have a fever or a cough  Call your doctor if:   · You have questions or concerns about your condition or care  Medicines: You may need any of the following:  · NSAIDs , such as ibuprofen, help decrease swelling, pain, and fever  This medicine is available with or without a doctor's order  NSAIDs can cause stomach bleeding or kidney problems in certain people  If you take blood thinner medicine, always ask your healthcare provider if NSAIDs are safe for you  Always read the medicine label and follow directions  · Prescription pain medicine  may be given  Ask your healthcare provider how to take this medicine safely  Some prescription pain medicines contain acetaminophen  Do not take other medicines that contain acetaminophen without talking to your healthcare provider  Too much acetaminophen may cause liver damage  Prescription pain medicine may cause constipation  Ask your healthcare provider how to prevent or treat constipation  · Take your medicine as directed  Contact your healthcare provider if you think your medicine is not helping or if you have side effects  Tell him or her if you are allergic to any medicine  Keep a list of the medicines, vitamins, and herbs you take  Include the amounts, and when and why you take them  Bring the list or the pill bottles to follow-up visits  Carry your medicine list with you in case of an emergency  Self-care:   · Take deep breaths and cough 10 times each hour    This will decrease your risk for a lung infection  Hug a pillow on your injured side to decrease pain while you take deep breaths  Take a deep breath and hold it for as long as you can  Let the air out and then cough  Deep breaths help open your airway  You may be given an incentive spirometer to help you take deep breaths  Put the plastic piece in your mouth and take a slow, deep breath, then let the air out and cough  Repeat these steps 10 times every hour  · Rest and limit activity as directed  Do not pull, push, or lift objects  Start to do more as your pain decreases  Ask your healthcare provider how much activity you can do  · Apply ice  on your chest near your fractured rib for 15 to 20 minutes every hour or as directed  Use an ice pack, or put crushed ice in a plastic bag  Cover it with a towel  Ice helps prevent tissue damage and decreases swelling and pain  Follow up with your doctor as directed:  Write down your questions so you remember to ask them during your visits  © Copyright [address] Information is for End User's use only and may not be sold, redistributed or otherwise used for commercial purposes  All illustrations and images included in CareNotes® are the copyrighted property of [employer]  or [address] [place] [address]  The above information is an  only  It is not intended as medical advice for individual conditions or treatments  Talk to your doctor, nurse or pharmacist before following any medical regimen to see if it is safe and effective for you  no

## 2024-12-19 ENCOUNTER — ANESTHESIA (OUTPATIENT)
Dept: PERIOP | Facility: HOSPITAL | Age: 74
End: 2024-12-19
Payer: MEDICARE

## 2024-12-19 ENCOUNTER — ANESTHESIA EVENT (OUTPATIENT)
Dept: PERIOP | Facility: HOSPITAL | Age: 74
End: 2024-12-19
Payer: MEDICARE

## 2024-12-19 ENCOUNTER — HOSPITAL ENCOUNTER (OUTPATIENT)
Facility: HOSPITAL | Age: 74
Setting detail: OUTPATIENT SURGERY
Discharge: HOME | End: 2024-12-19
Attending: SPECIALIST | Admitting: SPECIALIST
Payer: MEDICARE

## 2024-12-19 VITALS
OXYGEN SATURATION: 94 % | HEART RATE: 64 BPM | BODY MASS INDEX: 27.77 KG/M2 | HEIGHT: 72 IN | DIASTOLIC BLOOD PRESSURE: 91 MMHG | TEMPERATURE: 97.5 F | WEIGHT: 205 LBS | SYSTOLIC BLOOD PRESSURE: 151 MMHG | RESPIRATION RATE: 16 BRPM

## 2024-12-19 DIAGNOSIS — K40.90 UNILATERAL INGUINAL HERNIA WITHOUT OBSTRUCTION OR GANGRENE, RECURRENCE NOT SPECIFIED: Primary | ICD-10-CM

## 2024-12-19 PROCEDURE — C1781 MESH (IMPLANTABLE): HCPCS | Performed by: SPECIALIST

## 2024-12-19 PROCEDURE — 49507 PRP I/HERN INIT BLOCK >5 YR: CPT | Performed by: SPECIALIST

## 2024-12-19 DEVICE — BARD MESH, 6" X 6" (15 CM X 15 CM)
Type: IMPLANTABLE DEVICE | Site: INGUINAL | Status: FUNCTIONAL
Brand: BARD

## 2024-12-19 DEVICE — BARD MESH, 3" X 6" (7.6 CM X 15 CM)
Type: IMPLANTABLE DEVICE | Site: INGUINAL | Status: FUNCTIONAL
Brand: BARD

## 2024-12-19 RX ORDER — DIPHENHYDRAMINE HYDROCHLORIDE 50 MG/ML
12.5 INJECTION INTRAMUSCULAR; INTRAVENOUS ONCE AS NEEDED
Status: DISCONTINUED | OUTPATIENT
Start: 2024-12-19 | End: 2024-12-19 | Stop reason: HOSPADM

## 2024-12-19 RX ORDER — LIDOCAINE HYDROCHLORIDE 10 MG/ML
INJECTION, SOLUTION EPIDURAL; INFILTRATION; INTRACAUDAL; PERINEURAL AS NEEDED
Status: DISCONTINUED | OUTPATIENT
Start: 2024-12-19 | End: 2024-12-19

## 2024-12-19 RX ORDER — TRAMADOL HYDROCHLORIDE 50 MG/1
50 TABLET ORAL EVERY 4 HOURS PRN
Status: DISCONTINUED | OUTPATIENT
Start: 2024-12-19 | End: 2024-12-19 | Stop reason: HOSPADM

## 2024-12-19 RX ORDER — FENTANYL CITRATE/PF 50 MCG/ML
50 SYRINGE (ML) INJECTION
Status: DISCONTINUED | OUTPATIENT
Start: 2024-12-19 | End: 2024-12-19 | Stop reason: HOSPADM

## 2024-12-19 RX ORDER — TRAMADOL HYDROCHLORIDE 50 MG/1
50 TABLET ORAL EVERY 6 HOURS PRN
Qty: 20 TABLET | Refills: 0 | Status: SHIPPED | OUTPATIENT
Start: 2024-12-19 | End: 2024-12-29

## 2024-12-19 RX ORDER — ONDANSETRON 2 MG/ML
4 INJECTION INTRAMUSCULAR; INTRAVENOUS EVERY 8 HOURS PRN
Status: DISCONTINUED | OUTPATIENT
Start: 2024-12-19 | End: 2024-12-19 | Stop reason: HOSPADM

## 2024-12-19 RX ORDER — CEFAZOLIN SODIUM 2 G/50ML
2000 SOLUTION INTRAVENOUS ONCE
Status: COMPLETED | OUTPATIENT
Start: 2024-12-19 | End: 2024-12-19

## 2024-12-19 RX ORDER — BUPIVACAINE HYDROCHLORIDE 5 MG/ML
INJECTION, SOLUTION EPIDURAL; INTRACAUDAL AS NEEDED
Status: DISCONTINUED | OUTPATIENT
Start: 2024-12-19 | End: 2024-12-19 | Stop reason: HOSPADM

## 2024-12-19 RX ORDER — PROPOFOL 10 MG/ML
INJECTION, EMULSION INTRAVENOUS CONTINUOUS PRN
Status: DISCONTINUED | OUTPATIENT
Start: 2024-12-19 | End: 2024-12-19

## 2024-12-19 RX ORDER — MAGNESIUM HYDROXIDE 1200 MG/15ML
LIQUID ORAL AS NEEDED
Status: DISCONTINUED | OUTPATIENT
Start: 2024-12-19 | End: 2024-12-19 | Stop reason: HOSPADM

## 2024-12-19 RX ORDER — LIDOCAINE HYDROCHLORIDE 10 MG/ML
INJECTION, SOLUTION EPIDURAL; INFILTRATION; INTRACAUDAL; PERINEURAL AS NEEDED
Status: DISCONTINUED | OUTPATIENT
Start: 2024-12-19 | End: 2024-12-19 | Stop reason: HOSPADM

## 2024-12-19 RX ORDER — ONDANSETRON 2 MG/ML
4 INJECTION INTRAMUSCULAR; INTRAVENOUS ONCE AS NEEDED
Status: DISCONTINUED | OUTPATIENT
Start: 2024-12-19 | End: 2024-12-19 | Stop reason: HOSPADM

## 2024-12-19 RX ORDER — SODIUM CHLORIDE, SODIUM LACTATE, POTASSIUM CHLORIDE, CALCIUM CHLORIDE 600; 310; 30; 20 MG/100ML; MG/100ML; MG/100ML; MG/100ML
75 INJECTION, SOLUTION INTRAVENOUS CONTINUOUS
Status: DISCONTINUED | OUTPATIENT
Start: 2024-12-19 | End: 2024-12-19 | Stop reason: HOSPADM

## 2024-12-19 RX ORDER — PHENYLEPHRINE HCL IN 0.9% NACL 1 MG/10 ML
SYRINGE (ML) INTRAVENOUS AS NEEDED
Status: DISCONTINUED | OUTPATIENT
Start: 2024-12-19 | End: 2024-12-19

## 2024-12-19 RX ORDER — SODIUM CHLORIDE, SODIUM LACTATE, POTASSIUM CHLORIDE, CALCIUM CHLORIDE 600; 310; 30; 20 MG/100ML; MG/100ML; MG/100ML; MG/100ML
INJECTION, SOLUTION INTRAVENOUS CONTINUOUS PRN
Status: DISCONTINUED | OUTPATIENT
Start: 2024-12-19 | End: 2024-12-19

## 2024-12-19 RX ADMIN — TRAMADOL HYDROCHLORIDE 50 MG: 50 TABLET, COATED ORAL at 13:57

## 2024-12-19 RX ADMIN — Medication 100 MCG: at 11:09

## 2024-12-19 RX ADMIN — SODIUM CHLORIDE, SODIUM LACTATE, POTASSIUM CHLORIDE, AND CALCIUM CHLORIDE: .6; .31; .03; .02 INJECTION, SOLUTION INTRAVENOUS at 09:55

## 2024-12-19 RX ADMIN — Medication 100 MCG: at 10:33

## 2024-12-19 RX ADMIN — Medication 100 MCG: at 10:54

## 2024-12-19 RX ADMIN — PROPOFOL 100 MCG/KG/MIN: 10 INJECTION, EMULSION INTRAVENOUS at 10:08

## 2024-12-19 RX ADMIN — CEFAZOLIN SODIUM 2000 MG: 2 SOLUTION INTRAVENOUS at 10:08

## 2024-12-19 RX ADMIN — PROPOFOL 20 MG: 10 INJECTION, EMULSION INTRAVENOUS at 11:15

## 2024-12-19 RX ADMIN — PROPOFOL 50 MG: 10 INJECTION, EMULSION INTRAVENOUS at 10:14

## 2024-12-19 RX ADMIN — LIDOCAINE HYDROCHLORIDE 50 MG: 10 INJECTION, SOLUTION EPIDURAL; INFILTRATION; INTRACAUDAL; PERINEURAL at 10:08

## 2024-12-19 RX ADMIN — FENTANYL CITRATE 50 MCG: 0.05 INJECTION, SOLUTION INTRAMUSCULAR; INTRAVENOUS at 13:15

## 2024-12-19 NOTE — ANESTHESIA PREPROCEDURE EVALUATION
Procedure:  REPAIR INCARCERATED HERNIA INGUINAL (Right: Groin)    Relevant Problems   CARDIO   (+) Benign essential hypertension   (+) Coronary atherosclerosis   (+) Frequent PVCs   (+) Hypercholesterolemia   (+) Paroxysmal atrial fibrillation (HCC)    (+) Thoracic aortic aneurysm without rupture (HCC)      /RENAL   (+) BPH (benign prostatic hyperplasia)      MUSCULOSKELETAL   (+) Localized, primary osteoarthritis of shoulder region      Neurology/Sleep   (+) Neurocardiogenic syncope   (+) Stroke-like symptoms      Urinary   (+) Garcia catheter in place      Surgery/Wound/Pain   (+) S/P mitral valve repair   (+) Status post aortic valve replacement      Orthopedic/Musculoskeletal   (+) Spinal stenosis of lumbar region at multiple levels      Respiratory/Allergy   (+) Pulmonary nodules      Other   (+) Abdominal hernia   (+) History of urinary retention   (+) Inguinal Hernia Right      ECHO 1/19/24:    This result has an attachment that is not available.     Left ventricle is normal in size. Wall thickness is normal. Systolic   function is low normal with an ejection fraction of 50-55%. Abnormal   septal wall motion in the setting of prior thoracotomy. Indeterminate   diastolic function due to prior mitral valve repair.     Right ventricle cavity is normal. Systolic function is reduced, TAPSE   1.2 cm and S velocity 7 cm/s.     The mitral valve has been surgically repaired with an annular ring (#30   CG Future.) There is mild transvalvular regurgitation.Mean gradient 3 mmHg   at 77 bpm.     There is a bioprosthetic aortic valve (#25 Magna Ease.) The prosthetic   valve appears well-seated. There is no transvalvular regurgitation. There   is no bioprosthetic valve stenosis.     The aortic root is normal in size at 4.1 cm when adjusted for BSA. The   proximal ascending aorta is moderately dilated at 4.6 cm. Ascending aorta   was not visualized on prior Echos dating back to 2021. Ascending aorta   measured 4.5 cm on  chest CT in 2022.     Left Ventricle   Left ventricle is normal in size. Wall thickness is normal. Systolic function is low normal with an ejection fraction of 50-55%. Abnormal septal wall motion in the setting of prior thoracotomy. Indeterminate diastolic function due to prior mitral valve repair.     Right Ventricle   Right ventricle cavity is normal. Systolic function is reduced, TAPSE 1.2 cm and S velocity 7 cm/s.     Left Atrium   Left atrium cavity size is normal.     Right Atrium   Right atrium cavity is normal.     IVC/SVC   IVC not well visualized.     Mitral Valve   The mitral valve has been surgically repaired with an annular ring (#30 CG Future.) There is mild transvalvular regurgitation. There is no evidence of mitral valve stenosis. Mean gradient 3 mmHg at 77 bpm.     Tricuspid Valve   Tricuspid valve structure is normal. There is trace regurgitation. There is no evidence of tricuspid valve stenosis.     Aortic Valve   There is a bioprosthetic aortic valve (#25 Magna Ease.) The prosthetic valve appears well-seated. There is no transvalvular regurgitation. The gradient recorded across the prosthetic aortic valve is within the expected range. Peak velocity 2.5 m/s, mean gradient 13 mmHg, DVI 0.4, EOA 1.3 cm2, AT <80 ms.     Pulmonic Valve   Pulmonic valve structure is normal. There is trace regurgitation. There is no evidence of pulmonic valve stenosis.     Ascending Aorta   The aortic root is normal in size at 4.1 cm when adjusted for BSA. The proximal ascending aorta is moderately dilated at 4.6 cm.     Pericardium   There is no pericardial effusion.     Noncardiac   Significant Findings: [place] Act 112.     Study Details   A complete 2D echocardiogram was performed. Color flow, Pulse Wave and Continuous Wave Doppler was performed and analyzed.Overall the study quality was adequate.     Prior Study   There is a prior study available for comparison that was performed on 4/17/2023. As compared to the  previous study, there are changes. Right ventricular function appears newly reduced.   Physical Exam    Airway    Mallampati score: I  TM Distance: >3 FB  Neck ROM: full     Dental   No notable dental hx     Cardiovascular      Pulmonary      Other Findings        Anesthesia Plan  ASA Score- 3     Anesthesia Type- IV sedation with anesthesia with ASA Monitors.         Additional Monitors:     Airway Plan:            Plan Factors-Exercise tolerance (METS): >4 METS.    Chart reviewed. EKG reviewed.   Patient summary reviewed.    Patient is not a current smoker.  Patient did not smoke on day of surgery.    Obstructive sleep apnea risk education given perioperatively.        Induction- intravenous.    Postoperative Plan- Plan for postoperative opioid use.     Perioperative Resuscitation Plan - Level 1 - Full Code.       Informed Consent- Anesthetic plan and risks discussed with patient.  I personally reviewed this patient with the CRNA. Discussed and agreed on the Anesthesia Plan with the CRNA..

## 2024-12-19 NOTE — ANESTHESIA POSTPROCEDURE EVALUATION
Post-Op Assessment Note    CV Status:  Stable  Pain Score: 0    Pain management: adequate       Mental Status:  Alert and awake   Hydration Status:  Stable   PONV Controlled:  None   Airway Patency:  Patent     Post Op Vitals Reviewed: Yes    No anethesia notable event occurred.    Staff: CRNA       Last Filed PACU Vitals:  Vitals Value Taken Time   Temp 98 °F (36.7 °C) 12/19/24 1300   Pulse 76 12/19/24 1302   /91 12/19/24 1300   Resp 25 12/19/24 1302   SpO2 93 % 12/19/24 1302   Vitals shown include unfiled device data.    Modified Tabby:  No data recorded

## 2024-12-19 NOTE — OP NOTE
OPERATIVE REPORT  PATIENT NAME: Oni Nagy    :  1950  MRN: [id number]  Pt Location:  OR ROOM 07    SURGERY DATE: 2024    Surgeons and Role:     * Nino Grace MD - Primary    Preop Diagnosis:  Unilateral inguinal hernia without obstruction or gangrene, recurrence not specified [K40.90]  Right inguinal hernia incarcerated    Post-Op Diagnosis Codes:     * Unilateral inguinal hernia without obstruction or gangrene, recurrence not specified [K40.90]  Right inguinal hernia pantaloon    Procedure(s):  Right - REPAIR INCARCERATED HERNIA INGUINAL WITH MESH    Specimen(s):  * No specimens in log *    Estimated Blood Loss:   Minimal    Drains:  * No LDAs found *    Anesthesia Type:   IV Sedation with Anesthesia    Operative Indications:  Unilateral inguinal hernia without obstruction or gangrene, recurrence not specified [K40.90]  Right incarcerated inguinal hernia    Operative Findings:  Large indirect inguinal hernia with direct inguinal hernia      Complications:   None    Procedure and Technique:  Patient brought the operating room placed upper table in supine position.  Under adequate IV sedation the right groin was shaved prepped and draped in usual sterile fashion.    1% lidocaine was used to infiltrate the lingual nerve at the right inguinal area.  The skin over the right inguinal canal was also infiltrated.  A 15 blade was used to make an incision directly over the area.  Was carried down subtenons tissue using the Bovie.  Bleeders cauterized at that time.  Nick's fascia is divided direction the incision and retractors were inserted in the wound.  The external beak aponeurosis was identified and cleared of overlying fashion.  A bulge could be seen protruding through the external ring consistent with a hernia.  The aponeurosis was infiltrated with 1% lidocaine.  Inguinal canal was opened with a scalpel and Metzenbaum scissors.  A huge bulge could be seen in this area and it was carefully  dissected from surrounding tissue.  It was difficult to surround this due to the size.  Eventually it was circumferentially freed and a Penrose was placed around it.  It was then explored with and a large lipoma was identified.  This was grasped and retracted out of the cremasteric fibers.  Searching for the cord it was not readily apparent.  The vas and vessels were not identified at this time.  There was an obvious large bulge protruding through the internal ring and also from the floor the canal.  Further dissection identified the cord structures and these were protected.  A large bulge which was identified as a lipoma was dissected off the cord structures and reduced to the internal ring.  A sponge was placed in this area to hold in the lipoma and also for the development of this preperitoneal space.  There was also a bulge protruding through the floor the canal and this was freed up.  The overlying transversalis fascia was opened and the preperitoneal fat was identified.  Also at that point the inferior epigastric vessels were also identified.  A sponge was also placed through the defect to develop the area and keep the preperitoneal fat out of the way.  At this point it was felt that the hernial defects were defined.  Once again the indirect defect was huge and that point the sponge was removed and a 6 x 6 piece of Prolene mesh was obtained.  It was folded upon itself and then rolled.  He was placed through the defect and then allowed to expand.  This effectively repaired the hernia.  It was sutured in place with 2-0 Prolene sutures in horizontal mattress fashions to secure it to the area.  Another piece of Prolene mesh that was 3 x 6 was obtained.  It was cut to fit the floor the canal.  The end was tapered.  It was split of the middle to allow passage of the cord.  It was then placed in the floor of the inguinal canal with the cord between tails.  Starting near the pubic tubercle a 2-0 Prolene suture was  used to run the lateral aspect of the mesh to the inguinal ligament up to and around the internal ring..  The medial aspect of the mesh was then sutured to the conjoined tendon also in a running fashion securing the mesh and covering the floor repairing the hernia.  Redundant mesh at the tails were removed and the tails were sutured around the internal ring and the back of the cord structures.  These were sutured together with the running Prolene's securing the mesh.  There was inspected for bleeding and any bleeding was controlled electrocautery.  There was infiltrated with half percent Marcaine.  After adequate hemostasis was obtained the Penrose drain was removed and the cord was placed back in inguinal canal.  The external beak aponeurosis reapproximated with 3-0 Vicryl in a running fashion.  Nick's 3-0 Vicryl interrupted fashion.  Skin 4-0 Monocryl running subsequent fashion.  Benzoin Steri-Strips were applied.  EBL minimal patient tolerated procedure well delivered to the recovery room in stable condition.  Thanks.   I was present for the entire procedure.    Patient Disposition:  PACU              SIGNATURE: Nino Grace MD  DATE: December 19, 2024  TIME: 5:03 PM

## 2024-12-20 ENCOUNTER — TELEPHONE (OUTPATIENT)
Age: 74
End: 2024-12-20

## 2024-12-20 NOTE — TELEPHONE ENCOUNTER
Patient of Dr Grace s/p Right incarcerated inguinal hernia repair yesterday.  Received call from patients wife regarding bruising and hardness at incision site.    Stated there is 4x8 area of bruising and hardness at incision site. Moderate discomfort that patient is taking his oxycodone to manage. He stated that he had a BM yesterday. Denies fever or n/v. He is tolerating food and fluids.    Patient wanted to make Dr Grace aware as he was concerned with the weekend coming up.  # [phone]

## 2025-01-07 ENCOUNTER — OFFICE VISIT (OUTPATIENT)
Dept: SURGERY | Facility: CLINIC | Age: 75
End: 2025-01-07

## 2025-01-07 VITALS
WEIGHT: 205 LBS | HEIGHT: 72 IN | TEMPERATURE: 97.3 F | HEART RATE: 47 BPM | OXYGEN SATURATION: 95 % | BODY MASS INDEX: 27.77 KG/M2

## 2025-01-07 DIAGNOSIS — K40.30 INCARCERATED RIGHT INGUINAL HERNIA: Primary | ICD-10-CM

## 2025-01-07 PROCEDURE — 99024 POSTOP FOLLOW-UP VISIT: CPT | Performed by: SPECIALIST

## 2025-01-07 NOTE — PROGRESS NOTES
Minor presents today for his follow-up visit status post repair of a huge right inguinal hernia with bladder incarcerated.  He had a huge pantaloon hernia.  We fixed it old school i.e. a large mesh plug with a Merry repair.    Today in the office he says he is doing well.  He said he got a huge ecchymosis postop covering his entire lower abdomen and pelvis.  That is since dissipated.  The only thing that worries/bothers him is the incision has a ridge and he wonders if this will go away.  He said there was a small amount of oozing from the wound but that is since stopped.    He is voiding significantly better since the surgery.  He used to go 5 or 6 times a night and now has been going once a night.  He has increased lycopene and various other natural prostate stimulating meds.  He had prostate surgery scheduled but canceled the least at this point to see how he does.    Physical exam: Middle-age white male awake alert no distress.    Right groin shows a relatively small healing incision with subcutaneous edema and perhaps a small hematoma.  No evidence of infection or recurrence.  He is next in cosmetic result.    Impression: Doing well status post repair of a huge right inguinal hernia with incarcerated bladder.  He is instructed that the subcutaneous edema will continue to dissipate over the next few weeks.  Should you be gone about 6 to 8 weeks postop.    Plan: At this point he is discharged from the office and may return here as needed.  If he has any problems he should of course give us a call.    It was pleasure to see him and his wife Sherron again

## 2025-01-20 ENCOUNTER — HOSPITAL ENCOUNTER (OUTPATIENT)
Dept: RADIOLOGY | Facility: HOSPITAL | Age: 75
Discharge: HOME | End: 2025-01-20
Attending: ORTHOPAEDIC SURGERY
Payer: MEDICARE

## 2025-01-20 VITALS — HEIGHT: 72 IN | BODY MASS INDEX: 27.77 KG/M2 | WEIGHT: 205.03 LBS

## 2025-01-20 DIAGNOSIS — Z98.1 S/P SPINAL FUSION: Primary | ICD-10-CM

## 2025-01-20 DIAGNOSIS — Z98.1 S/P SPINAL FUSION: ICD-10-CM

## 2025-01-20 PROCEDURE — 72100 X-RAY EXAM L-S SPINE 2/3 VWS: CPT

## 2025-01-20 PROCEDURE — 99213 OFFICE O/P EST LOW 20 MIN: CPT | Performed by: ORTHOPAEDIC SURGERY

## 2025-01-20 NOTE — PROGRESS NOTES
POST OP NOTE     Oni Nagy  here today s/p L2-5 laminectomy/decompression, L3-5 instrumented fusion        Surgery date: 10/10/24    Subjective: Preoperative symptoms were low back and left lower extremity pain. Today, he reports complete resolution of low back and left lower extremity symptoms. He is overall happy with his post-operative recovery so far.  He states he has been recovering from his hernia surgery from 12/19/2024. Continues to ambulate as much as tolerated.  Denies any fevers, chills, and night sweats.  Patient states he has been trying to go for periodic walks.  No cough, no shortness of breath.  No chest pain, no palpitations. He does state that he has not participated in any physical therapy since surgery.  Patient states he has been having pain within his left shoulder after picking up a heavy bucket, about 50lbs.    Post-Op Medications:  No longer taking any pain medications    Objective:  Ht 6' (1.829 m)   Wt 93 kg (205 lb 0.4 oz)   BMI 27.81 kg/m²     Strength: 5/5 bilateral   Sensation: intact   Gait: ambulating independently    Posterior lumbar incision healing extremely well. No signs of erythema, discharge, or purulence.  There is no appreciable effusion.    Calf: soft, non tender, no swelling or erythema    Imaging: I have reviewed and independently visualized the imaging studies (01/20/25)  myself and my interpretation is the following: Instrumentation in place and in good alignment.    Assessment: s/p lumbar fusion on 10/10/2024. Overall doing very well.    Plan:    Patient was instructed to do the following   -Able to walk as much as tolerated. Continue to limit vigorous turning/twisting/bending and heavy lifting.  Okay to advance weight lifting threshold to 15 to 20 pounds.  -Did recommend referral to physical therapy for lower extremity strengthening and endurance.  Referral was given today.  -Patient can take OTC medications as needed for pain control.  -Follow up 3 months  for 6-month postop appointment with repeat x-rays.  Oni Nagy was instructed to call the office with any concerns or questions.

## 2025-04-15 NOTE — TELEPHONE ENCOUNTER
Called and lm for pt to call back to reschedule follow up on 4/21/25 due to the provider being OOO. Advised pt that appointment would be cancelled until they call back. Provider call back number.

## 2025-04-23 ENCOUNTER — HOSPITAL ENCOUNTER (OUTPATIENT)
Dept: RADIOLOGY | Facility: HOSPITAL | Age: 75
Discharge: HOME | End: 2025-04-23
Attending: FAMILY MEDICINE
Payer: MEDICARE

## 2025-04-23 ENCOUNTER — OFFICE VISIT (OUTPATIENT)
Dept: FAMILY MEDICINE CLINIC | Facility: CLINIC | Age: 75
End: 2025-04-23
Payer: MEDICARE

## 2025-04-23 VITALS
HEART RATE: 50 BPM | SYSTOLIC BLOOD PRESSURE: 108 MMHG | WEIGHT: 204.6 LBS | OXYGEN SATURATION: 96 % | DIASTOLIC BLOOD PRESSURE: 74 MMHG | BODY MASS INDEX: 27.75 KG/M2

## 2025-04-23 DIAGNOSIS — I71.20 THORACIC AORTIC ANEURYSM WITHOUT RUPTURE, UNSPECIFIED PART (HCC): ICD-10-CM

## 2025-04-23 DIAGNOSIS — I48.0 PAROXYSMAL ATRIAL FIBRILLATION (HCC): ICD-10-CM

## 2025-04-23 DIAGNOSIS — J31.0 CHRONIC RHINITIS: ICD-10-CM

## 2025-04-23 DIAGNOSIS — I25.10 ATHEROSCLEROSIS OF NATIVE CORONARY ARTERY OF NATIVE HEART WITHOUT ANGINA PECTORIS: ICD-10-CM

## 2025-04-23 DIAGNOSIS — I10 BENIGN ESSENTIAL HYPERTENSION: ICD-10-CM

## 2025-04-23 DIAGNOSIS — R07.2 PRECORDIAL PAIN: ICD-10-CM

## 2025-04-23 DIAGNOSIS — M94.0 COSTOCHONDRITIS, ACUTE: Primary | ICD-10-CM

## 2025-04-23 PROBLEM — Z87.898 HISTORY OF URINARY RETENTION: Status: RESOLVED | Noted: 2020-05-22 | Resolved: 2025-04-23

## 2025-04-23 PROBLEM — R29.90 STROKE-LIKE SYMPTOMS: Status: RESOLVED | Noted: 2024-10-11 | Resolved: 2025-04-23

## 2025-04-23 PROCEDURE — 71275 CT ANGIOGRAPHY CHEST: CPT

## 2025-04-23 PROCEDURE — 99214 OFFICE O/P EST MOD 30 MIN: CPT | Performed by: FAMILY MEDICINE

## 2025-04-23 PROCEDURE — G2211 COMPLEX E/M VISIT ADD ON: HCPCS | Performed by: FAMILY MEDICINE

## 2025-04-23 RX ORDER — METHYLPREDNISOLONE 4 MG/1
TABLET ORAL
Qty: 21 TABLET | Refills: 0 | Status: SHIPPED | OUTPATIENT
Start: 2025-04-23

## 2025-04-23 RX ORDER — FLUTICASONE PROPIONATE 50 MCG
2 SPRAY, SUSPENSION (ML) NASAL DAILY
Qty: 48 G | Refills: 3 | Status: SHIPPED | OUTPATIENT
Start: 2025-04-23 | End: 2025-04-24 | Stop reason: SINTOL

## 2025-04-23 RX ADMIN — IOHEXOL 85 ML: 350 INJECTION, SOLUTION INTRAVENOUS at 14:58

## 2025-04-23 NOTE — PROGRESS NOTES
Name: Oni Nagy      : 1950      MRN: [id number]  Encounter Provider: Kirill Moreno Jr, MD  Encounter Date: 2025   Encounter department: [facility]  :  Assessment & Plan  Costochondritis, acute  Exam seems consistent with costochondritis.  Place him on a Medrol Dosepak.  I do remain concerned with his multiple risk factors so we will continue the workup.  Orders:    methylPREDNISolone 4 MG tablet therapy pack; Use as directed on package    Precordial pain  Check CTA chest to rule out any aortic dissection.  D-dimer was mildly elevated, so pulmonary embolism could also be considered.  This is ordered stat and if he has any concerning findings, we will expedite evaluation in the emergency room.  Orders:    CTA chest wo w contrast; Future    methylPREDNISolone 4 MG tablet therapy pack; Use as directed on package    Paroxysmal atrial fibrillation (HCC)   Continue close follow-up with cardiology.  Orders:    CTA chest wo w contrast; Future    Benign essential hypertension  He has a history of thoracic aneurysm.  Chest CTA chest.  Orders:    CTA chest wo w contrast; Future    Atherosclerosis of native coronary artery of native heart without angina pectoris    Orders:    CTA chest wo w contrast; Future    Thoracic aortic aneurysm without rupture, unspecified part (HCC)  Check CTA chest.  Orders:    CTA chest wo w contrast; Future    Chronic rhinitis  He complains of some ongoing rhinitis and nasal congestion.  He wakes in the morning with a runny nose every day.  Try Flonase 2 sprays per nostril at night.  Orders:    fluticasone (FLONASE) 50 mcg/act nasal spray; 2 sprays into each nostril daily           History of Present Illness   Patient presents today concern regarding some pulmonary issues.  He notes over the past several weeks he has had an intermittent cough.  It seemed to be much worse about 3 to 4 days ago.  He was blaming his allergies.  He cough significantly  and produce significant phlegm which seem to resolve the cough.  He believes this was 3 days ago.  Unfortunately, yesterday, he began having escalating chest pain which became so severe that he went to the ER.  He states it was about 8/10.  He went to the ER at [facility].  Troponins were negative.  D-dimer was mildly positive.  They did a chest x-ray but not any type of CT scan.  He does have a known history of coronary atherosclerosis as well as thoracic aortic aneurysm.  He notes he feels better today.  He feels he still has some right sided chest pain and feels mildly fatigued.  He has some chronic mild shortness of breath with exertion which is stable.  He denies any fever or chills.  Cough  This is a new problem. The current episode started in the past 7 days. The problem has been gradually worsening. The problem occurs every few hours. The cough is Productive of purulent sputum. Associated symptoms include chest pain, heartburn, nasal congestion, postnasal drip, rhinorrhea, shortness of breath and wheezing. Pertinent negatives include no chills, ear congestion, ear pain, fever, headaches, hemoptysis, myalgias, rash, sore throat, sweats or weight loss. The symptoms are aggravated by lying down.     Review of Systems   Constitutional:  Negative for appetite change, chills, fatigue, fever, unexpected weight change and weight loss.   HENT:  Positive for postnasal drip and rhinorrhea. Negative for ear pain, sore throat and trouble swallowing.    Eyes:  Negative for visual disturbance.   Respiratory:  Positive for cough, shortness of breath and wheezing. Negative for hemoptysis and chest tightness.    Cardiovascular:  Positive for chest pain. Negative for palpitations and leg swelling.   Gastrointestinal:  Positive for heartburn. Negative for abdominal distention, abdominal pain, blood in stool, constipation and diarrhea.   Endocrine: Negative for polyuria.   Genitourinary:  Negative for difficulty  urinating and flank pain.   Musculoskeletal:  Negative for arthralgias and myalgias.   Skin:  Negative for rash.   Neurological:  Negative for dizziness, light-headedness and headaches.   Hematological:  Negative for adenopathy. Does not bruise/bleed easily.   Psychiatric/Behavioral:  Negative for dysphoric mood and sleep disturbance. The patient is not nervous/anxious.        Objective   /74   Pulse (!) 50   Wt 92.8 kg (204 lb 9.6 oz)   SpO2 96%   BMI 27.75 kg/m²      Physical Exam  Constitutional:       General: He is not in acute distress.     Appearance: Normal appearance. He is well-developed. He is not diaphoretic.   HENT:      Head: Normocephalic.      Right Ear: External ear normal.      Left Ear: External ear normal.      Nose: Nose normal.   Eyes:      General:         Right eye: No discharge.         Left eye: No discharge.      Conjunctiva/sclera: Conjunctivae normal.      Pupils: Pupils are equal, round, and reactive to light.   Neck:      Thyroid: No thyromegaly.      Trachea: No tracheal deviation.   Cardiovascular:      Rate and Rhythm: Normal rate and regular rhythm.      Heart sounds: Normal heart sounds. No murmur heard.     No friction rub.   Pulmonary:      Effort: Pulmonary effort is normal. No respiratory distress.      Breath sounds: Normal breath sounds. No wheezing.   Chest:      Chest wall: No tenderness.   Abdominal:      General: There is no distension.      Palpations: There is no mass.      Tenderness: There is no abdominal tenderness. There is no guarding or rebound.      Hernia: No hernia is present.   Musculoskeletal:         General: Tenderness (He has some tenderness palpation along the right sternal costal margin.) present. No swelling or deformity.      Cervical back: Normal range of motion.      Right lower leg: No edema.      Left lower leg: No edema.   Skin:     Findings: No erythema or rash.   Neurological:      General: No focal deficit present.      Mental  Status: He is alert.      Cranial Nerves: No cranial nerve deficit.      Coordination: Coordination normal.   Psychiatric:         Thought Content: Thought content normal.

## 2025-04-23 NOTE — ASSESSMENT & PLAN NOTE
He has a history of thoracic aneurysm.  Chest CTA chest.  Orders:    CTA chest wo w contrast; Future

## 2025-04-24 ENCOUNTER — DOCUMENTATION (OUTPATIENT)
Dept: ADMINISTRATIVE | Facility: OTHER | Age: 75
End: 2025-04-24

## 2025-04-24 DIAGNOSIS — R91.8 PULMONARY NODULES: ICD-10-CM

## 2025-04-24 DIAGNOSIS — R91.1 NODULE OF LOWER LOBE OF RIGHT LUNG: Primary | ICD-10-CM

## 2025-04-24 NOTE — PROGRESS NOTES
04/24/25 11:31 AM    Annual Wellness Visit outreach is not required, the practice has scheduled the AWV.    Thank you.  Nikolay Robles MA  [facility]

## 2025-04-24 NOTE — ASSESSMENT & PLAN NOTE
CTA done for chest pain and concern for thoracic aortic dissection revealed a branching nodular opacity in the right lower lobe which is mild increase since 2023.  Check CT chest in 6 months.

## 2025-04-24 NOTE — PROGRESS NOTES
"I spoke to the patient.  CTA chest did not reveal an aortic dissection.  They did suggest follow-up for\"a subcentimeter branching nodular opacity in the right lower lobe is mildly increased since 2023. Suggest follow-up\".  Timeframe was not given but I will set up close CT chest without contrast in 6 months.  He notes his chest pain has resolved.  He did take the Medrol Dosepak.  He felt that 1 spray of Flonase gave him some increased urinary retention so he will stop that immediately.  He does see cardiology at [facility] and I have suggested that he contact them immediately for follow-up.  If they are not able to see him within the next day or so, I am happy to set him up with one of our cardiologist.  He agrees with this approach.    Problem List Items Addressed This Visit    None  Visit Diagnoses         Nodule of lower lobe of right lung    -  Primary    Relevant Orders    CT chest wo contrast            "

## (undated) DEVICE — NEPTUNE E-SEP SMOKE EVACUATION PENCIL, COATED, 70MM BLADE, PUSH BUTTON SWITCH: Brand: NEPTUNE E-SEP

## (undated) DEVICE — PENROSE DRAIN, 18 X 3 8: Brand: CARDINAL HEALTH

## (undated) DEVICE — SURGI KIT INSTRUMENT ORGANIZER

## (undated) DEVICE — CHLORAPREP HI-LITE 26ML ORANGE

## (undated) DEVICE — ELECTRODE BLADE MOD E-Z CLEAN 2.5IN 6.4CM -0012M

## (undated) DEVICE — SWABSTCK, BENZOIN TINCTURE, 1/PK, STRL: Brand: APLICARE

## (undated) DEVICE — DISPOSABLE EQUIPMENT COVER: Brand: SMALL TOWEL DRAPE

## (undated) DEVICE — SUT PROLENE 2-0 SH 30 IN 8833H

## (undated) DEVICE — SUT MONOCRYL 4-0 PS-2 27 IN Y426H

## (undated) DEVICE — NEEDLE 25G X 1 1/2

## (undated) DEVICE — MARKER REFLECTIVE RADIOPAQUE SPHERE

## (undated) DEVICE — 3M™ STERI-STRIP™ REINFORCED ADHESIVE SKIN CLOSURES, R1547, 1/2 IN X 4 IN (12 MM X 100 MM), 6 STRIPS/ENVELOPE: Brand: 3M™ STERI-STRIP™

## (undated) DEVICE — LIGHT HANDLE COVER SLEEVE DISP BLUE STELLAR

## (undated) DEVICE — 3M™ TEGADERM™ TRANSPARENT FILM DRESSING FRAME STYLE, 1624W, 2-3/8 IN X 2-3/4 IN (6 CM X 7 CM), 100/CT 4CT/CASE: Brand: 3M™ TEGADERM™

## (undated) DEVICE — DURASEAL EXACT SPINE SEALANT SYSTEM 5ML

## (undated) DEVICE — INSTRUMENT 8670015 PAK 2 NEEDLES TROCAR

## (undated) DEVICE — SNAP KOVER: Brand: UNBRANDED

## (undated) DEVICE — TOOL MR8-14MH30 MR8 14CM MATCH 3MM: Brand: MIDAS REX MR8

## (undated) DEVICE — SYRINGE 30ML LL

## (undated) DEVICE — PROBE PEDICLE DISP

## (undated) DEVICE — PACK UNIVERSAL NECK

## (undated) DEVICE — COTTON TIP APPLICTOR 2 PK

## (undated) DEVICE — ANTIBACTERIAL VIOLET BRAIDED (POLYGLACTIN 910), SYNTHETIC ABSORBABLE SUTURE: Brand: COATED VICRYL

## (undated) DEVICE — TIBURON TRANSVERSE LAPAROTOMY SHEET: Brand: CONVERTORS

## (undated) DEVICE — INTENDED FOR TISSUE SEPARATION, AND OTHER PROCEDURES THAT REQUIRE A SHARP SURGICAL BLADE TO PUNCTURE OR CUT.: Brand: BARD-PARKER ® CARBON RIB-BACK BLADES

## (undated) DEVICE — TIBURON SPLIT SHEET: Brand: CONVERTORS

## (undated) DEVICE — PROXIMATE SKIN STAPLERS (35 WIDE) CONTAINS 35 STAINLESS STEEL STAPLES (FIXED HEAD): Brand: PROXIMATE

## (undated) DEVICE — DRESSING MEPILEX AG BORDER POST-OP 4 X 6 IN

## (undated) DEVICE — STERILE POLYISOPRENE POWDER-FREE SURGICAL GLOVES: Brand: PROTEXIS

## (undated) DEVICE — ANTI-FOG SOLUTION WITH FOAM PAD: Brand: DEVON

## (undated) DEVICE — C-ARM: Brand: UNBRANDED

## (undated) DEVICE — SURGICAL CLIPPER BLADE GENERAL USE

## (undated) DEVICE — PAD GROUNDING DUAL ADULT

## (undated) DEVICE — TRAY FOLEY 16FR URIMETER SURESTEP

## (undated) DEVICE — INTENDED FOR TISSUE SEPARATION, AND OTHER PROCEDURES THAT REQUIRE A SHARP SURGICAL BLADE TO PUNCTURE OR CUT.: Brand: BARD-PARKER SAFETY BLADES SIZE 15, STERILE

## (undated) DEVICE — MONITORING SPINAL IMPULSE CASE FEE

## (undated) DEVICE — Device

## (undated) DEVICE — DRAPE SHEET THREE QUARTER

## (undated) DEVICE — WAND COBLATION  EVAC 70 XTRA TONSIL

## (undated) DEVICE — SUT SILK 3-0 TIES 144 IN LA54G

## (undated) DEVICE — STERILE NASAL PACK: Brand: CARDINAL HEALTH

## (undated) DEVICE — DRAPE C-ARMOUR

## (undated) DEVICE — 3000CC GUARDIAN II: Brand: GUARDIAN

## (undated) DEVICE — GLOVE SRG BIOGEL 7.5

## (undated) DEVICE — SUCTION BOVIE ENT

## (undated) DEVICE — SPECIMEN CONTAINER STERILE PEEL PACK

## (undated) DEVICE — NEURO PATTIES 1/2 X 3

## (undated) DEVICE — SPONGE SCRUB 4 PCT CHLORHEXIDINE

## (undated) DEVICE — DRESSING MEPORE FILM ADHESIVE 4 X 5IN

## (undated) DEVICE — SPONGE LAP 18 X 4 IN STRL RFD

## (undated) DEVICE — JACKSON TABLE FOAM POSITIONING KIT: Brand: CARDINAL HEALTH

## (undated) DEVICE — NEURO PATTIES 1/2 X 1 1/2

## (undated) DEVICE — SUPPLY FEE STD

## (undated) DEVICE — INTENDED FOR TISSUE SEPARATION, AND OTHER PROCEDURES THAT REQUIRE A SHARP SURGICAL BLADE TO PUNCTURE OR CUT.: Brand: BARD-PARKER SAFETY BLADES SIZE 10, STERILE

## (undated) DEVICE — SCD SEQUENTIAL COMPRESSION COMFORT SLEEVE MEDIUM KNEE LENGTH: Brand: KENDALL SCD

## (undated) DEVICE — PENCIL ELECTROSURG E-Z CLEAN -0035H

## (undated) DEVICE — GLOVE INDICATOR PI UNDERGLOVE SZ 7.5 BLUE

## (undated) DEVICE — ANTIBACTERIAL UNDYED BRAIDED (POLYGLACTIN 910), SYNTHETIC ABSORBABLE SUTURE: Brand: COATED VICRYL

## (undated) DEVICE — DRESSING MEPILEX FOAM BORDER FLEX 4 X 4IN

## (undated) DEVICE — DISPOSABLE OR TOWEL: Brand: CARDINAL HEALTH

## (undated) DEVICE — HEMOSTATIC MATRIX SURGIFLO 8ML W/THROMBIN

## (undated) DEVICE — DRAPE LAPAROTOMY W/POUCHES

## (undated) DEVICE — DRAPE SURGIKIT SADDLE BAG

## (undated) DEVICE — SUT VICRYL 3-0 SH 27 IN J416H

## (undated) DEVICE — PAD GROUNDING ADULT

## (undated) DEVICE — BETHLEHEM UNIVERSAL OUTPATIENT: Brand: CARDINAL HEALTH

## (undated) DEVICE — NEEDLE BLUNT 18 G X 1 1/2IN

## (undated) DEVICE — SUT VICRYL 1 CT-1 CR/8 27 IN JJ40G

## (undated) DEVICE — DRESSING MEPILEX FOAM BORDER SACRUM 6.3 X 7.9IN

## (undated) DEVICE — MEDI-VAC YANKAUER SUCTION HANDLE W/STRAIGHT TIP & CONTROL VENT: Brand: CARDINAL HEALTH

## (undated) DEVICE — BETHLEHEM UNIVERSAL SPINE, KIT: Brand: CARDINAL HEALTH

## (undated) DEVICE — GAUZE SPONGES,16 PLY: Brand: CURITY